# Patient Record
Sex: FEMALE | Race: WHITE
[De-identification: names, ages, dates, MRNs, and addresses within clinical notes are randomized per-mention and may not be internally consistent; named-entity substitution may affect disease eponyms.]

---

## 2017-12-01 ENCOUNTER — HOSPITAL ENCOUNTER (OUTPATIENT)
Dept: HOSPITAL 53 - M SDC | Age: 27
Discharge: HOME | End: 2017-12-01
Attending: OTOLARYNGOLOGY
Payer: COMMERCIAL

## 2017-12-01 VITALS — BODY MASS INDEX: 31.34 KG/M2 | WEIGHT: 195 LBS | HEIGHT: 66 IN

## 2017-12-01 VITALS — SYSTOLIC BLOOD PRESSURE: 134 MMHG | DIASTOLIC BLOOD PRESSURE: 84 MMHG

## 2017-12-01 DIAGNOSIS — R06.83: ICD-10-CM

## 2017-12-01 DIAGNOSIS — R19.6: ICD-10-CM

## 2017-12-01 DIAGNOSIS — F41.9: ICD-10-CM

## 2017-12-01 DIAGNOSIS — Z85.828: ICD-10-CM

## 2017-12-01 DIAGNOSIS — J35.8: ICD-10-CM

## 2017-12-01 DIAGNOSIS — Z91.048: ICD-10-CM

## 2017-12-01 DIAGNOSIS — Z91.013: ICD-10-CM

## 2017-12-01 DIAGNOSIS — M12.9: ICD-10-CM

## 2017-12-01 DIAGNOSIS — Z91.041: ICD-10-CM

## 2017-12-01 DIAGNOSIS — Z88.5: ICD-10-CM

## 2017-12-01 DIAGNOSIS — Z79.899: ICD-10-CM

## 2017-12-01 DIAGNOSIS — J35.01: Primary | ICD-10-CM

## 2017-12-01 DIAGNOSIS — Z79.3: ICD-10-CM

## 2017-12-01 LAB — CONTROL LINE UCG: (no result)

## 2017-12-01 PROCEDURE — 84703 CHORIONIC GONADOTROPIN ASSAY: CPT

## 2017-12-01 PROCEDURE — 88302 TISSUE EXAM BY PATHOLOGIST: CPT

## 2017-12-01 PROCEDURE — 42826 REMOVAL OF TONSILS: CPT

## 2017-12-01 NOTE — ROOPDOC
[facility] Report Of Operation


Report of Operation


DATE OF PROCEDURE: 12/1/17





PREPROCEDURE DIAGNOSES: [Chronic tonsillitis with tonsil stones and halitosis.].





POSTPROCEDURE DIAGNOSES: [Same].





PROCEDURE: [Tonsillectomy]. 





SURGEON: [Christophe Moss MD





ASSISTANT: [None], MD





ANESTHESIA: [Gen. via endotracheal tube].





ESTIMATED BLOOD LOSS: Approximately [less than 1] mL. 





COMPLICATIONS: [None]. 





REMARKS: [Small tonsil stones present.].





PROCEDURE NOTE: [With the patient in the supine position after being induced 

and intubated, prepped and draped in usual fashion. The Gabino mouth gag with a 

grooved tongue blade was placed in the oral cavity and a red rubber Shen 

Salsa was placed in the right nasal cavity, brought out through the oral cavity 

for soft palate retraction. Patient then had the Coblation E VAC 70 wand used 

with Coblation settings of 7 and coag setting of 3. A curved tonsillar Allis 

clamp was used to medialize left tonsil and was dissected out of the tonsil 

fossa without any significant issues. A similar fashion, the right side was 

also dissected out without any issues. There was little bit more scar tissue in 

the capsule on the right side compared to the left side. There is no bleeding. 

Tonsil sponges were used to irritate the tonsillar fossae. No bleeding. An 

injection of 2 mL of 1% lidocaine, 0.5% bupivacaine was injected into the 

tonsillar fossa. There is no problems present. Patient tolerated the procedure 

well. There is no bleeding with Valsalva's. Control of the patient was turned 

over to the anesthetist]. 





DESCRIPTION OF PROCEDURE: .











CHRISTOPHE GLOVER MD Dec 1, 2017 13:14

## 2019-09-13 ENCOUNTER — HOSPITAL ENCOUNTER (OUTPATIENT)
Dept: HOSPITAL 53 - M LAB LCGH | Age: 29
End: 2019-09-13
Attending: PHYSICIAN ASSISTANT

## 2019-09-13 DIAGNOSIS — D22.4: Primary | ICD-10-CM

## 2021-11-19 ENCOUNTER — HOSPITAL ENCOUNTER (OUTPATIENT)
Dept: HOSPITAL 53 - M OPP | Age: 31
Discharge: HOME | End: 2021-11-19
Attending: INTERNAL MEDICINE
Payer: COMMERCIAL

## 2021-11-19 VITALS — WEIGHT: 153 LBS | BODY MASS INDEX: 24.59 KG/M2 | HEIGHT: 66 IN

## 2021-11-19 VITALS — SYSTOLIC BLOOD PRESSURE: 116 MMHG | DIASTOLIC BLOOD PRESSURE: 80 MMHG

## 2021-11-19 DIAGNOSIS — K64.8: ICD-10-CM

## 2021-11-19 DIAGNOSIS — K29.70: ICD-10-CM

## 2021-11-19 DIAGNOSIS — K92.1: Primary | ICD-10-CM

## 2021-11-19 DIAGNOSIS — Q43.8: ICD-10-CM

## 2021-11-19 DIAGNOSIS — R12: ICD-10-CM

## 2021-11-19 PROCEDURE — 88305 TISSUE EXAM BY PATHOLOGIST: CPT

## 2021-11-19 PROCEDURE — 45378 DIAGNOSTIC COLONOSCOPY: CPT

## 2021-11-19 PROCEDURE — 43239 EGD BIOPSY SINGLE/MULTIPLE: CPT

## 2021-11-19 PROCEDURE — 91035 G-ESOPH REFLX TST W/ELECTROD: CPT

## 2021-11-19 NOTE — CCD
Summarization of Episode Note

                             Created on: 10/21/2021



JASON GANDARAAN

External Reference #: [id number]

: 1990

Sex: Female



Demographics





                          Address                   [address] [place]  [address]

 

                          Home Phone                [phone]

 

                          Preferred Language        Unknown

 

                          Marital Status            Unknown

 

                          [employer] Affiliation     Unknown

 

                          Race                      White

 

                          Ethnic Group              Not  or 





Author





                          Author                    [facility]

ems

 

                          Organization              [facility]

ems

 

                          Address                   Unknown

 

                          Phone                     Unavailable







Support





                Name            Relationship    Address         Phone

 

                    JASON GANDARA     GUAR                [address] [place]  [address] [phone]

 

                    NICHOL DIEGO     ECON                [address], [place]  [id number] [phone]







Care Team Providers





                    Care Team Member Name Role                Phone

 

                    Latia Olvera       Unavailable         [phone]







PROBLEMS





          Type      Condition ICD9-CM Code OEG16-WM Code Onset Dates Condition S

tatus W/U 

Status              Risk                SNOMED Code         Notes

 

       Problem Skin cancer screening        Z12.83        Active confirmed      

  [id number]  

 

       Problem Tinea versicolor        B36.0         Active confirmed        [id number]

[id number]  

 

        Problem History of dysplastic nevus         Z86.018         Active  conf

irmed         [id number]

                                         

 

       Problem Melanocytic nevus of skin        D22.9         Active confirmed  

      [id number]  







ALLERGIES





                    Allergen (clinical drug ingredient) Drug/Non Drug Allergy do

cumented on EMR 

Reaction            Allergy Type        Onset Date          Status

 

           Glucosamine Shellfish-derived Products Unknown    Drug Allergy       

     Active







ENCOUNTERS from 1990 to 2021-10-20





             Encounter    Location     Date         Provider     Diagnosis

 

                    [facility]    [address] [place] [address] [phone] [place] [address] 14 Oct, 

2021                      Latia Wade               Skin cancer screening Z12.83

 ; History of dysplastic nevus 

Z86.018 ; Tinea versicolor B36.0 and Melanocytic nevus of skin D22.9







IMMUNIZATIONS

No Information



SOCIAL HISTORY

Tobacco Use:



                    Social History Observation Description         Date

 

                    Details (start date - stop date) Never Smoker         



Sex Assigned At Birth:



                          Social History Observation Description

 

                          Sex Assigned At Birth     Unknown



Tobacco Use:



                    Question            Answer              Notes

 

                    Are you a:          never smoker         







REASON FOR REFERRAL

No Information



VITAL SIGNS





                    Weight              160.4 lbs           14 Oct, 2021

 

                    Weight-kg           72.76 kg            14 Oct, 2021

 

                    Height              66 in               14 Oct, 2021

 

                    BMI                 25.89 kg/m2         14 Oct, 2021

 

                    Blood pressure systolic 122 mm Hg           14 Oct, 2021

 

                    Blood pressure diastolic 74 mm Hg            14 Oct, 2021







MEDICATIONS





           Medication SIG (Take, Route, Frequency, Duration) Notes      Start Da

te End Date   

Status

 

                          Topiramate 25 MG          TAKE ONE TABLET BY MOUTH CHEVY

 MORNING AND TAKE TWO TABLETS BY 

MOUTH AT BEDTIME Oral for 90                                                 Act

casper

 

                Fluconazole 200 MG 1 tablet Orally Take one as needed for flare 

for 30 days                                                            Active

 

           Multi Vitamin Daily - 1 tablet Orally Once a day for 30 day(s)       

                           Not-Taking

 

                Flonase Allergy Relief 50 MCG/ACT 2 spray in each nostril Nasall

y Once a day                  

                                                    Active

 

                          Pregabalin 75 MG          (Schedule V Drug) TAKE ONE C

APSULE BY MOUTH TWICE A DAY MAXIMUM

DAILY DOSE   2 Oral for 30                                                 Activ

e

 

           Vitamin D-3 25 MCG (1000 UT) 1 capsule Orally Once a day for 30 day(s

)                                  

Active

 

                          Meloxicam 15 MG           TAKE ONE TABLET BY MOUTH CHEVY DAY AS NEEDED WITH FOOD Oral for 

30                                                              Active

 

                          Phentermine HCl 30 MG     (Schedule IV Drug) TAKE ONE 

CAPSULE BY MOUTH EVERY DAY 

BEFORE BREAKFAST MAXIMUM DAILY DOSE   1 CAPSULE Oral for 30                     

                            Active

 

           Omeprazole 20 MG as directed Oral bid                                

  Active

 

           Allegra Allergy 60 MG 1 tablet Orally Twice a day for 30 day(s)      

                            Active

 

           Magnesium 400 MG as directed Orally                                  

Active

 

             tiZANidine HCl 4 MG TAKE  two tabs LET BY MOUTH AT BEDTIME Oral onc

e a day                            

                                        Active







PROCEDURES

No Information



RESULTS

No Results



REASON FOR VISIT

6 month fbse 



MEDICAL (GENERAL) HISTORY





                    Type                Description         Date

 

                    Medical History     Fibromyalgia w/ Raynauds  

 

                    Surgical History    Labial excision-atypical nevi  

 

                    Surgical History    nerve stimulator     

 

                    Surgical History    forehead lesion excision-atypical nevi  







Goals Section

No Information



Health Concerns

No Information



MEDICAL EQUIPMENT

No Information



MENTAL STATUS

No Information



FUNCTIONAL STATUS

No Information



ASSESSMENTS





             Encounter Date Diagnosis    Assessment Notes Treatment Notes Treatm

ent Clinical 

Notes

 

                14 Oct, 2021    Skin cancer screening (ICD-10 - Z12.83)         

        



                                        



Patient counseled on signs and symptoms of skin cancer including ABCDE's of 
Melanoma. Patient counseled to wear sunscreen or use sun protective clothing 
when outdoors. Avoid peak hours of sun between 10-2. Patient instructed to call 
with any new or changing lesions.

 

                14 Oct, 2021    History of dysplastic nevus (ICD-10 - Z86.018)  

               



                                        



No evidence of recurrence.

 

                14 Oct, 2021    Tinea versicolor (ICD-10 - B36.0)               

  



                                        





 

                14 Oct, 2021    Melanocytic nevus of skin (ICD-10 - D22.9)      

           



                                        



Reminded to avoid the sun and tanning, use sun screens regularly when spending 
time out-of-doors, and perform self-examinations monthly to watch for any change
in the appearance of your moles. If you notice any changes in color, appearance,
symptoms of moles, you should contact the office for an appointment to have 
change evaluated as soon as possible.







PLAN OF TREATMENT

Medication



                Medication Name Sig             Start Date      Stop Date

 

                    Fluconazole 200 MG  1 tablet Orally Take one as needed for phuong ledesma for 30 days                                 



Treatment Notes



                    Assessment          Notes               Clinical Notes

 

                    Skin cancer screening                     Patient counseled 

on signs and symptoms of skin cancer 

including ABCDE's of Melanoma. Patient counseled to wear sunscreen or use sun 
protective clothing when outdoors. Avoid peak hours of sun between 10-2. Patient
instructed to call with any new or changing lesions.

 

                    History of dysplastic nevus                     No evidence 

of recurrence.

 

                    Melanocytic nevus of skin                     Reminded to av

oid the sun and tanning, use sun 

screens regularly when spending time out-of-doors, and perform self-examinations
monthly to watch for any change in the appearance of your moles. If you notice 
any changes in color, appearance, symptoms of moles, you should contact the 
office for an appointment to have change evaluated as soon as possible.



Next Appt



                                        Details

 

                                        As scheduled for April Reason:FBSE

 

                                        Provider Name:Latia Olvera, 2022 

02:45:00 PM, [address] [place] [address], 

[phone], [place], [address], [phone]



Follow Up:As scheduled for AprilFBSE



Insurance Providers





             Payer Name   Payer Address Payer Phone  Insured Name Patient Relati

onship to 

Insured                   Coverage Start Date       Coverage End Date

 

                    [employer] UTIVOLODYMYR LAGOS  [address] [facility] WILFREDO WHITT [facility] [address] 

[phone]    JASON GANDARA self

## 2021-11-19 NOTE — CCD
Continuity of Care Document (CCD)

                             Created on: 2021



Arjun Ansari

External Reference #: [id number]

: 1990

Sex: Female



Demographics





                          Address                   [address] [place] [address]  [address]

 

                          Home Phone                [phone]

 

                          Preferred Language        Unknown

 

                          Marital Status            Unknown

 

                          Druze Affiliation     Unknown

 

                          Race                      White

 

                          Ethnic Group              Not  or 





Author





                          Author                    Arjun MCDANIEL [facility]

 

                          Organization              Unknown

 

                          Address                   [address], [address]

[place]  [address]



 

                          Phone                     [phone]







Care Team Providers





                    Care Team Member Name Role                Phone

 

                    Ngozi Garcia COLIN AUTM                [phone]

 

                    Aman Hwang M.D.  AUTM                [phone]

 

                    Lucho Naylor MD AUTM                [phone]

51







Problems





                    Active Problems     Provider            Date

 

                    Allergic rhinitis   Christophe Alston MD  Onset: 2017

 

                    Tonsillar debris    Christophe Alston MD  Onset: 2017

 

                    Chronic tonsillitis Travis Holt II, PA-C Onset: 2018

 

                    Symptom of head and neck region Christophe Alston MD  Onset: 0

2018

 

                    Head and neck swelling Lucho Naylor MD     Onset: 2018







Social History





                Type            Date            Description     Comments

 

                Birth Sex                       Unknown          

 

                ETOH Use                        1 A Month        

 

                Tobacco Use     Start: Unknown  Patient has never smoked  

 

                Recreational Drug Use                 Denies Drug Use  







Allergies, Adverse Reactions, Alerts





             Active Allergies Criticality  Reaction | Severity Comments     Date

 

             NKDA         Unable to assess criticality                          

 2017

 

             Seafood      Unable to assess criticality                          

 2012







Medications





           Active Medications SIG        Qnty       Indications Ordering Provide

r Date

 

                                        Suprep Bowel Prep Kit                   

  17.5-3.13-1.6GM/177ML Solution        

                take per doctor's bowel prep instructions. 354ml           K21.9

           Aman Hwang MD                                      2021

 

                          Dulcolax                     5mg Tablets DR           

        take 4 tabs by 

mouth prior to procedure per instructions. 4tabs           K62.5           Aman Hwang MD 

2021

 

                                        Fluticasone Propionate                  

   50mcg/Act Suspension                 

             2 sprays to each nostril daily 48gm         H69.93       Lucho armijo MD 2021

 

                          Omeprazole                     20mg Capsules DR       

            20 mg by mouth

twice daily     180caps         K21.9           Lucho Naylor MD 2021

 

                          Pregabalin                     75mg Capsules          

         Take One Capsule 

By Mouth Twice A Day Maximum Daily Dose   2                                 Unkn

own         

 

                          Tizanidine HCL                     4mg Tablets        

           Take Two 

Tablets By Mouth AT Bedtime                                 Unknown         

 

                          Topiramate                     25mg Tablets           

        Take One Tablet By

Mouth Every Morning And Take Two Tablets By Mouth AT Bedtime                    

             Unknown         



 

                          Meloxicam                     15mg Tablets            

       Take One Tablet By 

Mouth Every Day  With Food                                 Unknown         

 

             Phentermine HCL                     30mg Capsules                  

 Daily                                  

Ngozi Garcia NP                     

 

                Magnesium Oxide                     400mg Tablets               

    every day                       

                          Unknown                   

 

                          Vitamin D3                     50mcg (2000 Ut) Tablets

                   1 tab 

by mouth every day with breakfast                                 Unknown       

  

 

                          Allegra Allergy                     180mg Tablets     

              1 by mouth 

every day                                       Unknown         







Immunizations





                                        Description

 

                                        No Information Available







Vital Signs





                Date            Vital           Result          Comment

 

                2021 12:51pm BP Systolic     112 mmHg         

 

                    BP Diastolic        82 mmHg              

 

                    Height              66 inches           5'6"

 

                    Weight              163.00 lb            

 

                    BMI (Body Mass Index) 26.3 kg/m2           

 

                    Ideal Body Weight   130 lb               

 

                    Weight              73.937 kg            

 

                    BSA (Body Surface Area) 1.83 m2              

 

                2021 10:04am Height          66 inches       5'6"

 

                    Weight              161.00 lb            

 

                    BMI (Body Mass Index) 26.0 kg/m2           

 

                    Ideal Body Weight   130 lb               

 

                    Weight              73.030 kg            

 

                    BSA (Body Surface Area) 1.82 m2              







Results





                                        Description

 

                                        No Information Available







Procedures





                Date            Code            Description     Status

 

                2021      84596           Office/Outpatient Established Lo

w MDM 20-29 Min Completed

 

                2021      28286           Endoscopy Nasal Diagnostic Compl

eted







Medical Devices





                                        Description

 

                                        No Information Available







Encounters





           Type       Date       Location   Provider   Dx         Diagnosis

 

           Office Visit 2021 10:30a [facility] Lucho Naylor MD

 H69.93     

Unspecified Eustachian tube disorder, bilateral

 

                          K21.9                     Gastro-esophageal reflux dis

ease without esophagitis







Assessments





                Date            Code            Description     Provider

 

                2021      K21.9           Gastro-esophageal reflux disease

 without esophagitis JINA Blancas

 

                2021      R13.10          Dysphagia, unspecified Jeanna Mcdaniel, RPA-C

 

                2021      K62.5           Hemorrhage of anus and rectum Me

mu LEBLANC Benji, [facility]

 

                2021      H69.93          Unspecified Eustachian tube diso

rder, bilateral Jeanna LEBLANC 

Benji, RPA-C

 

                2021      H69.93          Unspecified Eustachian tube diso

rder, bilateral Lucho Naylor MD

 

                2021      K21.9           Gastro-esophageal reflux disease

 without esophagitis Lucho Naylor MD







Plan of Treatment

Future Appointment(s):* 2021  2:15 pm - Lucho Naylor MD at [facility]

2021 - Jeanna LEBLANC Benji, [facility]* K21.9 Gastro-esophageal reflux disease
  without esophagitis

* R13.10 Dysphagia, unspecified

* K62.5 Hemorrhage of anus and rectum

* H69.93 Unspecified Eustachian tube disorder, bilateral

* * New Medication:* Suprep Bowel Prep Kit 17.5-3.13-1.6 GM/177ML



* New Orders:* EGD with Bravo pH probe (off meds for 3 days), Ordered: 21



* Comments:* Will arrange for upper endoscopy with Bravo pH probe and 
  colonoscopy. Reviewed risks and benefits of the procedures, as well as other 
  options, with the patient. Prep for this procedure was discussed with patient,
  including risks and side effects associated with the prep. Patient verbalized 
  understanding of all of the above and is in agreement to proceed. Patient will
  seek medical attention for any acute changes. Will monitor.



* Follow up:* As scheduled, sooner if needed.









Functional Status





                                        Description

 

                                        No Information Available







Mental Status





                                        Description

 

                                        No Information Available







Referrals





                Refer to      Reason for Referral Status          Appt Date

 

                Lopez Knight M.D. GERD            Scheduled       

 

                                        [facility]

 

                                        [address], [address]

 

                                        [place] [address] [phone]

 

                                          

 

                Lucho Naylor MD Ear pressure, right  Closed          

2021

 

                                        [address]

 

                                        [place] [id number] [phone]

## 2021-11-19 NOTE — CCD
Continuity of Care Document (CCD)

                             Created on: 10/13/2021



Arjun Ansari

External Reference #: [id number]

: 1990

Sex: Female



Demographics





                          Address                   [address] [place] [address]  [address]

 

                          Home Phone                [phone]

 

                          Preferred Language        Unknown

 

                          Marital Status            Unknown

 

                          Mu-ism Affiliation     Unknown

 

                          Race                      White

 

                          Ethnic Group              Not  or 





Author





                          Author                    Arjun MCDANIEL [facility]

 

                          Organization              Unknown

 

                          Address                   [address]

[place]  [address]



 

                          Phone                     [phone]







Care Team Providers





                    Care Team Member Name Role                Phone

 

                    Ngozi Garcia COLIN AUTM                [phone]

 

                    Aman Hwang M.D.  AUTM                [phone]

 

                    Lucho Naylor MD AUTM                [phone]

51







Problems





                    Active Problems     Provider            Date

 

                    Allergic rhinitis   Christophe Alston MD  Onset: 2017

 

                    Tonsillar debris    Christophe Alston MD  Onset: 2017

 

                    Chronic tonsillitis Travis Holt II, PA-C Onset: 2018

 

                    Symptom of head and neck region Christophe Alston MD  Onset: 0

2018

 

                    Head and neck swelling Lucho Naylor MD     Onset: 2018







Social History





                Type            Date            Description     Comments

 

                Birth Sex                       Unknown          

 

                ETOH Use                        1 A Month        

 

                Tobacco Use     Start: Unknown  Patient has never smoked  

 

                Recreational Drug Use                 Denies Drug Use  







Allergies and adverse reactions





             Active Allergies Criticality  Reaction | Severity Comments     Date

 

             NKDA         Unable to assess criticality                          

 2017

 

             Seafood      Unable to assess criticality                          

 2012







Medications





           Active Medications SIG        Qnty       Indications Ordering Provide

r Date

 

                                        Suprep Bowel Prep Kit                   

  17.5-3.13-1.6GM/177ML Solution        

                take per doctor's bowel prep instructions. 354ml           K21.9

           Aman Hwang MD                                      2021

 

                          Dulcolax                     5mg Tablets DR           

        take 4 tabs by 

mouth prior to procedure per instructions. 4tabs           K62.5           Aman Hwang MD 

2021

 

                                        Fluticasone Propionate                  

   50mcg/Act Suspension                 

             2 sprays to each nostril daily 48gm         H69.93       Lucho armijo MD 2021

 

                          Omeprazole                     20mg Capsules DR       

            20 mg by mouth

twice daily     180caps         K21.9           Lucho Naylor MD 2021

 

                          Pregabalin                     75mg Capsules          

         Take One Capsule 

By Mouth Twice A Day Maximum Daily Dose   2                                 Unkn

own         

 

                          Tizanidine HCL                     4mg Tablets        

           Take Two 

Tablets By Mouth AT Bedtime                                 Unknown         



 

                          Topiramate                     25mg Tablets           

        Take One Tablet By

Mouth Every Morning And Take Two Tablets By Mouth AT Bedtime                    

             Unknown         



 

                          Meloxicam                     15mg Tablets            

       Take One Tablet By 

Mouth Every Day  With Food                                 Unknown         

 

             Phentermine HCL                     30mg Capsules                  

 Daily                                  

Ngozi Garcia NP                     

 

                Magnesium Oxide                     400mg Tablets               

    every day                       

                          Unknown                   

 

                          Vitamin D3                     50mcg (2000 Ut) Tablets

                   1 tab 

by mouth every day with breakfast                                 Unknown       

  

 

                          Allegra Allergy                     180mg Tablets     

              1 by mouth 

every day                                       Unknown         







Immunizations





                                        Description

 

                                        No Information Available







Vital Signs





                Date            Vital           Result          Comment

 

                2021 12:51pm BP Systolic     112 mmHg         

 

                    BP Diastolic        82 mmHg              

 

                    Height              66 inches           5'6"

 

                    Weight              163.00 lb            

 

                    BMI (Body Mass Index) 26.3 kg/m2           

 

                    Ideal Body Weight   130 lb               

 

                    Weight              73.937 kg            

 

                    BSA (Body Surface Area) 1.83 m2              

 

                2021 10:04am Height          66 inches       5'6"

 

                    Weight              161.00 lb            

 

                    BMI (Body Mass Index) 26.0 kg/m2           

 

                    Ideal Body Weight   130 lb               

 

                    Weight              73.030 kg            

 

                    BSA (Body Surface Area) 1.82 m2              







Results





                                        Description

 

                                        No Information Available







Procedures





                Date            Code            Description     Status

 

                2021      78659           Office/Outpatient New Moderate M

DM 45-59 Minutes Completed

 

                2021      99754           Office/Outpatient Established Lo

w MDM 20-29 Min Completed

 

                2021      37084           Endoscopy Nasal Diagnostic Compl

eted







Medical Devices





                                        Description

 

                                        No Information Available







Encounters





           Type       Date       Location   Provider   Dx         Diagnosis

 

                Office Visit    2021  1:00p [facility]

lenore Mcdaniel, RPA-C         K21.9                     Gastro-esophageal reflux dis

ease without esophagitis

 

                          R13.10                    Dysphagia, unspecified

 

                          K62.5                     Hemorrhage of anus and rectu

m

 

                          H69.93                    Unspecified Eustachian tube 

disorder, bilateral

 

           Office Visit 2021 10:30a [facility] Lucho Naylor MD

 H69.93     

Unspecified Eustachian tube disorder, bilateral

 

                          K21.9                     Gastro-esophageal reflux dis

ease without esophagitis







Assessments





                Date            Code            Description     Provider

 

                2021      K21.9           Gastro-esophageal reflux disease

 without esophagitis Jeanna BENJI 

[facility] M[facility]iel[facility]

 

                2021      R13.10          Dysphagia, unspecified [facility]BLANC

 [facility]

 

                2021      K62.5           Hemorrhage of anus and rectum Me

mu BENJI Benji, [facility]

 

                2021      H69.93          Unspecified Eustachian tube diso

rder, bilateral Jaenna LEBLANC 

Benji, [facility]

 

                2021      H69.93          Unspecified Eustachian tube diso

rder, bilateral Lucho Naylor MD

 

                2021      K21.9           Gastro-esophageal reflux disease

 without esophagitis Lucho Naylor MD







Plan of Treatment

Future Appointment(s):* 2021  2:15 pm - Lucho Naylor MD at [facility]

2021 - Jeanna BENJI Benji [facility]* K21.9 Gastro-esophageal reflux disease
  without esophagitis

* R13.10 Dysphagia, unspecified

* K62.5 Hemorrhage of anus and rectum

* H69.93 Unspecified Eustachian tube disorder, bilateral

* * New Medication:* Suprep Bowel Prep Kit 17.5-3.13-1.6 GM/177ML



* New Orders:* EGD with Bravo pH probe (off meds for 3 days), Ordered: 21



* Comments:* Will arrange for upper endoscopy with Bravo pH probe and 
  colonoscopy. Reviewed risks and benefits of the procedures, as well as other 
  options, with the patient. Prep for this procedure was discussed with patient,
  including risks and side effects associated with the prep. Patient verbalized 
  understanding of all of the above and is in agreement to proceed. Patient will
  seek medical attention for any acute changes. Will monitor.



* Follow up:* As scheduled, sooner if needed.









Functional Status





                                        Description

 

                                        No Information Available







Mental Status





                                        Description

 

                                        No Information Available







Referrals





                Refer to      Reason for Referral Status          Appt Date

 

                Lopez Knight M.D. GERD            Scheduled       

 

                                        [facility]

 

                                        [address]

 

                                        [place] [address] [phone]

 

                                          

 

                Lucho Naylor MD Ear pressure, right  Closed          

2021

 

                                        [address]

 

                                        [place] [address] [phone]

## 2021-11-19 NOTE — CCD
Continuity of Care Document (CCD)

                             Created on: 2021



Arjun Ansari

External Reference #: [id number]

: 1990

Sex: Female



Demographics





                          Address                   [address] [place] [address]  [address]

 

                          Home Phone                [phone]

 

                          Preferred Language        Unknown

 

                          Marital Status            Unknown

 

                          Oriental orthodox Affiliation     Unknown

 

                          Race                      White

 

                          Ethnic Group              Not  or 





Author





                          Author                    Arjun MCDANIEL [facility]

 

                          Organization              Unknown

 

                          Address                   [address]

[place]  [address]



 

                          Phone                     [phone]







Care Team Providers





                    Care Team Member Name Role                Phone

 

                    Ngozi Garcia COLIN AUTM                [phone]

 

                    Aman Hwang M.D.  AUTM                [phone]

 

                    Lucho Naylor MD AUTM                [phone]

51







Problems





                    Active Problems     Provider            Date

 

                    Allergic rhinitis   Christophe Alston MD  Onset: 2017

 

                    Tonsillar debris    Christophe Alston MD  Onset: 2017

 

                    Chronic tonsillitis Travis Holt II, PA-C Onset: 2018

 

                    Symptom of head and neck region Christophe Alston MD  Onset: 0

2018

 

                    Head and neck swelling Lucho Naylor MD     Onset: 2018







Social History





                Type            Date            Description     Comments

 

                Birth Sex                       Unknown          

 

                ETOH Use                        1 A Month        

 

                Tobacco Use     Start: Unknown  Patient has never smoked  

 

                Recreational Drug Use                 Denies Drug Use  







Allergies, Adverse Reactions, Alerts





             Active Allergies Criticality  Reaction | Severity Comments     Date

 

             NKDA         Unable to assess criticality                          

 2017

 

             Seafood      Unable to assess criticality                          

 2012







Medications





           Active Medications SIG        Qnty       Indications Ordering Provide

r Date

 

                                        Suprep Bowel Prep Kit                   

  17.5-3.13-1.6GM/177ML Solution        

                take per doctor's bowel prep instructions. 354ml           K21.9

           Aman Hwang MD                                      2021

 

                          Dulcolax                     5mg Tablets DR           

        take 4 tabs by 

mouth prior to procedure per instructions. 4tabs           K62.5           Aman Hwang MD 

2021

 

                                        Fluticasone Propionate                  

   50mcg/Act Suspension                 

             2 sprays to each nostril daily 48gm         H69.93       Lucho armijo MD 2021

 

                          Omeprazole                     20mg Capsules DR       

            20 mg by mouth

twice daily     180caps         K21.9           Lucho Naylor MD 2021

 

                          Pregabalin                     75mg Capsules          

         Take One Capsule 

By Mouth Twice A Day Maximum Daily Dose   2                                 Unkn

own         

 

                          Tizanidine HCL                     4mg Tablets        

           Take Two 

Tablets By Mouth AT Bedtime                                 Unknown         

 

                          Topiramate                     25mg Tablets           

        Take One Tablet By

Mouth Every Morning And Take Two Tablets By Mouth AT Bedtime                    

             Unknown         



 

                          Meloxicam                     15mg Tablets            

       Take One Tablet By 

Mouth Every Day  With Food                                 Unknown         

 

             Phentermine HCL                     30mg Capsules                  

 Daily                                  

Ngozi Garcia NP                     

 

                Magnesium Oxide                     400mg Tablets               

    every day                       

                          Unknown                   

 

                          Vitamin D3                     50mcg (2000 Ut) Tablets

                   1 tab 

by mouth every day with breakfast                                 Unknown       

  

 

                          Allegra Allergy                     180mg Tablets     

              1 by mouth 

every day                                       Unknown         







Immunizations





                                        Description

 

                                        No Information Available







Vital Signs





                Date            Vital           Result          Comment

 

                2021 12:51pm BP Systolic     112 mmHg         

 

                    BP Diastolic        82 mmHg              

 

                    Height              66 inches           5'6"

 

                    Weight              163.00 lb            

 

                    BMI (Body Mass Index) 26.3 kg/m2           

 

                    Ideal Body Weight   130 lb               

 

                    Weight              73.937 kg            

 

                    BSA (Body Surface Area) 1.83 m2              

 

                2021 10:04am Height          66 inches       5'6"

 

                    Weight              161.00 lb            

 

                    BMI (Body Mass Index) 26.0 kg/m2           

 

                    Ideal Body Weight   130 lb               

 

                    Weight              73.030 kg            

 

                    BSA (Body Surface Area) 1.82 m2              







Results





                                        Description

 

                                        No Information Available







Procedures





                Date            Code            Description     Status

 

                2021      23877           Office/Outpatient Established Lo

w MDM 20-29 Min Completed

 

                2021      57576           Endoscopy Nasal Diagnostic Compl

eted







Medical Devices





                                        Description

 

                                        No Information Available







Encounters





           Type       Date       Location   Provider   Dx         Diagnosis

 

           Office Visit 2021 10:30a [facility] Lucho Naylor MD

 H69.93     

Unspecified Eustachian tube disorder, bilateral

 

                          K21.9                     Gastro-esophageal reflux dis

ease without esophagitis







Assessments





                Date            Code            Description     Provider

 

                2021      K21.9           Gastro-esophageal reflux disease

 without esophagitis JINA Blancas

 

                2021      R13.10          Dysphagia, unspecified Jeanna Mcdaniel, RPA-C

 

                2021      K62.5           Hemorrhage of anus and rectum Me

mu LEBLANC Benji, [facility]

 

                2021      H69.93          Unspecified Eustachian tube diso

rder, bilateral Jeanna LEBLANC 

Benji, RPA-C

 

                2021      H69.93          Unspecified Eustachian tube diso

rder, bilateral Lucho Naylor MD

 

                2021      K21.9           Gastro-esophageal reflux disease

 without esophagitis Lucho Naylor MD







Plan of Treatment

Future Appointment(s):* 2021  2:15 pm - Lucho Naylor MD at [facility]

2021 - Jeanna LEBLANC Benji, [facility]* K21.9 Gastro-esophageal reflux disease
  without esophagitis

* R13.10 Dysphagia, unspecified

* K62.5 Hemorrhage of anus and rectum

* H69.93 Unspecified Eustachian tube disorder, bilateral

* * New Medication:* Suprep Bowel Prep Kit 17.5-3.13-1.6 GM/177ML



* New Orders:* EGD with Bravo pH probe (off meds for 3 days), Ordered: 21



* Comments:* Will arrange for upper endoscopy with Bravo pH probe and 
  colonoscopy. Reviewed risks and benefits of the procedures, as well as other 
  options, with the patient. Prep for this procedure was discussed with patient,
  including risks and side effects associated with the prep. Patient verbalized 
  understanding of all of the above and is in agreement to proceed. Patient will
  seek medical attention for any acute changes. Will monitor.



* Follow up:* As scheduled, sooner if needed.









Functional Status





                                        Description

 

                                        No Information Available







Mental Status





                                        Description

 

                                        No Information Available







Referrals





                Refer to      Reason for Referral Status          Appt Date

 

                Lopez Knight M.D. GERD            Scheduled       

 

                                        [facility]

 

                                        [address]

 

                                        [place] [address] [phone]

 

                                          

 

                Lucho Naylor MD Ear pressure, right  Closed          

2021

 

                                        [address]

 

                                        [place] [id number] [phone]

## 2021-11-19 NOTE — CCD
Continuity of Care Document (CCD)

                             Created on: 2021



Arjun Ansari

External Reference #: [id number]

: 1990

Sex: Female



Demographics





                          Address                   [address] [place] [id number]

 

                          Home Phone                [phone]

 

                          Preferred Language        Unknown

 

                          Marital Status            Unknown

 

                          Muslim Affiliation     Unknown

 

                          Race                      White

 

                          Ethnic Group              Not  or 





Author





                          Author                    Arjun MCDANIEL [facility]

 

                          Organization              Unknown

 

                          Address                   [address], [address]

[place]  [address]



 

                          Phone                     [phone]







Care Team Providers





                    Care Team Member Name Role                Phone

 

                    Ngozi Garcia COLIN AUTM                [phone]

 

                    Aman Hwang M.D.  AUTM                [phone]

 

                    Lucho Naylor MD AUTM                [phone]

51







Problems





                    Active Problems     Provider            Date

 

                    Allergic rhinitis   Christophe Alston MD  Onset: 2017

 

                    Tonsillar debris    Christophe Alston MD  Onset: 2017

 

                    Chronic tonsillitis Travis Holt II, PA-C Onset: 2018

 

                    Symptom of head and neck region Christophe Alston MD  Onset: 0

2018

 

                    Head and neck swelling Lucho Naylor MD     Onset: 2018







Social History





                Type            Date            Description     Comments

 

                Birth Sex                       Unknown          

 

                ETOH Use                        1 A Month        

 

                Tobacco Use     Start: Unknown  Patient has never smoked  

 

                Recreational Drug Use                 Denies Drug Use  







Allergies, Adverse Reactions, Alerts





             Active Allergies Criticality  Reaction | Severity Comments     Date

 

             NKDA         Unable to assess criticality                          

 2017

 

             Seafood      Unable to assess criticality                          

 2012







Medications





           Active Medications SIG        Qnty       Indications Ordering Provide

r Date

 

                                        Suprep Bowel Prep Kit                   

  17.5-3.13-1.6GM/177ML Solution        

                take per doctor's bowel prep instructions. 354ml           K21.9

           Aman Hwang MD                                      2021

 

                          Dulcolax                     5mg Tablets DR           

        take 4 tabs by 

mouth prior to procedure per instructions. 4tabs           K62.5           Aman Hwang MD 

2021

 

                                        Fluticasone Propionate                  

   50mcg/Act Suspension                 

             2 sprays to each nostril daily 48gm         H69.93       Lucho armijo MD 2021

 

                          Omeprazole                     20mg Capsules DR       

            20 mg by mouth

twice daily     180caps         K21.9           Lucho Naylor MD 2021

 

                          Pregabalin                     75mg Capsules          

         Take One Capsule 

By Mouth Twice A Day Maximum Daily Dose   2                                 Unkn

own         

 

                          Tizanidine HCL                     4mg Tablets        

           Take Two 

Tablets By Mouth AT Bedtime                                 Unknown         

 

                          Topiramate                     25mg Tablets           

        Take One Tablet By

Mouth Every Morning And Take Two Tablets By Mouth AT Bedtime                    

             Unknown         



 

                          Meloxicam                     15mg Tablets            

       Take One Tablet By 

Mouth Every Day  With Food                                 Unknown         

 

             Phentermine HCL                     30mg Capsules                  

 Daily                                  

Ngozi Garcia NP                     

 

                Magnesium Oxide                     400mg Tablets               

    every day                       

                          Unknown                   

 

                          Vitamin D3                     50mcg (2000 Ut) Tablets

                   1 tab 

by mouth every day with breakfast                                 Unknown       

  

 

                          Allegra Allergy                     180mg Tablets     

              1 by mouth 

every day                                       Unknown         







Immunizations





                                        Description

 

                                        No Information Available







Vital Signs





                Date            Vital           Result          Comment

 

                2021 12:51pm BP Systolic     112 mmHg         

 

                    BP Diastolic        82 mmHg              

 

                    Height              66 inches           5'6"

 

                    Weight              163.00 lb            

 

                    BMI (Body Mass Index) 26.3 kg/m2           

 

                    Ideal Body Weight   130 lb               

 

                    Weight              73.937 kg            

 

                    BSA (Body Surface Area) 1.83 m2              

 

                2021 10:04am Height          66 inches       5'6"

 

                    Weight              161.00 lb            

 

                    BMI (Body Mass Index) 26.0 kg/m2           

 

                    Ideal Body Weight   130 lb               

 

                    Weight              73.030 kg            

 

                    BSA (Body Surface Area) 1.82 m2              







Results





                                        Description

 

                                        No Information Available







Procedures





                Date            Code            Description     Status

 

                2021      33427           Office/Outpatient Established Lo

w MDM 20-29 Min Completed

 

                2021      59242           Endoscopy Nasal Diagnostic Compl

eted







Medical Devices





                                        Description

 

                                        No Information Available







Encounters





           Type       Date       Location   Provider   Dx         Diagnosis

 

           Office Visit 2021 10:30a [facility] Lucho Naylor MD

 H69.93     

Unspecified Eustachian tube disorder, bilateral

 

                          K21.9                     Gastro-esophageal reflux dis

ease without esophagitis







Assessments





                Date            Code            Description     Provider

 

                2021      K21.9           Gastro-esophageal reflux disease

 without esophagitis JINA Blancas

 

                2021      R13.10          Dysphagia, unspecified Jeanna Mcdaniel, RPA-C

 

                2021      K62.5           Hemorrhage of anus and rectum Me

mu LEBLANC Benji, [facility]

 

                2021      H69.93          Unspecified Eustachian tube diso

rder, bilateral Jeanna LEBLANC 

Benji, RPA-C

 

                2021      H69.93          Unspecified Eustachian tube diso

rder, bilateral Lucho Naylor MD

 

                2021      K21.9           Gastro-esophageal reflux disease

 without esophagitis Lucho Naylor MD







Plan of Treatment

Future Appointment(s):* 2021  2:15 pm - Lucho Naylor MD at [facility]

2021 - Jeanna LEBLANC Benji, [facility]* K21.9 Gastro-esophageal reflux disease
  without esophagitis

* R13.10 Dysphagia, unspecified

* K62.5 Hemorrhage of anus and rectum

* H69.93 Unspecified Eustachian tube disorder, bilateral

* * New Medication:* Suprep Bowel Prep Kit 17.5-3.13-1.6 GM/177ML



* New Orders:* EGD with Bravo pH probe (off meds for 3 days), Ordered: 21



* Comments:* Will arrange for upper endoscopy with Bravo pH probe and 
  colonoscopy. Reviewed risks and benefits of the procedures, as well as other 
  options, with the patient. Prep for this procedure was discussed with patient,
  including risks and side effects associated with the prep. Patient verbalized 
  understanding of all of the above and is in agreement to proceed. Patient will
  seek medical attention for any acute changes. Will monitor.



* Follow up:* As scheduled, sooner if needed.









Functional Status





                                        Description

 

                                        No Information Available







Mental Status





                                        Description

 

                                        No Information Available







Referrals





                Refer to      Reason for Referral Status          Appt Date

 

                Lopez Knight M.D. GERD            Scheduled       

 

                                        [facility]

 

                                        [address], [address]

 

                                        [place] [address] [phone]

 

                                          

 

                Lucho Naylor MD Ear pressure, right  Closed          

2021

 

                                        [address]

 

                                        [place] [id number] [phone]

## 2021-11-19 NOTE — ROOR
________________________________________________________________________________

Patient Name: Arjun Ansari           Procedure Date: 11/19/2021 2:02 PM

MRN: [id number]                          Account Number: [id number]

YOB: 1990              Age: 31

Room: [facility]                            Gender: Female

Note Status: Finalized                 

________________________________________________________________________________

 

Procedure:            Colonoscopy

Indications:          Hematochezia

Providers:            Lopez Knight MD

Referring MD:         Ngozi Garcia, Lopez Knight MD

Requesting Provider:  

Medicines:            Monitored Anesthesia Care

Complications:        No immediate complications.

________________________________________________________________________________

Procedure:            Pre-Anesthesia Assessment:

                      - Prior to the procedure, a History and Physical was 

                      performed, and patient medications and allergies were 

                      reviewed. The patient is competent. The risks and 

                      benefits of the procedure and the sedation options and 

                      risks were discussed with the patient. All questions 

                      were answered and informed consent was obtained. Patient 

                      identification and proposed procedure were verified by 

                      the physician, the nurse and the anesthesiologist in the 

                      procedure room. Mental Status Examination: alert and 

                      oriented. Airway Examination: normal oropharyngeal 

                      airway and neck mobility. Respiratory Examination: clear 

                      to auscultation. CV Examination: normal. Prophylactic 

                      Antibiotics: The patient does not require prophylactic 

                      antibiotics. Prior Anticoagulants: The patient has taken 

                      no previous anticoagulant or antiplatelet agents. ASA 

                      Grade Assessment: III - A patient with severe systemic 

                      disease. After reviewing the risks and benefits, the 

                      patient was deemed in satisfactory condition to undergo 

                      the procedure. The anesthesia plan was to use monitored 

                      anesthesia care (MAC). Immediately prior to 

                      administration of medications, the patient was 

                      re-assessed for adequacy to receive sedatives. The heart 

                      rate, respiratory rate, oxygen saturations, blood 

                      pressure, adequacy of pulmonary ventilation, and 

                      response to care were monitored throughout the 

                      procedure. The physical status of the patient was 

                      re-assessed after the procedure.

                      The Colonoscope was introduced through the anus and 

                      advanced to the terminal ileum, with identification of 

                      the appendiceal orifice and IC valve. The colonoscopy 

                      was performed without difficulty. The patient tolerated 

                      the procedure well. The quality of the bowel preparation 

                      was good. The terminal ileum, ileocecal valve, 

                      appendiceal orifice, and rectum were photographed. Scope 

                      insertion time was 2 minutes. Scope withdrawal time was 

                      9 minutes. The total duration of the procedure was 12 

                      minutes.

                                                                                

Findings:

     The perianal and digital rectal examinations were normal.

     The terminal ileum appeared normal.

     The cecum was significantly tortuous.

     Non-bleeding external and internal hemorrhoids were found during 

     retroflexion. The hemorrhoids were medium-sized.

                                                                                

Impression:           - The examined portion of the ileum was normal.

                      - Tortuous colon.

                      - Non-bleeding external and internal hemorrhoids.

                      - No specimens collected.

Recommendation:       - Patient has a contact number available for 

                      emergencies. The signs and symptoms of potential delayed 

                      complications were discussed with the patient. Return to 

                      normal activities tomorrow. Written discharge 

                      instructions were provided to the patient.

                      - High fiber diet.

                      - Continue present medications.

                      - Repeat colonoscopy at age 51 for screening purposes.

                      - Use fiber, for example Citrucel, Fibercon, Konsyl or 

                      Metamucil.

                      - Return to GI clinic if persistent symptoms or new 

                      symptoms.

                      - Return to primary care physician.

                                                                                

Procedure Code(s):    --- Professional ---

                      87359, Colonoscopy, flexible; diagnostic, including 

                      collection of specimen(s) by brushing or washing, when 

                      performed (separate procedure)

Diagnosis Code(s):    --- Professional ---

                      K64.8, Other hemorrhoids

                      K92.1, Melena (includes Hematochezia)

                      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 

                      intestine

 

CPT copyright 2019 [employer]. All rights reserved.

 

The codes documented in this report are preliminary and upon  review may 

be revised to meet current compliance requirements.

 

Lopez Knight MD

_______________________

Lopez Knight MD

11/19/2021 2:54:20 PM

Electronically signed by Lopez Knight MD

Number of Addenda: 0

 

Note Initiated On: 11/19/2021 2:02 PM

Estimated Blood Loss: Estimated blood loss was minimal.

## 2021-11-19 NOTE — CCD
Summarization Of Episode

                             Created on: 2021



ARJUN GANDARA

External Reference #: [id number]

: 1990

Sex: Undifferentiated



Demographics





                          Address                   [address] [place] [address]  [address]

 

                          Home Phone                [phone]

 

                          Preferred Language        English

 

                          Marital Status            Unknown

 

                          Church Affiliation     Unknown

 

                          Race                      Unknown

 

                          Ethnic Group              Not  or 





Author





                          Author                    [facility]

 

                          Organization              [facility]

 

                          Address                   Unknown

 

                          Phone                     Unavailable







Support





                Name            Relationship    Address         Phone

 

                    RCIL                Next Of Kin         [address] [place]  [address]                        [phone]

 

                    THE SHANTAL HAWLEY Next Of Kin         [address] [place]  [address]                        [phone]

 

                    ABDI GANDARA  Next Of Kin         [address] [place] [address]  [address]                   [phone]

 

                    DIEGO GANDARA      Next Of Kin         [address] [place] [address]  [address]              [phone]

 

                    SHANTAL GARCIA Next Of Kin         [address] [place]  [address]                        -

 

                    DIEGO GANDARA    Next Of Kin         [address] [place] [address]  [address]                   [phone]

 

                    MICHAELA ADAIR Next Of Kin         [address] [place]  [address]                   [phone]

 

                    DIEGO GANDARA  [phone] Next Of Kin         [address] [place] [address]  [address]              [phone]

 

                ST              Next Of Kin     Unknown         Unavailable

 

                    SHEA GANDARA     Next Of Kin         [address] [place] [address]  [address]              [phone]

 

                    CONTACT, OTHER NO   Next Of Kin         -

                                        -

                          -, [place]  -                  -

 

                    DIEGO GANDARA     Next Of Kin         [address] [place] [address]  [address]              [phone]

 

                    [employer]              Next Of Kin         [address] [place]  [address]                    [phone]

 

                    DIEGO GANDARA    [address]  [address]                   Unavailable







Care Team Providers





                    Care Team Member Name Role                Phone

 

                    BENJI Leblanc RPA C Unavailable         Unavailable

 

                    Charlebois, A Jeanna RPA C Unavailable         Unavailable

 

                    Charlebois, A Jeanna RPA C Unavailable         Unavailable

 

                    Charlebois, A Jeanna RPA C Unavailable         Unavailable

 

                    Charlebois, A Jeanna RPA C Unavailable         Unavailable

 

                    Charlebois, A Jeanna RPA C Unavailable         Unavailable

 

                    Charlebois, A Jeanna RPA C Unavailable         Unavailable

 

                    Charlebois, A Jeanna RPA C Unavailable         Unavailable

 

                    Charlebois, A Jeanna RPA C Unavailable         Unavailable

 

                    Charlebois, A Jeanna RPA C Unavailable         Unavailable

 

                    Charlebois, A Jeanna RPA C Unavailable         Unavailable

 

                    Charlebois, A Jeanna RPA C Unavailable         Unavailable

 

                    Charlebois, A Jeanna RPA C Unavailable         Unavailable

 

                    Charlebois, A Jeanna RPA C Unavailable         Unavailable

 

                    Charlebois, A Jeanna RPA C Unavailable         Unavailable

 

                    Charlebois, A Jeanna RPA C Unavailable         Unavailable

 

                    Charlebois, A Jeanna RPA C Unavailable         Unavailable

 

                    Charlebois, A Jeanna RPA C Unavailable         Unavailable

 

                    Charlebois, A Jeanna RPA C Unavailable         Unavailable

 

                    Charlebois, A Jeanna RPA C Unavailable         Unavailable

 

                    Charlebois, A Jeanna RPA C Unavailable         Unavailable

 

                    Charlebois, A Jeanna RPA C Unavailable         Unavailable

 

                    Charlebois, A Jeanna RPA C Unavailable         Unavailable

 

                    Charlebois, A Jeanna RPA C Unavailable         Unavailable

 

                    Charlebois, A Jeanna RPA C Unavailable         Unavailable

 

                    Charlebois, A Jeanna RPA C Unavailable         Unavailable

 

                    Charlebois, A Jeanna RPA C Unavailable         Unavailable

 

                    Charlebois, A Jeanna RPA C Unavailable         Unavailable

 

                    Charlebois, A Jeanna RPA C Unavailable         Unavailable

 

                    Charlebois, A Jeanna RPA C Unavailable         Unavailable

 

                    Charlebois, A Jeanna RPA C Unavailable         Unavailable

 

                    Charlebois, A Jeanna RPA C Unavailable         Unavailable

 

                    Charlebois, A Jeanna RPA C Unavailable         Unavailable

 

                    Goldiner,  Lev      Unavailable         Unavailable

 

                    Goldiner,  Lev      Unavailable         Unavailable

 

                    Goldiner,  Lev      Unavailable         Unavailable

 

                    Goldiner,  Lev      Unavailable         Unavailable

 

                    Goldiner,  Lev      Unavailable         Unavailable

 

                    Goldiner,  Lev      Unavailable         Unavailable

 

                    Goldiner,  Lev      Unavailable         Unavailable

 

                    Goldiner,  Lev      Unavailable         Unavailable

 

                    Goldiner,  Lev      Unavailable         Unavailable

 

                    Goldiner,  Lev      Unavailable         Unavailable

 

                    Goldiner,  Lev      Unavailable         Unavailable

 

                    Goldiner,  Lev      Unavailable         Unavailable

 

                    Goldiner,  Lev      Unavailable         Unavailable

 

                    Goldiner,  Lev      Unavailable         Unavailable

 

                    Goldiner,  Lev      Unavailable         Unavailable

 

                    Goldiner,  Lev      Unavailable         Unavailable

 

                    Goldiner,  Lev      Unavailable         Unavailable

 

                    Goldiner,  Lev      Unavailable         Unavailable

 

                    Goldiner,  Lev      Unavailable         Unavailable

 

                    Goldiner,  Lev      Unavailable         Unavailable

 

                    Goldiner,  Lev      Unavailable         Unavailable

 

                    Goldiner,  Lev      Unavailable         Unavailable

 

                    Goldiner,  Lev      Unavailable         Unavailable

 

                    Goldiner,  Lev      Unavailable         Unavailable

 

                    Goldiner,  Lev      Unavailable         Unavailable

 

                    Goldiner,  Lev      Unavailable         Unavailable

 

                    Goldiner,  Lev      Unavailable         Unavailable

 

                    Goldiner,  Lev      Unavailable         Unavailable

 

                    Goldiner,  Lev      Unavailable         Unavailable

 

                    Goldiner,  Lev      Unavailable         Unavailable

 

                    Goldiner,  Lev      Unavailable         Unavailable

 

                    Goldiner,  Lev      Unavailable         Unavailable

 

                    Goldiner,  Lev      Unavailable         Unavailable

 

                    Goldiner,  Lev      Unavailable         Unavailable

 

                    Goldiner,  Lev      Unavailable         Unavailable

 

                    Goldiner,  Lev      Unavailable         Unavailable

 

                    Goldiner,  Lev      Unavailable         Unavailable

 

                    Goldiner,  Lev      Unavailable         Unavailable

 

                    Goldiner,  Lev      Unavailable         Unavailable

 

                    Goldiner,  Lev      Unavailable         Unavailable

 

                    Goldiner,  Lev      Unavailable         Unavailable

 

                    Goldiner,  Lev      Unavailable         Unavailable

 

                    Goldiner,  Lev      Unavailable         Unavailable

 

                    Goldiner,  Lev      Unavailable         Unavailable

 

                    Goldiner,  Lev      Unavailable         Unavailable

 

                    Goldiner,  Lev      Unavailable         Unavailable

 

                    Birchenough, R Alecia DO Unavailable         Unavailable

 

                    Birchenough, R Alecia DO Unavailable         Unavailable

 

                    Birchenough, R Alecia DO Unavailable         Unavailable

 

                    Birchenough, R Alecia DO Unavailable         Unavailable

 

                    Birchenough, R Alecia DO Unavailable         Unavailable

 

                    Birchenough, R Alecia DO Unavailable         Unavailable

 

                    Birchenough, R Alecia DO Unavailable         Unavailable

 

                    Birchenough, R Alecia DO Unavailable         Unavailable

 

                    Birchenough, R Alecia DO Unavailable         Unavailable

 

                    Birchenough, R Alecia DO Unavailable         Unavailable

 

                    Birchenough, R Alecia DO Unavailable         Unavailable

 

                    Birchenough, R Alecia DO Unavailable         Unavailable

 

                    Birchenough, R Alecia DO Unavailable         Unavailable

 

                    Birchenough, R Alecia DO Unavailable         Unavailable

 

                    Birchenough, R Alecia DO Unavailable         Unavailable

 

                    Birchenough, R Alecia DO Unavailable         Unavailable

 

                    Birchenough, R Alecia DO Unavailable         Unavailable

 

                    Birchenough, R Alecia DO Unavailable         Unavailable

 

                    Birchenough, R Alecia DO Unavailable         Unavailable

 

                    NADIYA JONES MD  Unavailable         Unavailable

 

                    NADIYA JONES MD  Unavailable         Unavailable

 

                    NADIYA JONES MD  Unavailable         Unavailable

 

                    NADIYA JONES MD  Unavailable         Unavailable

 

                    NADIYA JONES MD  Unavailable         Unavailable

 

                    NADIYA JONES MD  Unavailable         Unavailable

 

                    NADIYA JONES MD  Unavailable         Unavailable

 

                    NADIYA JONES MD  Unavailable         Unavailable

 

                    NADIYA JONES MD  Unavailable         Unavailable

 

                    NADIYA JONES MD  Unavailable         Unavailable

 

                    NADIYA JONES MD  Unavailable         Unavailable

 

                    NADIYA JONES MD  Unavailable         Unavailable

 

                    NADIYA JONES MD  Unavailable         Unavailable

 

                    NADIYA JONES MD  Unavailable         Unavailable

 

                    NADIYA JONES MD  Unavailable         Unavailable

 

                    NADIYA JONES MD  Unavailable         Unavailable

 

                    NADIYA JONES MD  Unavailable         Unavailable

 

                    NADIYA JONES MD  Unavailable         Unavailable

 

                    NADIYA JONES MD  Unavailable         Unavailable

 

                    NADIYA JONES MD  Unavailable         Unavailable

 

                    NADIYA JONES MD  Unavailable         Unavailable

 

                    NADIYA JONES MD  Unavailable         Unavailable

 

                    NADIYA JONES MD  Unavailable         Unavailable

 

                    NADIYA JONES MD  Unavailable         Unavailable

 

                    NADIYA JONES MD  Unavailable         Unavailable

 

                    KAREN,  NADIYA MD  Unavailable         Unavailable

 

                    KAREN,  NADIYA MD  Unavailable         Unavailable

 

                    KAREN,  NADIYA MD  Unavailable         Unavailable

 

                    KAREN,  NADIYA MD  Unavailable         Unavailable

 

                    KAREN,  NADIYA MD  Unavailable         Unavailable

 

                    KAREN,  NADIYA MD  Unavailable         Unavailable

 

                    KAREN,  NADIYA MD  Unavailable         Unavailable

 

                    KAREN,  NADIYA MD  Unavailable         Unavailable

 

                    KAREN,  NADIYA MD  Unavailable         Unavailable

 

                    KAREN,  NADIYA MD  Unavailable         Unavailable

 

                    KAREN,  NADIYA MD  Unavailable         Unavailable

 

                    KAREN,  NADIYA MD  Unavailable         Unavailable

 

                    KAREN,  NADIYA MD  Unavailable         Unavailable

 

                    KAREN,  NADIYA MD  Unavailable         Unavailable

 

                    KAREN,  NADIYA MD  Unavailable         Unavailable

 

                    KAREN,  NADIYA MD  Unavailable         Unavailable

 

                    KAREN,  NADIYA MD  Unavailable         Unavailable

 

                    KAREN,  NADIYA MD  Unavailable         Unavailable

 

                    KAREN,  NADIYA MD  Unavailable         Unavailable

 

                    KAREN,  NADIYA MD  Unavailable         Unavailable

 

                    KAREN,  NADIYA MD  Unavailable         Unavailable

 

                    KAREN,  NADIYA MD  Unavailable         Unavailable

 

                    KAREN,  NADIYA MD  Unavailable         Unavailable

 

                    KAREN,  NADIYA MD  Unavailable         Unavailable

 

                    KAREN,  NADIYA MD  Unavailable         Unavailable

 

                    KAREN,  NADIYA MD  Unavailable         Unavailable

 

                    KAREN,  NADIYA MD  Unavailable         Unavailable

 

                    KAREN,  NADIYA MD  Unavailable         Unavailable

 

                    KAREN,  NADIYA MD  Unavailable         Unavailable

 

                    KAREN,  NADIYA MD  Unavailable         Unavailable

 

                    KAREN,  NADIYA MD  Unavailable         Unavailable

 

                    KAREN,  NADIYA MD  Unavailable         Unavailable

 

                    KAREN,  NADIYA MD  Unavailable         Unavailable

 

                    KAREN,  NADIYA MD  Unavailable         Unavailable

 

                    KAREN,  NADIYA MD  Unavailable         Unavailable

 

                    KAREN,  NADIYA MD  Unavailable         Unavailable

 

                    KAREN,  NADIYA MD  Unavailable         Unavailable

 

                    KAREN,  NADIYA MD  Unavailable         Unavailable

 

                    KAREN,  NADIYA MD  Unavailable         Unavailable

 

                    KAREN,  NADIYA MD  Unavailable         Unavailable

 

                    KAREN,  NADIYA MD  Unavailable         Unavailable

 

                    KAREN,  NADIYA MD  Unavailable         Unavailable

 

                    KAREN,  NADIYA MD  Unavailable         Unavailable

 

                    KAREN,  NADIYA MD  Unavailable         Unavailable

 

                    KAREN,  NADIYA MD  Unavailable         Unavailable

 

                    KAREN,  NADIYA MD  Unavailable         Unavailable

 

                    KAREN,  NADIYA MD  Unavailable         Unavailable

 

                    KAREN,  NADIYA MD  Unavailable         Unavailable

 

                    KAREN,  NADIYA MD  Unavailable         Unavailable

 

                    KAREN,  NADIYA MD  Unavailable         Unavailable

 

                    KARNE,  NADIYA MD  Unavailable         Unavailable

 

                    So,  Ambar FNP  Unavailable         Unavailable

 

                    So,  Ambar FNP  Unavailable         Unavailable

 

                    So,  Ambar FNP  Unavailable         Unavailable

 

                    So,  Ambar FNP  Unavailable         Unavailable

 

                    So,  Ambar FNP  Unavailable         Unavailable

 

                    So,  Ambar FNP  Unavailable         Unavailable

 

                    So,  Ambar FNP  Unavailable         Unavailable

 

                    So,  Ambar FNP  Unavailable         Unavailable

 

                    So,  Ambar FNP  Unavailable         Unavailable

 

                    So,  Ambar FNP  Unavailable         Unavailable

 

                    So,  Ambar FNP  Unavailable         Unavailable

 

                    So,  Ambar FNP  Unavailable         Unavailable

 

                    So,  Ambar FNP  Unavailable         Unavailable

 

                    So,  Ambar FNP  Unavailable         Unavailable

 

                    So,  Ambar FNP  Unavailable         Unavailable

 

                    So,  Ambar FNP  Unavailable         Unavailable

 

                    So,  Ambar FNP  Unavailable         Unavailable

 

                    HILTON,  MARCO A FNP Unavailable         Unavailable

 

                    HILTON,  MARCO A FNP Unavailable         Unavailable

 

                    HILTON,  MARCO A FNP Unavailable         Unavailable

 

                    HILTON,  MARCO A FNP Unavailable         Unavailable

 

                    HILTON,  MARCO A FNP Unavailable         Unavailable

 

                    HILTON,  MARCO A FNP Unavailable         Unavailable

 

                    HILTON,  MARCO A FNP Unavailable         Unavailable

 

                    HILTON,  MARCO A FNP Unavailable         Unavailable

 

                    HILTON,  MARCO A FNP Unavailable         Unavailable

 

                    HILTON,  MARCO A FNP Unavailable         Unavailable

 

                    HILTON,  MARCO A FNP Unavailable         Unavailable

 

                    HILTON,  MARCO A FNP Unavailable         Unavailable

 

                    HILTON,  MARCO A FNP Unavailable         Unavailable

 

                    HILTON,  MARCO A FNP Unavailable         Unavailable

 

                    HILTON,  MARCO A FNP Unavailable         Unavailable

 

                    HILTON,  MARCO A FNP Unavailable         Unavailable

 

                    HILTON,  MARCO A FNP Unavailable         Unavailable

 

                    HILTON,  MARCO A FNP Unavailable         Unavailable

 

                    HILTON,  MARCO A FNP Unavailable         Unavailable

 

                    HILTON,  MARCO A FNP Unavailable         Unavailable

 

                    HILTON,  MARCO A FNP Unavailable         Unavailable

 

                    HILTON,  MARCO A FNP Unavailable         Unavailable

 

                    HILTON,  MARCO A FNP Unavailable         Unavailable

 

                    HILTON,  MARCO A FNP Unavailable         Unavailable

 

                    HILTON,  MARCO A FNP Unavailable         Unavailable

 

                    HILTON,  MARCO A FNP Unavailable         Unavailable

 

                    HILTON,  MARCO A FNP Unavailable         Unavailable

 

                    HILTON,  MARCO A FNP Unavailable         Unavailable

 

                    HILTON,  MARCO A FNP Unavailable         Unavailable

 

                    HILTON,  MARCO A FNP Unavailable         Unavailable

 

                    HILTON,  MARCO A FNP Unavailable         Unavailable

 

                    HILTON,  MARCO A FNP Unavailable         Unavailable

 

                    HILTON,  MARCO A FNP Unavailable         Unavailable

 

                    HILTON,  MARCO A FNP Unavailable         Unavailable

 

                    HILTON,  MARCO A FNP Unavailable         Unavailable

 

                    HILTON,  MARCO A FNP Unavailable         Unavailable

 

                    HILTON,  MARCO A FNP Unavailable         Unavailable

 

                    HILTON,  MARCO A FNP Unavailable         Unavailable

 

                    HILTON,  MARCO A FNP Unavailable         Unavailable

 

                    HILTON,  MARCO A FNP Unavailable         Unavailable

 

                    HILTON,  MARCO A FNP Unavailable         Unavailable

 

                    HILTON,  MARCO A FNP Unavailable         Unavailable

 

                    HILTON,  MARCO A FNP Unavailable         Unavailable

 

                    HILTON,  MARCO A FNP Unavailable         Unavailable

 

                    HILTON,  MARCO A FNP Unavailable         Unavailable

 

                    HILTON,  MARCO A FNP Unavailable         Unavailable

 

                    HILTON,  MARCO A FNP Unavailable         Unavailable

 

                    HILTON,  MARCO A FNP Unavailable         Unavailable

 

                    HILTON,  MARCO A FNP Unavailable         Unavailable

 

                    HILTON,  MARCO A FNP Unavailable         Unavailable

 

                    HILTON,  MARCO A FNP Unavailable         Unavailable

 

                    HILTON,  MARCO A FNP Unavailable         Unavailable

 

                    HILTON,  MARCO A FNP Unavailable         Unavailable

 

                    HILTON,  MARCO A FNP Unavailable         Unavailable

 

                    HILTON,  MARCO A FNP Unavailable         Unavailable

 

                    HILTON,  MARCO A FNP Unavailable         Unavailable

 

                    HILTON,  MARCO A FNP Unavailable         Unavailable

 

                    HILTON,  MARCO A FNP Unavailable         Unavailable

 

                    Visingardi, C Zonia PA Unavailable         Unavailable

 

                    Visingardi, C Zonia PA Unavailable         Unavailable

 

                    Visingardi, C Zonia PA Unavailable         Unavailable

 

                    Visingardi, C Zonia PA Unavailable         Unavailable

 

                    Visingardi, C Zonia PA Unavailable         Unavailable

 

                    Visingardi, C Zonia PA Unavailable         Unavailable

 

                    Visingardi, C Zonia PA Unavailable         Unavailable

 

                    Visingardi, C Zonia PA Unavailable         Unavailable

 

                    Visingardi, C Zonia PA Unavailable         Unavailable

 

                    Visingardi, C Zonia PA Unavailable         Unavailable

 

                    Visingardi, C Zonia PA Unavailable         Unavailable

 

                    Visingardi, C Zonia PA Unavailable         Unavailable

 

                    Visingardi, C Zonia PA Unavailable         Unavailable

 

                    Visingardi, C Zonia PA Unavailable         Unavailable

 

                    IHLTON,  MARCO A FNP Unavailable         Unavailable

 

                    HILTON,  MARCO A FNP Unavailable         Unavailable

 

                    HILTON,  MARCO A FNP Unavailable         Unavailable

 

                    HILTON,  MARCO A FNP Unavailable         Unavailable

 

                    HILTON,  MARCO A FNP Unavailable         Unavailable

 

                    HILTON,  MARCO A FNP Unavailable         Unavailable

 

                    HILTON,  MARCO A FNP Unavailable         Unavailable

 

                    HILTON,  MARCO A FNP Unavailable         Unavailable

 

                    HILTON,  MARCO A FNP Unavailable         Unavailable

 

                    HILTON,  MARCO A FNP Unavailable         Unavailable

 

                    HILTON,  MARCO A FNP Unavailable         Unavailable

 

                    HILTON,  MARCO A FNP Unavailable         Unavailable

 

                    HILTON,  MARCO A FNP Unavailable         Unavailable

 

                    HILTON,  MARCO A FNP Unavailable         Unavailable

 

                    HILTON,  MARCO A FNP Unavailable         Unavailable

 

                    HILTON,  MARCO A FNP Unavailable         Unavailable

 

                    HILTON,  MARCO A FNP Unavailable         Unavailable

 

                    HILTON,  MARCO A FNP Unavailable         Unavailable

 

                    HILTON,  MARCO A FNP Unavailable         Unavailable

 

                    HILTON,  MARCO A FNP Unavailable         Unavailable

 

                    HILTON,  MARCO A FNP Unavailable         Unavailable

 

                    IHLTON,  MARCO A FNP Unavailable         Unavailable

 

                    HILTON,  MARCO A FNP Unavailable         Unavailable

 

                    HILTON,  MARCO A FNP Unavailable         Unavailable

 

                    IHLTON,  MARCO A FNP Unavailable         Unavailable

 

                    HILTON,  MARCO A FNP Unavailable         Unavailable

 

                    HILTON,  MARCO A FNP Unavailable         Unavailable

 

                    HILTON,  MARCO A FNP Unavailable         Unavailable

 

                    HILTON,  MARCO A FNP Unavailable         Unavailable

 

                    HILTON,  MARCO A FNP Unavailable         Unavailable

 

                    HILTON,  MARCO A FNP Unavailable         Unavailable

 

                    HILTON,  MARCO A FNP Unavailable         Unavailable

 

                    HILTON,  MARCO A FNP Unavailable         Unavailable

 

                    HILTON,  MARCO A FNP Unavailable         Unavailable

 

                    HILTON,  MARCO A FNP Unavailable         Unavailable

 

                    HILTON,  MARCO A FNP Unavailable         Unavailable

 

                    HILTON,  MARCO A FNP Unavailable         Unavailable

 

                    HILTON,  MARCO A FNP Unavailable         Unavailable

 

                    HILTON,  MARCO A FNP Unavailable         Unavailable

 

                    HILTON,  MARCO A FNP Unavailable         Unavailable

 

                    HILTON,  MARCO A FNP Unavailable         Unavailable

 

                    HILTON,  MARCO A FNP Unavailable         Unavailable

 

                    HILTON,  MARCO A FNP Unavailable         Unavailable

 

                    HILTON,  MARCO A FNP Unavailable         Unavailable

 

                    HILTON,  MARCO A FNP Unavailable         Unavailable

 

                    HILTON,  MARCO A FNP Unavailable         Unavailable

 

                    HILTON,  MARCO A FNP Unavailable         Unavailable

 

                    HILTON,  MARCO A FNP Unavailable         Unavailable

 

                    HILTON,  MARCO A FNP Unavailable         Unavailable

 

                    HILTON,  MARCO A FNP Unavailable         Unavailable

 

                    HILTON,  MARCO A FNP Unavailable         Unavailable

 

                    HILTON,  MARCO A FNP Unavailable         Unavailable

 

                    HILTON,  MARCO A FNP Unavailable         Unavailable

 

                    HILTON,  MARCO A FNP Unavailable         Unavailable

 

                    HILTON,  MARCO A FNP Unavailable         Unavailable

 

                    HILTON,  MARCO A FNP Unavailable         Unavailable

 

                    HILTON,  MARCO A FNP Unavailable         Unavailable

 

                    HILTON,  MARCO A FNP Unavailable         Unavailable

 

                    DEEPA NAYLOR MD   Unavailable         Unavailable

 

                    DEEPA NAYLOR MD   Unavailable         Unavailable

 

                    DEEPA NAYLOR MD   Unavailable         Unavailable

 

                    DEEPA NAYLOR MD   Unavailable         Unavailable

 

                    DEEPA NAYLOR MD   Unavailable         Unavailable

 

                    DEEPA NAYLOR MD   Unavailable         Unavailable

 

                    DEEPA NAYLOR MD   Unavailable         Unavailable

 

                    DEEPA NAYLOR MD   Unavailable         Unavailable

 

                    DEEPA NAYLOR MD   Unavailable         Unavailable

 

                    DEEPA NAYLOR MD   Unavailable         Unavailable

 

                    DEEPA NAYLOR MD   Unavailable         Unavailable

 

                    DEEPA NAYLOR MD   Unavailable         Unavailable

 

                    DEEPA NAYLOR MD   Unavailable         Unavailable

 

                    DEEPA NAYLOR MD   Unavailable         Unavailable

 

                    DEEPA NAYLOR MD   Unavailable         Unavailable

 

                    DEEPA NAYLOR MD   Unavailable         Unavailable

 

                    DEEPA NAYLOR MD   Unavailable         Unavailable

 

                    DEEPA NAYLOR MD   Unavailable         Unavailable

 

                    DEEPA NAYLOR MD   Unavailable         Unavailable

 

                    DEEPA NAYLOR MD   Unavailable         Unavailable

 

                    DEEPA NAYLOR MD   Unavailable         Unavailable

 

                    DEEPA NAYLOR MD   Unavailable         Unavailable

 

                    DEEPA NAYLOR MD   Unavailable         Unavailable

 

                    DEEPA NAYLOR MD   Unavailable         Unavailable

 

                    DEEPA NAYLOR MD   Unavailable         Unavailable

 

                    DEEPA NAYLOR MD   Unavailable         Unavailable

 

                    DEEPA NAYLOR MD   Unavailable         Unavailable

 

                    DEEPA NAYLOR MD   Unavailable         Unavailable

 

                    DEEPA NAYLOR MD   Unavailable         Unavailable

 

                    DEEPA NAYLOR MD   Unavailable         Unavailable

 

                    DEEPA NAYLOR MD   Unavailable         Unavailable

 

                    DEEPA NAYLOR MD   Unavailable         Unavailable

 

                    DEEPA NAYLOR MD   Unavailable         Unavailable

 

                    DEEPA NAYLOR MD   Unavailable         Unavailable

 

                    Goldiner,  Lev      Unavailable         Unavailable

 

                    Goldiner,  Lev      Unavailable         Unavailable

 

                    Goldiner,  Lev      Unavailable         Unavailable

 

                    Goldiner,  Lev      Unavailable         Unavailable

 

                    Goldiner,  Lev      Unavailable         Unavailable

 

                    Goldiner,  Lev      Unavailable         Unavailable

 

                    Goldiner,  Lev      Unavailable         Unavailable

 

                    Goldiner,  Lev      Unavailable         Unavailable

 

                    Goldiner,  Lev      Unavailable         Unavailable

 

                    Goldiner,  Lev      Unavailable         Unavailable

 

                    Goldiner,  Lev      Unavailable         Unavailable

 

                    Goldiner,  Lev      Unavailable         Unavailable

 

                    Goldiner,  Lev      Unavailable         Unavailable

 

                    Goldiner,  Lev      Unavailable         Unavailable

 

                    Goldiner,  Lev      Unavailable         Unavailable

 

                    Goldiner,  Lev      Unavailable         Unavailable

 

                    Goldiner,  Lev      Unavailable         Unavailable

 

                    Goldiner,  Lev      Unavailable         Unavailable

 

                    Goldiner,  Lev      Unavailable         Unavailable

 

                    Goldiner,  Lev      Unavailable         Unavailable

 

                    Goldiner,  Lev      Unavailable         Unavailable

 

                    Goldiner,  Lev      Unavailable         Unavailable

 

                    Goldiner,  Lev      Unavailable         Unavailable

 

                    Goldiner,  Lev      Unavailable         Unavailable

 

                    Goldiner,  Lev      Unavailable         Unavailable

 

                    Goldiner,  Lev      Unavailable         Unavailable

 

                    Goldiner,  Lev      Unavailable         Unavailable

 

                    Goldiner,  Lev      Unavailable         Unavailable

 

                    Goldiner,  Lev      Unavailable         Unavailable

 

                    Goldiner,  Lev      Unavailable         Unavailable

 

                    Goldiner,  Lev      Unavailable         Unavailable

 

                    Goldiner,  Lev      Unavailable         Unavailable

 

                    Goldiner,  Lev      Unavailable         Unavailable

 

                    Goldiner,  Lev      Unavailable         Unavailable

 

                    Goldiner,  Lev      Unavailable         Unavailable

 

                    Goldiner,  Lev      Unavailable         Unavailable

 

                    Goldiner,  Lev      Unavailable         Unavailable

 

                    Goldiner,  Lev      Unavailable         Unavailable

 

                    Goldiner,  Lev      Unavailable         Unavailable

 

                    Goldiner,  Lev      Unavailable         Unavailable

 

                    Goldiner,  Lev      Unavailable         Unavailable

 

                    Goldiner,  Lev      Unavailable         Unavailable

 

                    Goldiner,  Lev      Unavailable         Unavailable

 

                    Goldiner,  Lev      Unavailable         Unavailable

 

                    Goldiner,  Lev      Unavailable         Unavailable

 

                    Goldiner,  Lev      Unavailable         Unavailable



                                  



Re-disclosure Warning

          The records that you are about to access may contain information from 
federally-assisted alcohol or drug abuse programs. If such information is 
present, then the following federally mandated warning applies: This information
has been disclosed to you from records protected by federal confidentiality 
rules (42 CFR part 2). The federal rules prohibit you from making any further 
disclosure of this information unless further disclosure is expressly permitted 
by the written consent of the person to whom it pertains or as otherwise 
permitted by 42 CFR part 2. A general authorization for the release of medical 
or other information is NOT sufficient for this purpose. The Federal rules 
restrict any use of the information to criminally investigate or prosecute any 
alcohol or drug abuse patient.The records that you are about to access may 
contain highly sensitive health information, the redisclosure of which is 
protected by Article 27-F of the [facility] Public Health law. If you 
continue you may have access to information: Regarding HIV / AIDS; Provided by 
facilities licensed or operated by the [facility] Office of Mental Health; 
or Provided by the [facility] Office for People With Developmental 
Disabilities. If such information is present, then the following [place] 
mandated warning applies: This information has been disclosed to you from 
confidential records which are protected by state law. State law prohibits you 
from making any further disclosure of this information without the specific 
written consent of the person to whom it pertains, or as otherwise permitted by 
law. Any unauthorized further disclosure in violation of state law may result in
a fine or prison sentence or both. A general authorization for the release of 
medical or other information is NOT sufficient authorization for further disc
losure.                                                                         
    



Family History

          



             Family Member Name Family Member Gender Family Member Status Date o

f Status 

Description                             Data Source(s)

 

           Unknown    Unknown    Problem                          MED[facility] ([facility], )



                                                                                
       



Encounters

          



           Encounter  Providers  Location   Date       Indications Data Source(s

)

 

           Outpatient                       11/15/2021 05:11:44 PM EST - 11/15/2

021 05:25:45 PM EST            [facility]

([facility])

 

           CLINIC     Attender: Gokul SILVESTRE-YOUSIFNS 10/25/2021 12:00:00 A

M EDT            [facility]

 

                Outpatient                      [address] [place] [address] 10/14/2021 12:00:00 AM

EDT                                                 eCW1 ([facility])

 

                Outpatient      Attender: Jeanna Naylor/Bobo/BENJI dockery/Jaiden 

2021 01:00:00 PM EDT                           MEDENT ([facility] ZACKERY joyner, )

 

                Outpatient      Attender: LEATHA Naylor/Bobo/Eber/Gregoria milian 2021 10:30:00

AM EDT                                              MEDENT ([facility] Sara julian, )

 

           Outpatient Attender: NADIYA JONES MD Main Office 07/15/2021 02:00:00

 PM EDT            

MEDENT ([facility])

 

                OUTPATIENT      Attender: MARCO A UMNAZORP 5F-BA            03:26:17 PM EDT - 

2021 03:53:08 PM EDT                           [facility]

 

           CLINIC     Attender: Gokul SILVESTRE-YOUSIFNS 2021 12:00:00 A

M EDT            [facility]

 

           OUTPATIENT            5F-BA      2021 11:51:33 AM EDT          

  [facility]

 

                Outpatient      Attender: Ambar Judah Fregoso/ A.M[facility]POz salinas 2021 03:30:00

PM EDT                                              [facility] ([facility] [place]

 

                Outpatient      Attender: MARCO A CANELA FNPReferrer: MARCO A SHAW                 2021

01:29:00 PM EDT           Consult                   [facility]

l

 

                                        Consult 

 

                Unknown                         [address]

[place] [address] 2021 12:00:00 AM 

EDT                                                 eCW1 ([facility])

 

                Outpatient                      [address] [place]

[place] [address] 2021 12:00:00 AM

EDT                                                 eCW1 ([facility])

 

           CLINIC     Attender: Gokul ARNOLD 2021 12:00:00 A

M EDT            [facility]

 

                Outpatient      Attender: MARCO A CANELA [facility] -BA           2021 07:57:13 AM EST - 

2021 09:49:13 AM EST                           [facility]

 

           OUTPATIENT            5F-BA      2021 12:16:55 PM EST          

  [facility]

 

           Outpatient Attender: NADIYA JONES MD Main Office 01/15/2021 09:30:00

 AM EST            

MEDRUSTY ([facility])

 

           CLINIC     Attender: Gokul ARNOLD 2020 12:00:00 A

M EST            [facility]

 

                Outpatient      Attender: lAecia MEEKS

2020 02:08:00 PM EST - 

2020 03:45:00 PM EST                           [facility]

 

                      Attender: MARCO A CANELA [facility] 5F-BA      2020 03:46:4

3 PM EST            [facility]

 

                Outpatient      Attender: Zonia Fregoso/ A.M.POz kirk 2020 

01:30:00 PM EST                                     [facility] ([facility] [place]

 

                OUTPATIENT      Attender: MARCO A CANELA FNP 5F-BA           2020 09:04:00 AM EST - 

2020 10:01:04 AM EST                           [facility]

 

                Outpatient                      [address] [place]

[place] [address] 10/01/2020 12:00:00 AM

EDT                                                 eCW1 ([facility])

 

           CLINIC     Attender: Gokul SCHULTZ.ES-SDB.NS 2020 12:00:00 A

M EDT            [facility]

 

                Outpatient      Attender: Gokul HarveyReferrer: Gokul Harvey    

             2020 01:00:00 PM

EDT - 10/05/2020 10:45:00 AM EDT Dysphagia                 [facility]

 

                                        Dysphagia 

 

                                        Patient discharged. 



                                                                                
                                                                                
                                                                                
                                                                                
    



Immunizations

          



             Vaccine      Date         Status       Description  Data Source(s)

 

             COVID-19 VACCINE Pfizer 10/02/2021 12:00:00 AM EDT completed       

          [facility]

 

                                        Vaccine Series Complete: YESThis Data wa

s Submitted to [facility] Via [facility]. 

 

             COVID-19 VACCINE Pfizer 2021 12:00:00 AM EDT completed       

          [facility]

 

                                        Vaccine Series Complete: YESThis Data wa

s Submitted to [facility] [employer]. 

 

             COVID-19 VACCINE Pfizer 2021 12:00:00 AM EDT completed       

          [facility]

 

                                        Vaccine Series Complete: NOThis Data was

 Submitted to [facility] Via [employer]. 



                                                                                
                           



Medications

          



          Medication Brand Name Start Date Product Form Dose      Route     Admi

nistrative 

Instructions Pharmacy Instructions Status     Indications Reaction   Description

 Data 

Source(s)

 

          30 mg               2021 12:00:00 AM EST capsule   30           

       TAKE ONE CAPSULE BY MOUTH EVERY 

MORNING BEFORE BREAKFAST MAXIMUM DAILY DOSE = 1 TAKE ONE CAPSULE BY MOUTH EVERY 

MORNING BEFORE BREAKFAST MAXIMUM DAILY DOSE = 1 SOLD: 2021                

                        [facility]

 

          200 mg              10/15/2021 12:00:00 AM EDT tablet    4            

       TAKE ONE TABLET BY MOUTH AS 

NEEDED FOR [employer]      TAKE ONE TABLET BY MOUTH AS NEEDED FOR [employer] SOLD:

 

10/17/2021                                                      [facility]

 

          30 mg               10/05/2021 12:00:00 AM EDT capsule   30           

       TAKE 1 CAPSULE BY MOUTH EVERY 

MORNING BEFORE BREAKFAST MAXIMUM DAILY DOSE = 1 TAKE 1 CAPSULE BY MOUTH EVERY 

MORNING BEFORE BREAKFAST MAXIMUM DAILY DOSE = 1 SOLD: 10/08/2021                

                        [facility]

 

                    SUPREP BOWEL PREP KIT 17.5-3.13-1.6 gram SODIUM, POTASSIUM,M

AG SULFATES 

2021 12:00:00 AM EDT recon soln      354                             TAKE 

PER DOCTOR'S BOWEL PREP 

INSTRUCTIONS TAKE PER DOCTOR'S BOWEL PREP INSTRUCTIONS SOLD: 10/02/2021         

                         

[facility]

 

                    Bisacodyl 5 MG Delayed Release Oral Tablet [Dulcolax] Dulcol

ax            2021 

12:00:00 AM EDT               ORAL                 active                      M

EDENT ([facility], 

)

 

        Suprep Bowel Prep Kit Suprep Bowel Prep Kit 2021 12:00:00 AM EDT  

                                

             active                                              MEDENT ([facility], )

 

          15 mg               2021 12:00:00 AM EDT tablet    30           

       TAKE ONE TABLET BY MOUTH EVERY 

DAY AS NEEDED WITH FOOD   TAKE ONE TABLET BY MOUTH EVERY DAY AS NEEDED WITH FOOD

 

SOLD: 10/17/2021                                                 [facility]

 

          15 mg               2021 12:00:00 AM EDT tablet    30           

       TAKE ONE TABLET BY MOUTH EVERY 

DAY AS NEEDED WITH FOOD   TAKE ONE TABLET BY MOUTH EVERY DAY AS NEEDED WITH FOOD

 

SOLD: 2021                                                 [facility]

 

          30 mg               2021 12:00:00 AM EDT capsule   30           

       TAKE ONE CAPSULE BY MOUTH EVERY 

DAY BEFORE BREAKFAST MAXIMUM DAILY DOSE = 1 TAKE ONE CAPSULE BY MOUTH EVERY DAY 

BEFORE BREAKFAST MAXIMUM DAILY DOSE = 1 SOLD: 2021                        

                [facility]

 

                          Fluticasone propionate 0.05 MG/ACTUAT Metered Dose Adrian

al Spray 50 mcg/actuation 

FLUTICASONE PROPIONATE 2021 12:00:00 AM EDT spray,suspension 48           

             SPRAY 

TWO SPRAYS IN EACH NOSTRIL EVERY DAY SPRAY TWO SPRAYS IN EACH NOSTRIL EVERY DAY 

SOLD: 2021                                                 [facility]

 

          50 mcg/actuation           2021 12:00:00 AM EDT spray,suspension

 48                  SPRAY TWO 

SPRAYS IN EACH NOSTRIL EVERY DAY SPRAY TWO SPRAYS IN EACH NOSTRIL EVERY DAY 

SOLD: 2021                                                 [facility]

 

          20 mg               2021 12:00:00 AM EDT capsule,delayed release

(DR/EC) 180                 TAKE ONE

 CAPSULE BY MOUTH TWICE A DAY TAKE ONE CAPSULE BY MOUTH TWICE A DAY SOLD: 

2021                                                      [facility]

 

          20 mg               2021 12:00:00 AM EDT capsule,delayed release

(DR/EC) 180                 TAKE ONE

 CAPSULE BY MOUTH TWICE A DAY TAKE ONE CAPSULE BY MOUTH TWICE A DAY SOLD: 

2021                                                      [facility]

 

                    Omeprazole 20 MG Delayed Release Oral Capsule Omeprazole    

      2021 12:00:00 AM 

EDT                  ORAL                 active                      MEDENT ([facility], )

 

          Fluticasone Propionate Fluticasone Propionate 2021 12:00:00 AM E

DT                                

                      active                                      MEDENT ([facility], )

 

          30 mg               2021 12:00:00 AM EDT capsule   25           

       TAKE ONE CAPSULE BY MOUTH EVERY 

DAY BEFORE BREAKFAST MAXIMUM DAILY DOSE = 1 TAKE ONE CAPSULE BY MOUTH EVERY DAY 

BEFORE BREAKFAST MAXIMUM DAILY DOSE = 1 SOLD: 2021                        

                [facility]

 

          30 mg               2021 12:00:00 AM EDT capsule   5            

       TAKE ONE CAPSULE BY MOUTH EVERY 

DAY BEFORE BREAKFAST MAXIMUM DAILY DOSE = 1 TAKE ONE CAPSULE BY MOUTH EVERY DAY 

BEFORE BREAKFAST MAXIMUM DAILY DOSE = 1 SOLD: 2021                        

                [facility]

 

           tizanidine 4 MG Oral Tablet TIZANIDINE HCL 07/15/2021 12:00:00 AM EDT

 tablet     60         

                    TAKE TWO TABLETS BY MOUTH AT BEDTIME TAKE TWO TABLETS BY LEONARD

TH AT BEDTIME 

SOLD: 2021                                                 [facility]

 

           tizanidine 4 MG Oral Tablet TIZANIDINE HCL 07/15/2021 12:00:00 AM EDT

 tablet     60         

                    TAKE TWO TABLETS BY MOUTH AT BEDTIME TAKE TWO TABLETS BY LEONARD

TH AT BEDTIME 

SOLD: 2021                                                 [facility]

 

           tizanidine 4 MG Oral Tablet TIZANIDINE HCL 07/15/2021 12:00:00 AM EDT

 tablet     60         

                    TAKE TWO TABLETS BY MOUTH AT BEDTIME TAKE TWO TABLETS BY LEONARD

TH AT BEDTIME 

SOLD: 10/17/2021                                                 [facility]

 

          75 mg               07/15/2021 12:00:00 AM EDT capsule   60           

       TAKE ONE CAPSULE BY MOUTH TWICE 

A DAY MAXIMUM DAILY DOSE = 2            TAKE ONE CAPSULE BY MOUTH TWICE A DAY MA

JUSTIN DAILY

 DOSE = 2    SOLD: 2021                                        [facility]

s

 

          75 mg               07/15/2021 12:00:00 AM EDT capsule   60           

       TAKE ONE CAPSULE BY MOUTH TWICE 

A DAY MAXIMUM DAILY DOSE = 2            TAKE ONE CAPSULE BY MOUTH TWICE A DAY MA

XIMUM DAILY

 DOSE = 2    SOLD: 2021                                        [facility]

 

           tizanidine 4 MG Oral Tablet TIZANIDINE HCL 07/15/2021 12:00:00 AM EDT

 tablet     60         

                    TAKE TWO TABLETS BY MOUTH AT BEDTIME TAKE TWO TABLETS BY LEONARD

TH AT BEDTIME 

SOLD: 2021                                                 [facility]

 

          75 mg               07/15/2021 12:00:00 AM EDT capsule   60           

       TAKE ONE CAPSULE BY MOUTH TWICE 

A DAY MAXIMUM DAILY DOSE = 2            TAKE ONE CAPSULE BY MOUTH TWICE A DAY MA

XIMUM DAILY

 DOSE = 2    SOLD: 10/19/2021                                        [facility]

 

          75 mg               07/15/2021 12:00:00 AM EDT capsule   60           

       TAKE ONE CAPSULE BY MOUTH TWICE 

A DAY MAXIMUM DAILY DOSE = 2            TAKE ONE CAPSULE BY MOUTH TWICE A DAY MA

XIMUM DAILY

 DOSE = 2    SOLD: 2021                                        [facility]

 

          10 mg               2021 12:00:00 AM EDT tablet    30           

       TAKE 4 TABLETS BY MOUTH ONCE 

DAILY FOR 3 DAYS, 3 TABLETS DAILY FOR 3 DAYS, 2 TABLETS DAILY FOR 3 DAYS, 1 
TABLET DAILY FOR 3 DAYS                 TAKE 4 TABLETS BY MOUTH ONCE DAILY FOR 3

 DAYS, 3 TABLETS

 DAILY FOR 3 DAYS, 2 TABLETS DAILY FOR 3 DAYS, 1 TABLET DAILY FOR 3 DAYS SOLD: 

2021                                                      [facility]

 

          25 mg               2021 12:00:00 AM EDT tablet    270          

       TAKE ONE TABLET BY MOUTH EVERY 

MORNING AND TAKE TWO TABLETS BY MOUTH AT BEDTIME TAKE ONE TABLET BY MOUTH EVERY 

MORNING AND TAKE TWO TABLETS BY MOUTH AT BEDTIME SOLD: 2021               

                         [facility]

 

          25 mg               2021 12:00:00 AM EDT tablet    270          

       TAKE ONE TABLET BY MOUTH EVERY 

MORNING AND TAKE TWO TABLETS BY MOUTH AT BEDTIME TAKE ONE TABLET BY MOUTH EVERY 

MORNING AND TAKE TWO TABLETS BY MOUTH AT BEDTIME SOLD: 10/17/2021               

                         [facility]

 

                          Prednisone 10 MG Oral Tablet predniSONE (DELTASONE) 10

 mg tablet predniSONE 

(DELTASONE) 10 mg tablet 2021 12:00:00 AM EDT        10 mg  oral          

       active               

Take 1 tablet (10 mg total) by mouth 1 (one) time each day. 40 mg x 3 days, 30 
mg x 3 days, 20 mg x 3 days, 10 mg x 3 days [facility]

 

          30 mg               2021 12:00:00 AM EDT capsule   30           

       TAKE ONE CAPSULE BY MOUTH EVERY 

DAY BEFORE BREAKFAST MAXIMUM DAILY DOSE = 1 CAPSULE TAKE ONE CAPSULE BY MOUTH 

EVERY DAY BEFORE BREAKFAST MAXIMUM DAILY DOSE = 1 CAPSULE SOLD: 2021      

                            

                                        [facility]

 

                          Phentermine Hydrochloride 30 MG Oral Capsule phentermi

ne 30 mg capsule 

phentermine 30 mg capsule 2021 12:00:00 AM EDT         30 mg   oral       

             active           

                          Take 1 capsule (30 mg total) by mouth 1 (one) time eac

h day before breakfast. 

[facility]

 

          75 mg               2021 12:00:00 AM EDT capsule   60           

       TAKE ONE CAPSULE BY MOUTH TWICE 

A DAY MAXIMUM DAILY DOSE = 2            TAKE ONE CAPSULE BY MOUTH TWICE A DAY MA

JUSTIN DAILY

 DOSE = 2    SOLD: 2021                                        [facility]

 

          30 mg               2021 12:00:00 AM EDT capsule   30           

       TAKE ONE CAPSULE BY MOUTH EVERY 

DAY BEFORE BREAKFAST MAXIMUM DAILY DOSE = 1 TAKE ONE CAPSULE BY MOUTH EVERY DAY 

BEFORE BREAKFAST MAXIMUM DAILY DOSE = 1 SOLD: 2021                        

                [facility]

 

          75 mg               05/10/2021 12:00:00 AM EDT capsule   60           

       TAKE ONE CAPSULE BY MOUTH TWICE 

A DAY MAXIMUM DAILY DOSE = 2            TAKE ONE CAPSULE BY MOUTH TWICE A DAY MA

JUSTIN DAILY

 DOSE = 2    SOLD: 2021                                        [facility]

 

          15 mg               2021 12:00:00 AM EDT tablet    30           

       TAKE ONE TABLET BY MOUTH EVERY 

DAY AS NEEDED WITH FOOD   TAKE ONE TABLET BY MOUTH EVERY DAY AS NEEDED WITH FOOD

 

SOLD: 2021                                                 [facility]

 

          15 mg               2021 12:00:00 AM EDT tablet    30           

       TAKE ONE TABLET BY MOUTH EVERY 

DAY AS NEEDED WITH FOOD   TAKE ONE TABLET BY MOUTH EVERY DAY AS NEEDED WITH FOOD

 

SOLD: 2021                                                 [facility]

 

          15 mg               2021 12:00:00 AM EDT tablet    30           

       TAKE ONE TABLET BY MOUTH EVERY 

DAY AS NEEDED WITH FOOD   TAKE ONE TABLET BY MOUTH EVERY DAY AS NEEDED WITH FOOD

 

SOLD: 2021                                                 [facility]

 

          15 mg               2021 12:00:00 AM EDT tablet    30           

       TAKE ONE TABLET BY MOUTH EVERY 

DAY AS NEEDED WITH FOOD   TAKE ONE TABLET BY MOUTH EVERY DAY AS NEEDED WITH FOOD

 

SOLD: 2021                                                 [facility]

 

           tizanidine 4 MG Oral Tablet TIZANIDINE HCL 2021 12:00:00 AM EDT

 tablet     30         

                    TAKE ONE TABLET BY MOUTH AT BEDTIME TAKE ONE TABLET BY MOUTH

 AT BEDTIME SOLD: 

2021                                                      [facility]

 

           tizanidine 4 MG Oral Tablet TIZANIDINE HCL 2021 12:00:00 AM EDT

 tablet     30         

                    TAKE ONE TABLET BY MOUTH AT BEDTIME TAKE ONE TABLET BY MOUTH

 AT BEDTIME SOLD: 

2021                                                      [facility]

 

           tizanidine 4 MG Oral Tablet TIZANIDINE HCL 2021 12:00:00 AM EDT

 tablet     30         

                    TAKE ONE TABLET BY MOUTH AT BEDTIME TAKE ONE TABLET BY MOUTH

 AT BEDTIME SOLD: 

2021                                                      [facility]

 

             Fluconazole 200 MG Oral Tablet Fluconazole 200 MG 2021 12:00:

00 AM EDT              

1.0 {tablet}                         active                  Fluconazole 200 MG 

eCW1 ([facility])

 

             Fluconazole 200 MG Oral Tablet Fluconazole 200 MG 2021 12:00:

00 AM EDT              

1.0 {tablet}                         active                  Fluconazole 200 MG 

eCW1 ([facility])

 

             Fluconazole 200 MG Oral Tablet Fluconazole 200 MG 2021 12:00:

00 AM EDT              

1.0 {tablet}                         active                  Fluconazole 200 MG 

eCW1 ([facility])

 

          200 mg              2021 12:00:00 AM EDT tablet    2            

       TAKE 1 TABLET BY MOUTH 

IMMEDIATELY AND 1 TABLET 1 WEEK LATER   TAKE 1 TABLET BY MOUTH IMMEDIATELY AND 1

 

TABLET 1 WEEK LATER SOLD: 2021                                        [facility]

 

          30 mg               2021 12:00:00 AM EDT capsule   30           

       TAKE ONE CAPSULE BY MOUTH EVERY 

DAY BEFORE BREAKFAST MAXIMUM DAILY DOSE = 1 TAKE ONE CAPSULE BY MOUTH EVERY DAY 

BEFORE BREAKFAST MAXIMUM DAILY DOSE = 1 SOLD: 04/15/2021                        

                [facility]

 

          75 mg               2021 12:00:00 AM EDT capsule   60           

       TAKE ONE CAPSULE BY MOUTH TWICE 

A DAY MAXIMUM DAILY DOSE = 2            TAKE ONE CAPSULE BY MOUTH TWICE A DAY MA

XIMUM DAILY

 DOSE = 2    SOLD: 2021                                        [facility]

 

          30 mg               2021 12:00:00 AM EST capsule   30           

       TAKE ONE CAPSULE BY MOUTH EVERY 

DAY BEFORE BREAKFAST MAXIMUM DAILY DOSE = 1 CAPSULE TAKE ONE CAPSULE BY MOUTH 

EVERY DAY BEFORE BREAKFAST MAXIMUM DAILY DOSE = 1 CAPSULE SOLD: 2021      

                            

                                        [facility]

 

                                        Omeprazole 20 MG Delayed Release Oral Ca

psule omeprazole (PriLOSEC) 20 mg DR 

capsule    omeprazole (PriLOSEC) 20 mg DR capsule 2021 12:00:00 AM EST    

                              

                      active                           TAKE ONE CAPSULE BY MOUTH

 EVERY DAY [facility]

 

                          topiramate 25 MG Oral Tablet topiramate (TOPAMAX) 25 m

g tablet topiramate 

(TOPAMAX) 25 mg tablet 2021 12:00:00 AM EST        25 mg  oral            

     active               

Take 1 tablet (25 mg total) by mouth 1 (one) time each day in the morning. 

[facility]

 

          30 mg               2021 12:00:00 AM EST capsule   30           

       TAKE ONE CAPSULE BY MOUTH EVERY 

DAY BEFORE BREAKFAST MAXIMUM DAILY DOSE = 1 CAPSULE TAKE ONE CAPSULE BY MOUTH 

EVERY DAY BEFORE BREAKFAST MAXIMUM DAILY DOSE = 1 CAPSULE SOLD: 2021      

                            

                                        [facility]

 

                          Phentermine Hydrochloride 30 MG Oral Capsule phentermi

ne 30 mg capsule 

phentermine 30 mg capsule 2021 12:00:00 AM EST         30 mg   oral       

             active           

                          Take 1 capsule (30 mg total) by mouth 1 (one) time eac

h day before breakfast. 

[employer] [facility]

 

          30 mg               2021 12:00:00 AM EST capsule   30           

       TAKE ONE CAPSULE BY MOUTH EVERY 

DAY BEFORE BREAKFAST MAXIMUM DAILY DOSE = 1 CAPSULE TAKE ONE CAPSULE BY MOUTH 

EVERY DAY BEFORE BREAKFAST MAXIMUM DAILY DOSE = 1 CAPSULE SOLD: 01/10/2021      

                            

                                        [facility]

 

           tizanidine 4 MG Oral Tablet TIZANIDINE HCL 2021 12:00:00 AM EST

 tablet     30         

                    TAKE ONE TABLET BY MOUTH AT BEDTIME TAKE ONE TABLET BY MOUTH

 AT BEDTIME SOLD: 

2021                                                      [facility]

 

           tizanidine 4 MG Oral Tablet TIZANIDINE HCL 2021 12:00:00 AM EST

 tablet     30         

                    TAKE ONE TABLET BY MOUTH AT BEDTIME TAKE ONE TABLET BY MOUTH

 AT BEDTIME SOLD: 

2021                                                      [facility]

 

           tizanidine 4 MG Oral Tablet TIZANIDINE HCL 2021 12:00:00 AM EST

 tablet     30         

                    TAKE ONE TABLET BY MOUTH AT BEDTIME TAKE ONE TABLET BY MOUTH

 AT BEDTIME SOLD: 

2021                                                      [facility]

 

           tizanidine 4 MG Oral Tablet TIZANIDINE HCL 2021 12:00:00 AM EST

 tablet     30         

                    TAKE ONE TABLET BY MOUTH AT BEDTIME TAKE ONE TABLET BY MOUTH

 AT BEDTIME SOLD: 

2021                                                      [facility]

 

          15 mg               2020 12:00:00 AM EST tablet    30           

       TAKE ONE TABLET BY MOUTH EVERY 

DAY AS NEEDED WITH FOOD   TAKE ONE TABLET BY MOUTH EVERY DAY AS NEEDED WITH FOOD

 

SOLD: 2021                                                 [facility]

 

          15 mg               2020 12:00:00 AM EST tablet    30           

       TAKE ONE TABLET BY MOUTH EVERY 

DAY AS NEEDED WITH FOOD   TAKE ONE TABLET BY MOUTH EVERY DAY AS NEEDED WITH FOOD

 

SOLD: 2021                                                 [facility]

 

          15 mg               2020 12:00:00 AM EST tablet    30           

       TAKE ONE TABLET BY MOUTH EVERY 

DAY AS NEEDED WITH FOOD   TAKE ONE TABLET BY MOUTH EVERY DAY AS NEEDED WITH FOOD

 

SOLD: 2021                                                 [facility]

 

          15 mg               2020 12:00:00 AM EST tablet    30           

       TAKE ONE TABLET BY MOUTH EVERY 

DAY AS NEEDED WITH FOOD   TAKE ONE TABLET BY MOUTH EVERY DAY AS NEEDED WITH FOOD

 

SOLD: 2020                                                 [facility]

 

             meloxicam 15 MG Oral Tablet Meloxicam Meloxicam    2020 03:07

:49 PM EST              15 

MG                              completed                         [facility]

 

             Fexofenadine ([facility]) 180 mg tablet              

0 03:07:10 PM EST              180 

MG                              completed                         [facility]

 

          30 mg               12/10/2020 12:00:00 AM EST capsule   30           

       TAKE ONE CAPSULE BY MOUTH EVERY 

DAY BEFORE BREAKFAST MAXIMUM DAILY DOSE = 1 CAPSULE TAKE ONE CAPSULE BY MOUTH 

EVERY DAY BEFORE BREAKFAST MAXIMUM DAILY DOSE = 1 CAPSULE SOLD: 12/10/2020      

                            

                                        [facility]

 

          75 mg               2020 12:00:00 AM EST capsule   60           

       TAKE ONE CAPSULE BY MOUTH TWICE 

A DAY MAXIMUM DAILY DOSE = 2            TAKE ONE CAPSULE BY MOUTH TWICE A DAY MA

XIMUM DAILY

 DOSE = 2    SOLD: 2020                                        [facility]

 

          30 mg               2020 12:00:00 AM EST capsule   30           

       TAKE ONE CAPSULE BY MOUTH EVERY 

DAY BEFORE BREAKFAST MAXIMUM DAILY DOSE = 1 TAKE ONE CAPSULE BY MOUTH EVERY DAY 

BEFORE BREAKFAST MAXIMUM DAILY DOSE = 1 SOLD: 2020                        

                [facility]

 

                          Phentermine Hydrochloride 30 MG Oral Capsule phentermi

ne 30 mg capsule 

phentermine 30 mg capsule 2020 12:00:00 AM EST         30 mg   oral       

             active           

                          Take 1 capsule (30 mg total) by mouth 1 (one) time eac

h day before breakfast. 

[facility]

 

          75 mg               10/22/2020 12:00:00 AM EDT capsule   60           

       TAKE ONE CAPSULE BY MOUTH TWICE 

A DAY MAXIMUM DAILY DOSE = 2            TAKE ONE CAPSULE BY MOUTH TWICE A DAY MA

XIMUM DAILY

 DOSE = 2    SOLD: 10/23/2020                                        [facility]

 

          75 mg               10/22/2020 12:00:00 AM EDT capsule   60           

       TAKE ONE CAPSULE BY MOUTH TWICE 

A DAY MAXIMUM DAILY DOSE = 2            TAKE ONE CAPSULE BY MOUTH TWICE A DAY MA

XIMUM DAILY

 DOSE = 2    SOLD: 2021                                        [facility]

 

          75 mg               10/22/2020 12:00:00 AM EDT capsule   60           

       TAKE ONE CAPSULE BY MOUTH TWICE 

A DAY MAXIMUM DAILY DOSE = 2            TAKE ONE CAPSULE BY MOUTH TWICE A DAY MA

XIMUM DAILY

 DOSE = 2    SOLD: 2021                                        [facility]

 

          75 mg               10/22/2020 12:00:00 AM EDT capsule   60           

       TAKE ONE CAPSULE BY MOUTH TWICE 

A DAY MAXIMUM DAILY DOSE = 2            TAKE ONE CAPSULE BY MOUTH TWICE A DAY MA

XIMUM DAILY

 DOSE = 2    SOLD: 2021                                        [facility]

 

                          meloxicam 15 MG Oral Tablet meloxicam (MOBIC) 15 mg ta

blet meloxicam (MOBIC) 15 

mg tablet 10/21/2020 12:00:00 AM EDT                                    active  

             TAKE ONE TABLET BY 

MOUTH EVERY DAY AS NEEDED WITH FOOD     [facility]

 

           tizanidine 4 MG Oral Tablet TIZANIDINE HCL 2020 12:00:00 AM EDT

 tablet     30         

                    TAKE ONE TABLET BY MOUTH AT BEDTIME TAKE ONE TABLET BY MOUTH

 AT BEDTIME SOLD: 

12/10/2020                                                      [facility]

 

          4 mg                2020 12:00:00 AM EDT tablet    30           

       TAKE ONE TABLET BY MOUTH AT 

BEDTIME    TAKE ONE TABLET BY MOUTH AT BEDTIME SOLD: 10/13/2020                 

                 [facility]

 

          4 mg                2020 12:00:00 AM EDT tablet    30           

       TAKE ONE TABLET BY MOUTH AT 

BEDTIME    TAKE ONE TABLET BY MOUTH AT BEDTIME SOLD: 2020                 

                 [facility]

 

          15 mg               2020 12:00:00 AM EDT tablet    30           

       TAKE ONE TABLET BY MOUTH EVERY 

DAY AS NEEDED WITH FOOD   TAKE ONE TABLET BY MOUTH EVERY DAY AS NEEDED WITH FOOD

 

SOLD: 2020                                                 [facility]

 

          15 mg               2020 12:00:00 AM EDT tablet    30           

       TAKE ONE TABLET BY MOUTH EVERY 

DAY AS NEEDED WITH FOOD   TAKE ONE TABLET BY MOUTH EVERY DAY AS NEEDED WITH FOOD

 

SOLD: 10/23/2020                                                 [facility]

 

          15 mg               2020 12:00:00 AM EDT tablet    30           

       TAKE ONE TABLET BY MOUTH EVERY 

DAY AS NEEDED WITH FOOD   TAKE ONE TABLET BY MOUTH EVERY DAY AS NEEDED WITH FOOD

 

SOLD: 2020                                                 [facility]

 

          20 mg               2020 12:00:00 AM EDT capsule,delayed release

(DR/EC) 90                  TAKE ONE 

CAPSULE BY MOUTH EVERY DAY TAKE ONE CAPSULE BY MOUTH EVERY DAY SOLD: 2020 

   

                                                            [facility]

 

          25 mg               2020 12:00:00 AM EDT tablet    270          

       TAKE ONE TABLET BY MOUTH EVERY 

MORNING AND TAKE TWO TABLETS BY MOUTH AT BEDTIME TAKE ONE TABLET BY MOUTH EVERY 

MORNING AND TAKE TWO TABLETS BY MOUTH AT BEDTIME SOLD: 10/13/2020               

                         [facility]

 

          25 mg               2020 12:00:00 AM EDT tablet    270          

       TAKE ONE TABLET BY MOUTH EVERY 

MORNING AND TAKE TWO TABLETS BY MOUTH AT BEDTIME TAKE ONE TABLET BY MOUTH EVERY 

MORNING AND TAKE TWO TABLETS BY MOUTH AT BEDTIME SOLD: 04/15/2021               

                         [facility]

 

          25 mg               2020 12:00:00 AM EDT tablet    270          

       TAKE ONE TABLET BY MOUTH EVERY 

MORNING AND TAKE TWO TABLETS BY MOUTH AT BEDTIME TAKE ONE TABLET BY MOUTH EVERY 

MORNING AND TAKE TWO TABLETS BY MOUTH AT BEDTIME SOLD: 01/10/2021               

                         [facility]

 

                          topiramate 25 MG Oral Tablet topiramate (TOPAMAX) 25 m

g tablet topiramate 

(TOPAMAX) 25 mg tablet 2020 12:00:00 AM EST        25 mg  oral            

     aborted               

Take 1 tablet (25 mg total) by mouth 2 (two) times a day. 1 by mouth in the 
morning and 2 at night                  [facility]

 

                          Phentermine Hydrochloride 30 MG Oral Capsule phentermi

ne 30 mg capsule 

phentermine 30 mg capsule 2020 12:00:00 AM EST         30 mg   oral       

             aborted         

                          Take 1 capsule (30 mg total) by mouth 1 (one) time eac

h day before breakfast. 

[facility]

 

                          Loratadine 10 MG Oral Tablet [Claritin] Loratadine (Cl

aritin) 10 MG tablet 

Loratadine (Claritin) 10 MG tablet 2017 03:25:00 PM EDT           1 TAB   

                                

completed                                                       [facility]

 

                          topiramate 25 MG Oral Tablet topiramate (TOPAMAX) 25 m

g tablet topiramate 

(TOPAMAX) 25 mg tablet               50 mg  oral                 aborted        

       Take 50 mg by mouth at bed

 time.                                  [facility]

 

                          Loratadine 10 MG Oral Tablet loratadine (CLARITIN) 10 

mg tablet loratadine 

(CLARITIN) 10 mg tablet               10 mg  oral                 aborted       

        Take 10 mg by mouth 1 

(one) time each day.                    [facility]



                                                                                
                                                                                
                                                                                
                                                                                
                                                                                
                                                                                
                                                                                
                                                                                
                                                                                
                                                                                
                                                                                
                                                



Insurance Providers

          



             Payer name   Policy type / Coverage type Policy ID    Covered party

 ID Covered 

party's relationship to viramontes Policy [employer]             Plan Information

 

          NON CLIENT AGREEMENTS           UNAVAILABLE           Patient         

    UNAVAILABLE

 

          HMO BLUE            [id number]           Patient             [id number]

[id number]

 

          ARC                 UNAVAILABLE           Unknown             UNAVAILA

BLE

 

          [employer] (68           [id number]           [id number]                   [id number]

 

                [facility]   Health Maintenance Organization (HMO) [id number]

84    

[id number] Family Dependent                        

[id number]

 

                [facility]   Health Maintenance Organization (HMO) [id number]

84    

[id number] Family Dependent                        

[id number]

 

                [facility]   Health Maintenance Organization (O) [id number]

84    

[id number] Family Dependent                        

[id number]

 

          [employer] [place] [address]           [id number]           SP       

           [id number]

 

          [employer] [place] [address]           [id number]           MO2      

           [id number]

 

          [employer] [place] [address]           [id number]           SP       

           [id number]

 

          [employer] Blue [employer] P         [id number]           SELF          

      [id number]

 

                [employer]   Medigap Part B  [id number]    

[id number] Self                                    

[id number]

 

          B/S BLUE PPO/HSA (33)           [id number]           1              

     [id number]

 

          B/S BLUE PPO/HSA (33)           [id number]           1              

     [id number]

 

          [employer]           [id number] [id number]                     [id number]

[id number]

 

          [employer]           [id number]           Self                [id number]

[id number]

 

          [employer]           [id number]           Self                [id number]

[id number]

 

          [employer] [employer]           [id number]           Self          

      [id number]

 

          [place]             xxxxxxxxxxxx               

      

 

          [employer]  H         [id number]           Self                [id number]

[id number]

 

          [place] [employer]           [id number]           Self          

      [id number]

 

          [place] [employer]           [id number]           Self          

      [id number]

 

          [place]             xxxxxxxxxxxx               

      

 

          RPR- Needs Payer Match           [id number]           Self          

      [id number]

 

                [employer] [place] [employer]

ld [id number]    

                    Self                                    [id number]

 

                              [id number]                               [id number]

[id number]

 

          [employer] [place] [address]           [id number]           SP       

           [id number]

 

          OTHER1                                                       

 

          [employer] [place]  [employer] [id number] [id number]

7.0 Self                

[id number]

 

                [employer] [place] [employer]      [id number]    

[id number] Self                                    

[id number]

 

                [employer] [place] [employer]      [id number]    

[id number] Self                                    

[id number]

 

          B/S BLUE PPO/HSA (33)           [id number]           4              

     [id number]

 

                [employer]   Medigap Part B  [id number]    

[id number] Self                                    

[id number]

 

                [employer]   Medigap Part B  [id number]    

[id number] Self                                    

[id number]

 

                [employer]   Medigap Part B  [id number]    

[id number] Self                                    

[id number]

 

          [employer]  [employer] [id number]

7.0 Self                

[id number]

 

          [employer] [place]  [employer]mercial           [id number]    Self                 

 

          [employer] [place] [employer]           [id number]    Self                 

 

          BC PPO    O         [id number]           M                   [id number]

[id number]

 

          [employer] HMO/PPO           [id number]           Patient             

[id number]

 

          [employer] PPO           [id number]           PT                  [id number]

[id number]

 

          [employer] [place] [employer]           [id number]           SP       

           [id number]



                                                                                
                                                                                
                                                                                
                                                                                
                                                                                
                                                                                
                  



Problems, Conditions, and Diagnoses

          



           Code       Display Name Description Problem Type Effective Dates Data

 Source(s)

 

             R20.2        Paresthesia of skin Paresthesia of skin Diagnosis    1

 03:27:16 PM 

EDT                                     [facility]

 

             R20.0        Anesthesia of skin Anesthesia of skin Diagnosis    10/

 03:27:16 PM EDT

                                        [facility]

 

             R42          Dizziness and giddiness Dizziness and giddiness Diagno

sis    10/25/2021 

03:27:16 PM EDT                         [facility]

 

                    G43.109             Migraine with aura, not intractable, wit

hout status migrainosus Migraine

 with aura, not intractable, wit Diagnosis           10/25/2021 03:27:16 PM EDT 

[facility]

 

                    H93.8X2             Other specified disorders of left ear Ot

her specified disorders of left 

ear                 Diagnosis           2021 03:26:17 PM EDT [facility]

 

                    H93.8X1             Other specified disorders of right ear O

ther specified disorders of 

right ear           Diagnosis           2021 03:26:17 PM EDT [facility]

 

                    Z82.0               Family history of epilepsy and other dis

eases of the nervous system Family

 history of epilepsy and other dis Diagnosis           2021 11:17:35 AM [facility]AINAB Bhattim 

[facility]

 

                      Follow-up  Follow-up  Diagnosis  2021 07:57:13 AM ES

T [facility]

 

                      Annual Exam Annual Exam Diagnosis  2020 09:04:00 AM 

EST [facility]

 

           D22.9      [id number]  Melanocytic nevus of skin Problem    10/14/2021

 12:00:00 AM EDT 

eCW1 ([facility])

 

           B36.0      [id number]   Tinea versicolor Problem    10/14/2021 12:00:00

 AM EDT eCW1 

([facility])

 

           Z12.83     [id number]  Skin cancer screening Problem    10/14/2021 12:

00:00 AM EDT eCW1 

([facility])

 

                    G43.109             Migraine with aura and without status mi

grainosus, not intractable 

Migraine with aura and without status migrainosus, not intractable [id number]     

             

2020 12:00:00 AM AMI [facility]

 

           N32.81     Overactive bladder Overactive bladder Problem    

0 12:00:00 AM EST 

[facility] ([facility])

 

             N39.41       Urge incontinence of urine Urge incontinence of urine 

Problem      2020 

12:00:00 AM EST                         [facility] [place]

 

                N39.3           Stress incontinence (female) (male) Stress incon

tinence (female) (male) 

Problem                   2020 12:00:00 AM EST [facility] ([facility]

 

             Z86.018      [id number] History of dysplastic nevus Problem     

 10/01/2020 12:00:00 AM

 EDT                                    eCW1 ([facility])



                                                                                
                                                                                
                                                                                
                  



Surgeries/Procedures

          



             Procedure    Description  Date         Indications  Data Source(s)

 

             OFFICE OUTPATIENT NEW 45 MINUTES              2021 12:00:00 A

M EDT              [facility] 

([facility])

 

             Endoscopy Nasal Diagnostic              2021 12:00:00 AM EDT 

             [facility] ([facility])

 

             OFFICE OUTPATIENT VISIT 15 MINUTES              2021 12:00:00

 AM EDT              [facility] 

([facility])

 

             OFFICE OUTPATIENT VISIT 25 MINUTES              07/15/2021 12:00:00

 AM EDT              [facility] 

([facility])

 

             NELSON POST-VOIDING RESIDUAL URINE&/BLDR CAP              2020 

12:00:00 AM EST              [facility]

 ([facility])



                                                                                
                                                



Results

          



                    ID                  Date                Data Source

 

                    [id number]           2021 04:20:18 PM EDT [facility]









          Name      Value     Range     Interpretation Code Description Data Vanna

rce(s) Supporting 

Document(s)

 

          Progress Notes                                         [facility] 

SWRAVg4aBtJTPqZo36/SWZbbBIZqe4EfZVcmBUg4KNgfRRVqP3WoAGF3zC1kVJJ5TIuWMcQrMoIsCrFz

m
MbClzTCwTqOQToWrzVVnIoYRakOnptrWDlRQ4GdRE6LZFfK03eVTCwKVByO2DdBNAnHZW+Fy1KMPIpzS

FoRQ7FNahW5F2oc0fDVy3k7G8Q/BRz8Ckd3ohcIkdkem/kUifxXT+0/JRmbE2SzieVFaJI//ij+JA04/

TpQSFnP73iQBCA9N6ztEcxoWFm+akQQgHd95ru7pe+
OwhTrMowk3vwTko8Fm0j0zh9jHnqgZEJrdpMd58+/zCh5FUGml/4aQyvXM7Ehcu3FOAtXnpYbXG5ec72

nD/MuuuhSY37NL+X7IOlkmvZeF6DU6n2nqYshwowHeOqmilVNr7QC2+U7n3ixqW1J9gfWx1x1TNwvmQZ

HdJ7s+wo9xVvDDcyPLGTHLqXDIiBO2VlNWcoNsQvjM
l9fqjr2XH1f9/BweeJFb47BcLFnbcQoa+rYTPTFcSJQqVU+r8SkOt9q2Kb9dG9aL9drIuUuylBr92GIj

j06Yz3AniUz7D7KHYx2mWXzefH4cJczKVEgX+Fd3VxrnubK+P06FnPTiizHSN9fIr6zuux+yisgtZRYX

a08XnnPV4yT53s5u6CVFfF3YebS/O/lNzptoF4EO2r
ZfPld+d2z7LhfovUGzMQ/6dlrUrAuNb2d42KSx9itjeqgws3zVfUzbnx2tCkgRWGASJvK/N93111wA70

vxk3WLj8OVACQTsS4n0H8hGR6hLAWfOkj+oAAE5MlvfFlUiYrum3BnmOYlU56/jIHbHwZCKVjNiRh6h/

edPRRmaZR2PUO7r90qBzF7X/mY5PsrrTyH3gxQtBQF
ovPTChepmG8hSIT01wIG+bMl1MyMCihUD/phmHEgjMQGYQVKf8tNmRkEXvJrs2E9YlUG2+uiKUUvv0mA

5w5U25DJWtRTlw2AWGR7Sn29stSbM0cd3gJ5DzQgBPOpGhXbH+lAzUUaFrjKNTAeX/+LaXTPrKmEoPTF

pwD8xXx0ndeBmYzes0/OCxbJU7rGmLkOMaKuVt4PeN
0E/D/L7NJ4wGOtQJx3n0cn9Ws7/2HEEYeeX/EAVgxRBHpLnsRQRuZPFB6RAwiuwR6x3swj5I6uREf9a6

Oyh+BCrh0XpACNn2Zd+Lv2XgNzHcpz9yXS9F0yO7R5A8yDgNy5gibRYUF48mQhB+8KwvifBbyfH5q4Rp

Lvt8EAQyJ8+zU7U/t7g5KMkJNLbCq0Yg/rGPNazIDw
nwjPmOsbibYUYy4mlSWQ3l1WdgTxtrP+Pg3a1BkSsUWvdqOkrQvSXWStJOKh185BUmcyXtJfoKrKTBqq

NK7yQtxX4RFzEwC2VTvKiMz0P7L9sDjqqNLJU/wWB4KHcHOhwjV/oty8/Ol2r/HZl9811JE0zQ8kPg4z

0bML80YFSBUVwRLwR91LId3ZiwaJNumIS/GRDK/jpv
0VhAB5CaEFnlgzVUyt/gSP4gJBIFjV0NtBYKjXNKixAHqR9CaYfiOw7LtIzFYEAtQdTRlRxWqawi2hEJ

uHy0cQHoVm4q89iwXI8EnCAs9CJv6u7kGJxkzaEDwbQyLFJSfj3NW+ZL7c9OPYzIKW6Ei3YY4SqHOJ3c

JIqjZP21Vu3aun6WqR6N6bUUIbvQYPja6UBPnHPuET
qogVdbWAFSPn2Ozb/uZ+jG9HwauCFXNG8P/SZZSjSLEleoTiyTMBP2SAQO5GxjRqbl3wRRNfctLKG2Em

iHZBGOWXE9n3viZtFtPUWBL7BTnVEi630FEISGK9qc4A0lCUPPgteMhXOvRH5jIpFpeQHo2HWwQDaSRZ

CgDmIY8cc3MK5HuELYctfuv3nzUuTWTMNVDXaJtyq5
kNvF2oP22DY4EbWiyYIiVmMaHLQbVUR5CJWXZgjWZOPKS+vW3nVNQmkjoCcbrX6DnsqzeoaBF7jXx8M6

YWMTMYwowZjtGGbZWGrwLokxrW+x61RyjI7WYwfaLCkGOFDjm4bt1MW83X5pKYqZwSEZTrGCNmM3YWKl

pCXxO3vnuY+v/wCJ4FCFnQhv6noOMLiOHGyaJuudHu
XsBKSy5clXrSeM6Duq5Shg21u1eblOBFJiO3I3mxOFeoWI5LHamMGJdIMHCbxTBUk0IsselXbPZlhqLH

rzAmdmWFtgIjy803B9WEu7GuztzK7xZDDTZjYDpz1Rw6NkHosHjuj2STkJRbstnnLB9AlBHFBBMhcOhg

jM6LwpM9xQnK/SMf1dI6Uw3PfXUq3hxsHfujsffcvG
vrFlDlTm/0k6bASh7lZnNHgSLDSTWD+6TEQA7Q3ED6w0PgodYZzeM3gQ74kNqvWH7eUxHrH8fgLCQotv

CTgL7P6OaICVLGodqqMefp/BXrw8S6wHlnqUbvNU5ZopGs1cH5gPYqdd/dahL001U/HcsX5En/a4Vq34

Ot3tm3N0ADfgaL5KEqVjuqCB6S1xFCo4H5kE9yOWBM
sZUKmScWAKJp8QF1r7BW8jjScSreBHW0I7CFRlXjsPrn1jdOUZmGP7BVrToeWxq6/bt5qnZ5IQzhH3KV

SZvHydvc+vF2c8cs2bQLcS5hP2oBWTyOAFpb1gqArCgEsFCqvRfmzB0BTyLAPrx2CNpLMcmkgLx9jNJS

yboyiggMlqxCSI+7IIJoHRhwKRhKWBbMI09oztRqE2
kX02N9G6VFUGlSvL+J1KcG71q2kxFwJBOo+xTosU2VRZJUzFZrowW9eaf2Q+iIvtnRWGgsyDszIPexLm

UPKLFZSCWRJkI2k7eJldicYBVc2ujr5gemVEHJXDVPYpl9WhAF46cmSgHC43lTYyMQGiiraGmh2QmH1W

bX49WRLmeOGtnwgK7C5g4MvK6ZegYpyrnbVHB8s4F7
njkosm9c2vv2ZHI9VYjTTZ6hFcFgQOXmesoSfFWvQmMHdDV1JfaEppkgnVzWJ6nGYFtOArSvfGzqXwLF

AS8/4aHqUgVf4ifhcYYa6ZyhyFBC3CiPvG/0qSTCNMncEZqi6fth+XRoXR0l15l4TaEzlg3JMFeWhYG9

qT7TbBIHsacIVCheP93xz5EvaO7hIudk06mE1YlxKS
ymaapIfAarj8gyAfUfz0Aqa142LJmTggvjL/CLvtQeE2t2IbpMetP0USVKKP2sSe3/2KAjp44HKmjUyA

KUKMRaDTBvjUr0KcbEJzY1yb44wInubKldqWgFRfaU0jRns3h/xqkY/QuMtkv0wpkKrugsrwuVC54rid

YhERSJttJlbkI9LnV1pdIcCSJ3Wn+B2WESSkgc3lh7
5R+hkA5G93DsE80AyVsEGXdxBwLU7c/3NP1qjvLeaIafIhqBdpDVBl0KADTJhey9dZu0n/ywB7FQcoQK

6m+kZm5aYX3A1PpxBu4rUgEfPDMaf04ZqVb4TCBiugX2zW0WVQj4mvASLye//NtTNriLOzO+XA6RFjXl

Xhxu/yKYtQ5+GVtpjs4Ua90KKxOXMy69U8UwLW/SR9
qS23BfnLTBLm2Tj3SVNdceIjPFwRwCyXdYxfZfdWNzSnaU/oHQtSHsg5blfFFoW5S4cGchqDzQ9clBOI

HoIU1zT9SipSEpBvVH8tQjPBgVnroiW+GhrifK9CU8B0kjfMtGeionht5nzp1F0Xm0b14gRQ1Ux8uPHq

dpmxRtUe+/Xkktbj4DAgAI6DSiKFWsYm+7JmH23Qpp
VDmMA8N2yKw5oPtOXIZGGOK5fFfvE77D27BP51LyZhMMSCDopLcHTcLAsKzZ19zVypmOKNBHp8Vmfyua

KQvVfHSN/knUbt6Z+dEINifpFT4yUp6EtfjKWvL1lcL+/y6P1oOPSN9YBqLI/jjJLdnJVBYbY/o6G7S8

IuMPSSlk9MlzS3i2l/8KTrjGUe9SSdBve93ZN959su
fY56tO9+YBWAkiWlLS59qakao5H6EkP7Jz3zTJJJ4BH5+Di+7ArEK/9e/0ppAQ1ufu4wLzRz1eBDVxFr

5WI75bs3ISwFrjr5o1YUtT4cWWpozneRmArEgv9wJivXYBaR1EzxnTXNYHmXqis+eXkF9xvx6D36bj+c

G90zaFu3szJhJ7UrkYpjXl+4A7CUEjugJC0JDTeMG3
SFevm6ZdQcriwXABkMXAiC1JqLdToup7cBw9+O+lneFZ2IvJE4piWhoke1pD/fn95IfsbOMP5e4qVJn9

PvahWtp6AXPxz8Z6HxIZtBM/Jgv79R2Dp/ZVs7uhBFYxBnAFE5yeXivA0JTM1ti8SuVOd9MAMww3WgEN

joOYk4XLyyCCDkB3I8oHRaEKUaLY9VKXWxVK3IDYZw
avAhOqOmKJKZXpNuNNMdQmIky9TbR1HkIFHuXNQPZQjlVYPzG75hIWxpSn90VEuvUDWqDeGhXPt9Dq9G

TfPtATPcW57anKJbyNYoXZZxOOQRXxRxXVBaR0YjdSCzHFbcU2SlH6UnUM9dqJGmZK5ngPCjI5VzH3Qs

dmljZVJHQiAvSSBmYWxzZSAvSyBmYWxzZSA+Pg0KIC
A+Ap3MSP9lb5EjZWx4LUExl1XvBNjzPEu2W0RdlOSfsuInKsmnuQHZTYOkBZChB9jhprj3eYAgLvU4Rg

1EWvGlu9OpPPSnKReVoz2u1DBhLjX/70z/Wsh5P5iCwp+mXcaeKH7tkIAAmgqBcr8ksYJOHHEYSrj0/v

nGCVg9ssctPPp6souIz8mChDpGFxlMMCbc//tLTKLA
cRzR/Ma2XspE42165aqeFs3Td4tGt7iW5cC3fManS+m8Fj/+IE4i91E+iliwU2CJoudctTS1zT5F06A+

Nye24Y4o0l1b7h04l7TFCz81t9t00jUBYFpuK7qn5xWfxoU/RQPWHxuzICAhU2uUX8ba7dmifrn5oZ+J

X3Bn8r1763G8Bkwfu8MyhUxPGNFEvQvp5JGEekWoot
ooH1RQSUTNNaTELrcB1++vVqgmRV8PRKV5LCEwilZQ+uY+k0XBqX8rYTjHFpoqpW1Pc7jBg5PlAiYO4Y

EpMau8cNOypvd9GO/V4PC93mNQDv3JXKcTnpxenPZQJcsnr8LZDLs8wHoYn8McLimrCd36V+QQ4lrTCe

QRvOlJJAD2UiFefTlaDIh6+sm6Kgk7Fh9Y1Hmh7vc9
QigcVIxCoeZ54W17Iewn+EDMJ6ZFe7jaIw7gxP9DbnaSr67lD7t2ca3bN7TESm4h2VPt0X6EXHVnF8nK

k7mf9f9SM2vYG7LfTO6IqN08Aq/gdgnZrQ2PBfBzeP/cb6ULNc+0UBwTq/LdPrcxEgA1090sAroPPFLb

UiZcxxj1YznDKLD37bFLg9Kkjvek4xagEBQICo3xtN
6V+XlZtmoytwO7zE3R6r2it/xZvLiSDXK4S5476lfsRC8j7iBJ4yLoCIs8SlsxmhagS21q6IWk4ek1Rl

PmIoiqdOuTIK35HQma2Ge3pGNHPOzTyfNmYpS57uUG7GNOTSdNKVOYYm0Rv/cXelhju42zof8RzimHUd

rLKnVZXsA8BpTTztmHXthr+W/GpqdwTasImdT42ott
uF7Q0WQxSMN/xLvI7iIwI5ExYsl/O5Xa4Mst/hsWsUHwA6n4Og3cDeAVJcyLoRCsaPFLIImOQKq7XLDA

cEv+6hm+wcgdVYSA61VTUIyK3gYOf9Jyzls49xnE4ZqJUaf9A4aJfHPNQdwL5trPdiwYRiuCvboVl5x7

caPbVDxA37DugeMCChHXfwnfQlpKpb6ad3P+fZvN62
ajIhCgO8BCfAeg1rT8mtzOtVpl6OOr84uxSHbyuBwNtaD2DInFIxGEEqZRkZFjJtRCIxWtZZaRf5ADHD

6KUEOBqmlcF+bFid+dBrxMPPO2W8epT/AjmgKNKyJbyZGDf8Rr73m5nV4poEL3+KtWbeDexY/O3Niu2c

Gjjv8anzV8GnQ3kApZJiAmVVTL4Tv7ShcFo8VZ8mAA
aGVLZhnS8Ecjw2JgHbLighBhh6Q6i5o0WYUAGLxxnyvtfYLu6DK/Z6tW/Jh1SWYmQNBcYuxl9KpvrZBF

Y+6CW7ncumF2AkWV5OkWeDpzLQTd4rvYFCJ3R7ngJit+YEieV3hC/fHgyZJfMwfiA6lRK9L7utBlfk+o

S9DxAz4vvtLiJYmWpONVtQcJxsRWA1lEv7xtXBxOJy
e8uQMNbs60GLQo+0u2HETT+7Zm67RmATEMF8vuHPGbz1CoO9YT0SnA4cj1FckANCWbVstFAZ4Rf2htvB

0zAkgKoTODnbUqLluEkYUjm8TSdpt/A7PLItgun+Vj8brLvrL1gN3Ry/MrlYFa7deg5PghSwWfYj0Dd3

J3mrmZFnTN9xlvOY1uMUjIkaaCxUHw8NLNDXque+18
w64Y36W4oksRxtna25UEBe82YFp43YQ0sngJCp8e1K9xh4f8cAgDhKp6bg0Hpmv2a85gfXAN+WMOEGbO

fcVa0QCqoBWeYuoWBL98BvJjKqqENKQCmUCRHEE4Dq2u739auouvws99yM221CB2WOzKLdzXy8i2WAkg

XCffZgtSH9o+uKgneFd8zYmPRvdaX+Tls23RCjB1wD
x+zB7mMZIYNrrcBLHj/1s1Y4SNempAUFEesgij6AdUz5YAzGgE82B9Jlzlt5F9fvXdFGJg6+4LX0GdYs

sxiV8eHoJeknVczhpkQ/XSj7CEUguQtU2nabQ/cOtOhVUvr8a6Whi7SaQ3kLcCN1rdYVpM0Plw9nPTFc

s9tbPQtH31mDIccFkAc197u7x9wU3FN+WPbdcTsad2
mqZWkpU3Mxd2UC0rW5K2wvVEwLl2WV+y8n0+L6irFHjEl8J1Iij8KTHNynnLz/0r65qUDuxP7zPH4TR3

TAeyf6yiQU+zKBOo9xWOEUMC4F2Sp89EAFWn3jAEG/mHNjwVxHa73nZWNUaSefNrfQ8Rw+4916z78B90

UTzwUAZAyZALf5WpWO6lizota965uAu6XoJugc72yC
qGtftrisniAUgSFOqwSDU3eL8L5tdAIqWX8ig4yCwypS4u6bGs2FO3V+jBVcMXRqCPJNB9puLWUArBBK

wP9unz5bQmyccYQse26Gbe7uXaILjHbBSy1TuCGQyNhZb+CF3fP6bPQGb1NelaCxyks3JE0EaDnZTh7T

Cc0rAMOQA3CmBzTU2YKyrvf8kbFW/qNXDmUm6myJBA
mas4TPQDjyL5YLiLjiWSiLdm/4bUCIyNGKq6vr7EGBlc7WGLmMyZCj9EuSn5W2+QviR6MHw8nqc7uqj0

VLJtcaCfEflnRs9PaPA7/Mmc89uGv5AVQKt4alxajFZ9F+VpXebzoSQ+Uh+ubzY2IrvrHI7Xvkj+HcZu

CFwWYHtaqGRKi2vIffAV3DcbTmPT+x6WgsjKpw7ycf
KW8rpr0kpNpvEZRiPu23RbeL5BUF3xL3ysMmCp5SIrKHvtYOtyDYsST2QB+XVD4EbKiP1bzl0gSJiryr

z0gBP4rhe7ng/RcH2UdfeBl+xA29eCXYqf6PB+cVTe2TA7+tQk7eH9AYBdtEAsQR/xv2OsgcFeEk+nVZ

NN0Biq4sTlrUc8nQyxSY/6aoWjnJsUyeKmZs9mJUn5
IBdk84C9vsh3Rt2P7aQverCmfKiSz4Q0paKeXjIzuXYOARqelal2AqJ6fwW1uUyftP9qX3/OLY5GrZs+

0Xt3VeLE4bzxOcrPdmbxu7bWDX3L1jsXOBS+5bEF5IBM/lkW9mB8RjHcv00dqHWdXl6mXVLu68q0PuwB

PBtKu/eSi0fOmzBlCHkwv6beLwdP9dRgdW2THmah/D
aP9lAm4/ZH2ybe5m0tjSirGUrjpdPzvSYmIH0BGaGtCV1lfw2XNaFaIK5czi0HTFP4XA1EUZFkVA6FdP

YjW2ApA0XKOoBvUVQgMILlOO62TOVuJMHUZOoyVLWgU7Yfv867noBeenZsNJHmHb2RWQAoTO2PIEAeUD

BsyYPrWOUrBMGqKoI8UGXcYTkpOWQiL1UioeFgwnXu
HVPfKSDRSNelKRYbP7ftt2BaMWa8ZD0WNR1RckRrz3FfzcRrT1kpC4HSTV7DQUYiS0ZTG3RiT7qiUvKh

o9TdF0rxIaUak4LpBn0RCoMsRp1MEePeOG4zud4DOMKoIF2doj3MDAE8RA2OhXh7AVAdB2LtAKFcLTXv

j1OkOR4UNA6bbYmyPSqyRx9HRbOdw2ItDMSsRLzFbx
3I474wTFL1l6I/aCkYGjG2GL+dOWXeQYhduYDIhGz1mWPsBHQj8uUUx7+1FwOztZyUgB+8hzk+Z2c8Hh

vA/a0PDotqwSZ5tBYfcPE5i23cZRxnqQSnXo9Gdj77mv6JVozv/Vtesp1BOgyKO31GO139sNhffHuHId

Iyi4odLR8EfenzqjIvbA3p7ZSqUzOpEQZsHCiCHz57
w6tNgiYheoqA1QyRwRMxHkoWFRNuPAABbkDG/ZUmy2BtjlJPDTUJE8nvQ5ENedTExEH9JKwLBQiJQhSU

c98u4MobFRAYy/YvBmn1aZBGHxP0kfhmib5wBS7h/7KWNhgQtOSGq0HI234jH19TQZBDI5xEkp7caM4B

zozeeT2XUsBjg7QCG5egy+McseWM2xkxO+OFeyN68F
XXQKKU0PeM9az7MdSzOnE02oaeOqhjGDv90URp66yr2Nq7wi7Z+bgXS4hRZmLi0yV59puWhIUz4ka52e

jmtl0bZdZAc7mqZb7DAtf8ntwTqBRECWniTpOU99MJ76cCh/8BNQ7p7AanabJ9JSpYpjYrSewony1xXX

SxLJnARDWSoOL2hLj95tbG1sDM7I40QvziZNO2kfy/
JQy/CrZVrIoSmOqPP9u8Wtl8GNPBpKX+lMPZePJgOQJNXPJ7BlNKulomyTeRfYTn9BU4CO6VxpE1UCnO

mO7DL0PslXJmp1RyTQXoU+62BIbe9hkmWn88eCWjG3lTWxRN0Y/hflt/trPCNcEYRrIOzdPxVV8JuD+W

emRkOdCxAXoxlpGymUCmSZ6XPdGeXW4ckd4EVJXmLM
1cxh6CQIO3NO4NIQJeJZ4AdIHnR1MiF2JLRbVkPUKvRWNgLM13LNPbWXCETPlpURXmM5Imi380xkQmif

XgUIBjHd4IWPBuZX9UOCHfLTQjiKWxPPNqWKRkZrF9YAMaVSjrXGPxQ6KwhiWygcSnGUuxVQNZXPjqGA

PcJ2pnn9OyJLe4HI1QFT6YaoIvf5AbmlCbP9lmO5EK
OP3MHZHdH8MPH6KbQ4ifHoAde8ViH6txCrAby0NdUo1YVnNbUh4QLpKrDB0ttf0OKYXkQJJhQvpCQiQy

OqX9REGoBkHuYJP3BFOfNmLrIIu9PJB9JoE0AFKpLRf8ZZseCqDxVneyHvTzARDvFuLxVWhnCSk4HVA9

VJMzNcYbGol1PKF1TOE4UQNgKGB5CAW1NqN7SAEyHC
T4JKW0UgT6CSRvMDG7RAM1EvW6DQOrEzMcVIYkFgO5MRWtOIquXRU9GHJ7USEoRnYiLFz1EGK5MxUbFa

OiYZhdGvX8ByIwFlH1JRKvHTY4TtpwXgRhHHA4PQB8LTKwCdKrPRLuRJC9RvRjBdRbDKb4JBP9GzbbXr

c2BOcxCsG2KswzLkCuMCbySnR0GhkmYCF6DDF5YhU5
NbuiYlXfUA9WEWYoJjStWed4QWYoGdE5ARIuPUE8NXJlSuO4EYOtWnGkBCV2CiQ8CKApPRC1UCKrXgX1

MNCdMaEqTON0CGLpSmniHUC9XWB8HHN8RPbgVvFqSWFtSTY4DXVtKxAoCVS0CHO1ELSsQsKuJNYpZOC3

IAGtTpz9PKS0RlNBRdDzRKS0HCElTOXwEJqbByyoRV
I9VXV2XQHqUwJmBUu2ZGF9JOTvVbLmHUm8TYV8VWShKwKiUMi2XDL6JGHiZdWcYUu6JCM6UWHjBgLvRO

j4YIY9TELqDaKoXNo1VBF4ZSRvQtDuIUh0JCP0WPAqXdUwXMd8HGK9HTCvNFcjDWr5SLK6FJZePoBwAT

a9UXW5TBNjPlIdIHp9HRP1QFVeNsZeVSD3RWNoCqKw
QRK9KJR8IwV5OEOaDMS2LVX2JKG4WDBgFbVaITqcFdIjRpZxOVL0PHI0CWVwLkAeOtC2EOW6XwB2XEPz

CGD7BRXwSqJpDdFeWuOiXH9IUEO3YoYiAAN3RMYsYjErGnGoCbDbACR4OOL7MDDtAAG2HMryVBG2UrHl

HpWoWVpyJkL2FlVfCeOnBZykKoU8UzSgYtXpBZLhRB
QaCxOqFAE0JfO9VoaqDsP3JDP7VsYdEkgfXvr4XKV9WTTzCcdcApOmZBbwCiZ5IzsaDYarXXc2SFM1Xw

wxGwv2DNm1PDY7QCNjQlj1EWbaMsB5MqIuOnBpSQeiAyI6QblnHgK0BUMbJBX8EDXnJLB0XJU0ScG3PM

DrRRS2KYJ2MeK7KSzuRFZ4NKK8FwK0HBFdMCE2TRB8
ZzKwLiuoAkj0LIL1MBInWjemPiHbXQ3BMHO6QNArFiToQYLsFKX6DAAcIgDzMSTcSPG4CBpxNnAySWNk

EBV1LHSkUfQxZSXsWMU6HJYvYiLiSVW6DdReRH2GDX0mx9DeNOlzWLZhVN2hfc3CYFN7LI1XXXLzUH6F

jQDbI3VsphWOQCTpbvjqsO4tXMbmITXyA6SoidBFFA
3lC4GruCXqNHEdeOLDHeMjCXYfLRFrEP97MLbgKQ0QRISVCOylwSRoIZW4O4Jdd3ApjwSnIMBoFu1GUK

LgKY4XrBVmknOaVe6CKFWaOX4Uu940PySgvAFcNPYiHaSzFNNgTHViBUT5PD0YWEUwAX0CgPKntMCQcw

emXYSlP6X3KC0LAYDPViVhFc3PPbDrXF5joz8MQNUm
TFVhLweZTuLsNCoZBrOmLTIdRNmeLY0Da549V7P4VmL1dFZbZQL4TXY7pEHeJjZzTHWxpsRqAILgWZhk

Cc2jXL1VvfPoHKjhSz7PvC4LjzPaPJ7pw8SfsikBAkWsEMGwHyprc4QNgIBbBFEoE3ffn9DPnPYaCOO6

CM5SXTWqAO8EhUP4qUJbAGLpPDZLWQzlDUGtD5Gnjb
TPUHIlqyxezB7zZYBgUDAeVy9LXTH+Kb9BBE9vp0NfCDaxIkPrNX7nga7DVAEqTMu6JGP6FCCpFzu3NX

D6NJBgAVWbBUP5OEV8WrW2PMrxQnG3CWO6DFJxXxTcWtHeESK4IMB0CBArYbw2PWFvLvVbZaczQnl8XY

R1HsS0UQOrAVG3DLV1LsU0REJsLJY0SZY9MuJ2UAKt
EPS3UCV9JfFzFwdmRii3LYA1JLEZNaSzUBt0RJM0XYK6OQCjQTDhFPG9UenhIsM7FKinYmW8HtViCiX2

JRTyVGF6BmhxInWrQZI0QFB6IHInMcG1EJI6DuA5XtUzFlWsOUa1WWO9CippIyu9UEqmXkG1RypkHeUf

JAulKsV4XcslRIG8ETV0SjK9PoczFkMtRM8CSEToFv
fxRxw1LBO7WRO9VrdvXJC1VVIsDpU5UTAeYMN8NZNtYPM6PDJmWEE4ENT7PBF7FPLdONO2XNNvRcCsZd

MiGMCpONCdXoE9SrHjATB7RQY9ZgO1ZXRtSNQ9DJShVuL7BICaBep4OKU0RkY8FOEtTnFjKYGqGBFEEg

AhXGRmVVTmRAPgEtCpKqPzGBEcWYW0ESNjRfUiFAb0
WRR1QXGxSkGvNDn0HPC9ZMXsTwTkNOo2KRF9JDHwUcWjEEo0PHN7QQPoWxPgQLt1MBN9DZZgIbFrDEq5

UXY4AWUkGsQrCSz1SBV1OULdQgYjYGf0JCU0YNTxKBvbSAh1DVJ2MEDeKePhLIx7RDB8YVChSmMeNGd5

HCC9XZCwGnInKOG8KROoTvHuDBW0XHS2EuD1AGIgVQ
Y1MPS4QCL1JHFmJbYyCUjwYyRiVfLpZSQ1TXT6FVHlNnXkCqH3JKV5YrA5VWCwPTD4NSRgZoBpQdPiCx

RvQM7CUPA0BqYjOVS6UONgEoVgYdHaWyXcMWY2SKF6SPUfKMP8YEizABZ8CbGqJpLiFGL1ViJ1ZpvsEk

W7FNC4CqJ8PbglHqJ5LWHdCBZiKlMhOJA5DdS5Xbof
HnI9IGS7EtLrFpbfJdz9DUK3TRAwHjtbIuCaTHfuUsP7ApfjJCihQNy5BJW0FvfxLuc5PEy5NJZ4DCUj

Xnq6RWfqQhP5AqXfUiAyECdvDbM6HbxiRjS9TEBqVOR1FSUyJVT8HZP2RyI9LENjZQG6SKY4QrZ3LCpk

BUYjNEF5VkI7SXKkOAH7HSP4LuCqOjihFak3AQM0IC
RxApjeDHN2DG0SHUS1YUVxYQW6SRQ4EtL3QMBbZYK2NCQ5RaI2ADzwXnKeJYB0CmC7JKViPYV9PWM9Ax

T9HDSvEXH2MNVuOBMwIM0QPP8jl1XhFKfuFEThUN9tlz7TAMF1NS9BOXEwRI8BzYJeV3FuotCJMPEijh

emlF0xHXrbJSFyG5HkdzSYDE3fM1DasYPpOXszZPUx
T5CxS7QydEZ3KGYwV1EkCQBgD4q1TQejFM2VTCNyVN24XQ5bCOAMEzVzWBFiAmjfL9MgYmDXNrGvJMRy

Qd2fsOOZp5eiXpGzIERjFqZoBAB5YWwoLS6FPeKhFQAeGPEsyWyvPA2zlMPxDF7JkASgPhKgHCrbMQ1+

RTfshxLeGaeBDcE9FBFvs2MdZVmhRSl7UJikETBtQ9
J8wOZqPz4nyZ3EuYK6uVSuV5BghSEAbFPsD3Dhv3GWy761T5YdySQiE5JtZ88hrB9wW5wefxGqo3tNpq

RbFNqzSt3COPLcDD6PfORniMQiHSIzLoFrZNZqlEHjAYPkTdY8PRyqBOBvT5hdSZWavkYkDmOdCWYFYf

NyNOIxMq6fwQSpr7DcqAW1d7PzNUNgWMNAXTnxJU0+
TIhponSjZbwVUpL5FMLdg5AdKWlaLLw5R7DemHUmovRfAtnxhOBTWFRgGLIiE4epgmv5vSRxWjU6HOQe

DGXrY7TeHJUjVyN1VY3+VStdNND7myHktV5JPFUswPt9MNVF0qd4C3mCcgCEUFOsMt4ZUgACQLVPO1Ds

eO6WVcCCWJPKhUSDPWpfHmVNd8YWLHMGpOXKSboePZ
0p4IxhSsz2CzXCN2P1s4qakxItnCl84p//50tF1038mukMMJ2zEz3jBd2IYWJzTUM5YwMigOQjdMagEr

YiFCW7LWKMat2zWgc5EKh1BViSeSR+LVBFGTu6iAvb4J/39saa1I2HPP5x9YIpXZ70uwBDFk6sdhkjH+

StUb2Z83BZbcEeol/RmfMWLlqd/JewTS8BR7EkZEB/
VzJGkv0tDE2j0bmLpC6RpQe4XQFKdhz/3DhecDUnm8sI/5RJIFt9z1DpZdDBoC+I1CyIywgwBK++bsOt

U2MG/dKA8HrSxI378SY8r9b/wBwMkhkojEI8FWkB0ZxLOH/AyJsTDJUDhm216+Eb6IMQr5MHcpSw8oGg

CXuJU2QuMGuPbJnJdnvntNH2V4qteqGwqdZFZp6myG
58wbRulvUFxzOVXyGfWgdEm+xYYMmbpD35jNnFYFduO+9eO3dSCyEubBJuGvaXjHwVtZBG48cc/A7R2t

vP8DJwnkLd2J8SM5TnzpzQxG/pGlqi/R2qczWHjd+PsVlAs6ArwUMpQsHL5PLFy0zHVzMLGHfU7s4Ksl

t02SXc7p9rpcHsamYj+L5i/mO8p9zHqiHUakYWPqU0
EQ8D0z3eFnNgEYwWYo01AVvzxU0kMPYeNam4UtAwmP5rXjrR6+ouIArwPUMb62ps6eumEiL4XMSJ7g7W

SidKtnALt4P3ZmUHPKqP2ycbbR047sVs6Z2S1Zxo9C3bkzcAsDnrYufwtDwu8N2/ts3KJBxchZoJJ6YP

p/+m5+iLHTMButpUTsFADMiAZ86IR+jFKEK4OiD8ZU
jqw7n5Pn79KqkAedRtt0q7unjC+mZ4MlywdHsuM2z1qv72OJuPEk09w6tE6su4ro6hhUfqyvF1bZ6vl4

DMwF4nAwuM0ZSpjU8aqidBL7GJWpbHdtYM1aRbHeoYuyXm2D22Iw32AdwatC6At9O7vX9TznXwCZxKCW

PFlWKZuFWsEa+VI6FPsmMyZTsVU0jE2NAtF4oIgkl5
rf4JOBswaL6AU0r3HI6y642uTzqePD3u2CdRiebtE7157EV3Y90L3NCiyMsbAb0+oOzTnFO6Re6Eye5t

n8cE8NHak8aCwAogA/OGWBTwkTJmO9UlaW3rRQ6dO6X5sLwdSAsfax/BC3BMChdmGetcYyQUiRwTrcy7

C76PaR/uvpLXbqO1xdWAEfwNy55ybtPLUoIf8orxaM
/+6i9tRP8/X0d1y+uP8lHL9CWikMenxmLr+WKfvfDBedZV58cp5qlM53XgpJ6u6AOWAbTTYzUAtUCsQJ

BLiF7pDD7y4P9LH8JX9yIOZVDZiuFo7a40vou0Rp/rJvq6ZW5dh7Y72TsyD04iCjEqsDuoyhCOT12i9j

Gnn2ipjDS8igP04PHnW+39rB19Aixe7hYRXD8jgw/V
L9bX65/epqnSeSkPW02sb0985Fi0/+a34vNUT0YlltZj27vOEc/h8bJDte58QO5mjaXMIb3mVCs8yL0Q

ldZZ2dG3tCW8iyq0URUdMX4Lz+ZMMtjkCKslQ4tMIeQt4MPP4m/Je+WKxdQ6OdOKbIpph0u1aB32EHNN

7M04qa7g2cMFBM0dH1dy0BTdEfBLiz1H7yDfJq7Sk8
zq4L0NEw9efemBGm9Qy/JBbSxGwV/0WDTbYRn58bHexsOyfeajALDj510BBH7qdeBbEKWK3tsXQm1Oah

fox37Jg9vCdRj+eOmrZz2ujlUNuCL4UzkH72FRs8RymVOZbSaMD4dPueAkOnlJ3V6G5hdTIgSeGkA05g

pipXcDye9Z15O7zr8YMfs3U+Nb9ua2mMCvsKHMgBpl
5lOtLfsTUYEcCwIJOmRyNX1W2FitctQnt64VhRAJGo5zNcgh6DRI7nnPJICgcGY7CJBREMlaycqMOVYq

Olt9DXlaW4UXhCWfnElBISFfVIoe0Z3rmgWnVmLKgSiu60qx7QEk79twsXr+wUxjn0vQoiOqbTnKLm+L

hLkwSIagAt4buNwl4leVM/klZhvEP7oS+O5QaoZiOk
hJ04B0ktUBGOnifzaEa77cjSDGBtBo/8SYgvp3kGrauPtx2BsUFQ3TjJa4dOHG9NXDzxgdmD2+4DGoxJ

BSGe5Y71JcR4nytcnJqvG7h9QAJ1LBgS06PYjdSi4UowE+B73YqO1CjmJC80QLOf8UikdfmZIoK14ib3

V7aks+TNkumGY+wKuW4pCJf4Ianbhza/VJrTMdBqTW
uSatrW+V5jGmEzUVY2kSXahs5hXuRuzG/Qji8vVWy+c6NKirE6JqctMB2yI6SyPODncCIpXpeQxCP9v0

xatVZwOjvlSvemw2nk/a3C6kv3HeZb9Jl6JjQ9OZrVPjZVdzNVXQevSOcL5Jkzi0AH0zKoLVLUlsetvx

wsa4TfESDGqwgd700RgrSyD8G71S4o7Qcok9oquBw8
ygnZ1jnm1a6wHls3dbhowE93DqWgbZU3yEH1wn1dGIcHkRI+Km4ZGDKnZY0hPclqDF5YysDEwPhw+Qmn

FKOv9sfVpKCU3dGUe9AoYlGkdjpTwWMxUVOouR+a0dI7/h1dJSqy2MP4jDpOvuS5UeJ/h+KD+ka+Rhbn

G6P1K8mA3HAhPDBOrF+H6A7/zkeHfAj0g4CXZFHgkG
3BHTOB24nanc8FzkekfcmeCLZp/DM1a+l5s5l4oZpJuzi+S8ML9jPR55LunAvtH9i+GStXqvCn5SKTxD

WvlazS+dChCG8gNFDL7i6BxU8pOf93L9xCJBeYj1l7nmB97uC/PpXEiYnt4iHVIU47Uo4+QHxgLFwALA

lGaMbO0cKlXDQIFNJlGE1MkS/HI/8DLwBvUAgsBYwC
xrqMKsqxQoOEwZf0WXiFXbMKQ81QmxWMDbgg4a+xV3YgWDAuuL3woB9yJq4fGAwLvgkAHYDR6PhFGx8b

2jogbQhii2cB68eHBIosRQDQntS0OTAnX2LTsyPbk6YiaawPnlQU7o4Oaf5ONgkC3tmGrDsY8czv7SvA

uPe/j8lQkh8ma24AHv4cti4BRO4bXw3QEL5iXEovPZ
5MuXnPMT9DTNtIFwxaNTjCrDZlAOIEsWyzYAv+lu5rDwsmFFblVOtdZnPXeywjy8kIMi0YdJTxtMtb8n

Vjp25OjuHyzuvcupamVXXZooTY1DtvPj0maLku0iAUcjsxtNiqNoWOA5S1WDEtoP8BlHmHJ32gQji7ZA

mLsmBe0CdXRkEuGIdbzFPxULE/dBw6DcawXybmPpla
0wTzrQcwZB+3SSTBKcXsZ1QzPAKrmODqpsBrWbz87pc62XyQ59Pua4x0p/jPmYPvuQcB+8EG20TWecyF

4ADoqKzI1dITWpSgQD5DohKGbVCZs4MB7qcfgazZ1eXuGIn4HofBgAKTJLuq+IhZ5PjhBsW8WBXKE6U1

+7diDdrlQxkGHqAzqd8tMvYeIUpSoooGP0eTILtI6j
nnvNppCQcS27+exdTyR3FQzpYM0p7K5njkO54/uU5V4P35J+4RscL/0N8vlQhlAxtSyU3B5Cayo3NkhX

2OfIsetPT9rSBcLAVR2vE60q8WRR5zG1daUF51lfSflaF6nY+N701+qixncIIVt3+k7BamV1RjdmPiBM

ptYKkN1+xborqa/jiz0A9JdKdB7nQg0gv++qpKG+OU
piedzWFR2DegJAo1hcHY7u2EajUN4JzGVyN77RCjr0JmwexEnAFy8FgSSoE4zJ5T4MblOZB0Vs6FKkLQ

nEF6KGe4fIjAf6Z1U6XBw7cuF6Af1qS8GqoIiCL+fg8p2bnbB0hkTK73gKrx2xxzDOVV4fkNZAam4J+a

nln4wkf5Q4irU1SOhdBZKsO+rlB8g87B0OfwjeG/2Y
F4jS5bLmy6wZaziV9LSpuHpL98Z8Qm18QbStBV/jSVohIeBHKpMwSQIU6hwcLXmrYX8nvlvUO7I1q5Iz

rpMkJkXIH5TCGEADj5biIuBgLuHhk8f9slig42v76Va0ou06W6QtX7+pzrpESL8czhFCfu0RcWlLos/3

TyncBTP+hR06KpIRKjSGt7IH3nbv5EweIdryOotiUL
MVH/fYJ4ZBbeVGjdnUTq225Zr2x/xS3vaaRQ++KueUXlFOPYxgOLzL6ahB+zeoJqQbA6fnhJsa+alC8V

Z84utf2V8GK3pD0flCWnuBv/6TittOSxKWxwDF4w6ZpOafZNTYhnszPUyuXQv8LS3qU5xFsqBIZjoMZm

+Bzj3Rm2NsQehlp/AqtISapFCT1VOq5iERsRAoAOcC
4WtYlPKG7PP/k9z2eRH0AS90uAgwLADa7bEcEXo7hEOG1k0oAKR41XQ7lvKnb7lq5EirrU4HFF+uGRAc

tMSnpX0xSyRfAN6HCukU+mTVYckbcANsIescEUV9sLn1O9rW7xT9zDq9kHpfgvFzZIOCaVpubOavumUH

axf81Cota9E2Wnkzz4UvhpOaBI1HHqg5lUdVr10CFE
9h7KgHCDzHtLV6iz3TrrUcSws4BAXRO0Vm2NZwrA0xqnUYp+PmlJrRD1jjhbPVfSXx6/6rwAdBCy/mBr

efaRVn+uGiPfLpiy8ETKKG6oBeT5rpC9db8bYTjDZRFwAwICxVZTrQ0gbWvmppbe6HyFkVCltDCQPAKl

Op/X+rZ9ivYNbKS9zLwJBwRUONotX1oTGyZOOAjnmh
eFrPy5MC0MuMDPEANwEfJlFi5Msreid2b5lWAqMy0QpkqfzbWYzjshD4LGP/4cZKq2+swMkh7QckMsdD

V5ZD2aAlHNrxIqCiWIyYAcMmjHdIaVLsVfm4kui0hO4fo43xG2ps7uzItrjLQE2StUoSS0c9rFvU1LTX

UxZnBDrsiWGh0S2U0ZkbJi8L1EqgHtICCjTqCvKcvt
o4mRKg5S0Vwlv+bJLlhf7Rf564fbrgX3wMP6fHfjBsf08INcRV/NbkuSCUPGZ8XaviUVeLLegcszPCHw

hI8PzfhgcDmjr4lWF/6hlwyAelOuw3heFtrmR9Po4CrNloyyoa8jwP26mUFaFFBw+voHhguKctXWpMpE

PidMcPVXSaA1aFXv8clrq2U+TnXRxd04A/APgAGAhM
HHf3MaTYEpJe/lYqtNaGsrUXZ8EIcgp46VxcpgYb3i7eaaocgNs8K+qp44apX44Q/54g58L40Za3cOgR

uYVWI/ussPsvxN4kKyz7od7D2Ew36tpSGhhMSDXM7bb09XfBv2Q9YV6oQ2twcp6DdAFMtIVnzNRCPmKQ

8wt6I3ehlidz4lljI+YTlIFDB5brXNUSeiNoBVyXVB
oO8C+Bes5r2OBbGnpCImrOwX9oET+CpqP+k+x6A8/TVY7bqB2nuaOOgpTktTrPeezXDNG4uF1Qk6Ryf5

AfRRfJnkUj5RlhVCogW0G0+hoGrL82aurhCWYxcas1L7xhd2Or+PIglQq08Tf5N2UYJxnc+h7VmO3KvM

H0wnfP5FjPu/zSmkHm1t13R8BlQxdosJP/KmDewH7r
kdOFYOA86GV29T+xCcEBDXPagewvrS4A/7Xo1/Eo6zc+dNu4pCCg+bCHbOzksUv3qxHuujElru19KjIB

BTdPAmil3qciHD1xT60umipgyUMUJyFjGJasGcBAnAkDiBhhTlK2KYKvNEaFB6J61PzfQPdA0TvkcP2d

l6RokwBgxfuOZIX+LCwN93BVqV4GDfmD21G/2nPmXi
cvLifZfJjmOEjmPHm+0EiRTUHf8jqlbEh2GY9qOTkq/4SspHGzbaNm8T/TASAArJRLFBjhcCIWYD9GfK

TotrcJbFrIQIrcWu0QXsquCKPvRHasvK669ZjI6SnKsnrm9GA8HjbxGFufmHdb76VPDgBwjYCK600nmv

pGzfL4Q1nkS2KVH/QY4St13erItgHpe8T/JoKuZngO
eyfRXkFgY8qlRZ0STr0BZkXf84zTHZi4ciLDlThryQdz8ds9AoPoNhbECA90P8X/Mrw7yO3JcEzmt1nW

pbN05JFufrQ20PP2JAsgTdEL6KWRF8a9m5Ket8Gdubv/GwanO2m/EaQ+JB1e2ZhnlwvMfWbAI0XfjJD9

+bW9tohVXs00CXAKAkmOf6Bs+QYtZnD+vWLELg9SAn
ANN5pz3kcAbJlzxFOvXnZh/w44+QLLv6YDTDu3SZCgcJ2t/azWB+peBSfnAvmDe26I9JXTF91pORQIV9

kIq4sY4dMBiuDS+a1dbzgrNkg+bK7Zo0VYGN6Lrwiev1Al2xqsCqcq9qNw1DDwAig1RZLiheZNqvio6N

gsmqQndPsAKfWzHl4yqMuW/RZgWA+cBXiGw1aHMh5g
jtX600D6wPa744uGv+Y5+ux+pW++ozvUOjpRyRdlbqNrjGPod+nXXe9GIl8wCMV1YX4Trk9CDsEltbWp

uC4BJ5Vsi3a+TOxgrHDVQI0KcOPoXHggdpBn2QJH88sscbQpbhJA2ijWRBOBsdeiPGKV0elHbVyS6+6l

Xu6lwjgNp89voTffgQUmJe9LW/9l2CRFBWb2vORP8j
YfGGCfQcJH0EfvJkFtjF7ND+v7a5nKdVebCa2SbfMVgyjvQrnoqAmEfOyd0Pjt0V3bkOFtJw3oazY1vZ

DZmYfdyFuzAZmIBOcPtDXi4C42EszVfxxd5mB8iWq/ZXiJfhM0GCP1YdhTrDUyuIYQbb9qsZeo94dXXr

fcN0gNrCwCkVqii1bupYSysCaS1lfgkoMEdLf5smfr
UZA83g0s8d4WEF7c6Gdy6HaFclxd7AGqS1AeiinAno7WLEePgxFxWRRtoJu5gzDmCvncYD+daIclMIj/

0Tk80Fnri3JiWaIz0BwSJzGXmWCRLxLCj+gu/VqapU+oAP8s1n1r5hy/JQH9G2kmvaOGfK+m49nqBat6

6uWYY6P+S7FptutKYvvm0oD3H2IrwNJBV1bY0UhToT
Yf3W7I0rk94DCPSJftlQUJ+u+CdnBjKF0CQkWhASD6G8yD7HNl7PWCO/Ee0T4pG9ZU0RQNqRHXrCfDZc

M10K1TWygwJ4sx27qfG/CkiPBPgzH5NMDp7M9FRxX9fMIygOA5jHzzvBhAIvXvda4s4XbKYMf0945DzB

K7sIs3HiuXk8jK/aa75Q7Srk6eXIXZ9b0Bhn58VAoS
HDfjTkt6PZLeypzPGq0wmFYtNWUe8Do0S5AoX1ggbh7QUO7794cz24m2LWg5S9TzHQB3drz1AlUKDzvQ

t0O6CL+IxKk9SGVGoOqsRXo+D/u2XAoXVAwu7t4YgimD6X9N0DM40qV/W7Z/F+lbExTS2k6/eYu7MIQf

4Us6LwtM4TJ29d7EEZKr7x7/k6l23W4K+O0qPKL/ZJ
TYu45pYY5yg+iHcdrbU06AksmO8fuZmDDPjZsPC7z7r8+bTYwzIkL5c8xwa5LPSSJn1fiLJMTb35t+cw

f8UVuZyUWEp3OpS+c8H4GaGSNG6FlQDDzWVRmben1eMG/hh2JJL3qQwtH00mdp1J7/uP0S2T+Qr6d+gA

sXhxMxY3hWYhKy7GN7sq5Zwnp11TH1UPTq11or/N8E
hO77oRFBt/+7zeOQkXeBXkC4cWPmBLjxtt1zZe9bSp5r8fq2fNx6gQGdczdW9m9EBGjYvnNUcOU9t+jY

Z7CgsWBXJabbU0KJr71masIvPzbnz1++tw7TO9P/xb8jd9Nm9zOHovlfSLpyQ04ElPx6Voa/QrrrQZ+2

821Gtr1Q0Ef8t3U/NpVR9w6i8nLbqqQAHHUiAsA4at
o+9D9OT7//+TOWWjow7AGeoUxvvIE6pM6V2c9zmAtsHuz+s2aYkXoAPZfpmTl3UIVjTKdn36q3CSlnL+

bCgYlMu0QPDhB6si9Gmb+V/noJtQ3XxeyDAYgrByqg3G3d71suN4D11NqMcgRz1J9+jvhQU4A2a/ie1g

EhSLBN82ViV1oQWfPxB9Yx/SB5Xiuk2HyOa0f5bBHX
o0Cc2e8N6u5G9UO255C+8A4xbl60Bfz8hZc3q/1/td17W7wHXLwAV8WqmLn3ZWghVR8DnvQAhv1/9lp+

ptA5jX1+n/qnsx0G5LZOOAlS5x8LfM95rx7SiX4bG/fP+iPtjj/ot6IBPL3hCvgMVRgtOhZZ8cJhZj44

WfDeQn/brL8bbtM8h+vnm+NHG+HFW1qjh1VLpfP+BZ
3XMcDhSl2L/1Uz9cmC+upfsH2qbHnsWDJnAIp3nbvJ05iGpimhrz6Io6YdXdvrz6b5H86exk+KuTl8U6

tvOtlo3jQNCQlPPksl+0t8pJp0gRDqo/scujI0o34VmYKFY7J48IUaBmdg9R+DZh2uQgKlIfBVrZ4CMz

0aDf0+5lCBu6AqeJoqrAHzjvwxFXL8NijiNuolmmMu
xeRR83kp6cRxbfxkf5/p4I/lmlHLv85MiVhbaW3W/W5o4QDeu4G5inVvpplh7Feif4K/kij0FtRpvNH2

cM+S+LnA7lfiZ2Uvn0gdd+IYNNeRsFozS3rhTfJx+Ch0cQqGy8xR4eTV9IAcnuhXjIUVWh8R417guSa3

Ge4yIvReDHfInkdeRgpOCXXwFuidcIYFdLNw/fFZZp
o6S37uknO0rUYkFw1k27uwyj069zagco+xbhs4JEGY2nHjaJXVNl+vsH48/Nln4xMj0tbWGsOcmB/zI8

/eu3B14Xdx+5qq4Y2dm5V1+rKCviFUFedp1C1Ml0LRxbJJ2XIZYE/1ajuEpCbHuApVOb13ztke2bniWh

WW6Tvde9oKia+uu5RZxF8Ua/1EXG72wjguuUBAIOWg
l0041bfAbRxCo/dv3o9s9Z5Dx48F9urm427CmBKRae0Yta747iuYALiD7xE8gCbF1VvupWP6eHwLBdDx

Zf8KR5k6kj20NVFFnKfpwHnYrJrE10jf8HlU+h+0YG1Gbx7YGS8EQVnRDk1phUok4QqaJWg5Gl9GbgDe

aTuxKXJY87pCq5mbSan89D60szwW6Lmn1F9wmGa3wO
lnpuaGs7FoVNPgRTSyp3DOK0jfSR996R3b66jjF+s4002hhG5HayA2v4ZZhKj8jZPQ7UBEbgeISJ70bb

1GWD/rrgXseQA4cFY48vgWUKpWqe7jRxfLBoHQFsWfrEE+RGGymnms5ZDkCS+EOXYyhkQCaZmmRwLhTF

Sp5OC9mPPEvFQDSaREb4XdCViatiTU2coLYHlyy9GP
M+KhwFYRC79fC31iQJ49I+Z2Khs6zJDRR/eEXPIxstHLrRJEX2YiT/ye2wYMk29piPscfd1Ncusmm0eM

eRPbib7rn++/AOx/dADN0sjLikOaUEm3xExWodMyp2nAdALrl6QE4np1R476X0QFtTjSpZyowoCDKqKG

rNtupz/+VJvRDf6TzVo9jfip4t74QCGZLcu+y51yQ7
xbejeg5V1Mjtmh6iVPsKQL0MetQ64/dldKbAo3MO7N+Qs7bFvmlxxn1xJt/YjadDIh7N/3OmuNNzYa3M

30T9odV0Qjxq21aPXhyXZhy19xt1U7PzRwVYynj2KJuYjw9E5tFP4VL/6mnYIpMBU3SAuRsjzRu2F/S6

8OX9xleyQyfigmGr+ZvwFH6x2P7xp0mKtEvoTLNgU4
SvVOiN+kDLCr9IkJ/RVCqvq283L9Dj7Qip52RLiL7D7Dw/hvwbp+Ap4tosVYfcQlg9bY12wPoKgRIoVz

9XLxn2Ym5yshV6SMY2rOHgHscxDOK8AqBc/+JBSNANOm0kpXZVk7omebf8cguXOS27Pbh5dzU/yWBoxU

IaXk7KZ46nEz3GfSuwnmrsVwb5o3tnwi9vMOK+MI1u
3w7VjEHxxcxXEKHyMP24pDa9Hrxptxn3881r6Km6/riLPtskCEhfam3amu3eoJ1ZA+H+etR5B5y9jnrg

zFOPVeyzkPqKduHvy+gUHW6T4HWIkRfbNpiXdsdYB+zMGtDyivn/G0w3oLlSYaKgfUfGoGkaoeY25IEN

JHJvUeby/GbiYQfspEIC4Unwfqz1hu5ilMjhuO+Z59
EXnUMulqh8u7jf8bn2c76Zqn4BhEVx8TKwVikGsa66YgBopF12usb2QXNPprrOY+l9WrJ97ISpJpNmn2

hp2TLWx5/dkZdO9tYCymdMfdd4e4Ojh4l/nAhtqH+CEdVInb0dVM8Gj28xy+chukf/IUzsrj82c5wAq+

PSl79NvNZwtuI7pJ39VNY8jnZB8Mwwz2/2I2HiYu2o
cceh+ZHsc7Toku/XZx8Wu3RFkQxnyaFulwf1wZs7ur/p1gGit4FJzB5YChv8J/yksorFa7oxtm3z/YZ3

InqHnK/hxvah8vpaOa4N5qlj1v406o24P0Ar5tRMNIjcOL9yo9WGeeCYnE6JGh9BgqzDkw8yNfMpAwyr

Vp09M8jNMUhezfetTE9VNT/rUNXI9JLH38mhncjjIR
jUiT3JgtP4r/liZ4BXiezjvlWXP7PemgxCataQ51iFKMWOSocttj2C6G1C5c5Crmmwyu+U6+/9N+/J/2

y3+s7i0aofmwS2ah6ee30ewrOz9g/SfZhgfwmVi2DSRyReBber6IysodylHzh2clBmHNbciiGS/mlHGw

SiyPchY41u+HHjb3W7A5I/dirk4hOx59yXft8ikatV
aMepqd059lrq9FoQb99f1y6aDxBV9i7XJ8cB1SqcbsqBt+FE0/8gzu5z76sVJJ12VF/pbLUthLL7r4VL

bZUqy9XkSeoI2HlVfnFlxnEEIa+8lphEyGfbgKdDKhGac0us9XdANao8ORgl7PvinVSpa+y1CSlZSmDr

0n3QYEaXk1U9Cejf9Ts8E4/YecvP2JxYxV/S4BM7NF
tIZlS3F55mp4c7n2hhl64P3klQEwd1Kz5dO9sgqb+E4BnR0qdR20liES+C8Y7m29aWr3xhESlkdy4on5

M0cAj8pYG680OpkNq46ZPYVxKM2106k/ltsRwib8SxeI9l8sNgG82hRFn530Vlw48Ha/EJgPdwJoDFAE

0MXfSw2N+DGMk1/BD2g6/C+RNjdnAwf5iiMYFvye4h
w4mFo0pKgwj/tZqFY0FGiJB6pjccPx5Me+sP5HThiYs4hbSzHTNE7vs//O5wrMr+KggddFBE5mNLy4Og

Zxd7KUAApFbhbDA8avPfyayM+mNB6T5mvy/RHwxj0IsV2dYwu3NiZ6JQb6sGDPZ2qMhZ15B14VRkfU8+

AwDTR+dSPRTaIul6Vzt9xnQa+0jGgX1U3eS5MN6+zb
gZgL4YOM6uS8ZgE6PVuFaXT/QErJpQg9y6hl58Lcgm3e/gO620In82hfvXrEurPm1244yCh5HRwQiS0s

vqfaSX+B0gBimf+l9fMzFc+HfOiG+mNIbosfleaYPljkoqofpL14wSQrwkEetfS2Henv6L2jdcrYboM7

FvmW9PkzLEs/1++2IWJ3oYL1D3cfxlLPrjQG3h/Jdy
qRdzcausvRoD/saTqI32ohVgzBypZ3bqpoXg7S/3HgVujX+ar9NUN9G4MjuMf4InmoW9VPZN1fmGGFXa

dtB/3VTl+rhxXJ0gdEx+tqIIoy8nuY+Z2DOk/hbes2F3JH9TlPAa3yLeb/Nj68ywhkQJ900GeSuXhDnp

Silvestre/huN/Eg0KaqfKikb3WN/mbrIHFIgVgXqXPJLMiY
krubzo7NCfvwQUvBtvmktmPoiaw6XoAkP3eR/HoFh6D6uwHHA86Yqh/q04EZhi+Gn1M7jPk6utn396pE

dUtfk0Yj3FtwB2Pq0IAYwTNlEqr2PepKQu2hLDO8ioUzen6dB1p5PoaPNdMCOSTpe/FmPydGg6KuA2+d

+W5v/i8DjSu4J/5C5e0BIcxwMGdrvEb2GzDE+sh+ty
0w2lE6Vmr40c8J9+dmda+U5gq19IaZ2mf27QLpaZcfQUmSzxs2R9A9iCAm308aH0dkKL/3c/sj8n5PyL

C+cn3VRlPQ1tC8lpY8/2TY/vZ+pieB8vX5hO6wplTnFsZTw1oPaJE4V1qK/BX4B/CGfU51/G3+7RC3S/

1+6B6+V1D2w1vD6pHfLEqANpyhn39sbJR4ZU+LM/h3
DPw22rcXO/hzychDKdT2S9g9+foMFdiSlTXiC6P8qXhrWQuFrPqzuJEZ/D3hPke/CrZ6R/WeaqJ+mfpN

XYABIn1o4btYQLdmRr8+dHW/N1+/fxdAY7q21EscIO9jTAMpgCUbDTtyFdQm+/cT+kWUy/b60JJKCOU0

dnU8NtvZG6p+S1gWJQ/3IT7K6klwhi1Ku/T9dLrkvO
7hnMsPPb1OPk8+ahIhEvcNYAiU1m60ik5h8G7Mo+gGeoR7X/ohFnx/v9o3kaT5j9Up3kQOsqx7EF+JPR

J1eZIp4ZxptsfbOgwggR1bMZIlIseSrYjsQ+Kd55aBwDJfTjHxK4GIVItFhDQN6z1Wum96FqknkBmMs9

tF/466o/xVwF+G6lORICRWPwCg0phWq2eNS48WY7Ap
T58DPLDfqiklcMYepM0lSeOimLs8tX6zP+7G3PiQHOTmAvgq4qbfn6YxOa4XrmvTTxj4T4gyMVI9F8xe

8QlBaWoutRsjL0kFyJ+4YI0m5Ryk2+ucfX42JAv0sMQzcq7VmPSQv3qhTYDY0GvcO/N2jdQ8yiovgOGQ

IVOAKCUv6ceb+VEiRgIcfXhliKgx1rqAFjNcfjyv+Z
161cvO/c+yI0+txT39tKkYmPTioM8Des20r4MdrHHhUHwRnx5rbp/gcMUnas8mZ/83ZpBHT3g/DRsOdA

V1RP6h0kZ7/qPg+n3G4kVQLnF8xgLwLeiFRuSK7cJ2MLvH+qHCYRxZRmHsbsdfCS3Z+hpoUA328INmrA

OnGsHNQyylk2qPnIJucuQ/namrata+C61/K8qfjDicyX3H
WdUzGkJRiupJeN0dvHNfFpBOLwXEwMlateQM3rh1ZIlPkyx6uUQfI4lfFS5FcR3zgFoC+bndOYD8bHQu

59qAkXywE/nRC1wgbI+CLjnTXZcz+BZm1hJeqTqYiwRt8dp1WRfczyFEc/ML0od9Y9ZCweqtXy+hDE9T

5JxSw3H8h9K4MwAeu0UgJ5huPdwrC587/0frzfcElU
2qem0z2a22bortdSE/OEWa5Cl6t566/F74z3RcZC9J9qxsk/p2JxN3MRYqA/C5FEV5H7BZK3ASyZrJah

iC842B+1azCzii44FVaHE9F8E5f6wTEe4wQ2L22GdA5NEWFWoVJzExE/J+3F2AICclo8CbElyFJrfX57

Uv3ug1bvt/ruhDXLnpFr26npoCiOPvKgCLfQKaz/as
s6QZw8Ym/zm0qKlBvogJtuuYFFMINWcEnX84VXowwe/S+tV9i7Ox6yOe1OF3uM396aBaoZAoP/SO1k8M

ynJme82VQVqsz+jDMHeD+8WIO/wP1hVk1K77o2Wtwim5pzsT6vEmK6NneP8eNhYwSNEPorF0mJ0hyy2J

iNP98l9yBwlcNEtizQuY2o47Z0GMYW/Exze8kpbkb/
n7C89bh0h5+nH1FpygUYYfgQvbe/uAX+kRGqlKOqwVgBzgzJJUOZuyDK9tNyXJKyD2wtM/HVxeBwY2TP

7K2U7Jq3ON4Q0fIK58JGIeiN7hgg677ARiE9JwvCfqjWhEIZ/SvvME4yhrCgHQ12IyepeHhNqV1la2VP

ExUbFn14lDky7L6k8HT2YIXbbZLaZ7TpZ20E6ID0Ss
uELue4Xv222pC102i4tqM6NB+NVyX1vsnS3q6NhPQJwHS4fBxiQX8nKnYp6y1FlMbD/APwn+yHF75JSl

6I0IK6Z/nXjauk+nLj3hbtA+YXX7M7uBFQBmmn4Ttqt042xREwrWXgfavlbhu9W+9nlnOi4YLDzshj3B

gsc9EefiGtmppNj4KIluxYsNLsHNQ2ZG9nB/oRsVOQ
/E+LC2ksKRpLomrZCWRMF4jdp23rtuMzHnGIxVW0t0OaR81ynRhP+NQmaD5urX8xtiUYTaB0Pb6ZCi27

Sh52FP+L5yigqRAxF24rBYSbRyxTA0XS+coRXes9xEe2zW5kyJPS/8w6Si9cyWkjV/G4n7Ehpx0pvf+a

3fjL/BnHE6WNEPQKPRJsGn/FSMESI/mKWw3R9gpX8Y
+Ckdj3SmrFRMZyl/zde4a0qKwngrgbh50gxnYJyIIIn50YRqnJELPeyxy4+i7bn4oyfTF467f54qVHm7

7uhd9xLXSntpu6s4KxQQ35CQLdCrWM6aGgfOxHpxOfBXYb6g9WxE1ACeyLyiS3AKa/XBrMBp6pxmVMQG

SVhAcciPoNyTeUL+YiJhB9foAX0rHPpOiuVDsnuf/Q
NpYLeu/pyAP/PEkgIlKvoJS2E3JHtpuG/8CmtLHsbxsf2G7hZcDCVcLVbP7gjKuM4gaUoEgYal1xzqB6

1xnqyzHYe0r91AQg0PguRdjFoEQvNXRrAQqkeTFg0kTLxcJMpBu3Y7Eof7CKweCErsNZNieqYrWaVbI9

SZs9i0JAulmIXmKaO/V0MaEZuVDfTeRnd6TQC+WVwb
icXe6NKl2IpiEJoSeUg9DWIOQiGanVBGbWsH7wPhrcnI4P7xrEUr77wxkdjQIpQ6xybODB04RoRl91q2

jCkPh60xV7LE9nQltNGiPITEmgnzNeJ9XmpI9JUuqnKEzgN4IKRvhT8/+x/4cPBB6vkgJsuPoAJjr1B9

yveQTN9m4Bs1pOA5iAQJCkhuEpGvgPfVIGnWhOStLo
jcpbIGyZ5f2Ul+OaZb1+sGBoyACwmWzBcOf9N6bnwyqBkbxmlfxY6YYjSaDNiRDdJj5ZOIsbRi6Mgu9H

3qznVh8TGiVmzBGeMDiXhQUJeqpc8S8Tq+LOQXUtdQBrQIYXHfe71ZMGNPrRMQH1yfWW7wspkjCCaxKE

8IvVqLLd5J8LiiZQP7qwk3ukFSShOITz7jjA1fu7Hs
PlopmyXHCkELpxW5ELPyDGZIF7K4sdeENLDK6ZNfyUpxsG0aWHtGn8U5OsKXhoOLhNeIo8sqH83J+r+Y

0GbQt9a9H1bFzgEhhdki0Oyuwn0v5ZqDKKyxcqdjHW1p10g+ywKHum0v4AKn25IVywh6/x+RywJPYeOQ

Ann Marie/Is7ihcwAK8YRaTz/iyv5iSx2BAfGSg9VrNgmn6
xKkRPJHDFD/tNZ5k01cH+MyH0cPVjnk9l4UURshoMDxfHOjbsi6iRQlNlSqJjeGsULuCeJ/NVae/syih

JDQTTZEGbA+ZVVRJ3DvXwPbpppNe2zixTD4QID3Jg3jSULAUM4AwE6xA69BNCTOf7ywfvZJ+pUxXna01

2JodwVRWirmaJ/d70XvsxrRhdqKFHSGALpsRD6OUEd
LRiSRvMoHY0dkOhjLZxedsfG9+HWcRIyxqSOQUDCy5XKtxG1wLO0IeNqNC8IJLITOBHsPSZvLDLWf/xY

BqIr6PGWRD+dWZSc1HS16cm3A5uEjQkChX7LrL+2JrZW91iybgfgbC8LeweZpcfJhqVUvowPCF6xHF12

dVFu2lLpseYWlcT10o+PMqX4rsblBMSU4V9A/sEBwU
CiP7VTLNdURuuFA7XV8uUbWiRZA0/rHjbGDEOURFoiVrlRrrzbdoseeeGpsWgQlsHZwYmWG42RjYMi4e

ZBhPzVExxZ/YEYAlFZ93EQTKjTA1KNTyQBunePeLzL6YlgLzxpokgJJnVbPttBeRHXTYbtkwkFHJlOfY

7pctsdADWB4al6gFGitVoeNxdLcY0MHuI6v7sj9lFm
kNkE3lPQqgnQ5rPSCuCtYlAXvbQQK0uXH5cu7KZvAxiRXCbg0u+GW026RygssModhVLSdQrX1LioitxV

F0CU1wVdYWHOUJCzOWUjrWjcM8sngnGlLHy8RWeERTuzBYrJxUsTnGnCFnVCue28JrA/qTg8JdpmpPEw

sVwMXrBPubSjkl3b+qInh3knWYtOwr7P5w8cPbzPwG
26mvSz3ITBAu9JRW4yPeduOxB3B7Dt7+oQ4o5bJgpmumRlDZUOWsSXad2SCd3eLUBI/RXT1ZbdiveJUt

yGBaGmPkLYKv2SgaZC0WxKGwsGcMEXxEZ3zZD+KpqWEA8TcpgFCwB+0MQpKeJcI0vm8QyS0O4ISeQTlR

RoaztfNQ6GTtuWaLAT1Eu6I26y4NQe6AwMHLLbEkBo
IAXMraSD3HAOSrmaEssdaOSzFMvRemiikoK2rQGaYniymtc7awWavTcH4ZFzMSi7mtflhXW78tarUHIM

s6q4Kn+/Olo094PL9R4aJSv90a/15/kQC7korcbJOR3+ZpNfB5qdRVVQt0/H3Ve88yA3OXbY0N/8NJvZ

f9PVZmWuyGcyTR8hOUxEsw10GzTfN3CDZjgOXogie2
iJztMGRyZnuexAjjdHtbOoT28QG63Bn8BfuJjjZUKfh7UC4hGrpvGKQT0y/LmJaFj3/4XM/f/4//hfgE

f/X4YoFTmWPsOpRAR8rfBssK8RXS3td5ZiYKwhUdBcXF2fbr4PBOW5OC6XzBu7CYPbV8ZdGIErTVZtz9

CbJK0DRY0bsUdpOdR2To7PRyZwu6GsYRZhAJoQnQXC
l99BYAt7P+o/+Vbpo6IMM2QgqTDENn3h/QDOQVwznTOTSkb2Y6reBKuEelEOIh7SyyPL3wJkO0S6toV9

VwtiGurYykgDGhkj3b7gwkTRRHqVp1ldoq/9Nl442PUS0Cgq1gvQQOuulS9GuiNjFRB1cKCsLs4vEbtl

W+puAvXo/Xu9wKWJjYBWwGhh3eU/Ny3O2Gl5khJ1sZ
BTHlEF7L6a2GJBEoGiBt4etbDdFsDvqBDnY1W9Wtwm/mA9C2UWhM8aqBYFQ7t4h8R1HKWm83t7OJ5fAZ

9k4QqxxXipO/VJHir55jmqy4aXSPjrdcMioJFxLH4OIpHnYY7bps3HWFgpRYTnEnoDMko8MKhvHA8EqK

IbE2BghtQZIAAujmptuR3hJOniMZ9Nt653DdIzZC0E
REQDMKEsBCSrAMsZLuDxO6KbY3GmvSQ2KSJlZ2BiJOFpA5w1WPdjNP0GNVPvQM18CL0jVEJPAvSbP6Gc

JJdjBZWkYLdwUS1Mh978HvBufLGwVIAgFkAyCBClRRLpBTL2MT9GDKMpJQOkxSfsXC3ylIWxOI8LsDAq

LiZsRKuzHK1Wq495IyrjITCnDAZwODDWBPz+Pg0KZW
7gr8RgZCsqRPKsCE0uqi6XVItPXmRlB6G7nATgQl0yuX5IuTK1uYUjO9NTSVZeoePMuLCnOz0QTAVpJx

2caQ0GNRXEMZPoETQuYOyiI9pLZC1EJKBCAJGkYERqvpHymIuRTfRwM9YSAPI6y7YtnWpnUl9iCKqoVl

XhaXJ7ivnkTEZyd5QgQJ7NhpRkiualFiRaGSQfnrIz
nHfmHB4NxABkxRLoTW59OUR+IgGOIvPaD4LtscKDQBTzhmeztE9pEXM2QSPmDu8ZVHGpRCxmWJDpFZ2F

FkDkRwj6LW5hXMt0AZlnWDJcGCEeRBr+Hy0KRY2hk8CsOYpuJHAuKL5xfp0ARBxVUzKxK6A4nWTwIw4n

zF9AgSK8lZHdU7T4xKLgL0Amt4LLx148P0ZHFKLJRr
bAlmhrlC1NhnIjWVnuCp0BWHSlhPr9mZ2CLPjbUD6UWIWqNE4yCZ23My9nfEMoDeL0YNUvMz1ZFaDpU5

VoAM6bA53lKAAhKjVvYNVJOt9+QVmhjnFxNniAUbIqWIQei5VuCBwmATA6PlJ6YJNkKts3USDwJoQ3CK

RcPUsuBGL1UGB8OgmcNLMwKMI0WgIjJtihPfW7WDL4
MRE5DAlvSmnsOXLvLQAaJbZeSNRlNYF3FDA4SiJiGMw5OVM0AHE5RkIeJFm2QVE0XYC9AtGyQYr1VEM1

VZApTnIrEzpbPAxcFoRNYlszOzY4QAxdAOL4ORs3NIK3JTJbHgSaPIOaWCU7SqnaIZBmMHFeHFS6EQRz

HtW9FPOaTuY6GVLkTuY7WTZvFQE3KVyvJcjvEOx2DZ
I6TBNcLhF0VCL3BfV1RyOsJZa5RNxhMuA7AieaBHAkBCA0HAOfXwT2BJWmZmBJHuG8DIS2CrZxLwJ7ZH

znPeB0AICeOcX3BOBdXFK2PnJaNGndYIUwXXK4CIDuCowwEGUeZKR1RWVfUdr6QLY5PXG7YOfvSud2PO

qiWfB2PFVsNoAfIMZcCLB8AHilFoe9YGO4LDEeLHqk
EpA5INM2WqN3ZOatPHKcAO4JUPV1VSK7XVIeIoasBqU3TKB1HQQ5CEtpEuNgPQF6MzD8JPTwXuE0QJL1

RnP6XGEiOnS3IAS3AcX3TJWkRjV9RKY2CjD5QILqMyX6VPE8TgS4XTKsHgG4PBA1CuS1NSMqJvE9KHH3

IkN7JJMuQiF4NVE8SdL4JNVpXSi6RBHxYwJ7JZM6Jt
X9ZTLfIbU1DRC3KbH4KMBaMzU6EHL8SrQySjMqTcH8WVN9EKM1SkCqQJT5GCO3BXJnZzGzXJg7NGI9Rr

S1JsIjIJYmQFR5COH3SQmgZAX8GAg7IMU6HVPyHvlaNCymNqB0TQFjLEG8EFWwYoLOZyDgHaT6DEawSi

umMDPfSBLfEACbUHF5XOH3CIL1YMUjBYC1YYu4HPI5
CdcsNsHcEOsnNaN9OKRiUfNpLAqxXvR8BRKhLYR7WJVxDCC1RhNcLAUbGULdXoW7ReVnNoP0HXFjWvIt

BZozGuD3QSbkNqF5ZJNgZgQ6UM1LAyC4TRt0BML1KQirVbN3GJdoXyV3NDntUsIfGNsxLsQ1YgVhSkZc

GUQkFyV8GKUoKfR3EYVhNFD9MmelTVI9YOLpKVA8Ys
egDTH2YRnqHIL5FQLkBCBcHAR1RII4CQQdAMByZCT3XEHdRPMkKiz9ODY9CYE2NGRsJWc7HVJyEwIoYI

QvAOT0IVQbUmRbGXYaWDJ6RIvyPtJhBMNuZEZ2EBDhZiA7PJRrERU4GhmpJoWvXOFxXDQvQB5CJY3od5

VqZQryKdCuAS6tlg4MGLwNZaDbI0D3rYOzNe8jmHVm
i8QxsGU0l1BFXeFcW1QuukCXGK6tE8UcZ65jRFmgYh8lIS0AYRMpBZPpWF70TGMvNlkhL2MvAGWbV8h4

WQBqRuvcEIPyF6RjqOMuUfYkQVnyXI1CgJLsbuIlKg5DVCRwZt8vhRNVb1ksMbAsNWB1BPNpUFX3MPPg

QxnxPSukQG9QmMRaqIGVdrfzFAOyG4V2DI2KYXVRPt
4+FVuzznFjBszVDqBrVQEtg1UmPWm9KJ3NVTNcZTdxEI9Rh697I0G7PvD0zYKlQGW6NZU6xKDoXvHsHN

QwvpXaB9Nfs1KSPJeMn1neS6BxR01fjU8iN1iddvSdi8fMxgHeZLzoPb3AMGFnSszrn5QVlKWoXPCvU4

ulr8UWyHRzDLH1AH7ACGFlP6oefDonMACeBHEbLq0D
JDZsOi3jwAMkk8NryRG7g2LgUgZpKWJWBUv+Xr0UYY1lw4ApLIyfNIRxJG7krt5OZxK5SYQqQBSgLUA6

VTGbJPJkVkhfWSZ2GIO7SpyySQL0SHzbFGOdBcVjMsPpSYEiCfQ6YJzyTxw6WZFaKiO9XIEhPrO2RSV0

NBA6PhbmWPV2EHArLXE2RqwdRVT6QSWmZFP6DvdlSN
O9EXHcJCS9ZquaMwM2CCQkPrT9FPVtZEi4WOHiThk5NBK5WNV8CIFfKqR5ZOE8IdQ4MHazBlQbAIOzTz

Y2UQqaIrp2JCmtHXLwGrMpCmY4XNE6HYX2NvEqNSs1IXl6LPY8DLNqQUXsYWj6LRI0BKxoKSItEONdSL

L8CVfpDjEiCUotIJM6RLGlDBFjAAB7FEPGQtVeUxEm
ZafoYhJwXPW8QRV3MQTfCiF8HMAeXIP5LOefWNYlYBB8XGM7YuYjKsFfZVB3PNC8MaYjOmEyJVN5WwZ1

LVqdHvHbPOw1VUQ6LaDySFi5DPF8MNO5DXvsJyP5XZPnTDPpKkmfBDS5TOD6MBI3XfKpTSU0PH6GTTy2

LAZ5MnNuGQYyNnA7ZAZpPgR5MNUgMiMpQUW0BaP9XZ
VeRcP2PNS7TgF0EZJsAhM1DEC4EuK4XKKgAsS6GJW1PnY1XIJrXuI7UQH9UlR0JDMoMcN7HBQ1NaH2KX

FuYxU3WPP2OfH3VRRiZvQ1DMI3IrB8ZJHjZNa7RCMrBgJ5XTO1XcQ7HTVrWdO0BYJ0JlO0QRTcPfQ8ZL

C5FiCjQcFnZoi8QJPcPYS6RkwbQMT1FKYwMUHcVvju
ZWY5WSUeTOR2AHMgAmziHXZeAdI7AAVhXNAlHEt5VGT4BUJnBqq8OTT7LJRfVrLfDnB4ANL9ZzTIMbY1

WnD3CTsaSuF5FLHgNUGqKTOgYFFhWTJfAyN8BVThACQqVDx8VzD9WHcfMnB8BZW4XQN4SMDeHpF3TVA8

HRC0ONUuZBTiYFKxFID5GONpLBZ8PTDqEqH0GQScKm
K0KCZ8KjBeMsLcRlH1JWjbOEP7HCpkQgE1BZ3PWhB0CTc4NZN9EMGsSvO9DGY7DVC1RRZyIyv9PJJ8Rg

F4PJlnEcy6MTY7DyF9VfCjGZV6TGMvLDG8NYopMCI5WSSmDHX2UPaaUFV0YTatTfF5IbOcFQEvIFSyCz

W1HsKeVBBdDGX2JyLlLOGeWwWjHEK4FmE2VVehFRu4
IoFjQTz3CEQ9JvA3LTFpCEv4UKV6PpC1AVKnZMf5QFX1JbY4HhBwDUY8PTW5KfR2PTTgJRd9MBU8KYXd

NW6XWK8ec8XaYKkdCPQbJA0ipk9FFShPClVuJ8U6vAOyRw3woFXpi9NlyTT7o9CBXeClE8YtwzJXJY5a

O4XfX31zBAbAPtFxR0AyJ9RwiCPaCRn2T0NysApsmT
nkpZRpLMs4H6Src9GprkQiZQE0MI5DRKScWmptN0GaIxSFDhEsO0XtpgRJXf46GNdcFE6nLQYgSBO5PJ

GfEfbvMBpeUE1StEWwzAMOezrgHQOrO8V8RR0GOETPHf1+ELjrjxZsAqsIKxH9YZVjo6YgCSc2QF4NGQ

XjRFdaTX4Oo164G0L5RfT0hBEoGWP0QMT5gOEyAmSo
BMHxnfCdR3Dak1CPWE1PsmAfXQtjSm9NiA5MxaNmMP7si8QjcjxAXiJiL3CpsfB1A8ihedYyXC0VLZN0

G8qcufSdBFPBHnYdZ8qmUTStqoFjIRVxDNOPIsVvQ3JjczPANHObcbnduN9vDMA3DJMeMw3OAd1NSkXo

SC8djt2IUniiNHXdHunVSvTwXOdbKxuibKNkZG1WbJ
L8OMUlS58yGNRuRAThX4IxPXGkFmNsAM1PNZ9poAwqDPB3QYR3As6BBjEqx9FzFVCvXZiIqf63aArDCl

cueChuAUCUECAiI8tpRrUOPPDO8IwSPCFloUBWKBnxSFPezrkpZhWJgUlxGOHDSOMlZFgwaZCKnZURCR

BBq51oBdJwLvXeCdJg6M9ddKqy87FSoRrs0/P+sPF7
T+80fgkNuDF465nSiSxQknwapdebNU6oz//8qxFSC/QpRsAhd4/tyU1CiB0o/UqCZ03PHVCoDV5GaKFt

tC4Mwhu462TWd4WhA5ck5sPeALumNkEjl3tvlOm9cRm6qMozF/adbaqQsM4KxrvDOKg5okgx0c6K5bZK

/xuKiAL/RLy/4OrmFkzfBWjXoODky0FAXrw1w1vwXk
yFKH/j7NbhHW+K+IwxSdI9RNZx9mPOSc/5r7+ipjjA1X1Ehcn+ztzG9B/aW6+jQrd9r9m03SF6E6ShNt

dttJf2gy1w1T+xnk0dg1f1VB16nNHCTcsg29xOR0SOGFoxBQhv4HTGTnwxWkjWtfthQBT1w9MdJmySxk

lrxqRUd2jicidEoGt3657N3pRiX+1egijUOc7Gydxs
9v0xOqK1yDnEpwRAknEZFfpg8jbrbJxzdgOswEVqd+nNwP/QAppA5/+9j3+//2fQ03S/9M1eoNSO+1Eq

HwxxfeLbe9ORbiyoVAiP/q9Znm46IEzodN5l6hNdtJceAbVUKNpLa0RHnR7QFrghmVB1+igtGmCpLt0h

GTIYnLyZ5rODTV+XvGhY9UuU8lM2pBR8VH2EB1jRNr
Wp7P1Hvu12LLpVsezQdT52kg167CnEUbVPHnKAhZ9Oen4l0//+dx/tMMHBI45tukV5P9se9Yd/Avkgaq

yWmz/SELqBUo2dhc6L8pg5ZBhI+1rlgHj0VkVCnAPl2ccQaLwA0De/scXY9j62nxdnszSnq2O1ppe/1N

1zFZGcrrHvTK6u+dy2IIs7g6zgbglnd/z++P3x++P3
x++P3/+Ap4DWQ6aBhdCyAXiV8CBXg8hazvWjKrL/VuSl+EbEhAwV9EgMGM/FcqkSTfEZEPoXKrwhJn0O

apZSQw9zRLfUlm+LqXCmgTwGaoKlcwlMNf4ooVW5jIzhRJx511Huf+C9FC5sqsGJR541kyFBU7QjJ/E8

Lp7fi+b98chSOD3Wf8/E85/i+F43PoCRK3Ndq+K5Xz
m3yhtn5abSILpUJ4983sBSy5Jl9iuovgSbjZN2C1YjsYhjb+qD3pePBDjH42kvtIt640cmBLTCN81Q9l

bMxuk79g0ihi+lAgAFe94qUq/ZOOPgZ/Jb12x6Sv3q53t8y54c2sp/dSerADvlAqAp9r663Yco1f5zoO

nTW8+2Yk4Cg5bt9MEoJi7Tc+PcE4uX/k/cPmsC9yXl
CrXx9EtS3lXYWk5q2SzkWb6COeKTDF2G3lpm+9lu9d+o+pLm0LvQuzguwrVpXR2UT7dNgV7vY0/f7m6X

0f/gyaxSaH3B/JPGaxhka6Yc0s5IAnR32fFVgtkLv6RK2A+1b9v/CMoYUQ6ze0fRIAhj7wubfboHD75x

Sj1gnQqPCPrf71Mt2UForB7xf+/rmdhlt9Lk+7pbjw
de700Al8kgYdSUaLLTv12wsNJ8uRiAbATMwzfdN6hxGQd2aduAkgYPssy2+zHR19pacbcUPh4+Rfb6Pm

rZKuNjeJB5+na5Y8Fuuy46MIYV350Pkzfh3xe2h2edYbxS/EeMlLYIEESJsZ8N5ZiNT9Vd1XJmsmoi7F

Um6GxQ4K+w2xf9U/Y4yNJWxq425JGTwCAbXunMGJhT
5Q/JVoKHOnTOSL+LrFhnEmbM1dsQb01nDv3pBcx7buQuoIDzSVLi6epMXdIxNRvUP/gCvkrP6w9eCi4/

tMpsESr9ZGlX3b8xtW8ef+1m+vyfZRsVyHiIr6se56Oy/fOBiV8zb0QzU+3OqM3pYYbwV0g3zs9tr0o3

2Y8DTDXZzc6uI2okt6cSmofzsEm1mOGE5eFlBSOoVs
biFD2B7T3NWzbTz7ft0iX2kd3rufs8LRLzbaDTykxBTwaJBh7Oye2a+o1UbvHYdbGBjG6Dl9K+LVr0bh

P6uIehmI7ht1EUOB4q33cppVPtNx7k6pmCnFXtLjWQSsJjMwZUVKov9JaXFcwFSR6jAkIB9PQwXBi+JZ

EJiP3nPLjAyB68WiYrRzbInAl7Rem1OmfZdlzma9BF
yel97TD+l2JJsS3MyevSNYKN0SixC4IPPQNDkmEMcMBjosAizHIyHx32Eu3rd7vts6VucD75yKc4M20Z

QzaaGiuvqzNI4TJxThMtXxcdllHQmxdVXYUiD+psG2TB704LcoG0bbTj67df2NJYarL2Z6E5izjIDShH

SxW3qCAoqT6Rjy1nGRLzx7PmZ32RcxBHN1EaPMt4tI
bUHL2YGROWnxVAtGrAA5Q1huhigMvo07jSyX3x87h1x/HrCia3CbUtB9PQJxpYtTaUJ8GIkapFSmSDrr

nHTrFLrH7pnM9cWpN7t2bDN9SFy8xrZDbPRdyHtThEPOuUo1egVvFil1saaHaEh3dP3gvyNSrn+zn0JB

m5/bX9JKt1ibcC4AIZaqUZbhog1We6BWVtMq0PXa2y
LG/47zbxwM/ef9w5QW591gwzm4Sw4CL10XSxVw9i66rrojTlUm/LTdb9Wswh/O25ctEvDalijscexrER

ptucQib3WH+nGWgeBB4f51jy9qO4ubqrv2VxU/ecxnhlMO9iigu7Viv+3QawdD0F6H8VP8vEJAOXxVjr

LqculLcQ9jxm5GAuRiYTuIpW+nVdAOYD2qYabP7AuE
OPn3PaBOyIM+oSGNJtxHJZ32cAXrpNlEl6OCbvdPhhjwB8tNMUG0ALVo0IoYJ4YDrq7TkY/sC+wKuBvY

K6ooaZbbBVGJWjU9GlfMBIIvnQIPRVXViowAreJrAmvH6MMFqJnHTaFQHa+a1X9SkaSVHMv4XjrUhmjf

a+Ro4MRHbGoEkREs3YFaCwRCa/PLBlNyAsGg5VrxFZ
QY09qAYbfClYYzGE7yFgJmqnTwqEkQ1DWB7yrC+rnmo4CQVm1nFEWcg7T3xTlPH2w8UfZmvBkS/vk9Yr

lzIjDu1pKw2rBn5pLXrGN4P3KiwBcGgqcl2zUZEdVDW4IJz7tDl0Ooe8aVEef0d0XhlgyOYSuHIuNFmF

GpJSxR4zbqetj8XHr5SDrEAhCYBWWoqaZgaCmsNemQ
wDQeJfOUzq49lpSOdSs8XgxFYm1wshatBoyDZbocD0psDYpRYWmADRhzJ36TkZiKsQjYqb9ogC31WbGl

uvQwPcYaYh4Lu09T35MfazmkEMiO5cPu1LkPwZiIJjjEePd79oVQReneEjddLpEaa3tCPt2W2fRR6Gxx

vjVpdwe0aBEPM/vJB6C/ymgmLDi52Ysh7Jf5NxuD+i
Sj2XSuQeD1bW9rE/HRfFh2FHsthK64gBnx3QiK3XVnvYTRI9gl1t9bNuFbrD+t/szcn5m90aTwuAad/m

2SaRQBvgNmSGNC5TIy5X+1K1xXD5qZIVrtiiltgHbvoNdmDd+WvDqyrPtCAYOazgc74P8hfOjHovkTKS

WOKugZ11tw3vY6FB53irtANmzlRbwYrjwB1oV3L6FK
uVaYIBDJq0GF3UugUI18YcdI+NthiUivruc+JNxGRp5Wwenuiv02ad4CMpuOmjCmek+sqvuM6QiaL5HN

3Wxm8IlAdHkgJEhIPrcLzjMbf3s526eI6ahy1bV2PbRDNJCgva+8LxqzqIzY0LVVk5Qoj9DQrDcTxHwd

VII3PCjClPo56vPXnf8BGJab5tIjgoVv23ZeUQ1yMX
dI4XWDncUV5IMxcBH2AwuCFD4i5KTxdwf/QYMtQbiPjMVy2W6sD95IyFTEsIZZ+t1ZbqRv8YFSsDM9MD

+Q1pyY8SXo677Uxe7FOPxowIPibUfhYQyQGqe5iXDuCPZGq6w04nc+WN194kCoUtu8xtmWbb6gBjSJvA

V6cct9oCsEbaclAi1Oey21uJ/NPRuqmQncdhe9Y6zX
KSpe4jgSEaKB5Mr1sRzlGXzaqSNG8bvoTOT5QUM3K1H/vE5CFzQF+AD/Ci/IKyPjQ/wPguwvjeo0+jcr

fany/fkePSC/X2tVpMoYHmttKA+E8YEUxV8iQAdwsufF/v8jBAoHK8xWt0N1gjA+I5897Nfb85Hhp3P1rv

kF8TdaNwYznlohi15s5S5Qt7s2Jxhuijl3vBBo6Rdd
vVir6xZam79OHnhMhp4qUtHgcfC/qh9AV2MAah/w8RsdMkZrFiJkOHTvTsviz9aF/M8dQY3c9uyksmHt

cj0zXgXNthv4BaynewcRtN/O2fhk2AwU/U37tKNG7kdqHbgFa2GbY72ADZiYD7+TJOrB/SB0PnS/3puW

r85dcIyIZQ3SjytHJlfq+o0e5vNO1AYleMZeEM7yyC
UEogu7FAyMLvYc+uwptTdFK/bQBuWcsAwNRuxGoMaZaVVr6PaMNXT7uIcphTcY8hMoC8YVtwCN6rQ7Zd

xHzGI1XENnupCsA6cLi9LLplH73RopQtgRse7slRAUExA/3jyiFkC+6hFWb/fDBJTB/VBprxtOH/B6wn

9DEb4LE/nVF297O4kGpBua3mabK8jq3Mph9cEMuzHq
I+zqdREIEpfO10Pqonn7kK0+Y3k/SapyP+BjBQpbvDYGwNVvKBd59+73jrh0azfBVYToI/VCv2sj4RBF

XYp+nfG9QuZ205ZaRMRdFxXA9qlNcLY/BxlUh9jObhjAJSM9keqKlR1MfYeZs5KQ2KOyS+pLOe9sMP/P

fsY8DMhP/WwJ0OnTyVu3ehKhcU5Su/eKJl5lYfe6Fd
ZbX21Gii6ds0PLuNKxFTXfKarcLEH1M+ukWY6u1v9lneLI78CuyfTEhogDjch3pj7ZvM7Tk57Zphk0Kw

1bGYSp6WnrtJXFz3765b8Fe9ETJctq24bM6NiPWx36k0DNNJuym95D1RhmqdQzPIJFEwv6lpxqDi5P9i

sNUrnVoaDF46DOctSsTU+tlROYL/4kccX4L2VrEbj0
GMYpyZ1f6n1SzVvRIGHRDBOsMlKi9aZX8G5F8c+Jm4a8gfra2kwx5JIpH3oUQT/NNs54uf4bs3yt5MHG

aJnjh+56E/68OVlK9NddlYpw1zxHKsO9m0ZbEGtqIX1VzqV9SM/zLrYGNlgTbVspSM+8UWtBLv9cxJsF

STbpN2jxb6BU2oO4Y4EomqEXawhyI5x24bkwEnViwp
dJo1pJBkkDosbdP+udY3Lop0IkXt4wB9zQ2xc7AsLA8wVg2kmThvgwJ6BYE4r26sOCa4t6pI7dZFHgGR

fsAl60/vHBo1nY48ODwblbn0wMifBqLpe6jWmHsOgMFSsLxY2NQyKz9XeB/QONri1bxRhGuhje4f6dA0

LCTzoeXnQ4jE94yCdXBgPgbH2/LNpklwRihcAvG8L3
FWhfBdpn+Wudfg/9e96zoBgbitMelPxuu7c5BjI3D3hVfg/ejHhr76FRqy0Ws7H2Xz9pBSv0MZ91AB35

bX6W03NCpCm86vU+JI66Rz5GDyJHIR/43wrsFfrJkIYAWZwHMnMU+JVtSRsHgffY/jHwRFhzXxyQ4bFy

qUHi5MSnH2AwGR8BdEUjR+ZWvxOKvlkIgaK4dkwlB2
u0+qssnugenCNIHQFKxDvgScSiVC3KvX21M7D7m9Ue62Lg6EUxd3TOxHGknYwjLgmGVYYz0XkZucAK72

8hpGVHO62eCFYLlQ6mtj0++oqru6N7sf/YiRes64xW3Q2GORPDk/nNbNAnQXvabbrOiqcoOx/TqXy7MO

iY3Kcr65ubPNsSqYTZs8sFlfgJcDt5yxP3TCD/oK3j
11OaCPsF+49fsF4+b+8yRn3e5mKZ3+/OtOfJaiZzHgVSsntdj31RguQcwMjKg+ZRjdzXssOVU+KMJ47t

aoqlvmWD8quAP7NzOFi3MYrb9F6Zw/VkS8sPV1V+jMK+j7y1excl10ZDJAONEZG6YqresPZWf91TDm61

NP1owxHFf34yVGpCco6ZtydPErEPAzuPutlCtCyziv
KBcbdUjSXvRWQEsqHuLB3YXsLsm9Y687T+Tivehxz9h6jmwuSYt3KVeQn/jZ097fGNFm4r2lueCXGaUJ

vFU4xQxqx2Lzzx8jllsB/CPsxk6wgu1v7YOfo/asrAtu9BewT+yIkDxXivNNN4KptPl/jI0dya4ps3bs

5w6GLGiqUVgE0rqUfXbOJAu0crIv56FIz1CalLUV2X
X3FDQquddO2KHGOt/byQO3wYUOGjcSMOljikyfACSGzpV8joe/AiaseLjZmhmzrbnULHbfCUaOjmI6H7

Gerardo/4XJHvjbhkpyFFMymmOEBbqL3c1smgx6tNbsAoLZowSa8VXcQBu6qVOpRC9rwAKVLAOU/S8b15Gmj

w7jBwymZ6rS2sepi30sPeugDVTwVG/S7BxJ2N3elVo
50w20uS2pQmvDshN3TG6NycDIhIYoFyAXKJvbZaj5PSlAwSgEyw4CgCdOxn4cZ1Dwewc1EWKtXJcNGvT

bERvWX5Kk0kjzELb/Z2TkBlGifI8qFDoK63poZdClV6skwEOCxgayVTyobNI39Vnfv10wjb9PFigvgE6

/hidjx4NrNaF5Eyy2ZmeX/7cOsraD5hR453jfBQS1w
BQCW2wCbdRT5ILonT1lK6nabKkA9fJ98ockBKzG6qjGnvniWXatVv4DhXl+t1UD+CiheqadoHbxoLI/v

YL3e3wlkLg8umW9+qvIIJWF/KfbPM1n3dLzlsipfMWfzMT/BCKivCp9i3y1SE7RsavlhoJpvBW21Sv+T

tdzARCGNH1IbQWbkct9XMdyk3dSjOFPLkmt9Lq09MD
WXgaqCBz/rySL1a3ZNv2Ji5UN9ItFzIi0oyEIqzH5wT7ul0Z4IzMcKp1jbec1HCvY95NP+OBNYoyO/Fk

OiPTp1RU9K4MTP8KOkMQv8Cakkq2mtxE0fydZK2FkMd4Zz1lywu7btrbhAwLr6m8OQNzpNg3iKywI2eH

XI9XXq3nF9zgFhl2fX06G+yKv+ZvWOb14bFEkvQY/P
Exfszya1blH0GZB2agTGz0BgBgTUT/xOQh/nYjfVRT1aHJ5U2MXwXtd7ArQHXd4haIlYSxtY69bfqUyP

awXEbgR2QJ0DGrKk9KUVuMtsgzyBnqsAHtlbNXc99Pp6sb+zvSmk7GZaJiSlLQ4wZVvbKYlhhwN8Yugf

hGIzhjb37s/qYwFff9LXcOZWiIEQFqPXDHUk57aeiN
YkFEhSTbzjqbIpm5cQf6x2GYL+ag9G8L7cZ5oTcYB6i10R5UCAZC3WkHTzNNji8rGgdfwcwUUgeKP6XY

41Hj8Gf+74nJimkBC7AB0b/9CF34RRBA6ZH2+Cz7jqSKt9g/Hhyo0nLy2Mo39BE8ucNUdcNU/PHH6slA

XZM3GXs8TFge/idDf0P2HoyC2EcTTmWwyOtHC6UJHP
1Zh1ZkXCjkfkMuN2tOIvgow/ew09LaQb0jhG1w+8dck7bnYsCMz3HMCrCcvM4KyP0S/iq8kROgJH/VGI

Csd51zqVD6N50VaBTgLS00YLv70GAjEdgyHXXhJz7t6OzVbxbynfLA0DLhMgp9PKGUoI6FJylBe2SRp+

o+L8GAFjO5IKU/ZFXTEWb+H4vUlbp817ZzOB67Lrrk
/Ir9Jc+EOCw2IT7LWF5O2sOHsqTMj7T4fxP1k3rCKKlM9DQMcHWXXJ+f6FlIeDMgTPcglzWpy2RUEVbK

jzVAxG6KbrRk50jaFagX2bK5fmBTnQUrIq7P6MZ5/KdYquLcLj0U79di50JUKHzU5G+xXj7oP/XujArX

GVrr2F6bX+89PavY3qsayfHVbA31OER43x0BwnGN/C
MZCaPb9y3rR1zHmRORmDLRjY/whrxE+gB10OUf+mGHl5DqSRXLJsL+yPnlhjO/N3CZatrZ4FOB++A45H

Rbymw3X0a3oi85vDwTnqyVf/Q7T1/j3Vz2QjG3R/We/8gEF2PaOck8H6UopUZ54K+gJsVq5KQDwfJ6Wt

Typ/kB2kZoXFQvCZJvI6sI+Ws9dJQf+Xs404UCik6n
Yb0LRj4GRZfxM33RH5sth3+Njge/QisdLcV4mjvW0o1Ex+24280LxqBcyKK09/uLQpy0BFf0Wgnjc2gc

pvrtWssHg81PjEfifcGA57FBxcP73S90gtuWQyVx9RMJ+hYhK03mTbU2qi2qhJZPgjU4mvQW5nWIspE9

9U6wikmvgp53jrdYx0DqRrVlDw89kzPd0AmnHk6z9d
R3M19c7je/nNAYivQTbnespYCOTsHrQRfKSqILb8ZYh34hFAUcK/JRbGtm28IL9/QV/OqOjKI1ZmrqyP

UHmBdHAN1QqdEyTrPaR19EqAQQaA0DXz3bvyzr1WGaKuvdcrxRN8ir3Qz51ZiecIc44ARR3p9+nnh5Rx

rAy5fDptjQH/OqMPUJjEDtPj5eRLhakcV2Y+g8L3A9
XR5F8rB+TG9KtpdjNz+R6M+EeDjgZ8e5UBzgNHFdweHInQ50oM1bICbgvo1gVk35sU1Yngkxl98WFeJo

UQ4t1Y6IM8g6Bj72ev/wlhg+Btb6cCvlHAA63ve9PV32Ud6RcGF9LUnHXBi9X7P8x5RJafcH72ewJ+Wy

1+dT5NKQ5LCd+FqUJzTlSfyGcvKD6M3WklcKa4dLS3
exowexBlpedLFzvdZixy2iFNlwyWg+iWJtRZNxNJyrG1WgiRMZ0f8iLcOS9Y0oHankxEFSj1NZPZKxGF

w4U6DCDemdFa98NtaUxQK8J3d0nxPYbu3X71/oHY0Tsu0443nBpag8l1cUlzto0bF7CBVK38M5s5Kz5V

cRbKYohQC95Y0j+sy+4bp31n+Wn72FThU2Ccczg2i9
c1lqozw/spVCYYCGN+SGAnLXuzusnI6t2O2k6O03T077csI+C7oQ63zEI/AI6j29WC0928f/vtYvSMXP

o9udeRTPo9KkhRHmRHxM1ZCD1M7GXtesobB1JyMHUDrq9G+/lC3KTBbVjp5WzAQzzN9kukyWsuc9M9lO

0dVz6DZviY3hh7HMHAM6sv7v6vuzg5jtdYvyu26lyB
0RXAZWyXRJyiXhHPUC/7d7BKGRPko1t9rTXjQGz5g10I7N6oHPF0liX2+NiEjwfrJNYNcO+hKFbu7Gxv

2ISbaKMf4X28Chk8lgQgcrt009EB466v1HfQb+nVCGtHqcVZ1d5zM44fNmrk5QwFZwQIUN7etJs6cdne

mWTC56pNxK+A+5LDG2tZLo6R7/SopcuCYHvvXdTfm/
sC7q+GewgzBoOm4q/cFmXUpc/G488A0KgPUx/Cx+Ji/oYZR9pwW+OXK0r5ai2MzETF40k6omzs8/ccqo

O1KVCiOfb0vKvx2qpJq8QASj3vuYgagM2Xy+zi5j6NVlQjQ+e8l/yv1p7EdOpYNz6vqD3pPxon6R9tv2

+Ajct3c/h+TLCGgmY4R5JTLzGfoU+005q2jm41WL4v
L+qiRj9zIEgaI6fWw80qyPLo/5NbWxwIid0PaD+kfsZ+2YlO0VusY68I7f5KsVwDzp/XTnOaEZAbU3rj

ZVigM1+XG1E1Lxy8mfOP8G1e+4DBD+p3EfEOo9jdSJ28acKZabhsY9oA2oK4kCqew/0777l/DmP3wKPJ

pePo6fPQ5S51olrXEtSc8Z7Q/ZLK+X6oNpu+0v4MPE
Yz8MoUAOjH6XtQOR/mVvIFFqQ1Yer2C44UmVFqLVcuIO5w5eRH+p+zY29SlzpkclUshe5gy16aKHiiW8

a202yUM0QEpWl4Df+3N+3v5E1+C9WETyGzfih/Vl1KFvpmxJrRf1rB2rCdKrMikgx44PFCpfaUFr6j9g

ykrEtsPybpOKrlDdhR6Vpjc+QPR4ppXDAw5tE6Bm0w
yEBKeE3nvmroD6M4rJHp9VYklnprr1xuLfpX09XF/ovotj2FFchD0lpJ5/jYTuZyGcXmMIHhiEdZzJfD

R9s8TI8hbvoxaDCR4h5SrHb2sHDZ4U25MM9n6gV9Wl9RQ4JeqIfdEl5J3c9pfPmX86axMKM97frrXZqD

lKGhkFWy4vQXD+H0eN9jUNTRCtQ9mBuJvVXpBtCHvd
RIXq28wPx0xzbORlQBEAWeYUegAitjfMInh831oS/9Fz23jRmbEZbG785xuBY34kyCgwuRBZgZ26PMps

7J9fIk3AhfCt6BYFOe354wbzsX6e/dgl5EkJ+AJQ2bXH9wdRFCjrFrxwFjaRpsY9C2o5m3fV/lG9CMTO

/DrkQZqUjbhPaLe/bYt2dTvCBw/5Nn7k+6FVHUKm1i
VyfF7B9y9foWKw8cJZ0CCHbavFrEHrCbCbOYKA/U39DGc4EYTriVg8MyYn/WC/iwQdWMtScTQwbM7I7Z

ne3Tb5Y+lpm8aX1/CphlXwKsYwbkC8hbt9QcdoxytWVkatKQd78ys7b4jCQ+EnFFrpaYQ8NEfCqHayeo

S6FVvijxKhqnwtsdtU3i/rufnugXRnMJElF8B4xg9V
uwuTUiQ50T3xYfRo9V0+D0j0j6r99//J993GqrIczUE5aWkGRpaf17Ow06m31c7jJwZcq9XLOacv9s8/

uFJmivImwL+39iSVgziLmeb2KmyB0CDqEuiXMup/Re1d7OSrJ3Toak/XhZaSxhqCa9ZCRFpLV1Lh7JWF

aEyn+H/XLIfu/A83js89b2T551hrF4QfAL8R4VI22i
WMT0slr3/xsnswD6mG9GHHR1medXDt4P4LY/EGktWJlPYZlwVbOF4ce2K87NC+g+5bzlil2ue/07sJZY

76/41SQkdiS3y2rUGmACLZkojWmNHGxrptPEZ/V0AaxNZMUbqX0ifhoBJ3EK+s0wUQkD+7sUTgccDqbL

S9yobLP00m9WxyTJ49pwdur+HuZpO+/ewR5cubmgLG
2ww3pC9/GVwt4M37xmxvhcCKwSw2unWtdQ0ZgXOpMOjS39/N+QOjdNQanZseUueh9WRqqqA/2OCmGXWP

Vp2d1yHfTl2dj2lJ4F8wmPQx3bc0AuOxOuF8TA4STfYTOOYujH3eX3ViSpmj4fYoVYCEnB3MaqZwSQCI

+Yw1Oz0mYaIdz7XRVWKaZuaVeS7Ex1zHbafzvbznPl
Lh4RsEb2T2AQXeAB7G1RGqm/Z/BZSNoDJCHI6aw0qYBhi6YtZ8FM9wLS1CxqYC2wHH8beFsH23Y654w2

7w5bZUVvtp9v6umDJz93XOh7hvrZIm4HWnfoq+HG+JQq5Kn8Xi7kNU7N6vQ8VO+ZWSldk2K9Y3lZUdKZ

EtkPFEcdQt86/h5qp/ENzJU0fUwPE+K5L/SN8Onobp
jPkYZ5/Cp8pItt35V/tvgTebfBhkZZGnYxynLKd/pa8F+JLiHkRdU1T0BKsLMSJD/QLF4nlgjkkTUSyE

+gdtrjdjmJNELnNn+DulLhfxJ6+CngV+lhTzSx4mS8zabkZ6Rc58xFPeD+LN+UI9ON1m0AJkTIJspgrn

gt7TAjaEZhvdiX5/lLtn9pshlnG63/RfKdIga/y0Y5
10yO/abP1yOUbhzz5od3z44hYuoetvX+JxLq/7at3e9db3DbvL70qr09Hx/jbLOTraa/8n4oSIxxSPKe0

iclbR4Ea3HO4KgMVJdsmt4on20eQG03E4Gp0eTq2vi9G/l5UqDHZMUFFDfmcutB299Ehd2gQYd3SW+4E

vRN7/pmwd/XjeKfrjsW9OpzxspGfEyra8qW51QMOf6
FZ5brRbv5qhFbFti7mO07Vx9uY/PzzU2u0y/gLwwVUb7orQfvx5anguyUQrTl/e6//z7P3/yYw47I26c

rVtki73FwjM9m+6FnutX4Y91PyJ+f8/ljSQ1CMnlDMSPkWCvaSwMo6Wguh7tvC2H/6oNhjsLz0dwG16U

EeqoyKKV38oT3Uym3Oi8PrMTVSvh+UqmF5Y90qVk47
qI747lR5yv1cwhUEdjOOAZxYGJcywoZBB4/zTsAZ8+SRwIgfClmlhqiglvE03uHUvsizarN+cq0coeF0

LEPB+C0DgO4WHtHVg2UVZ5B97EX6zvsuPpCzjxQIPGK9xO3YdyizzzxculZ60sezTas7zX577An22lLQ

+819/y/qAiy2dZXcaa2C5Crp3dEobvsOog0zyBWPV7
zkov1lz1tb3SQ2TGDymGQ3AbD4cp/TKniGJpX2ReelTpmJ3SkEdM+lX1JT90MnpSgn7KAlN44R/w0aAf

v5Hy5UlVl0o5rBHvGksXbCpyOL8L5bnf/Iu4XlEvXtqpd+OIkqfx6DgsJ70ZCVSvXp8lLg09QX1/+Kvf

rhWXfiOYks3FmJRk2CLaAzkQe3ufUoGOSLZTkGMtH7
Bz7SqhX2VCF21F+waug1eqVmMfwp1RhqX5duU2VtSrU1wjfBRaPQ49F0p8K6b2YXXFa1EWtEJlT/SbM0

GaqsYkjOhrzj1kCDszD99FKp7oQp/0LfnqIZ+r117Yvb8FcAgfkggY1xns0D19DN/51maIfpNtSxCHvR

YkyvO9RJaV+pimib2RjA5mThds4UhNcYdJF8s3/kKq
GJ00iD0SH0JpKnHgqkVzpQ/haitVhIDHc83qM+RlsKlVMTy0NIQiIKm/Ys+jS4hDR7bg9RhgeUSyupZP

tEwB0LUB2OIzeTA81Ubyd2VDzCv/rkimR0X/u5JirAa9xZuULr3TT7lonL0jfBWLHpf5qA4TwLCvZcjF

hgWeKJeRHHp1mHgjpS5K+2pDys1xWo5fDkSpd7LAaP
kLFq7Rz9CRvRWqi/kUDtNzb4Jq/MFR5QLoaVjP+fpSWwk/1LEpON2l35YQJI4ea5BCDRcSl+BqLW0Wnn

6H3YS5+gRbDhj+hX7Z50Ttu9gIFLB84t8ga/pU27Ij0/D/y+h3os2h+4MMbM2h9DNuOHAKcfCxolGjY/

4fvall+EeZFUrSuVc3fxpftxUtGsjdUxLKU1lfbzDO
mVsIQhyohNH55V9VLm968c6xN9IoJGXs/kO+Dha69hcY37pVqGSOe43IFBFxnQEfzfqFJnpLkRO25Y3a

9LhmFjZiGzj86E+X0A+CaqA5qoK1SDXjxnyNkBilQx+bwpg7u4rJp0VAa8xQt9O/HGheloUhv9Tii34U

ZHGhEU9BgJ3KTrla/8oruYU40XK8SHvQ2FBfE74fmG
/SqBjW5Al6pAXCqBpTmVr9DnwyiaezqZB4e3uR4cGbZ4HNbJfmW/8+Yj5s8hx3mUUx6D3OK+3Q7pA+hC

NhF9PKvnPj4POEVAEkbwTJCeqBoX6IGyX0PrPmrEULRsDifaN1z7XWsujofXhaUNTPPSdus3Y5A50Dmc

u83vf0sk34POKZi/iR2QDyvf2vel3eQUFqiM1YGH0i
NaRONtm5hb2Eicii/OxmOf+oUSHhQQV8Frqh+edps/zthFo52OherEesC/RC/owwtNrHwZ2GjLCodF2t

S+D2Nehx0ko2kMkWgq8lmhOThe3gdkdadab3B37XwrmFiSHa5moNhDCZeFL2p/u+V76owaaD8C/pF0F7

rF3RPb4dqWgz4uMdZIh9+UUvD6DnvjqYqxkMWbUonj
bzpXRucC14vNap7+mF43chHggze1hsWs0KaMj4HrINB9FRFfd0Q8ao+/g7xnAlQnv1Zvydm75Sbxn2rf

DuNGTjJnIvcohBK2lOPz7S27lc0BomeoGjZVSdi+PvIUa7BEvz6RjT6W65QyPfSmPV25PEOqXMDyaFnN

DlQVSEWWEWaTpEe5OkdsZqCslQtPEFYLpUWcqs9BBj
UvPavulbqCzgNWAjuAOcCfgYNALkOLo8+KJ7GWcXanOvWf3ceVJwB4dNV6i1PXRvqyUBNUEQ8ZJX0QYh

I+cWVLuwByZdM/XVS8BNvDFrI4JrozOUnmaaiBx8BnhEvxBcll7OW2Gy8P6O3zpcgdOI5WpqO+BfzAAO

Va0S5cYi3UvoGhudTB8VwfU8eDwIU0bDwTiRGCrOWh
ZdeXeWcYX8UKmFqlUWMesXLpcZmWg6P/CPjPkizClPINl5LavMzyvCfZwDl97oWbT7SkomcJXKFL8XXh

f2Ft/LLrJtr2rtJu3UIuc1VdLHIA8aNPh9B5LXwrwF3BcJUGkZOtYTfnGNOKZJaY8inTK4B6RXfGPhXq

ogms4YV7ZIUBI5SoxGssGfE65MrjL26r5FLlQOLBW5
nSMCsyYhsJ4SszErKwvOMF3Vqy2l1EMpK+g/lePP7XdUYoI/b+j+0QhdH54M4EoE+CFiiYP8n79MyXyW

S3Re1v1zgOuioZHYYP4re6KOhsDEOy2hIRrPJBew1nymzz6CD4KuQK+8l9Ax09YD/SiA0vZbFsJkkity

bDNrC/0lu0jphtx8XKLFI+347FvE+SQeEtk9NY3ygH
fd4zR0FZVfsnjRbFrhtSPhMCuCaeKId/EtR5z/lfmoPihG2pNAk2gX/h2LQHNIKc7iH2MGcw+AiNVLtD

h85Vugkx6siBdz7dTZuHpcILx0ymyS7Y12+ZJ1R9oV59sEDjBKvDB3uaS4Cpk1Z/DFM5g9H+BRzBemmY

R8EL1+tXIuxvvTeh/CqokmjwpqK82m46qVRyixGyK3
bqA4X9apkfrgzspR/1q+y/E/VNvZa3fTzQRWIH0G3arC3Xv7uIH/thg+tyFJqdUXuek84ySsRO8F2YZo

0IVq5j6JOULcez5dQ+SC+1dnWATk+l+TVmbWcbLbbV2M7jtydKNe8D3v+ozFWTKF+cMdp/R4taP29Y7f

Awyxt9x1+H7+JUNMhxzGg7g/CxnQDBKPWz7Xh3KX3t
3XM7/95Chvvh9Jh6cn9qoo7XtuhvwW9PnF9NP90uDNiLZav9Ki+dLRpSRKJsEx8YZdlttXfNYxzh8Uu0

zi+JljdSA4hI2E6Y55r5DQVHnY/evDd067D9A8tn9xxDQYZtruv0A4GRLaQ38pfEyTAZ/TbYANchHrYC

8QC292J4yvE92d27CxewGjOo5/CnoZOvXak6Ny10cP
7S+WtgUefd9loj0D9+EhosVZIu/8beqEYtQvbDSwBmcwQUjTCKbHa0824o5YVUz6jsh/x/ApOYu2JXlS

/GSM3Mj/lcZLHT8qTRjxjvfGC1HHrk61cxDJHGxbNPxIqibO3uhSIlDEwbkgNFGi29zPtk5z9mvjs5vc

1kFc1761v/LdCJdkvYaKxci831X/gHIC3/DDsx2v4Q
opfmOpgaulkFZ7cD//X0JkuCOFNhOgfhdjo6xsD4cw+jZOm4+DbVe7S20m665S5ii0U+GbSA/o4FlG1Q

FCcvQtphIIDN0iuHF5vJj/+zElpS0SGUPeNm/5y/bwx+R/ktNeG/6jOkE8k58/g2Cxb1y4qR3Fb/j+8E

ib//Mz1OZr4yBfgnT6VvYHrHn4ZwfOde1RtOnrrPl0
ZflYnOqdqYjCasLdKvSjo3mjTl3zJ84c9W++lP8fNfnG01ha0cGpkOd6zi42qsbRqYWMOyclizzx4ne5

qUDmOE8MWCjKYSd27cWswO2Rf0hGp5j1FaetqpIll5VL5uwv2w3B5x8qmpcrlOLkJqHdsC5wh/08gPup

2p4EbR63kgio3ALynxcw5ocnG+bPfRd9htPknZ5dGM
tlZLQ3+6VXi6jNGpI8INnAAYoQ9VYMV8fHf1LY7iYtDvcEOOc4tJ9NsdiER/TGocsVGWPSSZHKeK/3cH

eagl3+8S3vAT9Lzf2SRSLHnII1qaMFD2fnbAzH6kwZYiIi2JgncEr34EgOo1jVr/ok/ac7U1tbSN2fG0

t6ped72wJtHtc4gcwyM68wN63wwyVeuUy8eiCpvfab
vVhwJVtwXgGPth7xopNpjQIDjUoXNTuEHTe0QmEakVtDtqgQRMsVxN197OqfGYCgpgbbqkJU1ZeYDVxE

hLWyMcNgg8AmwVfgE8yXkMdIfRzJ78eduQ/eWATW5mGciQDrHQcX8Ijkwvy3B3CSNFjlnYU+C/wYjCf0

JRhmDHNP3Lm7hMpOLX3hrMZvGW7Dqg5QRqMYfUlETI
E7H4TkjnE7nVikUPL5VXMAI1OzNjVXpBOI6sCJtVodvaHDh6GscXSZltwyXIQkT7Z9yK2IQpRZZNj74B

t51Amh5+mYxMSq9SKXtSSiwv+bE32tUApjVWlSChdhW6MnL8ef0hwSHDxtJiP5yBwHBUFGLO8TawqXuR

VCLYDkY8BNLZc8E6TgpQ1WesT2ma0yUlYK4XlonCn8
FviGl+WarFvVqHqOCjp0cRLYcSjWAGHRmOT06Cme+4KIj4Iz2rKpggCytY5nvkVO7RS9bsYQJLzZkAFj

zRE4q9wwrvMXWiGP+us7n1Bsq42Fy9nZ3U+DmtH8/xpK86f8HBwRR2VOeOOckuaISZGfkc9lyZWQWcy0

jHuBuerd855GEPxlRep8svKjbQZ2sSyvClAewZQKSF
6dAtTGQVmxDpLLLWqOZVIEvv6BBDVX/Z4H3pkAZbcviMM8XsC4e4mmi+JQmjg/TCFxYJ7hqAbxjdQDZ1

MqYckxr13HET6KTUGIyhS3wJYkMyVoypFFXMAzckOg19TsVwPyjERObsHzLEcdrUE0fgiJAiSSWmuAcT

M/cjyLP2YwHqdPFOIKXUuEVNlb3j7GP3wU3uYWtxKO
08YYMAFw5Rutb8SRW6ILgJiNVLSuBFE1C1M0J50flvXwIdDNzS4s24TIYW2nE4v1P9Xo1CJXZgJJcDf6

0wl7XDH+mOi7Cu4Kb9T9bGVE1JHKWy6CKcFiGqW+IamYlMD3k3WVwujXPmFz0TQMYd3JCZBHcFQI3rbr

W2nBqxTrz8eE7kXDuNUjZtchbsbLouZsacWt1OhyPb
DhukVc1hy/1FIckrlILIAkj212Pz8ejUF4thzWZjwjG5XIKdxE3PCi9KQUt2pLe1npxwTZNw6oAAXywO

A/TqWPb319sJlgxbOO7zgaGi9QDW2fuD0NUNryGMs3lxcE3lsKIjYLRb+Y66gS3FxaUspa9QFYHYWZgE

HRTHqHdl6nxoyLU8d8XqNWQO9ch2MKThAbMW7S5Fzj
AknyDztQVupEa8i4i8pNYDXggh2n1dW2xK6GElbOZFvOim0YGaMubed3BOGH+lSFapn7eSlFFxZp3ACI

FTf7FEXhNv7V5Y297JpzMdFSAT40AGLmZfGRaVnkop9SzrNd97uUD8q16MYzWgohjIpVHhcX8jpoJHLH

4xO9Rd5Wp6v7CaC+7iI2CWUrQF48ZbuYXuRY/uAYUe
aB/D0mKP1kqxHOXfZj6/tgHIRv4Y4HpmH652dg7iaYocmhcsvVOt6lP69KC+ABK+sKyoGNJt+GzzKP6v

qFbMlhGFdi+maria eugenia+1LbUMX1myrZD/yHLnGs68AYSDQW9TrZnEZPG4lyYddMn0DJqlrn/8nrzvUOygserAT

QDD7i2bPOH2ImG/s2lNRZK2+Ruc2FS71Oy5RDHjqNw
nvJuhPMd4vLgpRF1oRvtWebOjVRUsYNQdAN6fuwj9lL2/ikSmRjRYVgcG2YIdmNYxwQfoCDj62AH/RAh

PfhwZon8BCPmWJ5VHLyKNMcSOFXOA9EJE7aYE8kEx6ShqYegIuCSdQHoQsvyMWhrt1xhGtcP/KJi15kw

1VEgdVI+H6ReasOJyojfvFowe2RgCopfKfCwPxyiOy
UCpteaPnmGxzNtn6oDg6ACh2gcGl/uN3K3QQ3kPB+K+CYmX0tSQrYI/r/s3NBEhGNnCJCuoWrPheF0ih

VbvO24BcIUoVza9L4mmmul85sBa0H/2PeoN1sgKEv3I2JSsmnxFdiJBqGN4IJxPcSZ2acf2MSexcFEYu

WqyVDmQxBHubBowxgRQcLT4IpXQ3QEBlT76iIGGjHE
PmL5YzXPMvGS3+DCftSES9xwKsxH7HBJU6VlMfgnXXbR/8Iml47FPUy+6ARhShlMI78kxd5LrDPqkakw

iH/F9uePE31w8W5YM71MwUeZTpwunMcDJpJ0jrIMHjGmll24ngDJSpsIh4UPPIlhN5JCbf2X4qZgOaNw

YWbZ9lqyNz7sGCcxuLTQD2zdO4aiXR2Bh7LWEJEfWj
2j8t0o7/tTBfMHGNBAlm57V74Z7ZijYZhqmoQ7RAlCyye/UpKQQmlBHJBj45WydlGL6DBSEUVN6xcRdR

Y61/ZZ4Rlt9LmyfOlPzwRJWLTyQAIFHG66rsxhlO8lJ8JJ1cetkaweHBohR5JPLVoEOZJvFpIOI7mtPg

uP0DAG6pl8OmLXztRPMlTS0qxs8WKAxJCuVoT9D9tB
BnXs3tbXBmv9SluCA6n1WSZkGqC9XvldFEHL6jE1OPMZDQCkcKEFnyMUVYDLirFF6Rt3QjcjOtXNQ7FB

6LRPUJCRtnlFLtWOK8YT6NFUFeLJ23SQ1jHVXYMsEpF8VmRUniLBZvGHjnWT1Pl471XhLowGVcPULaXh

TqCKEmOXEnEPY2UD0IUmCjM9k7SIcrT3DaD4zcOTIx
Z4McoRBhNN7JXKMxLd2cmRVawNSuEBI2NMSyNg5SQt5MAzMpSW1eme3DDtDcNVUmSqqMOfc1LDuiAV9O

eGYkX1PvrcChO1YlrAqnVP8UHHVOo894VWuyYDXqKgVoNBPxmuErZQUMUIUUB7QnG35zYZkoM0rAXP8S

OOOQYQHcMFBbizBmcXkDZaGxR2LLIRV4l1GuoQxpQg
2wOFqjUgNblXA4mftkRHDyr8HdWP0WnhGfxsetEgHrUAFqamNvjGxuAF1IyRVhfSStBB39SSA+PiANCi

WyT6OygqOGFOMlnezxbB2wBEG8WQKiOg4BRZGyOEitNKKzKR6SOzKwQrC0Mg7tQVR9WIs8VQRdVB2YHq

4+WGokziSnJfjQBaCaJORfv9OuOCp2QT7TBHTfDCwg
MU4Iw788F3S8JiB4iKJeYRdbDONqNjXmBWIxfhMfKFGVUJGJQ4SoC57cWEuvPG1eg9OnrfnaKNJtzyWi

hSnbIM2JESXoPVCnV1KpFSFqqINmjdRwPGgkOETxKONoPBotPU2No1DqgPLyBJPnZcsqJYZFBNo+Pg0K

MV6se8PxEPqcLlGvCF8oqn2CY19EKvSgJO2gsy8JDi
YxGE2vjw2YUAzRVsKgZ1Isf2ADRLLgYz4PMOBwPDQ0nD7CtXWtIEEgZG9iJ8UDKE9FYBUaAm4uiZB5UJ

GvKqUoIDTcLHAFKHxcZNXjV2HbUHD7PCBuMy2SVGLrZT7MOdWqICUxLWWBSsZgTWNiAtAzTyGrIHPJNS

wqOHUcI8O9CDV8BHDiZj6RDDYeIN4PAOKtENTaUBD+
Pc1DKJBuYQ1buhJupYN2FTF+Yh9BNUOyTTx9T8D2OPJbMKs5K5FLU6ZRXAVpMUutSVesAHXxXZh4U8G7

AYJvL1YNC0Otqfzbjp4+TN7CS93DJUMpSAl5G5E6hLPqC6K2mKfPcXP1XI8VVV6NcLd2dNWntS7+IC9T

U0ELZjPyBZl7S1I8sOVnP5X9hEzSqBN6JC2GOF7YsW
NsFVAhzqTlQb2hT9ANZSeOJzWMUEM2PC5EtSGaBA7QwOZEV0CmgWXsOa9xOHmspBZndH6rOz9kGVviBR

4TKaKUXTyTEMY0DT3AfYIeZI3OqYPOP2CtqLWyLv1mPBslzRGsxq4+JZ6PFWKhXs2IWh3+DQplbmRvYm

lRDhFhSWRyl7MyWKq1YV2GYD9urQnfDTG0Ex0ZcNR7
eXMoZ8uQAI8OyHNnI74gwPQmIOOnOs6VBcG9naOjcZ8FJV53eAXzz7S6CBKgK8tqMUtil83eCJzcEAaN

TC1oTBPPTYjuCNxeZAR8OhNwmvonGHJxTh4KKiHrPZb3mA6fxEH1JOA1ZvnfxOUkPMljPrPfGdFhUgE5

cYyzmjl2TEojYC5xPBcaqaesNDSpQvd+DQogICAgPH
ZcJvsQEJGjsM6agcI7plYyRFcfkCWxZm7uy9f7UtmlDe6zKp4qDJi2SiQoDyDfEKYmPv0ziZ02KBnlzm

OaPl1FRwXdZUI8M9XrNykWVXC+EWmtXZbxoHk3cIIrHVEkDv4OZOLyPAPbYEFhCUEoYWFiDJImYQNaYK

AgICAgICAgICAgICAgICAgICAgICAgICAgICAgICAg
JAUxSGEaCOHnWZNmSKXtSFOpUKBbGHLhVSGwTMBlBBKbWMCmUECiEEOvQRYpRZ8MPTZiXXKsMXWsNXGa

ICAgICAgICAgICAgICAgICAgICAgICAgICAgICAgICAgICAgICAgICAgICAgICAgICAgICAgICAgICAg

BODoYUWhBHMpCILnKCVcYSWuDKPiIYWjPNLaBX3GNK
AgICAgICAgICAgICAgICAgICAgICAgICAgICAgICAgICAgICAgICAgICAgICAgICAgICAgICAgICAgIC

FrWXYbJYDfDOCsZNVqPRWwUPIrCGEbOHZiDTAaNKDjOEFxTREqVB4JCSDmESYzQCExHXReRIDuNDKnNL

AgICAgICAgICAgICAgICAgICAgICAgICAgICAgICAg
OWOtXBQiFJVxHPEiZANlUFRaSFLoZXFhFECaXHGeHWQxJKJhJQFkCLQbRBDeEBVxWO1XBNTpQIQkTUVs

ICAgICAgICAgICAgICAgICAgICAgICAgICAgICAgICAgICAgICAgICAgICAgICAgICAgICAgICAgICAg

ICAgICAgICAgICAgICAgICAgICAgICAgICAgICAgIA
0KICAgICAgICAgICAgICAgICAgICAgICAgICAgICAgICAgICAgICAgICAgICAgICAgICAgICAgICAgIC

GoNUUeXNRyONEeKHEhRLLgWOVzSXDsEBBjXSHzFXDzJIBwPVFzSEWjYH9IWEPfQXIfGUNeLDAuCUVdKZ

AgICAgICAgICAgICAgICAgICAgICAgICAgICAgICAg
YKJnTLAtTDSgILBpJZYzTNBhOFEpWFXeDJFyDZNcELZoGTExLZQaBSArFCJsFVQzAFHuMQ3FFOGmNZBz

ICAgICAgICAgICAgICAgICAgICAgICAgICAgICAgICAgICAgICAgICAgICAgICAgICAgICAgICAgICAg

ICAgICAgICAgICAgICAgICAgICAgICAgICAgICAgIC
DxNN0IMYTuZCPjGQAwHTNbYQMnEUFrZCAhTBAbRIGfLAIkEKXdKKOgDOEiRLGwUZSyYZCvEGVhBIAmUW

OfQNWaISLfFJKkBRGrGLWjVWRbJZXjIDTwAPHuHVAdVIYsQNHkPCQlSHRbIM0WFTRtCUYcYOQsURZrZQ

AgICAgICAgICAgICAgICAgICAgICAgICAgICAgICAg
SCXqEQFxXZVlVZAwKFDsXNGbKTDrBIHhLTSwIUJjFTRkQWPlQYLrYJRmQVZkZCVtVIGhHUQuDM1UTW59

lVVqt3Z8ZZVoFZ1yfqd/Bq8MOSufwhFttISqJD1RCbGwTU7orz1AWjFoKL1ssn2ADErPRiDpO0L1bAQi

YUBcGXWTPpHqC99rWOgdUk46QSylQKTrZjFqFPf2Fg
3SQqQgY9tpAGJmGzM9WGSiVhD2QLGgWeZ7MEXtNlMhVEpyHH3Gj3SsbMPzRNs+Uj9WGI6fq1ImQTdwOY

XuAD1xrc7FHKyOWpFcN8MbngI6EMK9XJJjHb6FNNPyGDTwcQEvGANtUUZUYlTgT1LuoA01UUOOCb1+DQ

javxGoQvbGXaT3AFVxb4DxXVa8QJ1QUEZnEPd6tKZz
VGRbR6Dhp9QaTy41RPSoSfzkAPVkp8ZgIWxjmMWndvHpVYLSIBWhsHB9QsNdXmMiYuZhDOH1YoHtHR0j

XXyiUZ2ZTGH5UQwuKXZpZRUbR4sTJdBiAGlcCDSzdUnqAU9LNbIbN2ZpioIhlUFbQLDiMLCTSm5+DQpl

jlOpDtdOZxR4XNMfd5XgLIs9SD2GPREePAfrXJ3MXF
YybQ1pUWczCC6FInYxJaPaKBSTPmXyT46rdTOiBNt6T0JyStGpLECkFheyXQEiZOdjQvVqEWJwOqNvSE

ogID4+ID4+SVfsYH0AFXhowmBuPXYyHj4ZRVAwLQCcOV1zMJZqZHRwQ9O5dFuaNFMKVtMiP4uvmusvZD

6wYQLtZ343wXpgacEzCFT3ZJKrHl6OBYZaKPZ8WGLb
iSGrOqIvHBXZZSfrWF7XiSMqIJA8zG4pKUrvLMOjJCPxJ4qELgBhwIfnOO03uMaitxEjuKRcGHx+Pg0K

JK0hq1MwUKx9luVqQJfrYBK0ZFwwFUWiKXNqSBWvHGD8OUA8ODANOwNzGBUrTSHnKMufPTCiZYPchq7U

MSAzNBHhVEa1VgUiQJRoGTHbSVfsJORjLDXcCwQaKY
OdTOBvQG8OPeEgWZBnBLDuRNeiQJGsBJAnbm9KSHEdGCKiGuP7LkJtGJPzMEShNGdqAIFoGEXdANo5NN

AxMMNjOP3NPwCpTDBtBCJ7UBBsMTCdZESvyb3OLDAaPWXmSgH2VNMwMUXqZMXcKHywTAXvZVE6MFGpHO

DnNYCrIL7HHsJmVJImLXsxUGbhKUWvEGGtig0EZASz
HCFtRqc2FuChNSGwZQRuJRurQNGrNWZ3SCb9WFRzKVDrZH9XYqDbMIQwSPqhLvYqHZNwGJUpus4GNYTj

MMIyLRKfXgZmXIXrZPQdIPicRYHbPMV9NSTwGBVyFWHyKJ5FRwTsGBCzEYi1XCQvSPWgUQTjfk0SEUHt

UQCxOwH7PKZqVEIqBUReLQdiOFGsZWMyZhfgBFDnGK
RjLR9ZJpScWHCpYpW4WVCbPTKgYDYayl5UINIxZDHtJsouGHZsYZKdQDKyIVtiNRFtUDSjMYU0SGBhQH

YgRH1FNeOrUHJhEsO7WDaqRFLpGHBknn9RUZVoBVYxGSIfKJHbUZMoJXMeMXcyHDYbTVT5Ztq2SLVuWU

NbQM0ESjXzJZHoTuAaIyXsAXZoVQOlqn8XHPGcYGOq
UUPnGOGrZESiDPKpULhfEKIsXXJ0GRv6LHNvGGRpKR2OYiBgGYYiDys6MWhhUKMnZIQuqf5MJFLaRBOf

SOQdWNLwKQOtTDLoPVelJWTvGBN3EUEsPEUyQFJbEX5HHiPzPGEvDkb6YgQnAJIoOXOobw3NQABfOQPh

OTgyMSAwMDAwMCBuDQowMDAwMDQwMjcwIDAwMDAwIG
1XDqIsCUTyXHW4ElBnDVVcTRQxnx9NSLRiQGK3UHx1FKBkMEImVSAbGBddIJDeHBKfBCQkFTVrFORnSQ

4GHfVaOQlmTARTUpc8ERulU0o9GBYwDw7RV0Zaz5SlIhBjTTXJSUcnRU6fcgAsTJWxJf1UQ4vDOopeRF

a5PJPvAtR9OzwrTyA0XrsySBEeNYVlSWF5PTH4Da0n
CZG7NuJlGVKpVEJaDSCiMgG7CBQjWSL0NBE6AMedOKLkMtMwCU9IXa9BBkD6RGA4cQMdQt1CKEJiYUYL

WyRlTC2HAQj=









                    ID                  Date                Data Source

 

                    [id number]           2021 03:52:15 PM EDT [facility]









          Name      Value     Range     Interpretation Code Description Data Vanna

rce(s) Supporting 

Document(s)

 

          Patient Instructions                                         [facility] 

LQJCUo2eBqZNRrLz77/AJWylPEKxa4OtWWiwASb5CUmwHNRyH3DfJVW5kE5tRZV8FFgOXvEnRwBlHuQj

lbm
AkPzkNSnVjPQAzPfaXKtQdGOxfSqwohKDxWG5KtSA1ERWbJ81iDBUxOFLkW2XrRFGhBVa+Cw7XPBZuuC

VaBF3HBrlZ5AyWy4wHEI7J9Z+EKdIr8i8f2pkHTQ9aNHQCG2TavIuLHXnaC5PmFUEEzw2souOacKBXJE

pAgdgHabCz8+ho9BQDKw00P64ItwZZtc/q7vGsZK1U
jz9Qp+xNcEo9jajJFwMXMxNsluaN/u+Jg2cIwYnx6beBrkyeA1OEymCDpU40sWX3jKu+8SqrcYMWtU5T

eHkj8ucCGMuv+TQbULGzNz9hfEQ0G1ZXs0pZSFiuyZHAhbqxHBmk91ZxPg8pqtJI0Ec8JGVlRdoQ3OND

5KuEVRObWSoRz4FuhNMb1lqc52mZH5VH6Pf54P8Ytt
danubkM8dX+RHeeyPLULeCsAtLeqB95ALdQkexWOf7rZLKO7RawkROxwk7jp0XvtLdecWogxHT1jR2lM

Gcb7nXSFqN3ZSzaFHFIZAx7qdCjCdscmp1JBHZAcukwpyTl/fwyNFDgsikHhzV3v/1DQqJF5C5ZBtTZK

1RX8OZqY760sy/etRl8B1u3Gix11TbmzqiqLI65s/D
Nr9Hg2uieMMAplP9bBzTvF6HFH2DetwR/wy4LvSuNI/1/W00f/6k1L35+E3qvh9z/sFlle5wJElV3a63

XXN74PLMzohyjwp4YN6TCsrIQQ+DPGo5XrpM4wTBgi4YJAfh+eBcO+nZQL4qRZsZbelvjj/0f4N+mY6z

AYhNIr3iIh4R3MksIFgrkJM7bhbD6cSiUizfC1jpxA
zO/jhNOE8pk9hbbQOsKNnHhlFWd0wqKS9o/szEm011rbCLplc9vr/frp2464nyok0mwcIwT0e+fHoBmG

vaJzTkXC0N0fMXMzWXfbZJ14DMclKPCfmagYbEU82vTamt3iKH1SVSJRTkTMKw06idqgr9sP/qiNtkYc

CWtdblWvuLJzsaniDXy1W4XqvA7iMljomwBZVOnIH8
nQW+m4DaEIF/AcgRBOCLXIEGXwP4TvBy0o/rd9g57Q7fsfeKHHvD0e4B6YyWZzH0ns4PHX/243RDGrB8

av98TqidARiZ3Q8OD34LC2S5zbnF4zIy1f8nF8qJ5ynOdqLXCG0lVaW0SxMeUw+/X2lie6V8hzCpL4+n

1R1UZa1nqyV8ma3HcpZtssQFyZQXhqwC5pp9AhB7u8
2jNCodzyTI45DDo1x9RLYN5B289vbUYJQxl3dtYDLPBxZ0qVGjDbl8iprFcPYkjUurRnBM9PpkHAlA5b

Gomeoh/WJjszUcK0AuC4rd15uwrIXoanmNqYtMHDbkW+vO/j0dl4T46uGdXEV7PiYY83xnzwanDHYJUj

zT4PKRO9tglfj6loggJFkhTfMrkHPmeac9RaoPdBGu
MeNilbHVURLZsydqb7+C7aba1FVlNXJEQU51Xo/hyW3uXcVYqQFGJXnKqZZpkKUcjkHH4WRDr9zfvuB5

AO6rZ9kQ6b1kHUt4hv0g0BFVNIcg439B+YTM3pkSKcfWSHTr6jpINulSMuLI9Kt5bYYeIr0zAVoCjCYL

JhVB7ZZqkf3f1nDCr9EM8sjOzuRef4b/c84M53T6G4
DaXLNlFlbjWbq5hDPZCM2+cgEe13jYgQlMdVRzybn0VssthUJfLMofdH/CRe1H4aMm5Zk4FtG9+5MPv1

AEkbe33el/RKnKOas+un3VG7NXG6sJSO7N56NdxIIz6td8gsh/RfriH67DCwEhBBB5qnHqeW2QBI5pc1

XbQFc9WYOjb8ZzEDpmKSl6DDvhRNMeW8E6qIGzGJMn
UN7XJMYnLZ2IXTJdoyTzMoNzIZSIIiRqEVZiZmAfl0UxZ8GxNAFyPALQURowLICcP15rWIqcPv69PIub

PEHqIxXeUFf3Zv1SNmFfTZKhS92ozNUrjCPnQMJyWITEWqSoMEGlD5EzoZQgWPghS7UzZ8XpIL6pfYKm

FP7qaZOkT9BiC6PbvgedGMSTRhSpSZDgDTthLNPxGz
BmYWxzZSA+Xe0LIRM+As1QFB4at2EpKUf3COKlp8OnQUfrACggEzKpNHa5AWLtGntgZyEkYZT4OZU0SN

IkEAO0BUb1NBA2IzGbVnI9IIWrDmZuKmGcFie0EXF3AOIqCupqJcKwWOK5XHHcBbulUVH8TBP9KxW9YL

VzBTR3MEU7QwU7JDGnJQK6FUU1LgH0HLMyLYI0ETPc
SpKfTnTiKAv6BN9XRQB3IGRxEDj2ZUSjBFS7VnPmZpTvDJyeDnO3JoWpMcMiNNB3JhT3SDIbTaj9ZEog

RpCyIccqFTH6LKdaCjM6CIGaZKBtWXkhHtH0LltkNeE8NFo7IUR6PnXcLxB6JDNvGCA1OjIrLnU6SZs0

LUD1FghrYsO8BVJcAEYJHsEdHiRqYSM4VZQoLqNvVK
v9YVZ8KwUnKoEdYXE0JLMeRWC4KBUwQxMgPSL5XcXkRzPzZoFkGPSdUXOkPjynScr8ZEW6ThAlUdjaTW

j0LVJyDJL1GHJyRyYmUZIaATXzRSqjNAI2IIHoDwP2VMXmLXL4OKf2ONZ4JKDtLOvrYAK5RmY2THIzYu

m6QKE7WRCvRNjyMAq7KCh4TZN0XQBrMmIfBAe2EDV8
SDKdVbFwZLz2FQO8YUSsSyJnHVv4EDP6OVUxJbWzEWh4CUV3RUAbByRnQWr6OUY1ORDaEzZoGWr1NMV2

RAEvBgYvUNd3JBT5AFScDyHcUA8QNEA0IZTpIdGaXRf5JBN6CJPrYwSwETm0JIA4VZCfHtEgKRm9FQYg

UztuDkImYZP7AzU2HWBpQNR1BBP2BdEiPXSkQEI7UG
LdLgC5AwcgAkkzKQI5WdP3PCGhWyHgDSgpWiB5LVPoLRQlFLM8DUDjRbXkSgZcDXOzHwLJIeJqWYl9DY

H4JsPeXsOfXfRsKQMmPoKoIcBrCED6EIgbZKE4WtYnRXR3FJTpKOG9WbBdDcDyBCmcBdX3YmCnOcEgLQ

nzNoOvVCCsWHjpGnC1EmjnXzU3YPM7SsA5RgbpRat1
WPP8QRNtSvwvLbq8TMgeSsG6FdQsTbv3GZ3WKPZ0WtyiIcw2MBc7BIM9OzrpDXf9TVu4NVA7HnSbWoTq

RCutTnQ2HdEqFuW6CFR2NlD8ALFoDOK8ISX2QjY6YAAcUVF9ZRL0GmO6IFBtKOk5ASF9HnF6BHKnXHT2

AAI6VuG9OLQtWpk3POQ8VGXlHpzxWvt9WDRwAAQOLn
TsUaEvQCIkAAR4XTKzImPiSWFaBMC9IXBlIVE1UODwUGT2DDWkRhXfUHAlRPI1MABrVUB4UDPxLWB8MS

OcHFUITnYyLY9rbe7PZqFtOC4tcc8HWCM8PC3YCHDnUG8EjBLeE4ZynoEEGBFriezpjT4wJWavJWDzG5

TtglOPBX2jN0InlPHoWDGxzHLEBcCcIMScKQFsMJ63
VMbsXN9VIJWXBRdqzRLbBBK9N8Umt8CypwNeWIWkBb0JENUsWL5BzPUhedDmJo2FRFJkEY7Pz398HmVt

lGHrJYVaFgPpEWObZLEuBXC6EJ4VTCMaIR4WiDOosUSUjkmcVTYsW5L6JQ5CFYBOVmOfUh0GVuNnKA9c

ro1WQBHhVX8kcz4HRKX5LM9AGRYyNF6TvAHxT4Luli
ZoZ3GgcAwbSR6XprJzKAebUM1KVVGbZp5wjP2UojquhWmZe0zyM5KnU12tpW3wK5leuuUtg6fNjdAwKL

jzSl7QCQMpKN4CcNPxhRAfBJPvMgFhYXInjKOqTRYoNrN1HClyXLWgD5rlVPNmrgB8LNFeLm2ZFFGpJC

2Xy728JTXtE5RdvRBgylE4CMMgFc6MRIT+Xj1GRW6g
f3IcRSt7VPXgd1LyRUlsSDldLhKpJCp9XIIsLmzgNdl8LVG1QSV0OYJaLVC4ROe8SCR8OhqlGJeuVSNt

DjHbMzHeKeb8HYL1VPDgNqhxMsRvHEU7OTYjLvugFER8FRY6RpU6HCQzFKN6JJO6BlT9YHBbLCV4BWZ6

HdH5DKBlIEQ1DWH1EYVoFrcvPQg8TQ2FNRS0YVNnCB
d6JBB6EzXmDDN7TMY5UyI4SkedMwGcXBryJjW6CswtGkYcDQf6ENI7MpVzTbc6LOGhSUM8GoqeTZC9NA

aeNbR8JhTbHyl6SWC8VoS4OtmrNlDqKBW7SrV7WQPzJmOnHRM0MuX1BHFaUsK5DGD6ThJ7UBGhBYomWM

H6ZFAdBwjaEia8IXB0UXC8ZMWyMpUhJTI9NrM4TOXq
PQIdXLK6IyJ0DQHhWas6AFH0HhK6BUSdSsQyYPFzUpE6GEUoVkFwKDcyLoW4QHUzOSE0TJE2SgU3NUAf

ZhGwUPYsBHQzVwvkHBJ8FKQiNXE4XjLbLMYmXE8DPHK0HIOsRDQyDOVzVEDxYxIrJpE0AJP0SBZ1ATMt

CeGxLVc4FGX9CQCrLjFnDPd1EGQ3AXWyTbLgOKv9SD
A6YTDeTwZyXOd7AOF2KDThPuStQQx5PBM7TOGrPjRzOMn7FMT8ODIuUpRxCVj2HMH8DQUySsAmRVe2NS

PAUwIgNsLuHSa9IFJ4ECZhXgQgEDn5NXN8UQDvBtDrTYx6RHN4KYDuCzs5CSUkGrN1EGGnFAI7ZWJ3Wm

M7FEXrXkPaUEI4OaXgVwUeGiP3XHJ0SOF8GVZkLEk6
AKKkBaQ4WykiASJgQWEhNEZ6IBorEsZqVARjTeChQvWiDYqoZKB2RoZ2MkncWeFfSSLpDfGxLzAoJwJ1

GAX4UqP7AxWbDEY5XIwsNDO0VHNwXzN9XOO8ZpF0KqsbZrT5PSE2FeJ8TsmbRXMeBHI6AvZvXqD6ADN5

ScY0ZuzeJuZ8JWG4TQJbRoslVnw0NSZ5EVC4MtNsRb
NuXLp6RNLSAcAfCft5ETc2RRN9TidcYce9KAW9RVM0ArfsDjVpKUjoFrX1JkTxOiAeWJA3AgY2RbayAd

CyVMR9GlB4CXKdICD9JGF7ZvB7TSUgFBL2GUc2ZCA2HFZiXLD3IQV4KkZ2KHYeUOW8BCQ4MHYeVymdPg

v3XFQ9ACL5CYCuFDzkWYA1ZvX8KLLpEZR9PCJ6VeU7
XFUpDIN8OWX9IZF9CNGkUAV2KDB3HfQ5BIKbWAE7ZQQkLJE7FJLuYHQvGZ3dUFnpbzTpXwoXWxQjKGBk

d0PzFJrtLIv8UIauMALbJ8P8tDNaQi4giNGwn9SenWA4c3EXGwRjXBOpBt2bkA0fkONePYIaZLwVEaJs

RNZzIAYbYA90QMcxCS0LBDLUDDexeSJeXOV7D8Vmg3
WoalQxSIEeBn9LLESsKK8GxLQfxlCbZr1ERLWdTJ7Yx252QvTivPZxKBFgWwCnEWXdOYDdOVL0OI8OAV

FaZG7TrYZneFOGolipVTQeY6V4AI4WVNIWSvQzDf4JYtIcYI5eme1KDPJkQOMbOxxKMwLiDLpBJiVnUT

ZmEXyvEY8Wk777M7L3PfZ9pFDwBYS8NXP6nQWlEkMe
WPGoyoEwAOEbLHwkWU6oj9FhdxbzH1jtYH3klRSzE18vhM9aQCdgRIWmG4YoxfN6K5azyoOhLH7GGOE4

V0ehooOfYOHKVdIcAZSsC8yvlXqsSXiiRAEWDHriZEHwF7JxvdUBFGKeufmopG3dUJExNUZeOt3ADNB+

Jn1ECR6ko9ZvWPkvUoXgMK8edp1LMEX8MF5CkCz0JP
WzA8OsLPJnIDGjc1UyPR5XVE1fjOizJKCdYPKmZ9rdjmz7mGMyLdNuJCL+Ss8NGGMptRKpWG5ZHsnC6G

kPqISA8acjjbd8urFFCUWI4R9LelAfWOKELOVCbHY3HNDOOKbEobLHcB3XZLZtr56RCBPLHoMVBNF5Vv

awvIS90p7O+q8nndrM+7+c0o2laoCobUP///N8z9+d
589nBmIv7t8ljdbcJwBFCSvEbPs4w8ny4Q01qY4A4iQe9dbOBPrCfPw456SRqFXYPkbkYhQQbQA3/aWX

LPRv++qpL+DrR9KwcWNh9dQteagqMkTGElPG3BAhRlT3qMrOKAFqs2B/8HhvIt3dxyxAn9CsbeAcbEZx

Dy1hudtqiyKA3HIRM6nzPOnXrmwBi8Z6/+cIBebG7t
+MUq6LLRcaJ0+60Kjxg1TpjTYjmIYrr24/jj6IoCRvIr5dBuZh9N4ih+aT2G27k3n3152VC3PQakz2Ay

CGfhYYf4P93CHvnd1d7ebxldCH652rW7cU4uUqAaw1bEAZIfvHB6uVPsQ2YKB1zxMsfF5vJHYOOgssNG

7VpTB1ild6GxyyvU6al7pXMSpoGLXJVdA17ST3cnRe
dCblwEvzSNMSGVNp3QUGbNWDIPlAibKbPYrJ3zraTrn3M0gVG+BuRYmNylweSfdo2Kbmit9ExFWWY0aP

y1yjxolHZcA6CK6uB/BJ1GiK7dP4AJWizjRL6l2B/aNK6hMcwL3QQ1kMexg+Nc4MiHx5v5cjkH05tu2D

x3T2yqyBftjj2o8K5OicIM8JU6hiOZTzQWbUqFaDa8
a3Ya17m6KkHoEybS2ZDobFYljCfBHFbEUV1mIw4UrZhfnKMPrU6Qo7FAxPdZlojkOj+NbSX+ww1nk5tf

6bQjAG7TVWha330JIZh0Nmz1TbHm5scTR6Js/EzKf/bvyzJFJVQ8y7OyChtxypf9juRhtpRJRV3zAy/E

z8JK/S6qwbMA2QlmVcyYpqwwJcFpnVdOQtYkfRMTmQ
+UO6biA5WQZQtZafUtc1FQT/B5IPggCvUKjxu7/Gm4zp1v0bWrOyipTJ1bjwXRSR5B5rhYRtLbQpXGXc

ynXyI+0O/BC5KB8Bou1+zaOVtJV+OcYnhwbwvsSfnIYwuxvHtNIjbLbBZ4JgnWAioHRkCR8K2U73Ap25

eoW+6Zww3Dp+RQoS13ugF+t+sjKspTQO4+EyVS1pfP
fNdtFBehvfD+gjYYhGoim+fpEsCsQV+W5oLppNEuB2XMeMOv+Ex3RJ2okwhpugZCOkEVFcnkrHu3MgUA

+Mt1s49PP5J4OnaZ+1Pzr8HJ8ihH8ZgwK1dNuJcob+g4jt4r95j5eNlj3wGqk7DSvYimnXvmOlmnh1U4

lf/u2+166Lw23WtmllXaEC1I3R1kdXK0LMD/gwa8ZR
Cb6z0d+kPOM13l8ON0HqR5IWCXOy4ONOhIathHrVdhtwmDTRHAvpKA+xku4AVsGlA5qtXK1ov9luLWiy

73S1pRPlVv+TO+Sl1amTkdTDIcL4nYl1GUbGK9xXmhbmr5DQ1nK7u+7f1QplZ7fvVPf20q89RY7ZS6xY

1S/W1+lw538cJ9iVhH/NaHOeudSsNf/pOdszyDPWM8
7yqmdyz35W796nwF273Ce8Kp9BdSWmTt2Sk5fjio158kXSAEMnLLNJwPCLY5QjRbxsLVFK7LSiBzvHIS

F4LmYI6uoXwIoim4QGBagwpNumYFokqIum7M9oG/V99K3+GMe4ZNjdMcyIw+NLetOFQSF4JIzcCv76PQ

uJ3tE+GT48VtqDcofdsBzoKJ1hKfLo9QBMTCIbl3Gx
7mPoBmjX9lVBE/XeqLhnCdBXO7pAXxWxaDILhqVs0oQ4KMxairqMixQ7Ts5/Axa1HJCT6CYG5fn0AaAt

u1ZR2fLOWc/WCOzPwS47aAUoFo04oH8CH7hK6wQ9ce62LT5RG1ZGANkz4n9A9Yzsl3sl/agxXszEDLgK

glMC79l3bZaQZuOpURUYIQV/OP04vFriG7WxT01sBQ
fkL8n6qwxBhewuvDJl9KbDjEzJowkO92hmVH5ptTgf+JCixXL4g9zej1kN4OJE8vAgX0SLc4pLDgqpgx

ALrNuoG/DOEpzT6TvAQiDebwBOMlfRFoW3iPgbk5IDgiww0AonJEQ8X+jRPAq0G2a7RBdyN/g+Cs8g4w

mq0t+tyQx9VgaSZOx5SvHtaHh4qi3PJn+BIqEqr7gJ
9LqWxaBfB6NpR6wk2cSxL6D122uAF8FBMtKhcUSRrh0YfdbB6lghLJ87KfdxdhHVBitrLSgknVu65te1

Qn2x/ohmfBgrOA2uZtPfI5JSt75wuaKwlRLvT05dt5gEAzfLUnwhUn2FkkaFU1j87h2m6Xt3Pc5hNYqy

7ddikq7op1FFx8nyvXmf4JXr0+IzYgZ30/pD2jXP88
/mtRXhsIhXfliSXoPazB0MERZjbS9Lc5YYtAoR+wI8lV9ZQA0K5ajXMX9h8QWUWefntK4yU8D4jfSRVT

qGxeDWoHHyyTwFljT9yVa7QHQ+sK9aUgvzRkWvt5ivsbJ1XnG1FnL88bwZ0H8heUYEC89nn0wkTxzcNi

GX82c5updnI6mX6bXf8eHwSoOHYrlQKohC7Yg9ldIY
hVxErAlmGaUiLOCXzzMIEM5ZnGYUYjBzlR5rgYkdoy0JD2JQRHX07Rj1MALIaQcC1AusiHNYYs44Z/mt

HSO/1p76ZjD1k7jSd4kdpEkN7Ia1sit/sLcwWL2eZHCHl6N8wCUyNAVLo/h+gO/93h6CvW+ndCATmArc

A+wBjgAe+Xc8Y+N9oQbjW4cpBSnswcWUekulzNkwnF
wHrnfkOxDtbzV9+hOgJo56cLznZ5HvtFVnE9qEsWc1Qu7jzQBnYuIJXJZPeY2dNXZN8gpy5OBwX5tRCO

tXKz/e7k/hqUmgV70FPCXUbmaJ2cokB2SXssENySZiSetuqZwuHfKLEMKaldNYIxtmV/Np1Ec1BSGHFT

SnJ49YJtMiJE3dfa4mN8dt60OO+yUz5iRPH3bLWlmA
wXlUH5Mdao7Nh4Y425DgUreewVrXxGITR8iqy2viQw9lAC9u65PocPihM8gYHEUiwAElld3ltP3VSo6X

ZCC4OJW9KR27finn1bkh0IjgOIHZYvCzY1l3i9ZOc5e412DVLFjzRiMmP/Wm2+DiR+r1K+DiR+rl18LF

a8D4u3ZLu8Jl6DTpZ6AcHXa6vQbcUkb86Js7v3zm7I
jyWx4pN54Mm0YODeMyYSaKnkD9ZYM34t05c6nirhvEoHuhUYmxVe8E7hScBCM1IxQtGNLsjdIyZDrhaQ

moGa0AzClkPakYggNIIDC2VFb6TYTimTDbj53zcfwdWewmHFFEcsHYCCcYpx0s4n/2JOfokq8aTxzzFV

D4GX620UJDLbxCaMB1zGBOtSL3gn46CoZU5lBI8W9j
A4vda2H55v2Pos10/1b4vH43tKDkztQnnFWt3NX0TSz2cQi+MYSLOjKaUjjJPBPffHP0zoy+e1wD9MeL

JIPDxRnCI5OijDxCLUwfrCzLKpPUgsqLziX5mJ6xxmiFCVYzsAsQmLrtAA3lZTUUImgjC1PAsnwcD0ch

kf69cFlWw09/Zma1duXJYQgW5dSlKfCDQ4axZ7q4Bx
UtVtekPuEbHC/+g0xwJHmxG5RPZEF+FTW0hKbWk/QsSpJbQTNqkiYiWUxNalffbtGUU+30/ZL0ie/LpF

zV5guDT/es5VuwfwR6NKWpb/geY4nV01M4iAdiU6dGNqIducO7M5hz96Y4lfOjAYyl6B8EJXymngWihw

lJKqLcjji/Dk6skR310cFrGDLGvUKSU3gWtbh6lYbp
+ldhLF68sN7rE8yAFaf5TKnXAbqKAToR8ZN5XsldYnqkCae8YqqUSFQzpEV9rlwJOX2SB0+bi9B8ypup

muoy5G15as3gK/wCeObxfa8ZzISsdWJf3L8K1IwhI1ex3Lx6o7RWcBYP9RGmGV/x3KhJsBGxVimy7w/9

mB+cMgJiQwpUMGPUasSU0eYqXQ+9eIyDKP2BVw5o4N
h3ncE1BIsZOGJnk0cLtbEQVeuPZjVHmnfHaSmWdIZgCIDYAabYRoDXUfdVBdMu4lQ862jaaJqPrjFBB/

BQKYhnXpr4Ap+/bSi8sc3st7yBYkvfKHHimA2O719i3XSq/rsK1h1gKbZJvcl1FoX9yKLqxf8YUalQck

UTFR/5xT/Kv6FaF5PmnRtTjAP4hpY56fh0KHjGyv9F
fMrJSEiCQfMjPtUybafrXgutCWs08GB9AbmtRbUFjISC2+dEPS6RelOrDRt7Ee/+lpXMExqkWJKSwNIu

DmET1H8TT2Xwr0dveqPSm7hvhTb5GKLfzOZ8BRBNz5WeN2EW2Dy6c7/pmIF6rW7VHoqHjOUXWgRgbvzs

Am78kdjWABE7j0+6tIgj/JGumFjf1DpzBtGvwhA4rw
q9sK5wUcCxZNSBPAwSHHbinyBZMIypBscIccfSDOMZXDis7h827Ux0Bhscifiggov0ijxVmhPxqLoB+5

ecrp4PN+Wy+qAZbsjvcxSncigRW13fSkDZagbCXDAqhhqxYqN3EVH2HNsy9YXRvnmKEW41TfC5MDdO2E

inIKZ1pBSlIxYXg9GpLkNJqiYKIYYquOAWkNWW9BZf
CcTYQUkeDdHjEdi5ruYTbRIyS0y+b4SXPKwk1rg7f5dGX/ogLicULMmuWEiUF+vPutwISKp1vQwyYTkt

AopP7z2t3Bch2avqA5RFEF1P3FE8Pp6qgNIPOQ0xuo1/jPPU6KyvhX9bA6V8LjDQKUdnqmcCECIDxYdt

69IGwlajZOWmQDQWUzPfG4Sf6iBMkJQPpo+2ld7XTw
m8xMCD2YS7TZCIlV4D/+WPZpdzaZHyD7UyyFOPCtoOuexDyBEg7j4j8GG4uPuNLtblbcmEela1+KK8fX

eXmyY4SpJc6+NaiJElLV2hZZTQkNXQV1L3zHECrwQP5vJvb1jX6wC/TpMorhikTN5CzTjdxHbYwJEKWn

0euMmt2HxcT116KdZc2O02KX+iEMYjZUfBzXUIr57l
a3t/Ocv6hWd3LhX3QQtkzU9X5fqxlqVPfSZIPwv5wjZ8A713T82QI7KPe+4EE3UjVYcccHi5YJiQQfzm

l9N26bs6RaaVqkn2SnJzVNdbl1Y5AOwmNM3kFABPxPsyju59COMlD1J9IjsSnRqyDoa3p6YJotI0DKxN

TATaOGGjgBIgn/3f3sQdfleYsfnTPtnb5sI2W6H5VE
QtLh1b1eM2oCuwC2Fvhl/Ng/9+bRaVhcNNuSLhsetURqP4HvDVLgq5cSH115J8Huw2JN0ou5znsJCuGo

4Z+wbq8Kdc4sPulHqGA5UcKnlqRAOJRGlJuDFzkWYaWXhSBn1o6yN3kaUoLOnirl/D7VH4J2itmmECp5

V6LbMlgsjF4HRGQdMQZpfOy7hllZXAjvlj/pPsegPP
16xsg00eSV2o15mhScsVF8t3RnDM/gzSYuGOUTeJ2gqU52bHLhzhESQGQ1+hefpIEmivNcanpDFgcHM7

vQ+ry7hGyWdX2Ql7quOn1cZiAIiq9/go9RC1D03En7q4JuZhJ6i9DF3KDjvXtSJW6XgJr4bgzk9jAgm+

moUIIV7A+d1Be+dhdwP7YMjMdk697nXqE/+16NfxKO
s3PoFG+fmoPPyhAanG0mB5cA6f6dsacs9zgJ3XxbAHwxohfZ53IjvUf0yuJl108VfA5bywvQO8MzbMqY

fTtYEmWi9W4nisvN5BCNazL/ApsBGYA/DC3yTLYUrGrraEF2XZz286WhK5qTgYofYJcWi5UskYp1gk2y

2gMk7j5MeX2epP0os6lsFLxAiFjj70r5aYpGKV9tee
3P+F10seby5d2mck65WCTh7AjF9N3ncVsIhnHecFFc8sw6VYdaaZ76QqXl2fjv2sMct6XSwoMzIuGyOR

As5YX+pAgu7w0Yu7U8nYr1PzAkpHUW4vFkUodUn3LxkRBuP7xfcSML/pwrFMGP65b7akD9jKwbmLeuL6

ZchOfz4Afbqw6iWzaEp6JL3a1+nQjGWwkz21TZppqH
VXK/tcMLWsez5LnFYl78aU/s8XE4qh9Kgigq+MQ5aF+rqWa5TeoakT+A4M8UqcLowjCDAblMDDlveJEm

H7/f5XtxkdwuVdFmmdOwW65ScACJKAPQ/SHm0l+thC5zpggXFCUPgqKNXXZlduHRoJBMHx/nzAEDaOTh

tdtZFDao95bxAm713i3LT5Fg8YQNjMdD/ADxhHeRiT
vPouGM9i0tctfJBdU10c26/PF+tL5Sch6CdoBjhWvE/WtzNyygk7Qkrcxckh8vh4/uzmFruqnuZ4b8kL

5Y+pGvnw8GvHN2jLi7WlH876idTZAj9wzV9lSsP6QRahu14TaU5RAx0J6xpklV7YNPqVba8I1u+phVad

f073onsPW0mavgwz6PjsDvtxcaLwgzK83Cc3SkbVCD
5oy8mp9KpuMKMoFmeWjl0k6hz5PO3+wbaaHf09KHdNBZlrlDtfvvRQ2XYX81FvRuAyc4CkevM8JwwTYb

mYpux0YiHhZtORYWhUYXMxOafprwqEuVMHDXaipMFsI56Zi7R6417SlclnoSaGCW/9AhOSIvA5Jfi5BW

BTvobb9qsk5xVHgiPwJNdDDm/di10NH9D2am5yS79f
1rGH6FDQqMR8wBjby+yJr+iok9TbEU8b73two85BmUz09ep0MgR4RGdK6eBX2/GcqW1c56YdmjTLOhRN

/3PhtVVD1EcNkY0udQylzUAqCVFR52n6B+c8xx9P39yeEqe9Bis6jOcJqmqDrqOLi+BlXkboumP7V8Rg

OoL2ZkCeUtggZMpyi/I3wuzcRV6mY3vAYKuR08nfBL
7RCcOAwKBR7KGVq95N30svtExY0NPozYeDXBNN/6MQcan033zCqU5XUkul5W1qA92ZVviYvPapZw2wiu

VPpAytJ+ZuM+qm/wTv2Gngr7JQYX6/oyUomIK47sxBVWoc/ebhW3G1SRmB0vQ4+IuWWK9v0n+nC/SpVK

FVY+b7VsL0wKD56rw1KNb8NFpbfF2M4JMS6eHsue3Q
+xesg+LAQxczwgW5EnEHgLqHenIJeQizI6NrfA5UiOu+SV7IWi+nK+Dl8HZ07ZnXptq1iHOS8tBleNGT

amBjGM7ANys3tLi6tf6Kp79asMAaXHgQ4+n9mIGAk828WE0kC1tX8I0Lmpg+1E532L7iRtQrA6ky3gLP

nZGqD9BTdwF94YTxOb8p57Ie2NXJwy6a+R/DWgOI76
3djEh+vODQWH4l/ZyaBaHruAIJvLTdkN6bYqjQgGuOozt/qYn26Or05Hi1oRQOoa/96Jiij4N9Ax6+AG

35B/GlTAXujhfKu8z2vogj76YRF9X+/5Z6bxgTy/ID5VxdFPFVOmOecpmF7U1Bb6L4Cj1+QvjtKjyi/2

XHNbumXnhl6Z/u46h2yclKGWt6EbjHh481wGHmty+I
5AhgyOPF67hQJWsbM1+jGKalZufeugscxWWnduMCmWMXWiV7PirPrFAw/MpvQjMwXamd7ERFu47U9AS3

FiAb94qoBGwwjv8xkmEP3kUu/7j9Etk/kK+tdaHEp9GT4N+wAvoIejIpHrEsYju2rjO6COPqs+eZ8vzf

BMyd/cDzwHP/v7ePmBQGpEMtm1ZDFiIOVdI8ac8uMf
gKXtNoNbIKtviV5VvBt+tq2nqgHwmzxG5GaHfw9T4rV3WiLkNiiIwQYuxewP8vfJ7tn5Mbma9+2AtEv3

8FxYJHJ4uDxHo5ieJI85Q/L55XtCB7e2V252TqsyEDQ8D/EuBJ+L+Rmc6VDOvnpAIqYGUxIIpJk9RyUZ

LKPvQ/Tk+///hcAGAVVmF84RfG5OZUe9f/TN3+bIBG
7jXc/m+Bva1LVRU5SaUw+wr2Fpl04/K8qycVox/exlACDOijVdUjI0aiYgdewi8aADW/D8VjfkBr9ET8

ndl+YlgG6CoSj3LVerZdYvH7yjSVQHoAvaq0tmHJwFVhE+fE4uxF6WwM3WEY6f1P4qt2U+DHGKOJNqyD

UMnjoQswfo64GxVS+NuC/wYzhn7hWCGFGpGk/af9f3
aN/ZnD3KeWOuHcocLFkL9AZ7TzaI2CRy5z//KzxwJ1Acqh/p/27GUuGyUnykA7weBuGfRfrhmz8OT/IT

v3z/oj7Y4/7Y+wS1x/CO8Ysgx9qp7TydeZG4Av/Vnw3kJ/5PJl00kCGY/v11aaKbrpuHZ4lDYdjb5V/g

Gx9QmOHuNnPz/V3t8ow/sIZcC/SXE0qKXTDBvGguZU
zfbc0fB3e9Y/Pd1K4EA4FWdwXMdMG0aVyD1JAvM78ZpIf4NZIBWmMA8Vg/ua95Ao+0COzhk3AQ5f2FZ+

SSkeDZvykssX0RA6P+ZVxZ6zTLxT4u5Gh2wjGqtYKd53Se3FMJ275Npe7NeZH4eObJw9/VK0YT33ig7G

T5e9HJNzpB1iNRWn0sJD2iOC76Kox2/KW3tu34nr9R
wO6+EekAeIpIbi0CvgbgTkji8zz1N2rCwA9Uext9CMqqb0kiiigeaOlqHZ6MNuc4XkM9BlSFJ729AD7Z

bJ6bk6V9OYr7589aNp737aAX7Q8ou5O+x6PMQvlVt0p9ef5IMAHIhV9un6h2xZ45kF2ZCA67QNrS1gKV

ab+nWJd81DOhWQcyrE9YBR1FArmM8toXPtzGUv+xyz
FldUh0lTVYl/r7B+PP15p/Wyc+5931hkY3hz0mEU6vzVLW0o6ZfeJ8sIcL/Q+thZtTvpUQgbahDti17g

G9w5BcnI2Nfs/n1Wj8Ohn88Qt5Um/KXqr+QsB3IOkFSZ8m4vs69/g0pWE7RdAsx2HAAyMam2a5e3ZKcP

b4N/DeyePRbqZMD39cdu2eNKfVrmKgyb8j/NzHwgE3
JNR7ot/E4FWK42DheOkKJeRNI5rfXBajW7uH6dD3DrXFXid0Ox2WZ5FC0byapkcTM1RceOwgZCRYcvxX

z+YmqjJbBeTOT4BEqd5ntxy11Reye4cMTBVkhB77tshy2ZzAuVoTmpef0klTrnn8QvqjU3I1SxGeEB+4

sj9Q1zSNeALDeOff4FS7IAUcpOogI+Xkin1EH16hyo
hRf3ld1+WfiP3fKMHAk9kFxmrdocuj83uOXPj55vUck/3zhr6aLGJzIThSWOlRC9O4yIqnTExhB7OIfV

o8Vi/kUFopE06JORU3DknLq4AjEXXfzThqhG8LhXHrXZg6oEshtfAZVaBisN59n3P5QdQWUmqAQ83c0D

ajWoK7wLaqT/U84dWPZq7pccQAA6v2gJmsf6ewvfA0
Pv7NIiUB5bL7a3KMfuQ9T+5je0J1GXFSRom9kVe9XP6S1/BPvhekXqh731BPqNCxPG0FxW69MIOL3OlD

+8an9W8YhqRzMDoOyLtIjBBGxfc2Qz294ef3Fwixhbfon//gjoCxH+oeIzkktqoYu0u4PDoSzirlcnkn

T34ON9w+UdR3QykCLBFwRKuOHG8hMGM5C8m8tH+0Af
Cvlb8KuWHdh7d9oA0/p697CZO8Rc+kumcEWjjOVvHUlwsu3EY1WBJYfKPbGP2eBAkgWKcsO007JUXZsV

WtbsbmevWU9lmAy+M10gbW8O+aJ2tsCFQfCvL9wozP0pqh3WJUu4PRmBtk5IWoi3JDYryo20t4HNhFLI

d8t8MqyooKxaJcL2hD2XF07L+iqRuc2cvA2xsENGJh
xVY4w3o7P9ps7iRd2qw06phR95tL+D6xQhBv8bZrBoxbFd6m9LfWhWErBVRK69avSFC1sPqiG//hklRS

0KFxqfwZBt3CKUvx4173TXPU9zh5+u6Em4gkkUSBHEOzmgQiuGPuw2S1Y1uHZgHb0b4n6nXvWpVgrjNO

yc4kOKFwBcY87ICmSKPxND0DOOmUia7lqXp6n+B79/
4dtiptFbTcS64bYkiDzKUQ7Bwk/bamiFrvuLt3dahXw6Gwv1D3ypry81mv9WHrWjzPa4ltBr1b0R69vp

/cpVzT5gc3/43YmxrH1mumjglT6wK8s2Ya+qkUZNVhn2Mq6vhaHfc1a28CwQCffEztfEAw4MxQRgTgul

fSUrBVjRuJ46VPkxr1mqkKRDeLB9TiCqEOZpe3Dj5+
/IbZXzllLE51yVGPpV736eNsrQy3wHKB7ztST3vqT4z9B4/W5nvwORfRHx85Fe5370+Y9IC9f9hEvol/

tW2DWjQtDOW+iJd/m9crZniL/eFL3fSU+pOs07/kh6O+hN9q334bUR8fuX1p+Cbb3cKHK6it/NWjD5Ei

PVYCchetR99DBq8J9XE53k+O002j5hCy648irZw0fc
jz06rMVj+A9RF8KzLR/HG5r020s1lSDvgg2e5BvyJ4x0tt5ppWw+Ue5xJ9k+H6He8/PdnN9B/V3n3oDF

ZtMEGu3oSN5PtOFJw0nV7wk2S2M5H9MCWe6yvM5hNTF2Mks9Ge45eZLiLwvzUa1SVUd/Ujj9/lgw671Q

Z3y12rW6iVRP/93WzCL7qXQ83uY8iK88YkMCPQcJIB
kGhOHg0EH043FiQ3FiR1T5Kz2AmyuSvr/Wnrtc4xt/qt6/P6z9aDc0KmX1e+Zp5Ub/GXwWajvBe6HaLi

UPsf8GL2K5Ec2bUE581cqZKyuLU4t8e8wu3rQnrZE2B7hbe+6ufHVxQ6+CPQ/18n6BAqcx+7pV4iNd7H

q5x6m6C1vzr8XtXZb2swYk2tqsiinq38aN8dTnemtS
87yH/wXTH9pdc/Lt/dU8JshlIq8sazPGuK5V4riMEwsniB9lbaAyBRIs6z765IJAdtiK64r2sDn8H7sF

HooQ3wngWAada1OWI627zJirQBqfShcIJ4id1s3vJ8EoBPLZ82vEza31Ut+EQu8xan73jJfGH/y+SSuh

gpduxScBgbS0K7ws+7J89vA+iX4hpRkn1xjy+c33a/
eiKuVb4WZ/TUn5dDrwG+nd/M0syx48U3a3KrBimg+VQ4t4eLcxtBW4BRWKznpXuQGOiQNkx9zyQSh2+B

n1MMHPbCmkwkdvJpm++FXYO/VCxfLAP7xLqNhKHQrCSGC2MvqovDBrnG1kYpbycjcC1jtK+8XBChscr/

zzD4oKRsHI7wV9Yul7BxthM6+cjwk6QUJGUelrovvZ
Bsv97GtbgeZiZqr/zucKzK/zUL9sZieuubg8riyzdfvE42CGLXX3oe2hxB5UuuDHmtO75buYwM6G53vD

Hnfwyt09+6WywZN6DsgBvCfsvkVHgWPDry09D+RiLR10rcM1LsM6UkfcAViRTgc/zcG4ttUm7sLLAdhj

48SPKPLPj6ZG9OSj/T2Ogk2k/yPgRH2J6q/IsduirX
cw7o18BysVdniIvcGLv6yVUNdv51V1/VztQIf79m2i1xz/uMekP0tdcxdWHhkRk1ivglswF3N0w83Exe

TXtu17cRfU19WApA99M65vbz5gy+52lyKg7NkJpGL5X9c7d0EJ/1wqm1CJw6iuwG0b1A9VJ0k9WcL/yX

obqIz2OgqI6sJ/4p1LQ+/UYArYCx4GewLH4NHaqy6w
4Fbo1/sZOlmWOh+99sWeyDbT2ygcCp6QVBpgFN9qpOj64L7sua73a9s0WMTstjs6fT77xvdxnjqG9gP9

zsg5ACF9aZiZ9yg6/xHO+z995GQ6Up4JCQO4viZqQjM7eyu5joI1Xxau6PpYP/7x2vPzRSURH0wrcJpI

eCEzkqzedq5PzggKJ4V31EQEO87xkGLMX1repMvlAF
9h/cO1NWiodl0/7Fl7ZlSib4mF49TG/S4L77DfI979r9vJ6oEipSiFIcBtTHoI02Hx/VMMz0H+HUxD9p

ZeX74GhK+1oRQ24Gmx0IxvQo/i5eXaUML3Us2Juxcbe/2+d9jSpif5lqfhUVS65pEt6Fx4fG58B/rIfr

cvIvcF/D6H+0mi63ljUxXfn4TmIgO9eg44VezmbPq3
E6GB2FGhiC+F9b0jJ+8MFiEw8nn/39J5ro8ko1oDsk1g28rAEmgJq2PKjx8j2Z49zba6U/IiPaDuMoMi

5EIdTgTZLoSFKSk5EunB+Xvnlx2Vuxci/j5Ji3g6jrfobe1Im2o8W+98LOC9C4Dny7apu4bXpXYeewD3

ftXZ9umrO9a1Qb91TtAJqCqP+/psb1Xo2uTt0tqZ+4
3tYvzzc7MVY9x2Ydu73F4Odla2oap9arfcics5VMaY1hbaaIU1WGgpym6UblH9tridi0VWeSkfiyS5Ne

V+fn2XK4iG1U9lkLPCLGt0I/yRpPz6BBdIq5F60hpRTGesA9YXRdfBHrLN+ThKslbTwtwnOTk3vNv2PU

Q+4FG0mm8/VNzbmxYLFkJ7IXrso+ik2Qm2E1ZmmY/n
0HcRNg/8PBI6o4lh4AfpICetb+nRC08VOLMkqE9U0ViG5ZIVaWmxuz1EepkSxsPbzI0k23jZPnukJ+t7

sHEV3G2eMWzSCRg5ytJuRSKfqh6lrLXHU8YijUNpUi+ItgX4WpZmdCko9aFdkICy3TFYB6yhrf7Bxcw3

lUbau0ta30id6wJbsuBiBu3G13ym+qEgpFOoM0TSnf
rOBpTF4MO0I55RQ0WoN0wWSYfq0u/K1Ppg22bX7hV2Ts9JtnEkXrl1FR5RueBf5sD35gCmSeIY+RCn+p

Gs39irAg8FEB+rsTWCy+V4bk/ch8rxu0OjUHRVIvyhDKWbn40AgreI1rtAMoyJgZR7L1zlMzNKzLcZKr

elelRBgw3PrhXNa2pbD8jZ0JqbP27CvRvo+K+EXclj
cXG4vrFIfamg8xVGyTB+aB/+tk9esMTMhs5dNjKKewa+bq4CAR1c9Kb+R/Pb0syugRvvlW0QZYWjvX6U

ejjcz/tRKR5gjV7t4Z3V3vJmjgtfSTOda3BEF7LnmZ8sqPDbBm/VljBZMbY0g6k4sfxze4f4K8mRsNhU

1WLaP8gbU3LKB4PuVpPQ5vAnJI0lCHmdgKZHe+r4vU
Gtwurik4bvViJg1W3qF1Mpy2xyH/aNa71DWkDzkatfgBT1oFS7zYmpkkOdzl37642LW/pu82MD5zlrER

7PtAHAg433lCSRPIu1WbQQ+apLQ79vwShofAgSC6Qn1A+R+s8EvC2SKy/XEGeuHSqq0j+99/5H2007BD

ZM7Kp02+E9yPen4IPkd8HMCGxLkc0/IwbaBG8MrWaE
CeZoWm/8Pt3qKIJN0hw3v6kBspqKN24kAvndneK1kDBKW/m32m1TP/ZXFFup1Kmtwu/r2j8iEDUdN/TK

DgjQCDcV4rV53k/xxqU46UA83S6UKF0vnFnyoe/Rs93q/NmLn8Z6xraRXjgGnK27ckVvTbIvWLck4ZP9

rG+UAVmBAp8cN7rchWCPk44tmpXxbkpo4aDt+AWKnn
Nm2suVazhJ4f1jZruJ9mg0CZvtJoIimvC6hwFKRW7g65ApYA7T3grgbcT8d/wNqLk8Lx7Rp+TMfXFs0P

CDLHP3A+DWEhSlxT5eYlXK28NgRI2Rovs+N8cPiHQO/x2HsCrS7tDHbvXoWWMa55ZGGKJB630bbO78w7

/6l1DErx+L+/clKWb45jhHJO4Tu5Y6gT/mCW0Ez7cN
n2v81hsMfngLepC+JbR3Hn2Bgg1wGxa63YYB/RjfC+l+FZdDD+d80VHXPS5FKe3QV1FvsBmZQySAcDMc

uY8TnPOA5P1ZGbiqXy3pLep4tplLy2F1ZoBgDrVVUurXGtkHx1iUntT1oBVJawwLZU4Hbg7cXmXvRNaV

UUPimafIOslp2XNjqLlsrNgNxviTSDl1ccg+H+zoh0
LpLCXdtMMSMFRYBZiAhzkNs7nEdH9M/ch9SYhMXZPkROwMx5953ytLh9f1d93O/gjx/F+rVBkBmqovVm

l6tWbAHO72B6CI2lRW9m+wftYXoD7/Sb26fMu/L76z0d6CiriAk4PgDCMZQnbn74ryHXsJDy5Ic2YeAP

tNrNs2h8ykCvk97HLrqObcv338c+kD4zwr11cur5o3
7JlJdcK0CJnwut7ktZ2GCnlr91sFmoOz7+BYe5G2ypjsM/itEDcu3qTNdTDmCaOegw53fcL/nmhT7a/Y

W7u7BneY0MI/0OCU1myQg6PZ/inVkSiIIgClKWtw14j/1Z8MrVlGNA9PuHNh4/lFwWDeP0NA7dW+o31+

EojBgzdnShKPMv2Lv1N0yQAvw5q3K1n8kEGHI78cfY
Zb8GVs9iOoVs3u902bi71w5cqg1AL6v3npaW12o+dCqqIv2R6B85bWTsupAtyBZb6hSdMZzwPg03RC34

dTVB2TbggwFLJ1O0Pk9M+yDvw62SQoEu3RYscPZSdADWTBf5Mrt4qG/mLF5j+LOzcX3PCqCZgsPC7aXw

5MsRG3bv3voV/5VrnPUbibzpqRh3L3xScp+lj1Kak5
JejwAS9tHNAq2qWCuTkYJRJ/A7E8xaytoKPdSFvpYEpSJZu2vYdxUD1dMF6QyEOmWRCEt9jKrDOHTze9

auR7m6PeFDpCbr5aoXSuTMizWHyzPpMc3djdinHIf6a8zN2OJpSRmPVa/1sArqgldAmkVX3RqBKd/llc

Z3jSU845v9LZ5srtThdOmMzWKiWkBOQMVKRIkrIZeq
nc8mPzKPkbGbI4CXYUvhCRqxgogOk+0vLFFvMEPgukEW9Y8opKJjFWWxoUI8fUo3QCfKQk+V3IfqYGli

KQWIIi/VwTrpVdP/sf+6QUz/jOrqNPbCvFv0lYRP7dRhM+aeReCe7Qo5qD4XfGYfHrUZTqeizCCKJkU8

V9xHqsf97xJqY8VlsvS0tk4RI3a9A6zPJtnVnTA7em
70DMzaGvg1t1apaLVKJgEnJzL/SHloDO5hIJJ+J5klh9R6esMFZOOThMf0rSBG9evdsdo+GcPmSkxAb8

VV4UBdYw047KIkjaGtmR6S+KzZhH6hoi7q69FF5q9RGMJkROOXIcLHjs/E54tq1nKhXWQ4O7QYp+Gou2

LOssf44dD04nWh2lGfjV2AqQaae7/A7uylmDsdX6br
Pl2ts5dlp0KerQaxZ1yGoWIKe19U/I8yCx8Ry/vVuNhFYf1l+Pkx4CYI6E4+PK5qBQOeYpGOU2qXi11J

SfHd+8Dl0shSfXIoAIyjIPqEhSks5crtnBN4EpfT1NE8VQ4yMjktksQbLCe13vlatQ/SyKTk7+g4lqra

OcSpETyRwxQ/3TTvYPOMl/xngPfbRGwEwNyDlYw3id
E0xUUTYZCE7OrLaiPYqvFpStWguQMJkIYmb1PlsWR6S12LCmeDtymqyzYBnZzuaOBq2d4jmjNnGCPjLO

/LlVxVUltVbltIA/KfE9Yt3wk32pfUVjZxoeg/aEczUdQGDqd3rle4uhIS5adwxcJg7SrOQWi6Eg3Pqg

B2QgKAyKCMJqBWPbCCtMCoo4S+ZbWtMS8FRIIG4Rz1
dVB8Xy/FyDe2FoybpdRGuotPiPgDXZ2JSuckn7HWlGuDAFBmJVlj6GM3M8EpDy+HH5ZXujEdozKwC/tF

uZEeT4TQQBp2KtNuwAkIzZZiFNvQ4WFU0yBMXwP7yU3i1rkEaSG8yfu5G/h8FX7vvmqgtUboc+wf7BAc

KVZqAfLFmhYoM0dYjF4RNUGgFgCsOJ0Gx3UgMSRahh
2pR6vNn3H2EFBNK8HCTy9iHtKci6sQyU8JAgrxT2FJJ75DXcXuayLFzidijKXsj8aypiPXmRYAd5FDVm

Ev19HIja4w5PIuNSwsiIsCl3Y31RLCzDyOWPH46QegpoknIRYcGPZ7MHHS7GvB51SjWXRm0V/K/bHN7E

JAaKX45HTIyV8bUi9kR4dW2cbzVLEtEQvQvniKY9pN
P7eEqUs+oPdY91QmCi5xKP17VHPlonaYPSHAaMDSxitPq0nFntXLgPN4hFIdnZXLRK57xzOoBBZRsRNI

0QmxugIH7nFrwyMqvTg10jvGjpe3tPxKT9fZisj7JDlFJqPiZjx4ef4XqR19Cqw4KpbkmnitYsAPFlbB

QluhqDYzELFC2TRyjIRPsyYklhAkwvhcohPd3sQP1S
TUttEpNIKThRUnFcNS9UFCBAIteQ0+dN1WK9sq2q4rWd04Q9VogoxHXOhuSDRsjOKSQ4RiQs/cVF/mLo

IMGHxR2qOyij/majjaTumEtP9zDHE+HYBvUSw8BI2VGD1xXmPI5gT6XwMaE7c8nfOdWnHxRAsurkkumD

RDAMG5EWmHoWG1lkyPs1U2JMLXpcn689C3qzzgpQeZ
WptF8YMBEpRiKXPAJMrVvVzRf3LC0wp/T79kC7EBQkwRy/vypM+ZUl0tToiUbUkqkW9ua1NZmYDMPWMp

iFoeMfFWCGsDIj4ue/1WS5sYdyTLfyVpAc/LJy8nkpBZCUyFfD8tF/cFyYFpKt+iKSKy/NP2heRzeRW6

9NEH6fEEPNMPCsgFYIraZsxc/OSRPdDrOTIaTIXQAw
8joOjMYf2KgqRnbso/g6cDtB9/+FzP3/+P/4X4BH/w+nYsFQPUtaolKlmCBlCD9BAiUqWU0umh5TXAPs

YHOjUzxIWlNhBJhqAihznQFdGM4NsTG2MEMbI08oRTTrWEOwO5VaBIZzQL2+LXzqHDG9okGnuB7WERM6

UctuhDAMvFfqP/dLlfaG8DHVAc3WaQQeTx4XmDsofL
UdBf91nUJ1EshbcyXw2F1/AM4Qx+evIbxMuFSRWEMjJZyPaJ7fRPfeVC2dYJEvk9KdBrn9iINo/ErNuv

Y5DOgfce7EXwDGf1w+CL2f8PDe+EMzn81KOC1UWXiytvW67Q3Dl5HNHFRiZQk7dt9Yf0t4wfcT/ZH0PX

kEmQbyy/XJ1MBGUfaN2IphXWVV2oVvHDcr02+FPAs2
ay2MhSx0fIZxKKWnvy+n+zdVrZEy2mkbyyPOv1t+s+o3p0sPg0/0zic6O+cXM2+RcM8URW4uj3JgVJZy

RArvxpGcIkyUCvB2DHZlc5FgJJv2LK3YOUCwOPnvBA4Zt386TTVlG5NagRRbme9ECGMiBl2nzG8vdWVs

GNHWNETQZ4QlaLWwACboIM9Jg0BelzHmTIC7P5VbqK
kxfLmbdIN3AOWqIFLnC3FiePApEySoIInjWL9TxJVwnfElGh0KRCFdCr6pnKXAm2qeCgAbKMBjQdSdSO

JnXSqcZJ7OOwNzS8t2JUrnJ1ZkN3ivSPPtJ1EuaRSgUQ9TQWSjCx8pjFDdjMJgWHUcEIClQx8CDo5OKm

PdNB0xpq0XWVYwARHhCnxZMdc9LTmaCR4RhCYvX9Xx
rmHyK3NmxKurSY7DOXUYy071ZFnoPSAfXxXpDVFkqdSlVZCORMHBH0DrlSBiR5ZLAJHbC1kMVUXdB9hu

CK21tXB4FEteEU5JGIMSwQV6XQ2BtnCbHZj4X6AkJ8eybNN6UYhTLJ5rKNicQ3SrMFGrjszmSPpgCS79

aMZ3YGInC1FfoUbplLRzkNDfNw0xMVrwLC8Bs569YN
HeF9BnvCUhsvTyNCKzENNIBhHqP2FYFCJaPATjG2qjNLy1AFSzTWO4HXTySFVrUP6zYG0HLx8DTsHsIR

3nia6CZRXsEWByXtnHXot7BKrqQF4HiBFvK2TdyuLaY5VzpRtcNM5IxLKwPB9LYAMhYx6jjI3VKVFEZS

DgSNUrIEfuWA9sv5VuhzpvKUIqweKfmOjwKK0ATVZl
DVRsU3LiLPNmhVMtorCbCDrlOWIoILWpDRmhCM3Dn6WykZEuCQJpLVDqVQCFHDh+Gv5JSL7li9EcCDoj

HwFeEK2cbc9DBmH0TRRuRUTqYPY7BUXyVNEiXgpvNYA6USG1TfcrMKD3AZamCUUuZfXvVyZdZRAmPaS1

TSarIdj4DMKvJdZ0VNJtEoM4ERZ4THG7WrecKIL3SF
KpMFR3WmudLSA2WEBvWTJ6PxzpCMW4WGSmLVR6LcvhQhI1YURrTcG1JGOeIEh3QQOnVip5GZD9TQC0PN

KzXiQ1TCF3BkT9KWmiNeBtUODdQdH5KIsdUzg2VBlqWILgLiHiHkC5WJI4FGB7VlAePAn8XRz8KLT2FV

DpAFGnDNt4CRC6TZblYTMfLETtTDZ0VTmiWtEyUHnf
IJJ6QCYkUWJdNYW1GTDKBnZjEoAsTxskHmVfZKS8XBU8GHMcDpW7OVZyPTI1OChcXBSvZLT8GWD9QaZe

XpWeSBM4QBS2FgYaNwBcYRL2JmN0DEycYrJwXOn6NGN5RvNoMOg4SOW9JUS5FKhoZhK3KYGoRVJxMomi

NOE9AZB5ZUE4KjXfBNB5LX6UGEe3UNX8GqImOWDwKv
R2DWFtLtO5DDCnNeCwOYG2WvX1ODNrZdI5ANS1NyI4NXBqHcS2KUE4QoD4TLPlAuM9FIN6RxD6BNWfSt

L2OJE0AuU6VESnNbU6EHZ0QcO5BUEvWgZ0QNB1NjW4AITqLlY2OFI4YyO4SSSvKZc8QWWePsI6FKV2Bn

G2INSvOsN6YXA2EsU0OPTcLeS9PAB7GmChEzKoNkl7
TQMmRJU6MkevVLP0RBOmCTCqWefmRRC5GXSzAEY7DSBrXklhTQEsMwM0VYVaWKPzXPq4DIT3PBHyVzl3

ZKF3YQBmIdXoFgQ2VVQ1PhGCPiT4AdG9SMbcFzN0OVLlJBEiFGFnJBJaGFJpLeT4DOEeJBClZIv3KqU7

POncQqV7WOT9WKA6TYWfIbU1ESU6CKM9PRTrKAEpKT
IlNGH2GYBzNFI6KCRiNrQ0KZXmLbH9BJX1AnXlDnZuJjW4SKryGZX3ZUhlHvR3PQ0VQvH4IJo5FJV3WO

OxSoU1HYK1URI8NXAfBwj8TKU4DcW1RRwbFdc6LGK3OaE9VsMiBNU2BHLwPGU4OTqlKVX8QTHgHPT5WX

gtJZO2RPblXaJ9LsMaMAUaJHWrKgL9JeYzXPXeDJM9
JfKtCXAbFuXkYQL7PcF0TBepHLc6KwAmQLe4JWV6UvF1GTTaLGi5DKX2YrA4HJHqLWj7XCA6ZuA2KvIm

VCO7MCV6DmO1JJKlJJd5SFI4SFLkDW5XDT2mg8XmSXeiZXRrDQ2wez7LBItBOlTpG6Q3ySZbAy6cqEAd

j9MxuHI3m4KVUsIyY8QmryHEAD3bE8AcR91dXGkKTm
ZaR7CiC3PunPPfMEg1A1RlgGtovXtozTKlEOt2X4Jpn1FimsYbBPC7BG2KWWYtIeqeQ0TbOuBPWyZyR8

AmcmCEYa21IPtbLB5pLXVbNPY2PJCoBocwVMwpWT0BoGJhnWEWtgmkJCUlP8V2SU2WLVQVRw0+DQplbm

ZcZxqKHxG7YCWoa2TzGKx8LM9CSFKlMFewZN3Zt777
H7G4GeK9vHRnPWF5VIY7wLVzNmWtWNTsrxIiE1Eri6CCKN2SieYuRAztDn6XeC4PsyZdXS6nq6KovbkW

CkSfY8LzxpJ0L6lenyQhSC9GLRA8K2vwyvIhNXCTFwMiC6bqFJAvslPeMdGpOLAKYxNkM6CphgCFCCWy

ycpadH4bNYB8KOCoHk8XEx5JYaVdFO0efr2MSlEpJE
RdGyqYOcouVKjmcyIiRxnFLmFlHNPoUwhTQnx4ESjaBG6Tfs5iA4U4AHmcAAHOW2FgeNKwSJ2oS4KSE8

eaRUhpPn7QUyFlE2PnakXjDCcmJ4AzYFdfPGUUCApaJSQeR7AbWQQuBOHdOq3YWFYjJR8SZdDjFuBzGD

INVeKiINQoRpKgJVchXCAMNp0GUkHhW7aZBhtaG8Ni
KXxnOg8REoLyQ9A0yOgHhQE7WRF2KB3KJsDZDjOnFWk0C1Z5bZUuX2W9oQxOoOH0KO9KRL0QQLOmJN8+

EnJxG3OTQBuTZQB0DT6PyLQuRU9OkOGJF1UamPMjFu2hZNNimGrlkYh+BoRvD6JXLHPPYOO7NB7ZuUXr

JV9AfIMBO5KddQGfEb5xIGbmRyYtOX0lZR6+IC9QQV
TDFuZIHsWtITbzQAlxUXSwUVf5U2K4WCQfI8SOK1U7X2o9p3ykso2+TS9YPGZpB6EXGEGTOsAbSEhhPM

scPOTzFFc4N2O1TXLfK5CTK9hwU8a1UQ8+PiANCiAgID4+DQo+Rp0XIH1ly4HsTIucUUGkTR6eji4MBZ

vvZLEjD0NlHSZnSLshD8TqhGziXC5QZXnrOPpgDA8N
VBYgUOL9CU7+DIocxXKyKY3CSwq/oYZqP3gujNBkTUzbts0o23s/WaAzSG5wMqKUUJ3uK4WtkMy6fbOJ

cv9KY8geBxbzEb5+INtmEDu5IbniwX6deOUgbTd1vLP2fj1qWn2tXQfbHOmriK9jqrZ5aZ0zMQEhZhF3

ckZ5iFQ3KP3gOx9RFQMeICgrDFM8FlNAOEenzH3hGl
SvMk3lkXO1yNdrL7y1gl12Gx7owpsxHDo6WI6wAv2kNk7eKPPga9dyfTY1XU7bEki+AEztCPEzFB6vCH

F6KvYWAn4ZADP0E2v1dY7rwPR2YR5OStMhKOBfUSGoNTHwJYNvQXIdXJXaNTSyFJHhXLLzDGLrOGEtUB

AgICAgICAgICAgICAgICAgICAgICAgICAgICAgICAg
ICAgICAgICAgICAgICAgICAgICAgICAgICAgICAgICANCiAgICAgICAgICAgICAgICAgICAgICAgICAg

ICAgICAgICAgICAgICAgICAgICAgICAgICAgICAgICAgICAgICAgICAgICAgICAgICAgICAgICAgICAg

ICAgICAgICAgICAgICANCiAgICAgICAgICAgICAgIC
AgICAgICAgICAgICAgICAgICAgICAgICAgICAgICAgICAgICAgICAgICAgICAgICAgICAgICAgICAgIC

AgICAgICAgICAgICAgICAgICAgICAgICANCiAgICAgICAgICAgICAgICAgICAgICAgICAgICAgICAgIC

AgICAgICAgICAgICAgICAgICAgICAgICAgICAgICAg
ICAgICAgICAgICAgICAgICAgICAgICAgICAgICAgICAgICANCiAgICAgICAgICAgICAgICAgICAgICAg

ICAgICAgICAgICAgICAgICAgICAgICAgICAgICAgICAgICAgICAgICAgICAgICAgICAgICAgICAgICAg

ICAgICAgICAgICAgICAgICANCiAgICAgICAgICAgIC
AgICAgICAgICAgICAgICAgICAgICAgICAgICAgICAgICAgICAgICAgICAgICAgICAgICAgICAgICAgIC

AgICAgICAgICAgICAgICAgICAgICAgICAgICANCiAgICAgICAgICAgICAgICAgICAgICAgICAgICAgIC

AgICAgICAgICAgICAgICAgICAgICAgICAgICAgICAg
ICAgICAgICAgICAgICAgICAgICAgICAgICAgICAgICAgICAgICANCiAgICAgICAgICAgICAgICAgICAg

ICAgICAgICAgICAgICAgICAgICAgICAgICAgICAgICAgICAgICAgICAgICAgICAgICAgICAgICAgICAg

ICAgICAgICAgICAgICAgICAgICANCiAgICAgICAgIC
AgICAgICAgICAgICAgICAgICAgICAgICAgICAgICAgICAgICAgICAgICAgICAgICAgICAgICAgICAgIC

AgICAgICAgICAgICAgICAgICAgICAgICAgICAgICANCiAgICAgICAgICAgICAgICAgICAgICAgICAgIC

AgICAgICAgICAgICAgICAgICAgICAgICAgICAgICAg
ICAgICAgICAgICAgICAgICAgICAgICAgICAgICAgICAgICAgICAgICANCjw/kBHgK8lvzQNrjrJ0R1qi

Cc7CXf2VZG0wa6TjNQFnPDogrjLxGsiSTuObWCTbMutKXzi1OBirEG2XsYStN8HmG0TvLEbkVJ2QAMVo

LBGbzOBuQTLzGPDuMdN5OYAdADkkCO6NfVAxOBygWC
RlPXEaZA8TBMTwS783arRvDR4RBj1OCdAjTG6dly6MNrAdAALqCcjKNms5FTxpJO3UuSOyhJClOsFrIP

SUIjVmQ9xjf4JvSbIuGEKKAUrkNN7Qe5PcdOBnTMj+Wx6ZZE3af6HgQIwzQkFzFF8dlf5BFTnPZmSnV7

PfeOweCSFnwXcwosLiKX5gdHZ9K4Swy99nBIB9YIts
ax9pDDMFSE2vQBL5CWXKZaWwJVJbFi4lRv5zPCZkKZXpBoTdOOJIAA3ATXKqMGRhrVGxOMDwVAAIVH1X

MWryDMJ4OwgbnyNrcGQtBTnlYM1DIPTfyiOuJzIoQDTXNWt+Nl2XDN8ae9TjUKtwZQRbDR0ado4YWOeS

DjBgE2E4gNRzF9A1WXirEz5TOPQcATBlTyQyNNSBAT
vvPN8YMH4ixcA8PK4EwRChHELoAHNnhYBqVJr0L71lyZZoCDwpNZ0BHLD+Martine+Jv9HWUXsFUGhWMMrJq

RoSCBUHeFqU6CzM1YFx4KnF9QqCE11bKxxljBcJYekSF0KVQ3mUBCmEHVIFE5GzNMurF5lyqMpXpWlOF

DANkFwC66oxOPqUWLaWHZfQSHjTy8XECAlO7AyjwIg
lQhbojApBOFiIRQPPG0DVOftlmPsfYCcbYoaYB70vXaiWV6ZNr9HTjHgFG5plu7UrWQdAc0HDCXiQP1A

ZCUkPTPpSCJbHLS3FICpKnNeTKaeAFWmHQRiGVM2QRNgJRRkAC6WErCaTVSdSKc8BVIoRQIjPUZggh0B

WBVnTLFyDMG0TSWaQSHkMYGlVPvuGEBhWUOpPSS8AE
LuZHPeJN7YQqWdTAJzJKT3NNvhBEAgAXEfom9OFANeRBYsMYJ1HDZxPGRzQONfINqbMHUrAUIjFsW2HI

RgTJUlLN2XTxLrVWHjFOR7XHGgSJWsSHOgri2LMBTuAAQpRgDbRFYbNFFaXOBiAWkoUWSzQVIkLHb3OL

ApTAFnCV7WJrQkYWHnFQShDUFyBJYhUFNzrx4WHPOl
ZLMrFWQ7AmLsUFGfCIGkSPbsSMCcXMV2JdHmTBLiBUWtTR3LFpCqCZBuZZvbCqgpBNCzROKovd1GFXBx

LOIrCjW2LaYkQPGyDRRtJDhpSEAeHWB8RMz4KIDgNXXaSU2VNlApSVHjPUo4HfJtXUOjINLxcl2IYRYf

SYWeNZI3CUOpOJFtWZPtSJjaODUvKAN4DoCxIRWeZT
KeZS1KCcOgCQUjNAo0CvDgSUFxROGgxx4CEVUjTKKuCOG6GHLsIQPwEORwSWqtHFNyXKYrFzz8KYQqZL

OlDO9AEsIeNSJlHpX9OQmhPNZzCGOzpu1RQHQwDKKdUKMyGFXdOLRoGEExEJv1lvOurTLzLMn3MZ3KY5

VcicTsWzNRXn9Zl156HEZ6ZPInMe5CJ0dmKu5hHKMa
EIXAWj8JRAz9J2D1WzP8EFU0DnV0JBspPoK2DCAnWFvjGBCeHiNoFmW+QTb0EZe4SaSxLCEkEUy5GGVg

NsajAJYhIJMjBYJhAXGrOt1rGUJHQi0+CRhhgSDocQngPHWBQqMlFrD3GExpRYIYWs7H









                    ID                  Date                Data Source

 

                    [id number]           2021 12:36:24 PM EDT [facility]









          Name      Value     Range     Interpretation Code Description Data Vanna

rce(s) Supporting 

Document(s)

 

          Telephone Encounter                                         [facility] 

DCWCXk1dGeSNVaFn03/UOBexTBLal7AeKJruMUk7DPkgCDMhC5EwSHD1wT2jKDO0JPiUDtMhLfMxVqL7

lbm
XeMieNDwDiIERcNflQBjWfGQciVvwndHNwZW5AoJS9XJMyP02qINHmZVShE6TkUUklRM9+EOhzDHZ7vi

CwcW9QQVD4YV7Q75AGw9+0/9SXL3O17fJ6NoYKPlLrHdcEqpefe1ZQgfFCAKYZ//0uTdLEx+nGSgAeGu

vs38/40S4ejGliYjIrYVSM+b9+pMkL4OU+foBWLWBr
tfbRUkIGjCgiKwPzGNDnBgMdSBZaH/vNdWBeCopSbfVM4Zsh8fNt3GwXxKAFp90B9ApzR1InILFILbcz

XS0DCuH4uBwfXwfg+CD9ZnQpqIpGKMPoiFhdVb1OKfsdd7aZ0ZnOscmzLv7WRHGoAJqsyyXvreGhjxFD

TuHU6NF+CdHteVZOgN3dqzfEcO7oO4QffAteQttJMp
pix5WvfTTJHN4Q2QaDECihsYG6L1L2kxqu62sk7JC07bFAUIJZLaEU3jfi+CKm0CawybQdSjDb5N1BV0

LwsyWhJvrnMF2H95TsngQGrBXY3ykHpjJX3lQBxZ0bRUsVEcaAHYuX9YxabP0ixx7geuCHXSjNX5tGYI

4RGQ7T4uTvhvZI60eNgqurHrjraMxJ8fyu3oEkD6Fa
+31LSIEr6eba1RrqUcd1lOj2JQxsdc0t2TQ7+R1J9eciLuGh0RDY0Cvc9lLQDPgiNSnOS7h4NNX3RllM

7H6oL6xM21N2Q+Ydy59H7bZPSP1armuCx9uh0MMeKzC/Uz5xU5CR+TIrkhz2x0Wvtyeo0s+QdTzL3nJq

Oc+LctSk8KhNJmir8OgCEIqdwuHGbKepFdyEUg0bbD
kH//xvnxmk3m21EaYsEYEfPbx9dPjWfqFz7S3J5XTprwsmnHl6OZJmNxmpanhLDbSZU9QgqaCYyrJ4IO

V1b85N0vko8ydtNFX7+up4lPOc57y9wUlAxDiuRqzinfAlADB6Vdloik/7ASpqHQYrBj3bh8CvTZDfnf

fTqzRnTn80QA8UA+uEjZJcrDt5cuZAG7r+uJSLH5q4
lajcqcVAmARH+E97Ce4Vox4JsCi0FpZ+WSRWM/jSh/1oxA8opQ1fgWZWT0R92jKaX1a5s8+y1p3TaWAf

5yfETabhtlAg3+Im3ZTE+GgHa56nSK+F2oQ8f3EL+3iiDdVNKkJbMhtHETVwc9nZxZS279Fxgeq/jbPa

os2ZJK6cn6EtYSDbKGpxzeYoMnfNTlFvNLUhQyfQQf
FnQDlYXzOxJNJwXNayDC0XNWxiEUieVDIaA8FnvcDgwURxJLRrAy0GACVfAX3YPSUmeTRiYDSiElSpRW

PWSzHgVSBnZGLglPTIm2ehBhBpTZB0GIEkXhggRB0XCZJwEZ0Md969WL38tsV9AKPrWw4TSPXdZR9Qvo

83jGP8ZBJjFaQxYMSnccTxGIBvdgM0WY8KLwInMTA4
fMSsLmnQEC8EJBNtfTIhHQ2MZZJtjGCmZX7+DQogID4+YFdsddNcPeaOZzMpQCRqTsjXLaYqHxJ7MAJp

JoEyPOI3PYAaSdXhKYw4KSK5XcV4XPFtLQg1UXfaIpMtYracHnNtVPSaHtBvCJznROv7UKM6KDJvHfZl

Tqh4BYB6MOZ2KKCmXKG6VZI0KrB9AZFmPYM3JAC7Nm
K3VSQzJQZ3MDT6YpV8LHRyEfPbXIVcQkW9SCSzJEvxHMJ6HES3QHLeIqUaKFz0BEX2HcNbFkSjKKzqSe

W4LdJfDdR2JBRdDQT3HihgAlMcUFF5MBQ1YDNnFtQuJKXqGVF2MyUvIdJyPXd1MDN2WeooBaa6TGjyHz

B7XkhuFcXePSitFcK2FyeaYFE5DEM8BaJ8ZthlThIt
KP3WZCSsOxVnJco1KIBgPpB6DALjFMY1NELnOnR8HLTwAvZmIGW0OkL8VBSoGBD9UNTiKbN5GYDfZrLc

IZI6XZFoKmtnVPT6IRH3DMX2EZxlYgVlWWRkQDB2OXXsJwFmVVV0FMP3BHLlZqHzMATwGYC4QIQiDfi5

IWY3WaMJHkLlZWU3GUGmBVHrAQncCcshEIV4YST1XB
SpOrEaWUa3NSV1OXXvCfJuTBl5AAW7EGKoMnGiJBh2EMG4YQYgIbMzIBq1WXT2CUMkRrFzTUs9XGC4XQ

AcHnRxFVr4VKE8NMSeLhHiDUr6AJL4SDXtAwYiCIr7PWW7NYLjWDhzCPk8NIO8ARMtWzLnQHk9YWX2DU

PoAmTePOa6FOD5KVYqWrDlDYV0AHOuCmOdZVH7KYG1
LlK9EZExZLX3LWR1OOI8NEMtYnBgYYmaCyUuXkXgDZN0KUF0RAXlEcAfDhP6MMT9BfI8VRSaFJH9OYRh

UoAaUaIwVzYzOW2PVAS7DoKbEPE0MPCmDhPvSvPmXhYvJTM1PLT1HLWiPFO8GUlbSJG7OvOaZaKyZUza

KvM3SwDrUyMkWQcfOyS0CgKhZwEzYOMaAWKiBmZdYG
A4AmL8RueyZwP2TAA3MvFtPlvaPjo3OOE9DUPlXdmhRtXfYGuyGnD0GhdjRLeoZJl8DFA5NclkZwc8PR

s5RBH3BSMqOke5DCroIcT3NxIdInGiEPmnMyV3ApglHhY6CSBrVFZ1HBObPJA8IMV4LuR7IQKnFJW7ER

E6IqL9WCqlTYE4HFT0TiU8REQnTSO5ILD0KwIpXlkh
Hys0SEQ8DSQmSxxbSmPpMW0CBYY5EPGwVkQaSIXsJEZ5IYQxRoUiDPKvWFP8MNhwRpMyUAXcZRF8DLDt

ZvQwDVTaUTN0FWBsBdOgLDS4GzQaEY8SIC8at3HuTLn9XYMax5JxMDhqABw7REwgNWVaT1I0pGVdYa5m

fIVew0NkjED0w0PXPcDsYOQtEi1bpB0anEDqSTXaNX
qvTx3xNE0AIBSiJT7In9ShwoJiPSR2J1CxeEsajZzgyES0ILRtEHLqE4EqwMEeNnLlQNlgGRVdD7SnHI

jcBZAcQUpmLCYrC4DjklHLMh54HApwEI4cQQDsDHTiNKC1XRXtTKkaOASzB7c0AHxgQ1CyX6xkSPUvG4

QwkMPoEF4MQSP+Ar3HVZ2ot1WzUEs7DAWmy4UuPMqf
SIr4WLnyVLUnC8M0dWGdVo5iqG0FxIM8mUVjM7BvmGETtPHgR7Iwf0UXq982T6IobGNlMBUdjSJvOI8n

z2CzywvvU9njOR7gmDTcJ64xrH9gKDnsDVGrU5JbuoV2Y2sumcDvUI4UPIL2G3nujyZaNADDNxFaMDOf

J5wssOcgWXLtIYGAAOnzTNPsP7FjvhCFZFUhwtsyoK
9dUTywDXICQLmlBX9+UPpkzwUxDfgAUwhlYCDkXcqBRkHnYwB8ASJrXjEyCOM4QLJnPdfzIfR4QXO2Sh

K7KXMcOCy4SIX1JjWdNWGgEmJcEVShUfAgKQpcAIp1XYR8GAXpCyBuYtz5JHB5IJX2SDRjVXQ7DMV5Go

G9HSTnSUK6DXZ7ObK0BUCeHZS2BXN1AeL9WDDyVqs1
DQV6RPJ8MGQwMWeiVUR4ZFG7UKHjDPM9DIWrDMWqTfK4OMS5BxV4DgXxQxVcBTB7ZbL4GKIqGkn9VNka

NnOaYjyjVTSbLRT0GwN5PIRpFCZgDBacTtR6ObdeEoK4MCq2UBH3SqJwOmT3LSMdSHI7FfBaZqI7VBh3

UKE4JghwPuC6FMRoPPDGQoRbOye7UNI0NHHtEuptMV
T1GLY0KgCpPcBcTUT7KBX3VoZ4IEFuHPS5AZZ1IiCgPdriLRD1NIT3JtAuFdQtHqNpEHKbHQXgSnExDO

KwZVF2XmK2QLMeVJX1HVZ3YwQtFlSdDIZvDAB3JSX1FAVwHDIrUElgUwD5THNaROneBKTlTYN3XQGzIn

Y0SPF2JJSuWoRsSCc8DPv1TQX9OXQsFyVjWTv2LKH3
TXJsDsBsJVr1LAE2KKPoQaYjWBz2OPW2YLHaCzOjCPw3ILB8ONFlOvGzKZi3FNN1JUHjHtFaEUb0FVQ9

NAWdCjHuIXs0ISC6USQqFmMpAS7JVCS8NMXnYlLuUZs2YEG0CTAuXjPgNFs5ZZJ2REKbQoJhZXy0PTMm

AnceSwXzWIP7CaK9CQWmJED8WBB6NqCpVaCsIYS5UD
ZfTgL4DhbgYvgoMFV3OrD8PWNtVcCjFOfsCwC1LPIkKACqCTC0PBEkGeEmCrPgZWKnSgCQSyRwNLy8DY

NhYuZeThNnFeNlGFXtWoToZcEzVTY8NAlbWRB0GqQeORF2RXJeOOE1EohdEtB3WRW2EsV3WimzTlE3TI

H0KqGwJTZkFVrfGvH8IjroLxA4NXE9GvA5MompPcs8
GLR2IQZxDsvtUxc6IEgqZkK4JyRvHhf4OD4TOMR1KpxmDuu1AVz8GJJ5NbagBZe9FRp4KJI1EiUwDyXp

CSskOsE3JsPzZdU5QMG5FfB8YOHeLCG6VVQ9ClA4CYRpTBX8OWH1QyF3LLVgXTr3WMRfTLT0EIYsTLD9

SFY7ZrU4HOMnOdw4UXG9QBRtPgihFlf3SVR2YjXNTx
FeZHY9BMK4QbH1TEKcGFX2JXQ1JxD4HLSgIOJ3GOLvHNE2PPGuKHA5CEC9GfH5ZLEzOHYzJVX9XdY8HZ

JrFVMSNwRnKP6jqk0LHHRiDEBwMswAOoDvRVyHRlEyTRLmWVozOM1Da690EZNxQ4VxwRXgyd4XHISoSK

9Th963UoLqJD4BlscdoR7WFQGmRC6Rc1PxxaMwJUC1
K5AyxYwwyQpbnLO8LHDkRVAkT0PmgSVmPwGvPYldTNIzN5XbICkyIKDvEUvlNNXeM6NtafJXIx58YVxz

LN8sVURaUXFsEPI3AQEvEZmjQZWxG7i4NGnoP7GdL0rgFWEnQ1MdvKMsAY3ZLNW+Wo4BRE5ca7JcFQod

DRKoUQ3bum6UGVB5VC0ZHAVbIN4DeTFuY1XudnWtQ1
UnxNmpLB2EulZxUInyOC4DZLXrRn0ugT2LlxqutG6OcuSiWJzhFy9AzH5HryHpWY4bq8OxpfoQWoFnMI

FdClxhd5ZFtXOiBBGhU8hbs6DEgBEoMUV0FH8FNVHtBS2RyPA8jFEbGUVuOBUHOeDeFSWjRa4dsZBum0

FupSB9n2KrKXBtMHEDJJcrNG8+DQplbmRvYmoNCjEy
ZSFrx0NhSQgvYHi2G9KwbYTropMcWvkyaVEPYFVmGGDlV6hzwwu8qXAqKoS6SPFbSZFwD3HrAGWeLqO1

ND4+XKbmEYA7ktAwgC2IPXZyrVu4NGGY5qc7R4tJblROMNNnXb0JMhWUFJCHS1JqvE8AWgHHNRBZvVCF

KKbySuZEo9HAMFQXvCTYNxsrVH8j0YctGbx0XkPXD7
T8x8ebhwDucQj06k//87vU8668enfKGU4jVh7tJk1NQJJaSYC5WwJcmENgpJdrQgYvCNO1UUUJmv0rZn

f6GWv6UUsTkUL+TZJRJRq9sMhm8K/63wui5W2WDL4a5OApAM81eqJEIo5vmadlQ+VlSg7X44OXkdKdfa

/RmfMWLlqd/WuvOS3FO9NfWEN/YhGOkz2dWS3g5saI
rL7ZlCx8FCAKdmk/4BnptADho1iE/3CJLEn3a8ViBtAHsG+I3JyBsfpwOY++wrNbY4FL/kKV6IxQgK30

0EN4w0d/mUmYtwfpdFQ7DLcZ8PhTHJ/BgLdFXMKVxu196+Cm9NIXc2TFxlVi6wDwPTrEB0VrHRqLxPjQ

tbpsbYN8C2yxilBkmlHQKw0uiH26biWvuaYMprCGLr
GfWgdEm+wPREhhyZ85mBvIPZpyQ+1bC5tQAmVcpSAcJtbTrBdLxBVX38wu/L5K3kuY6CPjrnIr0P9CA0

NuaikSrC/pGlqi/B3fjkFRhz+GgKxFp2AudKKpAxKU3MZGo8oMYnFTRRhM3o5Xfwa50LVk4b9btuWibr

Ol+L5i/zQ8w5oMniAVyxPFAmT0DE2A5a8oHkZmJHpP
Xq66NHqroJ9qMYOkNvi4VxOrxK3qJfdV1+ukCUjnQSJh94pe8krlFhL5XFHW5r0WBdzGnyJLm6H5GcHC

YEuP8ujkyZ791cUd5K4K8Pyt5N2owkgNiOouEbsmtIor1T6/tc9ZHQozaScCF8UHw/+m5+lVERBPyflG

EpOISBkOF81BT+gBFKE9PvJ8CVfwc9h3Wo50RvkYxy
Scg6e2lhhA+nO2WvokhKblW5h6ve78HCfTYb30z0jL7fv0ii7uiMkdhcX5hF9re3AUxT4fXiwD1KVnrD

0missOB6GBEifVghXI3lDwTyjIgjQx6Y19Zc31SpovdO6Sh3L0uE5XclPyNEwWOQUNrWQNmADaYp+IV8

CDusXkGLuAJ8sZ2KJfH9nAnfc7nz0NIAditH4KO9k3
NZ2b007mFpssEY2e4KgImasvN1256PR2D10T8PXyjBsiVq7+xRoXtCZ3Hg0Nse4dt6iV4IQly7iUmApw

O/YXPGTcwSQjL5MhxJ4xNH3xP8Z3eHmfREefub/CR1BEWvpmDsbrVvJWiSvDbzw9F72MgV/qbfSDuoV2

gxPJPixPh99mxsKVRrUh9gjbvR/+3g7fMT6/X0d1y+
iC2vSX9PRdkFoxqpDp+ZCbwqCRibME65bl9tiI85WywP1g9PCNYaYMNeBCcHJyTJUXaR1qXI9n7W7KW1

VA5iEVWCLTxrTp3w66bli7Qb/rEmn1WB3cg7C22FihW77lKbMmqUtfkdEKT42z2rWdd3eatOU5ljO92Q

PtR+18nN31Iegw9aZEYR1xwp/EW9oV89/rnxtTjJeM
S15hz6028Ji4/+y96uNAY5UtpoOe73zMBr/h9yUWwk20QP4yhySMBo4tGBm7mY5CvxGY6cL6kQY8mxv4

USWvTZ2Ft+LHBaouWTypP5kNHkPu7NLI6u/Je+PVwsT2NdPKlDszo2m0bZ61BCBW6W10zi2t3rZORA9i

S3on8GDfAzXUvx7I0nVcIq8Xg8gw6L2URi6caguHVe
3Et/JBbSxGwV/5NPVwKCp69xXddeGyfezaLUWl710BQO2mxqToZJMX4lwILw1Pvwvcq02An2uKjRy+xT

adLs2wbtDNjML2BxrT06BLy1OtmUCAgHqWJ2iQroCyNuuD6W9V8mdDCwYvWuS55pkggOvXrq2K14B3it

7VCnz3K+Eb7ka7zQImaYXGaWca4gQoBqgMCKYzZuQC
AaDaNS9Z3IjkhxRqt93VcCRNZr6zEhvg0NRP7qfNVWOufMY3PNSDBOgeqdeMNHFeXto7HHvvV1DZgATd

fWgKWIYuZBin5A3gwcTfOpQPiIkw20sp3EEg09tdkIx+tDpsk3tQrkErrTiJQg+UkMpeHPhtJv4mqSst

5stVJ/vkHcrCV2fF+S5EcbEvAxkZ12E3fzDGOFcfiz
sIy76wlRVITzKl/9TEkeb1fPogkRus6SaMFQ2EtYd8iSBD2JJXoblapQ6+4VImgBSZUo9P26HuT5ijmc

dAqoN9q4JZX6KIuS27XMmrOb8ApbJ+T31ReY3XhlNF43BJCq6EjcynyHQtP09bm2Q5rka+TNkumGY+rU

nI8uCNo8Yhsydtw/VJrTMdBqTWuSatrW+J4dWaJfAI
Y6sSBptt9dEiXhyT/Pmm2mXEd+o0WAymI6GgowHL7sB8KqMOEaiBWfEpqDrVV5c1fedMVrMnwwCbfxd3

fl/t7A3bc0ThPs7As1DsA0XUcPMdDQoiZSCRraPQyO1Kcgh0FZ3xJdOVYDbwmmmhwux1UyIPMOfona56

2AlyKoA9L87Q7w6Jgyd7aucZb6ihvU8jub9k6zHux5
jygpsI88CaWjzOG8hNI7eb6eHJjHxXG+Gq2IDVUtPC5yNwcdTB4TjfMQeBar+RakXHFe3isWkLLK2xJO

u4WxUqEmjbcFjUBmXIRhaV+a0dI7/b4vRXgz5WU9sIqDghU1WeW/h+KD+ka+QenxJ7L9Y9tI0PRzVCNW

rD+H6A7/sugYePf6x4YFYTKozW9ATZQB85mjno0Csz
uqgnuzMBKd/DM1a+x8i7f4ySfTnjw+U7PP0oQH46HnfNiuW6x+MQiCuuNb3CVIyGHugotC+kVuIY4yBS

HE3g3ViM6cPt93Y7mMVTcTw7d0jxF77mU/HlTYtIyk0pSCUS35Xp7+BOwnLXyDDWnYhEoL7nCaVTVCKR

MzWL0EnI/HI/7HDfXaPNnhTGgEarqUHuvrYxGIiBh6
ZSvFDmBBO02UnaBKYaps7b+xN0NtNAVsqQ2irG4uDy9uYCjMqzoRZACG8XcYDq0u3wijqAhky9dO39cI

FRwwFZJFhkA3FDMdK6NWmyXoz2BlobzYxcFL4g1Wwt4VAcvD2ftZbIxF0ztr9TpCaEg/d5cSlh6va86O

Ul5ipj6WYA5cFa9EQU2xQCzcBK7OuYtZWA7VIRzIWh
dfJUuPiROmYCXHjUyvWPd+hg2nQfxiSMjlVIhuCzEPrjhil6pLVt8VvTEndEvx5lAel79TwlWhpztqhj

bkLIKAwaKQ4HbwZn8zyUei0sWSgmmznLdeHpKKF9Y8ATAiwV8PrOhFH57sVwo7LRlJreJd6XoLFuFwTT

pojKDqLSL/wGi4OjviTlvfKzeb7zMeiItvWZ+8TIZL
WhCwV3CpXAZmdEUoqjMdIav50ir86HzN60Zpv8v1o/jPmYPvuQcB+7TU79WIdhkH8LAbqQtR8gUXHjAs

XR0VlbFPzIKDa4KM4kptiteW1mCpGAe8NukHwROSTGet+KiZ9MkyMtB6UFMHD5V8+2ppJlcrBtbFJsWu

vm6iNjJdCNtCzwgHC4uTHNcC8bzffLbuPDaY66+fmn
OdP8FTpuGX0h6W9trtI77/mL6L5R28E+4RscL/0R5xmKhpSdpWmM4P6Uyyw5VwbO9ElGltgHY2gRBkAN

AO7oK66f1UID1dY1arAY41oiNwhwS9tJ+N701+qixncIIVt3+s4BfnC7VsfyRzHHsvLOuQ9+xborqa/v

ts7B4VdJiN6oCj7ev++qpKG+ZGjarktLZA0SqgAJc5
qxQD6q4VgaAT8OlWCeW69BNmm5VovpsBzHJr7HyFXtG2mV9W0TtqWMS4Jt9YPwSVlCM8GTb1uUmXu7I2

H0TAu9lkT8Ou2tX7OiaNxQI+hz4e1ukxU1xbJD03kOpq8yymMPTP5grMDZtj8H+pqtq2mvl0I0gtB0BN

boUHMtT+enB3m33X9LktewK/2EH2vG1sKwc4fNkipK
8WGfsCuF90A1Ky23UtCiNZ/jDUvbHmLHExIgEICO9orbAPjlDE0ycwjFO4D0a3CogmKbUfQAU4KIWEGJ

a4dkEdLwAyWpq5w3vgsm31y02Nt6ir82X2UmY1+irboZCA0vnfVGqa1BwOvSat/3TyncBTP+hE45RjPZ

DlYAb9MS3yqn1SbsIgdaBibiKDJOP/bOL8PKzfZCsx
vLWa568Py3i/kZ2yikTB++SieTFlUMTKrxWLtN7ygT+xuzOoKfY2xrwBci+hrD7LC29cnn1J8LN7zZ9m

lZSfsVz/9AafgUEoPHwdSP6i5HhWfnFEDHcakwKWmrLEi9PP0cH9gJgjTDAjmHKf+Gvm4Ll7UjYayrh/

CfhGBwhSOY1RFs8wLGvWKqHHqC9HxYiZTO4XT/v7q0
kEA0GU09gJjqGZGe1ySaXXb8bGZY4h6dBZH83DI8ctEmg8sp8ZdgsH7ATF+iNYGblJRdrD7aLaWnYC7H

KuuG+zCWQjgkgALfNwtvOUJ2wLx2K4xK0yZ8zQo4aSssdlGbCDMFcXrgwMpiogHXbnb31Oobz3J7Whpp

d6VlybMcED8PKjs7hIoWe44EUW8u6TfEVNgTnXV2fm
8FndAbZsu1MPGOQ8Cz2INdcF9jydEPr+EsnMuXR6tpcxBJtNYk4/6rwAdBCy/mBrefaRVn+iAuJxQsya

4RLTOZ2cOyD8zyD4ry8nNUaKEXCeGpJXzLTTyI3hpCpjadbi3KoQwKTwpQYVLVAiHs/X+oD8cpCYdYN2

sGyNHhZCLEfqG3zKHqGMSBfsxboIpWz7EL7QjVUYFT
BdFnDmMv3Oscooc6s4jMUrQa5NjbewpyTIvtgpO1NOK/4cZKq2+hsDru5NnmSyxZS3NY5zGgTGtwChJy

KQtLXrIgvWqYzBAfIla7nkx1mE4re72kU4py4psDmmpPDW2LkDfKZ3b6eJlR1KHGEkJkGLjsuTVy0O7J

0FvyXq2J3FddIkOSCcHuWzDrzsg6sUVw4G8Fiex+kD
Eiip5Tc660mvdyG9aMD0fLytLsv75CFpLQ/ZffcPTLDEM0VephTEpRVvokckWOUslU1UkjhbbGqse0bF

K/6odhkUjiJkx4kgToueH5Fu1IkUbmpewr3pvK73lMKsCNHb+kqLhvkZalPCvWhWVunRkMGOBzT1xMLb

4vtba2A+GsATxf76O/RYnGLEfTBZm3QkKFGqZs/Kristin
nEgAmhPOR8OEllh61NykvoVo4p1qkrzqbQw1M+oh92pjT44W/64q14F01Ff7fKlFwLLQF/oicUmjjF9e

Hpc5mq8L0Cs30kvTAstGFPWA6kh47RkWq4Z5LC4oC8egmp6SeMCCiPYxwJAWIkJK2hr4T2pofxzr1lvs

I+NIsKWXM4oaUTLAbxLiVQgSANaB7I+Ida5y1KAaMl
uILlzVmC7zKP+CpqP+k+x6A8/XDF0tzK5xrdGKpdCgoFqZvphMHKB6rJ1It3Oau4WaWTyHhlDw3OxkQB

gnX6F5+ozPlE84hjthWEVhjos5J0awa7Xh+YDcmOy64Kn1F4AOZibs+a7NnZ3XxVU7mzzT0XvIo/cEfo

Pa7c00Q9CqVeixzZI/RlZoeS8dmlLQJED85MT51P+q
OlQMIVQbmfthiK9Q/7Xo1/Eo6zc+eRb6iGGo+yIEfGptxNp5bsDvbxZftw99PtYWLRlJDfgq7ccoUK1w

N39uuhxdxTVFXnPuUXgjCdFOsLvCbJztMiW1SUUxZQfRS0X97HukPWfD5WmcrV4om0PfjjGchczOTWX+

TPcS74YEkZ9RSajI49U/2nPmXicvLifZfJjmOEjmPH
m+2GdBUUPq2vukqCm7QZ0wVZmd/8TypKKlwjPy4P/QLHZMkZINAWbnyBXAUW4RwXMustyBwIoYWQizSi

8QRgoeQOTyCSgckA744YgS9YdPovrn8CX7EsgbBTfzbJxu64WBIpBppXSG699vadqDwqK7J8mcO0YNU/

HY5Tz00uhRkkRpm2Q/KvZyLrpNlwpQIyJuD6koFY0V
Xu3QJfZj54wRLGv7juFNaXvfxTer9pl2RtKtKtyHWU15O0W/Tvw9iX5TjTumo4bHhwD75RUxnaT11RF8

SDjnYpDV9MTHC0m7w5Aaq6Mjqgi/OgxdW8o/EaQ+QY7p8MsdrklOyGbLZ6KyqMT6+hS0iwpZIn44QKHX

KavGt9Nq+QYtZnD+lYDIUf4HOlFTN7oj7lhGoUsvfV
HlAzUx/w44+AQKg2QPCIb2IEMozP5m/azWB+pfGZulZlbMg46P3SVRR09qWJLLP1xVl0dS3sXUkePF+t

2dpyjrBrf+aZ8Np2NSSW3Ajnhix6Lj1ldcAcox6nZp0LPxKho4EDEfrrIJxqdw7KvhrpVohSwKLdEhDo

7abGbA/RZgWA+sMIqFl1xMRs5wqhD582D4bYo323xI
i+Y5+ux+pW++ozvUOjpRyRdlbqNrjGPod+mLHo9EOh8fGFZ2YD9Bux5KCwInwpVlrA2HW9Djd8r+IErg

oHZGMJ9GzXQrPDwjzfFu1BHZ71xfquGfxrZG9aeCSCRIussgYZYZ5mpAyGmC3+8zXn2owrfJk34yjDqh

tFTsNl2SI/5p9TXHWPu0yTQF5iIbTLEzDpGX4PxoBr
YdlC0XO+z4b9uFoNtqDq4GciMVfwjdEdvbxPvTzSmc1Vgd4Y9kfIRtYt7plaQ4xBICkGddjXdtWZfIHY

pUiSCc0E90VxsDqwkb6tO4tYg/SFfVewX5XEQ2PboQeXYugLKMpm1heZvl76pDTwewT5bQySbBpWnwm8

vwtYKsmYhX8myblgIRfBt0tfjwFTD46e2a7q2CCJ6s
6Xcn6NyUhmuz3KPqW1HiuvdTha9ZMCsQffTdMLWwxBb1fnNfYtxaYE+daIclMIj/8Ii12Tlea3MrGvPv

6SrQPrIXvYYSGsHNa+gu/VqapU+fMQ1r5v7i6fc/WTK3H2tmjaWKfT+u95zgMth14kTJG0P+S5MpgyeR

Bnnx8qI6T3ZrrBEEY8wD6ScIjQUe0F4H8bl02KLTCZ
dhnHRA+u+CxyCbQM8ESzPlDKF9D0zB6BUf3OVWG/Ws4N0wU1NO2OXErHHVcMlOShV42C6DSfnyE5xg37

nkU/EejJUZfnS3JZQa8A9NLuN7mKQhyXR1cPabfQrZXmSpxk6m7PpBZVr7878AhGN7cXw4YxcGb1nZ/w

c86D7Dqg1nUWBO7i1Bbs59GIfEWTriGdd7LHDzbyqO
Hs2dwRWkYJPw7Pz7N3PtC9jyrt2NUH1704us06m8HFn5L0ZdIZK6tbb8AeGGTezSl0W6WO+FkEu2GEXC

yPqpIZl+D/c6GJmGVDbs0t3KctjS9A9F3JG38eA/W7Z/F+ynLsSN2g6/iBc8HIZd0Hm5LzrK2BH03m6B

LVJl7r5/f9m92P0G+O0qPKL/ZKJVj72oIO6vv+zXhv
bsY69IrhoQ1mhQtQDXqMoYO2i3t5+qRExbOrS8q5uyy6REPEGv2zjMABIn96l+ara6KLeNqADYl6EfF+

k7X4DpNAZU4NlBLCqARAdxkg6lMH/cf1IEV3mJknI27fbg1C8/uP0S2T+Qr6d+eCeEapKuF3yZLoKg5X

E8jz8Invh74LM7XNVj80bl/Q5VyH47mEXAl/+3yxKE
wMzUYoR0oZMnFBdphs9fFm4zAg1y9fw0dWp1gZGtsopX4v9TPKxXnnGBvLU0j+jIL4YnbCFBEixoQ1PV

y58vuxNcZstwe4++xr2SM4Q/ob4pc6Zx1uWFtwwhANplF06OjFx4Dib/QrrrQZ+4082Nfa0I3Sk9q4A/

CxXA5h8t1zYuenWACLXaSvO4jqh+9D9OT7//+KMUNk
vj7KTbxAfrzMD3dK7D6l5arCklXuy+i2lChPhSWUsdwKf9RQOvJOfb08u6OUgwU+cQqNbTu9XSGgE9gc

1Ztp+V/cbJwZ2BtpfHMKegJkze9F2e48rhB1A33VwWpwUv1K3+aapZR3G4j/nn6uMmKNCS88DmL5vAVd

DdE4Px/VI1Orxk1KdIg0v8xSBNi4Qr4x9A8u8K5CZ4
24L+7Q4wqe49Bzo1tSc1g/1/st43A0qTPKiTA2GstOe1FOcmBM5LbfWQob4/9lp+qyZ6yO1+n/rddd5F

7OIFKUkT8m5JaB52ek1BkP4hB/fP+iPtjj/us3IYPM3uPtpYMBhoCuVM4zJdDd17UfNqRj/urD0vxgL7

j+vnm+NHG+KTD8rwr4IGicR+LT6IWiAdMm2A/9Xe3y
[place]itos+pojbJ5orJscMMKjVHo3whoH82kFjcpgyl9Jl5UaTjlch4k1X65fis+EcYx6W1skVgdw0xAGEZzEEm

xz+5c4gHp7dBNni/lduaX4x83NkSVKM3E54VByWhjo4Q+KOk4hAdPuLcIHhC9DWg8jOm6+6qDGd0UtpX

puuLZygndgVXA7MeqiAfegxtBpupJL97em7wDusciw
c8/p4I/uqqQJz28CsEowjC4F/Y8t1JEmn5R4izRyrwoo3Pwwe5O/lyr0PdCarPJ4fU+S+NsZ8lxfM5Ui

c4zwc+TJDMbPyMbtO7pwNxAh+On6vFkNm8rZ0uPY0TKaydxCkWCFJf2I461taZu6Up1pCfHhYXrEjwnq

JrrLTFPvVwrcdZXXxNOc/fYGBuv8Y51nruM5mRDmUm
1f05wwyy742mwmhn+dzac9QEMO3gGzaZLFWz+vsH48/Asb3hGc4noUTdBhnJ/zI8/qd5H31Axk+9wz2U

2gb9D5+xCIkyJFZtge3S8Kk4YFhkZZ0RPRJC/4wxiMnKoBpHpVAd34zxdk9qqgNxXI3Bhvi1yOqt+fy9

NUrU1Oe/1OTC47bduynNQTOROvt8753xwQzQnPf/rt
8b8n8I2De31N6gqq807ThVUAda7Bnf600fsNJRuP6wX6nVwH7PklyAG3iTlYAxUeTu7UZ8d6uy53UCWM

wBshyHgLwYwB69oo0IvI+h+9GF5Ctv8ZRG5IWSwTDg8iuHcq7AhhGSx7Pb7VjwMfsCooRLOS75hAz0zi

All64A71ajaR4Eoc9Y4upBk1gAwhrbxBz7JfQHAvJK
Qth1VUN8bnQZ169F9a63pmN+u0277xbL5CbnO4d3BXwVh3kKXM7UCJrmxAGW23ud1BHM/odpZtqIB9tQ

C08wjPLGxHip5tWrlHImXBFaYtbHF+DXMmlycr1IUnQQ+VJMHkfqFLkMwvEhCjOVFa9LC2gMHLtEEBQy

EPe6LaAAmcnaUX7oxCGJolc7WPF+YwvJYUP11tK07o
EJ73J+C3Okq9dYMMW/fEDQCvimJXuLVUY7LmO/gl6qBEn48ndUwlce5Pkjiqd2rIwJBthf5xv++/AOx/

oQHP7baPecCiWRd0vVnBlfBwu4yRbYOgb2QS8zo1L441Q4LUoFhInPuaqhKWNuMByJyehr/+XHvFYj7N

dFw6rumq9x76IUVBVzc+d25dP2ocukps7E2Ueyfm6m
XLnUTQ7OcpS16/houRgZm8ZF6D+Xd7uTqgwddz2kMl/ZpgyNSg9O/7QwaVCvUo0B54W9dvL8Dkec05dR

VvyENsf83uu3C4FvBrFXosa7YQgKqz5I0gJF7GD/1umMPzGMW4EBsJhaeAn5R/T13RZ9gqhoCnmfvkDv

+WsxVO2m2J5uu1iOmJaqACFtT0EbSYyK+hIUEc6HaW
/URZoum299R5Rf8Sks65KXrU1M4Om/hvwbp+Dm3kpwYEzyOmq5zV28mVgEbZJbVp3ECkd2Re2wzyF8XK

T0pFLlAxetOOV1AfZo/+RFFKTEDl1boEICe4iihtq5zsrPFC88Ptw7kqW/uDIlwFBiAe4PU92yBn2MbB

quxtbaBrs5o6sqnf7vWIJ+ZD1z4t0KcSHydlvXBYLe
QW35kKw4Zmoglsj5783r3Ct9/wzMBsuwZDjhhv4zjl8iwR0WI+H+bcM6Y7p2wyhqpIAXZlboqIoOihJm

y+jTAW8I9QOJcZchKuiBsgxUL+zMGtDyivn/G4b5bFiPHvOpkQzQeBbccmO94ETRCCEqAwnj/GkmPPev

fGEO4Oryuxp1me7saTjyaW+D77FNyLGnmwy1c4rp6s
l7t32Tse3TpOLq1LKkJsdIlm56MiSajQ13srv9STOCeyuEN+j1CjG37DUcMsFtm7nf8HSTx7/ziDmY8m

UPvmxFeno2f3Axw7w/nAhtqH+ZGyRNnm3sVM5Rh12xj+chukf/BUjkiw85f6sAa+OPp42QcXGioeK8hF

52TGS0wqOT1Hwrv9/1T9YkSt4muqop+XRtc9Zrmg/Z
Bp8Pb9ZFvAndhpRppqg8nYr2wp/x5vIls6CXhH1BZai9Y/ivsppCd8dofv2a/RN5GnaWiN/jzunl5poq

Zi6V5ksp0n162e07M6Wk0rMMFPirDB7vm0DXrbEDtV2UMz4MuewCvm4bJlDqAfjwCw64I5nKELasifzn

EJ7KUB/xIMNC7XXF20ijwraqFEjRcA6SexT5y/vwO0
XGwusqkfJLR2NkvgeFwupZ86dMZQSKGrjfie1U8B2X4o9Lmhocjk+U6+/9N+/J/2y3+x9c0ivmqtZ1ao

7pl85dfqHg4k/DnMszmavZu7CJHpYpGtlt5FxpswqySfe8zyApKGlwdeBG/piYJcGkiZqbJ77b+YJiw7

T4I9D/nkkb7bEa22tQbo4aofgNgIujji701zzp7OsK
n81m7l2fGdHK5a2HT3iF9RryxumXp+FE0/5ool8v03pALL40LP/kaGBllBX6g7DJdYRnw4MmOtcB3AsW

zrHbzoYINt+2bcrZwXiubDzPQjUir2ox3EkMWjm8HSej6PplxFRgh+u4DKvLLrOu5f8EVXnSb6X7Ecdv

1Wn4U9/YlzeE2TqXoN/J5ZL6IIsVMcO4Q18cs3r0m3
kdn55M7yuBZmg1Ae1iR6qsuu+N7LfN7guT75ndCW+L1I6n52lOb8gkEPggch0ok9U2xBw3rGP627WcnD

g63VKWByJI2068a/icyEahx4EmzR6d8aGeG45nFYk591Vrv10Vb/QYqNzsXbXQDY3CVhHh4I+DGMk1/B

D2g6/C+SQyndGai0pxDNPqhf0ez4mPk0lNkks/kOqT
O7VUvZR6athbNv8Zo+kO1WNkkSo9moTcZSZB7gb//O5wrMr+WuodaFUN4vHIi7ArFku5FUVSgVmfzHF0

mgPpmaoU+gRL2Q2xbi/KQlpx4WgN5eOfa7NdZ2PUq2uKVIW8eQoL76F73NJuwO4+AwDTR+pHVGJnUyx2

Uxt6egQg+5lBuH0Q3gD7SO0+sjfTcU9UCI5qG6EnO0
DZfKrWT/OZrAjAw6s3gv89Wery8f/oG247Tl82jzvJpLuqEq5142mFl9JZbIsH7ynnxgZR+Y2cExee+h

6tBaFs+HfOiG+fSGdbrosnaZPovqexukcR97xDSgdbWvpoL0Zewa2S9nnmySqnP1TphJ0IbsJJj/1++3

UMX0eNK0N9ryipVHjuYG4m/JdyqRdzcausvRoD/inU
fV07kwXffQgnA2jenpLh8T/3HgVujX+iz0CXQ4O9PhwPc9JvubJ7PGJV6ohMUPPxmgL/3VTl+eyyOK8x

lRc+peTOgb8crK+Z2DOk/pqcg8O9HV6PoIOp2uJox/Ex31nfcdVV779BtElIlYktwxV/huN/By0JdklL

vki9RL/tgzHGKIoYxPvQQKLNlNjsfndg2JClmtYDvO
mdknlxErfeo4XbTwJ9iV/RqOy5R7naKIW84Gzy/s18XYyo+Tc0N4uNw2iko005nVlBtkd5Da6VghJ4Nd

6DXEaEPkHsk1HfoTDv5jAWX5qaRtiy2dN9s1AokEZnMLWWGob/LhCdiPj4MmL1+d+W5v/i9WySk4H/2Z

6k7JQauiXSpykXu0DiGJ+sh+pr5r5qC8Nqz02r0B4+
dmda+Q8im56OkU4nc36VMulTybOGkXcbg9U4G6uYQd850zM1gaXF/3c/ma5s2JcQL+eu2BWvZQ0nB6mv

R6/2TY/vZ+whnR3vI5uV4ndwZeBrBTq4uMxQW5E9rN/BX4B/CGfU51/G3+7RC3S/1+6B6+A5X2h5oY4n

QmBIaIIxtgy81ksPY2KH+LM/t6FPu59acPT/hejboL
XpX3C6z7+jpVHtrHxEFuY5M3hOzqHTsDhOaqzQEM/D3hPke/CrZ6R/WeaqJ+lnzTLCNSXf8b5mfAVNvh

Xw2+dHW/N1+/vboLZ3d49GycFJ5sPAVqlYCaBRkqKiJu+/cT+kWUy/l32FYQKYT3fiY1AiiDB6e+S1gW

JQ/8DA4N9jxqyq3Yh/R4vIvnsE5hsAeOGv3ORl7+ot
GfZojJGCfC9u67rr0a0G3Vc+oXfwT3C/ohFnx/e1u8nrC6n2Fv6ySDidp2AJ+REVI2gBHz4WdvbcuwJe

iybY4oLCYbDdtZqIiiE+Rg22sGhATeHyQeS0VVEEhAxOIU8v9Spv88DckxtUeZp9fG/466o/xVwF+A1y

IDEPFSBnYh6aiHz1vPC64PE0RrH92JYCXgtxbrrWJp
eM3tFbGajIi1cR6eS+8M8NtRELJjRftn9axcp1DgGa3TuszBMsu1Y7qlTDY8W6vh7CtVmEootYibC4aW

fY+2CQ8s3Oej7+jrrB88SIi2dTJfqb0PvNHMc0muHOXC4EybG/Q1qtY3omgltSUELARCCWFy9xzd+VTa

BmBgeUnsaYpc9pqAVaBylvvx+Q603wnP/c+yI0+jiS
10xLnWkIJtzY5Xnx40p1WtuFKhCAnSgs2bsn/ftOGpun1pK/12GhPSK8u/YGxTjKC1BG0m7gC1/qPg+f

9Z1hBEPfF0cgRhVupXViHL4eA6CRtY+cFCVTqRTjGelrwrQZ3V+mypJH785VLsgPFsOgETTpses0lEsB

EmhbW/namrata+C61/T6dbfJgmgJ4ITmSpQwACkusEyV2t
uLYiBfYZUpQMiRultdBH6dj5BBeDglh8iYYzR6ewVL9LaY2uvDpO+fhxJAP1sAEr49nRmObaY/eJN6yp

oF+CLjnTXZcz+NPe9eBpdHwSlxKo1pz0RRckdvATk/EE8iu1H0USedeaEe+sTF1A5EyPb6S0p6W8EmFz

t2DfG5ydVfoqE244/1nexebBdM3grg8k5r19ovxolP
N/CCMg1Pq6x162/D19u0AuQT0O8mfxb/h9QfY8GYNeX/U2HYY9R9UNB0XTdUwSqeyP958O+8uqTmkf48

XSaYC3J1L4n9pCMh1pZ8N36MtT2HQHJCcHTlCnG/J+9Z7FJZvdt7FlBawKNziC07Hf2tv3dzp/wzzLHE

aoFn95dcpGoLOzQaAMbPDew/oeg3PXl4Ub/vp8mPoQ
xqeHaguRMQIGKXhWwQ73GWmzpi/S+mS0m1Nh7dOx7EG6yT896gBsgFSeU/BG1w9BsmQhe09MTYphh+Carlitos

MHeD+8WIO/rB4fMg2W17c8Degha0bxvE6nVhF5SllU7kGpZsWRKQycW2xH8zgr3YsGA85w5dHwnvGMvv

oMeT9t20B7CZLB/Ytee9yienb/z3X16tl2x2+yA9Gz
biOUAkzQlzc/uAX+wMNytHHhfZrKkakBCJQHfzTR1kMqZAGkQ7flW/CAniGaY5GF7U2W6Ye3BF5A6xFP

08VGLqxF4avg758CCxD0UodTmqoCoUCS/AxhMQ8wcgIkSK58BicejMcVgC9xb0JPRxXlAp20nHha1V2s

7ZD7RBJddKCjY9DhT95O3KB9PuoXGrv0Kk643zN486
s8vsY3NZ+WXqM8xukP8y5MtRTXoPU5bJmhUU6aPwIc3f0VwPqR/APwn+nUB64INm2B6DB8Q/nXjauk+f

Rn7xelH+PKM3N5dKYLSmog3Vobp664vOAonBYndqqvgqh6K+8hatWg6ECKtixs2Jjpp4LohvPvzobQh4

QLgipJpLKcEGF7VQ7bN/oRsVOQ/E+RE6rcIWxQdquK
EKFQK6dol32ghyKbTlEWcLD9q7QrJ01xbVlU+WFmcL3zmC7rkkBBVqM9Le9MXw30Fa12DY+R9yvviCYa

E52iXJJqQbpGV7PZ+jgUDxt1mOw0gD7liTRE/8b0Fn8quCjnK/G6q1Yvem4qsx+a3fjL/YwWP0SVHEUQ

CEZfHb/FSMESI/zCUw3X3ajY3U+Bjyt5QtxFJARni/
mke6z3pVltomwhl38whfVBxJAPz43UOpfNOSMjlux9+i5bi9qubSX622h47wOWc01mlo9qLZUxvva7q5

SuSD00QDIfHmKT4mJjbXlLedGxPJIb9f4AeV9HVguWgmF3MXv/CVnFRs8vqpDLDAHGcGpgxKuCdCuBJ+

KoEsT4giRE3rQJnVfsMKnqnz/QNpYLeu/pyAP/RKds
WbZzlEE1U9DVgpqQ/4KlyVVhakzb7M8zYaPTLyHGzC4doYdU6yuQcUvTno8wbmQ30hhgurGVt8r87VMc

3WnyErlJfEGgYFHwPAgzzJKd7oBQnvQYbUz2L6Qji4ZQkaDPodWZFdliDsOsCnJ2KRr8f2NLvsvRHpHj

L/M1JoAWxQDiDrEer4KGR+CBmiqrBn8FCa4ZpeKEeW
zVk2RUHGCtExcFRSzOxJ2vSwdpxE3N7kfGMy84aqxgcIWkU0shoLNS00FtTe35h2gJsIj17rQ2YU9hEd

bIGnAZYFqtchZrP4IitD7STjmcZVfcZ0QRSjjZ3/+x/3dXYD3nciEwbSbYRgg9Y1cwyPIN7x0Hq8pQR4

aYZHEmnaIaXbwYeUDFcAfXYaHyhkeiZKkJ1x7Fb+Oa
Zb1+kISusVYhaXeNmUs3O9heaxqJewksmshR7SCzBgBUlZZhAo2XTMorQt1Uzd1C9yfmNe3NCtRqjXFh

NEsUvASGhyic8Z7Hq+SFYGSraHWiBKDEUno60SAAEGlDUHF9lkOT5uyjzbYGabOD2KxOkWJt4C3CfiXT

O9gmk2vjNNPsKWKn2koW6qd9HtXdwyltDXEyHMadS5
XAQwUMCKW8M3mzvQYSRU4XIclRmyyK4tDDiIn5R6JnGFioZXjIkRy7lnI77T+r+T6NpSc9a5K2aRhsGk

bhqq7Rnvie2t6FaRHUamlogmNY2p25b+nrGKkl0r6FXx94SXeak0/x+RywJPYeOQnoI/Dg8iokiEY0IP

hQh/ucm4yJr2BIqANx5WiDhjk9jWtKDXAXFY/dNO9g
84yX+WyS7zSUirr0a5KBTbdlFOceLLzknk9sUIrJhHtJomAtGTrGcI/NVae/syihJDQTTZEGbA+LODHO

8XaKkGxzwrFq8ljeHM8MRX0Ju2mDMQYSZ6IpX1dI20XOLBAt3ebwyLO+oKiOua485XbqaOLEiuysQ/t6

6BxgplDbvpODYURPHruUE3XHCoHRbAMnJpNB1gnRrv
LEddqvtT4+RLxGJewyBIFOPYk5WVcnT9yUB3YgNmNJ6BHQODCAWjTNYxPEFIj/xONpMu8EOUDC+fPZQn

4CI64wr2Y2gJvHmAbN4BtG+2TsDM60xfyuqpgX4TfdcPmptRmoOLullBHS8iRQ12eOAh8aIodoAAjtC2

1b+CXyY2zpifNXZN3N8T/eYUoIJkE3CVFAoBSsxGS0
EN2eSiGrELC0/vCceEHMPZEBqgMydKhyotjvguevBibHpGzdWFtMjXM87HbUUu7eJYiOkFQolN/GFNCy

VX00VRIYpGN0QOAwQUaofSuWiQ7AqiUizbvfjQGoLzGjdUzDODKOyvrlpBRWgFxG4lknpiKWRK7qy7mB

OciUsrRzlFdB4VVyH5l2sv2pBsfOiC3pCMezmL8cEF
AdKlIwKTioPNB2nNM0bl1SYnIgzLDUrz2d+TV662KsowrGpviTVMpNbO4XullsoHT5VU5gKgLMPMZQJv

DHJpdDvbT4vykfKsZYm2YWnVRRcaQHaPkVhFrWyPRjWFkx87JiJ/cSe5VqbwxOXgpCbJZiQUodPuma2q

+yNai1cdNMoUdn1D2l8yZvnZdH63bqUk6RXNYd4UTV
3cMqlpCcW1M0En2+tP2v9yVpkgjiQjFFOKQmWSdd6ZNa1mIFCX/LSK7OtryeaJIqgWWyJcLsYWWy2Tow

TL8AxIMzrYrWZZqBR0xIA+IqtZFM9TetcKVeR+9GEaZwLdZ9tr4EmZ5Z3SQyLPeZYlsndjIQ2MAsuDsM

OR5Cw8H00z3WFt1AzNNQVkTpObDMFRujUO1BEGMwon
NbczwMKuGTdRpjjembD2dMEtFozzlaq0xhGrqPyS3LVcBDl5esoemRC45yawMMRUd5v6Ew+/Tcb412KH

5R1pLZd00s/15/fKV5zpwfcGQE5+PiCwH2rzXGLPn2/K0Rh16aF4QAuM0G/1BRaYt8UOWvFsmDpgGH9l

WKcUod61WkAxT9EBQvwKCscal0xXncJDZjYiulyIef
xAzoIqQ98ML33Rm7JfwJeiHNHwy9TP9iXsluTNOZ4u/LmJaFj3/4XM/f/4//hfgEf/O8PkMGkZEnWfWK

T9yvJwrU9PMO1he6LiEJfiThHeLM2nfz5IUSR6NZ7VxHo6ZLMlC5KpQXPgXLNrh8EuNZ7TCT6xjDsoNq

F4Ne1WQlJte5EhQQCoZFbJqTQKq16GCCl2F+o/+Niq
s8YVC2MzwTKPPq8d/FKQNUygjTFNOya4T0myMNnZnmQADx3EjcNK1hFbT5V6joF3YvokSgyOsliNXdng

1q8uiaXYVRdWe0hjqe/7Ee414LCL7Ioh2lsZLKoltJ1IiyXwYFC8qFLnNk1xXdfxP+puAvXo/Ag9nRAL

mTTJcFel8iN/Fp8T6Oi4oxZ5cHIIKeJG4W7y1ESMLg
LaOr9adyRcNqMbfVSaR3Z9Hdix/vN7W6UEgU7fnOQDI2n8q5N2IFHw27i9ES8qBS5y3EzufRdvT/TOJz

i15pfqy6qGHVfmsdCrlGDcCT7TQaXtYH4hgi7SXUVbSIChSubOFea5SHryVJ5SjJEzE2BulgVIGWBpxc

hpqQ3uTWppAU7Pl538WbDxPS7CATNUOVWlUXUsBKoL
BhVbG9KdP6YddPI3EPZmP0FcRDSiN0n6IHgsMT1WWYOrLJ73AJ1lBZXCCzJsV1AsPCkcMEMhHJrpYG1F

b588ScBcpDZaLWGpEyJiVNClBVDaJWT9UV1NIJFtPQRcxPubEW1ktICfFM8DiDTpCrXoUGayDI1Ft575

RmlsZTIgMTIgMCBSDQo+Vx5LDZ4cw8PsAQwnYGPgRQ
2oge6TXMqLNaGsE7Y3dIAiYk5ggR3CnRA4rDVvE9SGFNCbenMLzCQqKq7FXGHqFb9vgC6TVCTFMBViQQ

NiMOhzM0oXTF9VLXFEQSVeYCGjdzSecRtNJpZyW4RJBVW8n8NorZfzHm5nBIskHcWdhSU3vbvtJZZam3

WnAX0EhaSzconcXiEsNVYgxfZnrIgoTX2XyUTjcPPo
UF82CWZ+NmPXJoYrM4YtffENMZYjrlnhyH4hVSZ1GGIbZm1WMMVmTRkaLTSzFD8SRtXkDtq7HW2mUEt5

IFsxMDAwXSBdDQo+Jj0LPS6da0KfGQngWySqAI5bso5SJKfXWzXuI4M4vZNvWj7ttU9CpMD7eFZwT9I4

lYTkU6Ien1XCe564Y7QLGDBLOrfCqbkzeV7DbhLvGE
nwPe8LJYMwpEw5cT8PQUpvQN9OOAYbFD2rMM77Zk8mlIKgLrT0UVIjSm3NHwCwN7ZtRF7kI80xLZMmNp

AwIFINCj4+JTvoamRjQkkBTfX4AACjq2HlEIkoNLUhMSA9HYWdPgj9NNZ1ZQTuDZFkZIluSHQlPYI5BH

hcCKYvJZHdUUUwEQBzWwOlMVLlEcF7HZFtFiV5INXn
WBAuXPmqBchxRCYwYBH9QbihONZ3YREvXQW0JnoaESW5EDLqZLX3TuwiWKY0IBFuOWBuYCizUiO8BES2

QKATLhC8STS4IJDvVJF4NIq0BMC1MOEnVZowBJCxDLQ2DSSpYWI7ZIE7GAP9USYySvKoZOU3EcBsJWyj

ETmzWCF8FKK0COpfPmC5SQm2GST1UxPiFbU4CDG5SU
P4NcEhBJI5JTX9JzA5WpZhDRV6KEN2ABI5VFGyKZdzPS2BIfQbSRB0IPLyOTXuOcn3XGJyKPUdKkiuJY

A6CNR0GZU4AaAqLKd9INXiUsJnAKKiWZc4CTM3AqEdVXPbWaRoPEQ4DeXcUVCdEITnQWU8TsT0BDFyOQ

e2AHL9FMGqDMozSGG7VQTiIII7LjGuXYf6MTexWiX0
WMyfEAl1VYNbNJCcWBAoElBrLdcrKhSnTZQ0TUPlFKNaRcV2AMB7SoX4MDOrExA2XKE1HdM1VRQjAdL3

IHC5WjV1YCLcMgW2JIT5SyD7GILaKpK8GRC1PyQ8OIOsXdN6OVM5GaH6LFAqRxI5RHZ2StD7WWLxYaT3

REG1HzXTDhB3UxL3DPLqVpS2LTP1SdI6DNDiDxX2DV
Y4IsP5UGRpWmW9IDU4XLLoDWRaVGY3FRTjCMN2RVMlFQN3PPKxWGO0GHorTQU5HUa5FFRbMZGvAFD3RX

I4UYY7ZDOiQEbaLSAgSKXvDvxpXnv5ZWX2CrRvXbZhMsuoYW1BMEt5SJK1SCGsCOveWrX1JAS3NKF5Vt

MgLOB1LVneXaF7UdWiWQDzZMV8OFV4CURlPbQ6JHS5
LME0OBMjKrM4PEN3SDG1WiBrKsU3VYIeNhU9TOYyQOE6PVUcRbRvUpOfRoV3VKO3TpMnTuOjJzXrFYf5

VHS2WBNzDXx3CCPlNoC6ORf5KEU4CSVwVdy1SLy9RPE3WTmvMxh5EXE7YhO4KAhfUXAhLTF6ZKU2QXAk

CGF7SDDeUTL4IUdfIRH2UBQyDXJ8AEorJVKbZJO3Xc
B5EiRaNVYtISWtVgF2KjMfLmXoBNC1QvKiIZJkSfHzDCQ5YPDRIyK6EzC5BUUaGJp5QSD2NaO8PQVkQB

y3MOI7HSV1HZTjUFW2TRZ0BdT1ZtTxGJO1YVV5QNB4JGloJXs1WP8tQLqykfFjWbnDMcQ1WPTzv7DcBR

q5DD0JSXDgLOncXH1Hx568RTYjY7QdhUQpjz5GJOTm
Lq0ncN8wfJEuC3Pkm7WWAX6TRFQkRZKoDB71BCUnOzdmJ7VeEWKgE9g6SOKzTkcwAINkY6PwqLMmBaRt

QVsrKR5AzNXjaxMoVw2FMLGxTf0clSNJo5txQjFlEQL1KDZ2YOCtLYL2SP2ZCeZtS4l5QIueR3OfF0ch

EXOzA5DvfUGnKK1XAy7PWsZdYN0wcm8MOUuqJWNcZh
kIOim2BUdvGF1JtAPeS0WfnpGbJ2XolOclBE4YxgSbCThhYO7DSFZoZg2huA9QHEjvCBYKV6IgW24tfP

6gR7iprbDpv6tCgjLbPFcsEv8DZCVyObsgk6NVlZQePWHvW7ghj6FKhQTvUAQ1PL2JHYCvB4alnGftKZ

O3SAZnPd9YBRFnEz9xxASem6MqqDI5u0XdSGufYLUI
DQo+Yv3EDX8hl8RwKZwvFDIiQN6ivx5HV74MTdXuCS4xgb6HLiArLH3pte9APQzAKgJkJ7Vls9GMYIDr

Bq3EKKDtZXC8bB7LmSQyRUYaVY9mV3BCUN4AKQOkGj1ujSZ6VHNzDlAnEWNjGPHZZeVkZGJlJjPlCDWo

HTYBKSvaPXMvG1XnIFJ0TZFuWq6ECACsMY6YWgQaYV
AwIFI+Ny0ZLNFsUG7ujiQmoZW8VAB+Se7VDUWqQAd6J6U2QLMxAKc8S3GAS1EOCTXlYNhbBJjtOPPnTN

t0J3K8HSMaY9HHD3Comihtxj4+ZV2OJ53SGHLhLAe2F5K2kEVaO9N5oNrZlBM5EA8FUN7KqSl0vGWitF

4+JR6NJ3UIDwAuTFj4M8B7aOFqT0A0fDtEdUN2AJ1F
WP8QoNUfYLBndtGvRp9uN8RYQUjVRhGHTZY1HT5DzYTfNX1DzMDCR8KejKUzFx1xPHsthXRykS9hJo7y

KVddSZ1EZqQCERuLIRU2TZ5ZbDMuUH9OlFHDJ2EavHYmFs0gTZjnsVEpnw6+RJ2XVNZzIe1TKs0+DQpl

zgKnAprEUbFmADKmz1BqKHq2GF1XDO3nfZjnWXG5Ck
2ViTW7rKAhU8tUNO6IwFIiI25uqNGsAQAmDl2QFhH5ppLoaO8XQO48aQQnv1G0DAReH2ukHBgqr29cRM

ctPKmTLG9lTZHVREzbBDocTOO4ZjMsflkkPAYoRw9YRfQhSCt0oA1nhQG3KLK6EkgvfQKgYYpeOfWlCj

JzOpG4vEukelc5AGlmDF9sGLlatmydCIPzEog+DQog
TCVfOQAbNeeBLKIlaZ3ackL0crQgLOmrpWGkEk1cx1e7AxlxPj1lPd0mFJi4RmMiAmEaGQSiQg0ezR49

AUyripLjKv0DWaTqKOY4R3NqTnoKZWH+PZpmIMfbpSb9aKLeFMAtWt6MWTSeIUDcUGQeCBVgSLUrMGVf

ICAgICAgICAgICAgICAgICAgICAgICAgICAgICAgIC
WrCXDtATUoQCOmAMYnBUTxWBEsYFDqFFCiFXWbWRAcQJXpKFKkNHZtFLXzKURkIOHtXS1WLMGwYVSfQT

AgICAgICAgICAgICAgICAgICAgICAgICAgICAgICAgICAgICAgICAgICAgICAgICAgICAgICAgICAgIC

AgICAgICAgICAgICAgICAgICAgICAgICAgICAgICAg
NL6YXTDwYQRlYQIpORPtKECgVOGbYPJdAMOcCDJjOUSbVEVkDYBnPFCiZAWpHPBxMNCrUOHdKBYuDGIc

STHjXSVjTJLeCQMoINYnEMPqLSZtIFWbYVWdWCKnPBMtWXZkQWKtXNWaIC2FUBIxSYAyCQEwMUCoVSZk

ICAgICAgICAgICAgICAgICAgICAgICAgICAgICAgIC
GvJIJtFNOcUCVxXEBhAHDvEUMgMZYnGOKoBAYqJJDhHOWwROPmNXClRVLqAPPtZUAwPAVgFI0CVLUzNR

AgICAgICAgICAgICAgICAgICAgICAgICAgICAgICAgICAgICAgICAgICAgICAgICAgICAgICAgICAgIC

AgICAgICAgICAgICAgICAgICAgICAgICAgICAgICAg
AOCeGN2JSYFhYBHgWDArORWuTYPjLOUxHUFzFULmZYQhPYZcITJjFSAeRNLoYXMlTDAwOBXoWGUnVUNt

WUHyACTiCCWpJCNtEGGmCCMcPZZpHJJdDYFaVCGjHXIpXAIyTJJbKSRsWJKuLO4LCOViPJZpUCRaDMMw

ICAgICAgICAgICAgICAgICAgICAgICAgICAgICAgIC
BiRYRaDDPaTVLaHMFiEMLxLGAoLFXgKOIlLBRrNXTnUJCsITTaHGGjIMPoMAMsPKMxEAMmJVPbRE3HSE

AgICAgICAgICAgICAgICAgICAgICAgICAgICAgICAgICAgICAgICAgICAgICAgICAgICAgICAgICAgIC

AgICAgICAgICAgICAgICAgICAgICAgICAgICAgICAg
XJBrYHMrXV4VSLCtAVRzWAAhJJTcELHjCWAeFSJjBEWkWCCmGWEwIPYeLPJoCNJqEEBiDDXqPDHcTFYn

UPIeUOEzEYUqFLOjEATfYRBtKRNrGUMsKCXuTTKlDUDiKAOaUOKlSLNsWASqABLlUE9MJOElPRKhXYDq

ICAgICAgICAgICAgICAgICAgICAgICAgICAgICAgIC
AgICAgICAgICAgICAgICAgICAgICAgICAgICAgICAgICAgICAgICAgICAgICAgICAgICAgICAgICAgIA

0ORC17pVAci3T1GOBtST3rwcu/Vs0AWTbbfuJrfXChWG2BTgAyGD5jdh7XCuInWS1hqc7KVHsLYjChC0

S6bCAwXPCtZMLVBnIaI85kMRptNz08EGksPXToOmFq
RPt0Wm6GClNwX5epEPTvEnY3QFHcXwOvFVreBC2Ah8NmxDPbASp+Zc6DLU1sc6ZxMFadWsQyRJ1eny6E

VNjWRnZvJ9JaybB9YCJxIWNiBw6WTYIwSHDmzKRcDeQxZEFOVvMkA9QfsL41SXSBWb7+DQplbmRvYmoN

VuCjVTAbc2KtONs5BH9JJEBeSKe8dQYoGPMsSJCae2
7xKJIyA194ukZtxaLiqMVSvmzhFK3ecGEmxsHfavizSFFQAIA0PRPlZM2kUQBmUGJpZnT6VRYEDT5GDT

GaMVFbnUWeGIFuHVIDTL9DSMpjNYE9LuphxsBloSVbFVgxUA6BOJPafjVqKjZyRFMXWAt+Iy1FIE1pt9

ZdLVdvSQDlHX4zkr3HJOtCNqYoW9C0vVTmP3B9FDmq
Gs7PIOMvBUWdJkZnNWCULPkpSK2WJS7znxG4CE8CzKAgBEVtBQYznFMiNKo8X66odIBbQLvwOK6AHLF+

Martine+Ni3JNQDuCCOtBPJnRtVcFDHAEmZuK3PsA0ESb2YlH9OlHM99gNvcotVvVAxxRT7YRY4oSNCyTKWI

YQ6PeNPoxQ9mywOxXvEnZMGBJsQgB63olEWnLHIwRA
ThMIXjPq4WTOPfN2OowcTjyAuszpRmQOIgEFYGFQ8LAQgsidXqwQSzaPvdXB09vIdiVL2XSl3QGvAfON

6hwt1TkINkHx3WIQSlBF8QMOKaIPTjJDQfRWS3QDSyJhDuOZfaOBTlUGIuVWS3DSKrTEGkTT0PYmNeEG

PpTPq6PPaxZOZeKWQjja1RJXJjHSPbHRI0DxFyWEDf
RFHxDEcpYOTeTDOsYPO9ZXGlABLeFB5YZnKxZBGdRNXdCPOnEPQxCBIrjh5OBGAkUPNaUDP1MTLjZFIx

HNEcPPfnJAXgBCUlWxHiTMYlJYGrXF0BFnGnXVMcHKN4UXPzYEDdOMSfmt4NTYGuPBVzSsK0DXBnIEEg

CFCnERydNCGvGMJxHHB9NEUaPBLfAE8UAnSkCHOjCH
B2VTAqICUhVHIydl1SKVOjSIIuCgttCaZsPIRxFVIcNYleUSXgSWW8Qpe5BZPmJDDrNS4UMpHfMAGpXM

O2FUPeAJZjYECktb4IOVZcTAIdMeaaHVFnFNPpLKLxYZzcHYDoKIK1KIWtCWCbRAMuZB0ZGzMiNOVbZU

avOKblSRAzAHXgwm7KVCSvLJYiUXPwKUMjPEFeDDNw
ULblRZSlYTP1VvB5AVVgRIEeSH8RKzPsUSMwZWq6ZNqbMMCvZQJsma2WWYFsQLWlBTvmBkNmIXHgGPAk

CRmoHTVfIIAsEtGgIKOxJBYfXW6DZfHhSMMuNeJ2KmQnOQDjBMApjj3FVGBvPCUaILA6WfHwOUDpRMXe

HAx5ywDexSXpPQy3HT3SX5QtdjHeXbZRRn7Hd303CY
T8POUrVf0KU9wnOm7lGTPqUPAQYn5XGNs1WTNdOVWyGijwUPIlYYmlUcX3DHE5FglzMMZpBIOlVZT+ID

zvQWY2NYZzMFEoBIHnMlTlPsghXSbaMZC8CxZ6OOMrGq4vGAUYMe3+DQpzdGFydHhyZWYNCjIwODIwDQ

[id number]









                    ID                  Date                Data Source

 

                    [id number]           2021 11:58:21 AM EDT [facility]









          Name      Value     Range     Interpretation Code Description Data Vanna

rce(s) Supporting 

Document(s)

 

          Telephone Encounter                                         [facility] 

CJMFIk5sChLKFwKu90/WVPipFFYpr8AzHYtbXLf2QJkgHOKwT9PvFUZ1tZ4rFSE6BRbVQnJxXhTbGiV4

lbm
OlYjqBBcAtVMTcTntKMdGjRRiwPubusMCbGS2LwJT5RDUyP18xZJQaXJMzK1CgOEu1MH5+XCnbIJA0mq

ZpcU6JYYB2S53P9jZUjl+0/+EeQQJqO/5HFT7VPIilIcOC78kEnNOok7gBjxT+93N+NTljeVtJaR+ST3

e+7+3e8bRIfs9kMYOTIMKXm/PsLrcROOh5QL6fsEDc
0crygJGkalzbVkzBxK0rjXTRxvvltV3DrupXITKMXNpPyggqMWB4fT6vqksikz995K1oGOGFcYXRP6O1

ijuGIzGzHC8zgrF36Eu3YaaZkQSCn2G4ehBD4WbTPajUSkl4JlT6hfwPEzRWyVVLTulTHM7y1wd6cUxG

rPQvrCKYxKgGzggYzyPdoSaLkPMpRckdXa6TBZkn7t
8LlWc97VkNBvMKR7nQccWVME3rwRyCs/J81vNSzwzRz1lsCdAJ6pVdcGPRDhvzW1r2zH0X9gLioM/uCZ

bsawnPo+1GAx5IDfUTWTh1efp1Y/wh9KMS3PFaWrJZ7YmRQbHtesO/zrPjnjiLYqiWtI0/tjS93A6T77

biarDFPU9d8nU5MnSu7J8oUvrTQxUBg/VGQSqYlLCZ
1Yz1re0dcero6jmZF6tFiocX9/0FVLxy9b+KWazdm9PJ5y6H8ZaX95jxReqaAfqvLwcKetLoVB/yZJ7G

5zazgPFK9DYfHL60kbYI3G18XhSJEpw0iEqRt7mQ14La0b6sZ79q66MfbcnyyjLbG1SVYm4unuc71x7Z

e84f75b66YVcpHCXnL52vCI7ITsYJvmxgrEY4eWmoG
Lexh/qCwo54LYEun3z5x5TL75AgbgvJa68YLAAKQMt8EXlu9c3lcXzF06C0qpM90p32MRXMNp7V9vAU9

M+xlKn3P2eb9w/vv4DbJN7uMTCJwmY0RqdWE3AFtx1ZoFqo3ivANOYD6sB/dqR2PykeFYXjLsMEAYOk3

BBowZ9Y7vwXmuPvsCr2aTw2nlai6dvBeF2wqZ7W4L6
hppLoOlwqF5oksZF3qrrjzJlcugez46VenvQTe5Lbi/i53UKp/Ns/WD3jVyU6auUd/sChvTF+F7Ut/FC

/lmxdW/SA5y5ZzZubGVsXMvTEFSzEmro9SeUWog6QZ9ANR1so1QlXEOgMXhnzmBmNvjEGwUnFSTuOpnO

ZwBcKXrFRiNdEFXuHReiLN6PKKvmRUzaKSGqM1Pmry
SlhNDjXNFnMs5SRKRtUQ3DEUMxuVYxNSUpRsJoZLVWRaFdYRUzTIKzyGIXm0pkVbWqLCE9DEOlAyfpVB

1NWLBoUK3Il729SL68mzU1SQViNx9FBJYxZA3Dhf84uGN8JASgYeYxOXRqvsPfLMDyceN8UH0MTbCeZQ

F6qZRcJhoDCB7JPXGlhAHpVS5NQELqbFAwLY1+DQog
ID4+ZLhmvkQeQrgIHzYaWTXkZavXCdWcWsR4XCQfUjPvKTM5BBHbJfAoHEu3SFH6RlS2VWYeUOe9VOqz

FrGcQwkvEqNmCOSeUsPfSCrxDFx1OKL7HRGfWjNuNub5ZDK8NYC8VFKoIPH7OJU1ZeU1KREgMVG9BSI4

RoY2PCRzTHO2LST9PzF1ITJhElLkBPMsWqK6XBXzUA
teWHJ4LDG0TDYbGzKkVQq0YFW1WfZwStJpLAnkHwZ5IqJxQfS0QMNuGFA4IrxoQcPeNUP2FST1WKBjSe

MyKBZsAEE8RmJlZwHcIHz1NKU6DgzgFwv9QHbiHgI7OeyrPxJhQHndPfP8BmkdBOD7SYI1WaZ8JrmqGg

IxFQ6CSFLaYrCiEsh7ZHFqYwJ7RLGiGGC3QQOyEeM5
DMHfJvShPIJ1UiC8GNTnGAS4HNRiThU7VMEsBfVqLVH1RWKvSlpwEXV3QQS0QZL5OCjlEuNySPOfGQL8

MGHnOkOxKSP0LGL4TKDyEnNgOWQqPZA9OJIpSge1WQT8TbPEOnApYKP7JAMmSNAsPYwjYkhvUKH6FSD1

QRAnVxCaNOw2CCQ7NOHvJyFuUDl6WUJ4VULkQwAyDO
f6LAO2AVTmIoIeNLz4LVV7KLUaOaRdXBx0JNW8SWHdAeAoIRj1VVC4YKZgVfEaVUn6AWX6LUPhQfThCG

y6TWF7JCIdLQfxDPv6SRM6WRCsTkOdGHu9ILI0LHCwEnJdKAh5SIX6HJMiLyInTIH6DNAaXsUxSDH5TW

U8SoF1DZBhZZV7TIQ5PTO3FLFkOfHjDDfjHaPnXfQv
HSU0QXP5YYWiKrYmAzW4ORB2HrI9UXDpNJP7XXCqZnWaAnBiFxXnOK7ZJUT2YhFnGLF4DMGdAhTaMdXg

VhVgCVC6JMC7BIIpWGG5ZQffPHW0UlYuTwCbZFmdAzZ0ApWwHgLnAQzfApK7QgDjGnRbKCBjRCJtWkXa

HEK3AkJ5JhkkOdT4SNS0OvWqMkljZfa0RTH3ULGaZv
raApToPQmwZpZ3LypyMQjcOWm5HDJ8BdqvPrv7QBg0CAO6ZBYdFlk0HUjgMaS2YaAqEcFpHDoxBkP6Yt

hpTiM9LXLlUFY1QRVoNDD9VOC6GcW8UWWfUFK4KBE8DjW7ABenUNR2NGV3UgT7BUVtRIX7FJT2NlUhXr

rnPmh8XGA3TWPoVqmbQeKkYY4NHJR8GMKvYtYpQKSo
NDO8PAXaHvBlYJVoCJD9JNkyLrOzFVSzVMY6TCLrMxGwTLBxOAE3TWZbDmDhIDP3RdYzDE4YXH5ax4Fe

FIb9ISPce8JzTZpvKVo0DYjzVBDfJ4Y6yTLgOq5gzNQig3MxyRF0y5SSEaQnFHIvAa2wzB4ajKQmKVRi

MKjdMw0bKL3UFNMjKV5Yq3RqbsHbVDV4O4LctArqxT
lzgEL2CRHrHCUcC0WiwIFdGoNcKRlmHYCmJ8RyGBhlZHTqPLlnYHVwL6QiwfBDUv14CXqdXO9pWEPkBE

BkGWB8KAGmOLpiHBIrZ9e6DPpbI5JdZ3zePPSmR9KhqCPxOX0BAAV+Bw6SDY2wg6ItMYf9RKEca2XxYE

iaPEl0JHhkGZVnE9I7tERfZx5cfI2WpZU9mMRuL8Kn
nKBNcJGcA0Wie5LYc985C3JqfLYwMQPkxPNoLV1xw0AmpoelI9cnZP2jnZAbZ92ipX8iXPolNJQdU7Gl

xvA8P1ysdcIrBR9QAEI4B8dyhvZwAIPEIiFsLFZkM3dheKbbCNFbUJWVVRhfYPShF2UbeoUDGPAdkyep

bW8yPIxgAFYCGRlvAI2+DQplbmRvYmoNCjkgMCBvYm
jTFqWaKbB8IDLhLbVlQBO4MCAlUlnqCdC0JFG0AwS1FVSfSDd2BKC0AoIrMJZgQqImBQZbIiByKFvySD

h9JZN1WPWiUdWyQkk3HVH8JBB3SERuSQH5PDF1RsL6SAUiFOR1KNM2HyF9DSWxOAU4YHN6NkP2EOVcSq

p0HDH8HWC3YTHnFAkqVQE0OTQ9SINyYTF3XXBtINNi
CbP9UJG9ZsY5QfMfYwLzCTD0VoN7CSVbMmz6EDohEcCgPsdnVRLaYCL2YvQ2DVPtKBEmYNxiUwI4Qscm

WiU6KTk9BQL5GoRzLgT5FPXyVFN6LuRpBzS3RQz7HCY3PvisAuO7RHLoNGRUNgMvOfh4QMV5PTItHhat

KLL5GQZ7SpVzZnBmRAS9NLW2BpH3ZNAnCFA4KNX1Es
RbBgjxYTY7ECD6UaNwWqEqCfCrNSYaIODzJaLhLPVrGRZ1ChJ7CLLfTJO1RJO3WzIyPbBvVTHuGKP3XD

P0XAViKYCbAFicRsL2FHFhNSocRPSpJWK3VKHeDcJ1OPI9OXOaHhBsPKh2XIk5XEI6PVAwRcIeYXb5KL

I4DNQoLyRxSJy7AUK5WMEbEpTbJKs0UJV3NDElZhFs
ANe4EGE3EVHsEgCfIZe7SEQ2HZFgSsArDXz0VQV6AUIeFqUcAFo8EZP0FFIzIdXxZZ8SRDJ2YPYoNnPd

UAh7BAC1YJCqArDvPVm3ISK2KGRrLyXkEXn0BEDmCdxaCqCvJXG1TsM6LEGaZGP7BLS1TlQaZuWyMOJ2

SGIvQoE8QcbvWhdfZDP9DoC0IZRiHpMtETazPnV5GG
YtFLYuPLT4QBObRkGkDxFoWIAvFhGMAdRzFQr3HSYvZiRkChIuFzXbCLMhTcIfCkNpZUL5EUzjGGF9Tq

BeMJR4XJYgNEU2JthnElU8ZVA7RnX2RryxCbR0MQH1ZuFwFLYqXSpsBkX0EtlaMnE7RUX6UbX2WbndIr

h1KBP2RQFcQcmmFqf0EHszAfD5YyEfBew5JW8SRSI8
FhwtXte0JIi1VHI5HueiDYh1JOi4ZLI3KxVkAhJbNHnrHxX2RnZrVyJ9KJD0KxQ6KZCfZPY2FDB9TnO5

THXeSKT3CUY1RhA4QUVyWCw8MGWjQMN5QLRoJZL9RDA5CgZ3WJMvWsl0OJB6VOWyBwvkFdd4CFR8YtHK

MrWuURJ1WJM8NpY4YWDuSXY5XSN3XtF9OOVhEOT3JX
XdUVF0OEBaBGG9KEP8UdX3OPOrJNNzAJP9VdB4CUVgFDVINlUtIZ3vyr2OKRJcWGVcJejRRfSeUUjYPz

NoAJKsWLydYH9Iw234JGCsM4TwbIObem6ESUImVK5Bk433XeDhWH5TnasktY1FKZDrZK3Xt3BfwcGpLM

C5E8RzbPurmMmqcTU9MRPyZXTrQ9QfrGJeSxYwCEus
OMZbX8JrOKmsKEZlLUcuCBYeJ5PsfyQMDe65NOjfOI0eBMWfQEGdDZW8ZVHsJHkkWHDmC5f4JSegA1Vu

S8kkUZUsQ2YcuLJlAH6KXAG+Vv6GID4ol9HlMXckEJIxQT3due6WTTJ0AO8CPXKtFN7ZbVPdD0KwgkIu

I8ZatTuwKE8AseMzTLizVI4QAPEbYw5hdL6EtbibeQ
3KtuBaAJdsIa0UjG5RqhUqAE4ex7JjpqyPJzDmQIZrEvlnz7LEiGSeHAWdN3ixb7EDaBOfQPE8WV7VVI

PdOT8WbED5yAYnGRIvVTTLBhLdJELrJw0qwFFba1KgjGL6i8OeHVTfHMRXJVuoVP1+DQplbmRvYmoNCj

VdQRYoz8AgJTmlVFx7W6LmaRUthpXrCjnvnTZILGFp
SFFxA2gvxnk3fOFzJsN4HRZcYOFiZ2QtYMHhDgZ1JT0+CUqmFZY3vlCczH3KDGBqlEj5PBQO6ez7G5rQ

qyEQJEHnGk8NChLKWDDUX2EzdF3WKuUQDEADnXASLJsjGcXMo0DQHEREzJXTObgxUI9k0OpxDcx7BmEB

K9V3i5nsxzXwyNp92a//89xQ6775hkdXOO4cWn4pJe
2PPTUnEOJ3UnClxIKtiRazIuTzEQX0XGTNzi4mTvo5JEs2ZLmApCC+BRKAMRb5yCgr9W/95mgw4G6NJH

3p8WXzWO74jyZCCp0smusfL+InMw6E40TIcrKofa/RmfMWLlqd/DnjCX8BR6XuOJM/CdIAks7rYV1e0s

hEeQ6RiFe8XQDLgsg/3VceaKMlt5bW/8WVVKt2l6Ld
OeLPtG+Y9DfCtvszDO++rlPeT0QN/vRU0RwJvL980JP0v7m/dRpTlqeqiZK8WNdC6XoBSF/PoLrRNCSW

ft326+Fy2WGWn1WSooAx7tKnOFaGR1PsWWvZhMyZojabrYY0F7vvvaCwcxQFMz7ihI68cqKddvRPzlGE

XyGfWgdEm+mMEIojrJ92eKhQSFimT+3sJ4yVSuWwpO
MxLfxDoOwRiWXE10bj/D4U3uvT3SAjprYg2B1DP7WexvnZqB/pGlqi/G9tktRXrt+LpBnEs4DrzWUpWe

PC3YDQx5dLRcVFDXfQ2q4Cvuo30SBq2a2muhErfiYk+L5i/zA8g2oKjqFNpvZBVfM7FW6M8z2zAmFoPN

lCUu41UAombB9rCBTaSpo3UwAewO9aAmiQ0+ciQVyp
JIAh87tl1mvuZlL1UMLY0c9CIieGseMAj3Y0IyBOREtU4hjmkB762sZw0X4Q0Xqz9N8ojxnAlMngIvnf

tUqp7X9/ph8TPTkaaBcAX3NSo/+m5+yIMEJYbswQGrKIINmOK79OP+aFCPR5ZoX6XBsjs3j8Yn60RspO

ynYkk5l7aotC+uK4RyfvqGqsT1l4qx94CAcSJq06a1
hE0sc4uz8cnBbbjuZ7hW9wp4DSrO4mGmfU6UZjhR5zdamTQ8RTHjdLvkCL4nGgHwhSciGo4M54Bt86Gu

nadR2Wk5N3dJ1FcnQwZVoARBKCbWVIvETbMw+AV5WGjwCoHBzHT3cO9PTxC3jIokt5vt0IMQanlO1YW9

t3YH5b537qYtrjJJ3n3DnSaqfdL9283WJ2G55J4WHa
iEaiKb5+aQzUwUS5Tx0Xxv1wy1cU6MFcv1dYlHsyP/XVQPKhyWHwT5XdiT3aVB5lB1C9uCitOPvtrl/I

M3SCGkinRyraHoETjOzKhvx2X38UuS/yweEYuaE1wfMVIcxSa58ereJFSaEk4zwlwM/+6x5dMM5/X0d1

y+jD8rQO6BTbsIncpwQk+FXvlxJYbjIB33wb8kyN16
OnvD8a9HZKLkGYEbKOzQArBYTHdQ7dNN8v0S9RL4QZ5fFHDIJDtkLs5m06qlm3Ub/mIxn9LA5oa9O73Z

rzT87jSbNzmCsllvYKN65b5wZsf0ghtRS1stM99CGtA+20jD63Lyrw3nCKRR4ywu/IY3pZ56/rnxtTjJ

pJL55ki3289Cj1/+o27dKOT7VmckOm11uXGr/r3mKM
tc23MO4ttfCILz8pPRf4eH2DvhJZ1yO7rUG2jub7EBLvVX4Mo+QDLtmbWVkhP2gWWtXt2HBZ0e/Je+SP

guO9UxCMpPszw6i2rV21JQPZ6N20ck5c6pBGFR1eT9zg8VHdIhHCvj8U3cLwMs2Eg4sb9A3VZc0sunrV

Fq3Et/JBbSxGwV/1ZEPcYQs84zTqvkFknqfvFHUa43
3JWW1tkqEuLLYE7jdIYk5Tkuaku50Nl3mOqCm+bByiKk3flcTBeLJ0CzfN36KYo6QedWQRlKvZZ4zOpp

KpXwzX0I4E7mqSKiTtEuT77qhgzDiHho6Q79F5dz9LNzw1A+La4gn7xWSywRQTzNqa3aLaVyuDMPEbLk

ZSXtAtIR2A0XmcpsRuk32YgADHSe6cWoug2HEU8moM
YANehIT5DNTQWWkhtztOGMIyRzi8QNtgO2XCcJXcuKeDXHJpUZym6J5amkNjYyVPxEuk04ko6YEj87nn

uJk+aQcfd7mOspVdnOlBYi+IhFbzAOwhYh5xkVkz2hnPF/psBxoFK5aH+H5TafBhOukO16K2fgUUEUnq

rkbOb16xzZDAAlRs/2HTpiv7wUvofKvj5VcAKM3SkY
z8qGKB0VGQonntqS2+0HXfqWYPKm3B72ZhN9rfdqgDcpM8p4UDQ9RLkX63PQkeDy4IzmZ+W79VlZ1Zst

QD67GAUj2IacordFFzN53qm8L0ubn+TNkumGY+nNuL2yJNo7Uigfelt/VJrTMdBqTWuSatrW+P9xKrSr

LRI1zJPsgq9yBzMumT/Zps3oSJb+c6NDgwF5GfqfJD
9zZ0FpPMRuvWZzIobCdKP3v9cypXMkPlohGtwnx6ho/b9D7jc3QvOc3Pj1AsO7XBsYKcZEgoUHFTkuVX

wB9Rkdh3XA0tOwDVANbhswvtjih2MfCZJKqbxf732YlpJhS2L36J3y0Kssf8hozSe2tngT9upv6t6kRl

h0tlwtxQ75NiDibGR6xPA0lp0fBHrFeWP+Ub0VHWIj
MQ9uLjvhCK8MflLVmUaj+IbgFGKn3bfLrLZG8qJNy4DvHaLrygdQpIMxSFXmoX+a0dI7/w4iPWhm5LI9

iFkDkfB9SsY/h+KD+ka+ZntkK0L1R1iI2WOzTRRPjQ+H6A7/oxoQtOx8w8OTPQHlaW4XONRM22gkom5G

tsuqgnuzMBKd/DM1a+d6i9t6hKkMuxt+O9TJ3uJW90
HvlPimF8s+RBbEcgCs2YSNlJZzjqyT+pOdKM1qXZEE9z9IfQ8fGq01B6fYWYzVw3d5vdT09sN/NtGNxD

vu8lPTJV33Yh1+YRogRHwGUEmMjFvJ0oTqROWTHMDmUW2YoK/HI/8DLwBvUAgsBYwCtfrUExtfJgTWqW

h9UMoBYgMWX80XjnJCVqwo8p+vD7RrCEKviS3krU8a
Gv7vWIaTzzqTHRME7QhESf2d3xstdHsza3yM90rGQAymSIKLqlC9GUTnI7OMwwMwb1IpauoKxyVT9d0X

yl2VGxmR8qyNtWmM5biw6DdHwPg/b9zLxb3pn39MQw2ahh3JEU8lMt9RCV0tVAttPN6YxVxMYO9WQInI

EjdfJUuPiROmYCXHjUyvWPd+kj6jWtabIHdgCPtlHd
QQaykmr0yPDz7BtWLycLxj7jPaw40McaNdaprorekjAJAEadTI2OucIa1hoMwp9iQWueeffEhiGbUQT2

F8CYGmjH7RpOnTB02qWvu3PXsIwmTs2CzGOhLvDVgsiFYzFSB/cYu4PntfJiguXcyz4xDndHaxFG+8TI

INStFjX5RmRPZunLSozjToBjn78cl50OwB47Kay3s1
v/jPmYPvuQcB+0VK08GImrkJ9EBqoMuF9pZEGdCaKD2PgmHTcFKDa5XB1rchalkI9bIzUVe5XxkJiDBN

JImu+MgK3RjbBhY1LOZJR9Y5+9giAnhlRjuBKvPdis1mBlDyYAcAjzyJT8uRLKaR7lalsQpnHSnP22+f

thSeF6UCacZC5b8M5alkM68/zH6H0G39I+4RscL/6L
4wuTxcZafUfU6I4Mixh4NpvW6IzMkzjCK4pIOxZNAM2qR82s2GKV1bC6hwXK82pzHowgH3fH+N701+qi

xncIIVt3+n9JkkX7StpsSfHUriZExZ9+xborqa/qyy6K5HxYwF5yCs0xu++qpKG+FTaitseJJQ9NrmYZ

z8fcIB2x9RkxIL0KeSAiW96XMub2JjzehMnCSw1ReI
DhK5oQ9N5YqcCZQ1Li1OGiBPlLT7EJw2bHeWg7P3I9UUq6ccP2Ey9tC5LsfVoGH+no0c5nnfB7ncCR10

hYyz5krxCYZB8wyWQXwp1M+ohdg2cqe2T9ybH4PHreLCOgO+sfJ4q77W3DxiitF/6MF9pH2eIbx9vZcz

sV4WDzhDdK05D9En44BeWdPR/kGXtuYbUQNxUhNCSX
0zwlVNdsPO5smanOF1J7x3BoypHzSjTJM2EYHWHLn0nzTxWvWrQzd4u3wqaf00w32Xi2nh88X7AdF8+w

jaoLOF3xkdBBjg0CwMmYen/3TyncBTP+lO54XeAQVfDNv5QD1ctb1SqcKdahHmxgKMZPF/hNM5HYtmEE

rrzISd608Wf7j/kJ9hxcKC++YtnIYeQRDEwaLXxC1z
mC+ogkYgTiS8bfgEaq+zoX5US79aaj2T8UE8hJ0dcLUdnAv/4IirjMLaJVzoAR7r0HbEwhZSLYyzdoOC

ycWRj0YI3zX1xKfcTFVftKMv+Dkw6Li0IrVayyo/ZrcROddREI4CQl6pOOsZOtMSnQ7OsDdVXL8HT/v7

u1cWV5CH49lEhfRDFg4eGxWLf4lMQD2l1gJUD77IA6
geTuf0xm7KiqhK7WRP+cDTJmwDLxfC0bXvNwVK8VQkdH+sYFHpjdcEMoEbbmEMK5gYt5L4oC7fI8uRx1

vBhbudTpJNMGwSromSnhdlSRadv47Yvli8W2Wrtfq8ZscjWqMD5CBqd2vCbBr80WME6w1JxSKIxQdHG4

af4AmnAvJmk1ISJBU8Jp4MOzuE5epdTFb+BmgSxKW3
hxxcWUkDMr2/6rwAdBCy/mBrefaRVn+tShMeFmdg0DTGGO6gPvK3stB3qb0uRAmFHRUsNhDUqMIFwC8u

lPdkgvpg0RzTdFIuuNEYCLDdMi/X+bM1ivGTzUN1aNqDAuAXXYmbH1tBGrDNZKmrqzeCzQh2MK5TpCJU

PCGzKzTiOb3Nrjgat7l6sYFoJy4FqmbecoOUhkzzM3
TVL/4cZKq2+zaVeq5HpnGpmTD3DK9dLgHLarLePpBAqQLnZplYsCcJIfHwc8raw6cE3ab31iM2go7nyS

gliWYW3ZkIaSE9d1gCvV9OVRXdJlTKayhNZc1Y5W0FwoOy5O5LweLcTJAbDrNqZapdd0dUZl7T2Ykhl+

sITrmz0Pv839axffG2uRQ5oShtYlb96WYuCO/PpepX
XNHEY4OtpuYFlXRnzginJQXfxO9YrjbrvOfdn0iGX/9giirDiwLgx1kqXfdvZ0Mb9VkCfjenzj5izE53

wOQvJNOl+axJbfuIhrGByHhVBwaTmTRYSfL3aOEk4tbqr6Y+NnEDgt93U/GNnAYHeAKRl5RnXZRiJl/e

OrtVvJocSAC1MUckh45KydxiXk8c9aidlztSw6L+of
20itY37Y/00w82E31Nd1wXoVwODAT/rugBfnbU0rZpy9fg2U7Na35urKMhsTRXPC4pm71OkOk0K4EH2n

V2hjiz7NsHGQjBCxaUAJKtYT8cd2O8icoxqw8xavG+WYvVMCN8xkGYCIglHwFVhNJBnR5J+Sfd7d5FAg

CzmVDhmEcR4rJO+CpqP+k+x6A8/QLB1ycM7etjRGuu
UxcUyXhsfRIVO4xY0Hv8Ruw7RlIJpTljIn3OemDZmgX0R0+vpVaZ57qkufZLWcasr4J2ulq8Db+AXkzD

a35Vl1E3QYAsbr+n1IhY9UoLL7garI0IdPo/hWfxNd8i11H3MkArgvbQX/DqJiaP2rooTBSCH71ZI79O

+aWuAUFFMhmdfutN9O/7Xo1/Eo6zc+wOp0xCYm+qUS
rPqgqQx0adEdorUsah47GqKTQIxSLzwg0mwdSH6nD12pdtkncVUOMuYuWQskRwJTaZoExRyeTpC1PQGh

QCwDU0T22VdgWGzN6NrjnO5um1HhyrZehqiEMEH+CXjO13QPcL7XIsoH56X/2nPmXicvLifZfJjmOEjm

PHm+8UoWPCTk9bxtkBq5AI6tGHxo/4YhsWGqsjZn9B
/BDESOkIXPUPtkxPMNQN5AgDHsjreEbGzZEKfxKo0SQkogPCPpAVwqgP065ZaE0BoBeshu8TH7YuvjGB

domAkp71XAUsXwwTGO991luhrBpuA8O7ueD5QDW/QN4Gr15uzBrzUbq4L/TmLyLpgAdreSUaIaI3xoPW

4QBd3LRfEt53vYDSk4xxBAhUemeMct8mt5OrIwDwbN
PN49E1Q/Ijl4xX2TmDrbf7fUegN54YXdipU31ON2ZVzqViRU9JIGF9g4f6Gzk6Omfbl/YvipN2e/EaQ+

MG4g5BcygkoHtQhOH0EjuGU4+pZ6olcEDt39PHTCGhvEz9Nu+QYtZnD+rBBERc5NHbROT0ws5ncStPcw

nTHlAzUx/w44+JMRs8TUMEh8XECqxU6p/azWB+hoYK
sjXflSi62Q8HICW83vHPOXK9uYe2dG5kGGyoDB+t6zzmmnJkz+bS6Td0FYAZ9Gdajwd2Ar8easOdhw6a

Rb7DUcOnv9DLKyszZEberq7GleunEslQfUXvNcGa9icWwB/RZgWA+aQRoZx6mTXj2kzyZ091D4xCx830

jRi+Y5+ux+pW++ozvUOjpRyRdlbqNrjGPod+hAJe8G
Sz2vFLK5MD6Whe3ERnTmygJvaK1CP5Vso9n+KTivwKEAFF8EwXCzJWepowOe9JSA74axneAftkKK6slA

QQKXqrroOUGN1gwWlVyY3+9nBl1eugtMy70zoKfdyZMfWx5TD/3i4QSWNSs8iRLL6nXdBXSdZwZX3Fqp

JbDspI9OL+j2i4xXsUzqIn5UqzSOajjaKcbzoNcYvM
yb5Ror3H8atLRiKv9hosT4gBZAiFtcaLxpLOyZKIpYeKDi5U19LhbVhbum0yL1mOk/YFxXtaB8JFC7Bu

nLdRDcdPXDgk7txGcr65aGMcxiF1oEbRaHaIyed7edlTYzzDvU2nuripBVxJs8gwtdRXE48i8x9c0PZE

7v7Ewv8VeWauqs3YFwT4LhyuaOsa0BKDzDraScQOXk
mQe1fjPxQkenBT+daIclMIj/6Ld88Bytb8RvJgAp3FmDYyVPzOGFSkPEb+gu/VqapU+iBX2u1c7s6cx/

DWI2W5dwzdATvI+s80nxAoc53eGPW1C+B6TtodmWLtyl2nF7F5KofJKFS1uS9IwKzAMu5K5C6yc78AHO

GTdhnHRA+u+JayMaBO4FXfPfTZI2V3vX1MVt3ZTKY/
Vn4O7vF3LY5ZQDxZGIyQjHBhZ07G6VZyjbT5gr28kzZ/ChiVCCwrD8MMTt7A9FZaD8pEBksVB5tFiieD

bQMzNbhj4m7VqZWZe6611FtKP8wXl1KhtDj1lM/oc02X0Awh8uLWEI7t8Yto94NLzDPUouTxx2YEMcdh

zAZu1fiVWbNEZc6Gz5Q2FhU2ekho5PYH2902hf73z6
SAs6W1FkZCT5xjc8OmHFTytOm8M0ID+FsVb3LVHTtVdtZXo+D/h5BDcGJOaf1s2ByirB7F0X1AG43yJ/

W7Z/F+raDrCB3t5/tTo2RQQr9Co6IxqM6HE63b8CICYm7c0/h9e17O4N+O0qPKL/KNFCv90nKR5cw+zX

btngQ16TyfeW7qlEuATUuHzFY2e7i8+pXJubNkI0z9
kbz7DSHRSs4qxAWALx29u+hzp3CQzHrEEWo3LnW+b0H0VmQQWY0OuSTEzRTFsurn0sUQ/yh7VRQ3iDrf

V83qpe6E1/uP0S2T+Qr6d+zGeDdaZjL9fTGkRk3DA4eb1Adth63MN8HPPn77eb/H2ApM40vZIYg/+3yx

XNkKwGCtB7fWBfSXfjqe6fEd5jLv4m6gt1lXi5qKIo
lwoN1p7IZEnQyiRPlMO2x+sGW0CzxMLILsipU9KPa75jncUgRmyle0++lf2QS9W/rk7dm3Vt0hBTqjxs

YCunW96CoHd7Fqo/QrrrQZ+5871Pxu7N3Ox3o7Y/KsXI7m3r7wGytrXAZKEuYuQ7bqr+9D9OT7//+KMU

Pidq0SAjyJmijCZ0rQ4J9k9joPahBmt+r8uOqHwIEH
mssGb1HWTnBSoj39q0SPuhT+hKkTdRs0GAGzQ5hg0Pmy+V/jyPdZ6XcnbXRJrgYtaz2O0g37phX6C45K

dNnfLd3Y9+mwuNB8Y2q/bp4eFsUIAC78CdO0gYGuQsF9Gu/UB1Eajd4MuVy7w8vLWHb0Cl2h2G5s6F2H

X424L+4X6gli00Bpw7oJk4s/1/dm58F9hALGhMZ8Lf
dCg3GRahYQ3OwyWRqt5/9lp+pkG5oQ8+n/xoor0D6FTOKXfB5j5LcZ12ni5RfN4cK/fP+iPtjj/tj7BL

EI8pBquFUExpPpHF1gVqQm84LdNfEy/slN2orwV7z+vnm+NHG+CJW2bbx5IMwaA+EM3XCqMbBm9A/9Xe

3yjD+owdsG6rbHaoRJMkMFt9jjaT72lTnsmaca0Gi2
SvJilxd4k1D49akx+FqKg0E4zfFcrr4cHYEIdBExar+0x2fVt0jZYcn/vrpcG4t06JeHJNC6R08MVlLn

hj4I+OJe7mJoIyIpIJcU2RVd1kKw4+5oHCb0FgzDftgGZlgddpOCG1TvarIhdvpcQbirXC79xt5fVtle

rqc8/p4I/bjkKNy78PmLryjQ9Q/L8o8GRso6Q6lnGb
migr9Bykv4T/tre8IaAfvAX9uV+S+SpB0fkjB3Rfs5hgg+OVNGsZmOldF5duKjOt+Hv0dUkNg2yY6iYV

9HQqbvlLwHHCZp0C430cdJs0Zs7yDuVrSSrIadetEmjTQPCjLwlriDDBsFFg/rUIFzw3I61swmV1hBBu

Oz9r22aaaj373broyf+qqly8GDFG6zBcmWMEPf+vsH
48/Skr2uGb2sdWTxXnuY/zI8/cn0P64Efl+4ok7G9vc9T9+lIBvwJGIuxm6Y0Ii7NCcdOT3PGQBX/9vo

xXaUpIvLmWAe55jemi4qwoXfGD5Nfvu6dFsg+ym0TZaF6Xw/3UZO56nmuiqUBSVJLjb9476yoAhUnKh/

xr0r3m8E6Ud16R5taj375GgIUPmg4Iit093itUAVuO
6hP0hXmB3RqzjOD2nXpSJjFeAo4NQ2u6xt80XVPYrXxrqBzFnYbH99kw3VhH+h+7ES5Wcx7PCW2OWVzF

Qz1bnYqj8SnoRUs3Fz0LjeJpdUrbVVNF92yAa2pfVtp79Y28fpuB9Ebs2W7ryUw5tByqrgiKh8RtANPo

CSXyr4YVM5jvFX179C1b18wsP+u2594xcY4QjiR9y4
RRmAh5wFXA9BORtnqNCW56ni5PFV/sdyKqySA7xXX94jqBZTdJsp8mNavMRqNXSdNjqSY+CJQtelbj3Q

QzVJ+KNPQkjjTBxRfdOwNoOABj4HD7iKFRoFSTSpNEq7JxVPbxmkJE0glVQNsfy8CBP+FisSXBS10bC4

3yZQ56D+X5Qet7eMJNJ/oWOPHheyQXxYJHZ8FyM/cc
4vOCq74vlYzqrt6Qdlshb3zHdJSkwc9kx++/AOx/zGTD0oaKtvTiQKw7rUhMyjVsa4bNxKStt7VV7tz9

M113Y8EZlOyXlWcfdsNIBsFJlUwauk/+GJjNQe0SiMy4dxgd7x86QTYLOyu+q36qO0aoxzoh4U5Gtywt

9bKMrIGZ5RtnK10/amnKxEm7IQ5C+Av3pJzagrvt2m
Pr/BazmXOk7E/1PecCQpAz6E63H2zwD3Srpx13sAYruPLgm85uj4D1ZeLuGVlud0JWeDvr1Y7iVL3MZ/

5vjEBaNQJ3LIiVacwWk3W/O66PW8ejmtBvbggrPg+NpuZO0b4W4lz1bTdKmpVABeW8KxLLaS+xKFMj6G

uU/NCArcx645Z2Np5Vbp02UDaJ1N2Yd/hvwbp+Ly0y
cyLPvsQmu4mN42iFeKcPXeTb2VBjn2Fq6yetV3ARW1sDBtCtepNZZ9VjOg/+DFDKZVQt3viWWEo4pyko

z4uovFYL97Uip4ktV/wIBcnHJxKg5RE91iQx2UoRvqecuxQoh8e4qrsb3rNOA+FD6f9n8OfQPenqcXYI

JqKD56wPj6Ddzegyx8380g6Iz3/jwXKaskNQnrii7x
rf4xxY5RV+H+nsX3C2i2dobueQCJDlkdpZzUgaSus+oXRK1N6HAIuPujUioAkgpEW+zMGtDyivn/X1d2

gTcBTkHqfYhJmXswjnO63ZZKSXVyBfzd/FadLMqvfTFF6Lbqkbr8ha7auXerjQ+T28TZzHIelec0r1jw

6pf4v27Dkq2WrJFn2OUaPhbUye61VcTmiG68gty9SP
TMbznTA+i0JnD21YVyKbOan4in5NFJb8/fmYmZ4lDCpurZfnd6y5Hyw3q/nAhtqH+SCvPPkk9fST0Sd4

9zx+chukf/WSfphh16q0zNw+JIe35TaMLxhbN0qE99MBC0kxWX7Lwit6/7F4NjIv2xzwja+UNih6Hags

/XRb2Ok2BYmIvsorWxazi6vBa0ld/w6wEjr0HDuI6C
Icf9M/jgrwlYv5ehrr4q/VF5LdxUrL/hjbny1rveOv5Q1bjp1e046p23N3Sk6aXTBAvsZS4ia3QEdxBD

jS8LEz0EepnSta9nPcCdZjwsLx00T1sQTNrsfehgRN4OEX/kQALK9GIU06adcedyIHdKoA7YbtP2e/vw

O8KSzocewlVDF9EipxyDrwyA82eVOIQTRozbsn1K7Y
0I3d9Dqwjuuf+U6+/9N+/J/2y3+w3k5aawfvL8ab3gg19mzkDw5d/PtSnxfzuVt7PWAjJlPyug7Xoatj

xaSqa6ciKyWWupnfUT/hlYKlPxfIjkN81o+PKsv7H9I9D/fslh2nHo92zMgf6hcypSzPvkgq607lmm2J

oBp47x9v7eYnWB5a9VC6qP0ZsbvuvGl+FE0/2eaz8u
00bPQZ28EX/xjOEwaQA0p5DUfTQoj7AnPreP7FwEfnJkciBWLc+9ulmXwWnkfSoMYvZhp4ut1BeTTha8

XPyz9GlgjJGme+j4JGeHQzDl3x6JBPbGv4N5Siru3Ie4T2/AqlhY9HqUfD/U8YR3TUtQHxK7X74jd9i2

e1ktj22C7ifFZlm6Ch4mB4dvhx+V7HgW8zwY43wpXN
+P0E3a68kJa8mrIZhnfb3td9H6zBt3kER634LjdQn57EPSViNP6937g/dxiIwok5MgyC4x2aIdK81dEJ

x114Lpb22Ha/XIgSzrSdYIRH6RMoUn0S+DGMk1/BD2g6/C+MQmthHso5oaDWJkfh0rg2kLe4cCiza/kO

rHF5XAgEI8vqfoHz9Tf+cU1QQuhLd0qyBtVOYR5sd/
/O5wrMr+VpjrvTBY6qAOv2PcVqn8TDAHjJzffVD6mdXtosrR+qNN7N1fjl/IBxtv3WzR5aLpo7NfJ0MC

b4sECBM7dBqP89W82WHyoU0+AwDTR+aMXKZfQob6Yhk6pnGi+6sBuN1S3wJ2MH4+cxeAbP3QAW3bS7Qk

T5DQxAzDT/ZBfSvVa1d4yq88Aszi5x/sD911Dt51jo
rPkJemRy6949iTk7WVlEdT2gdeskQV+T4dJyah+n0lCkUc+HfOiG+sTDgqkfndzEHgkhaetcpI60vQHk

jhSqiyC5Gljl0O7jcexIydK8WvnJ3FvkNNa/1++7DQD0uBJ5N3zigvPWpqAY9s/JdyqRdzcausvRoD/i

lGzF10oqEbrVohG4pvwpCo6K/3HgVujX+ex5LET7Z8
BhxBt5PisfM0VBNN5sbTRGAawtQ/3VTl+epyOT1hnRb+dxUSea5psV+Z2DOk/kvlh5U3MH7NnVWp0zIl

j/Dp67kbumQW113NvXoQhFzjvfY/huN/Gh7ZkpdNanr4HB/bdkQXKLqJqFxDYSGLvLoctjzr6THumaBM

wTzlzaauNnzis8JdKjZ2jQ/KoFm7E6hrDJN88Qyv/k
67YOku+Gi5W7oJt4ctb203oPqGafd7Zb4KvtL8My6TOFiFGiNfb6AjrXNd4lMJL4btSdrw9vB3t2XusC

SbPVSXMqv/ZwAigNp3LoX5+d+W5v/a4KlGm9P/8O2p5HFeeaSAqgpKg0EhQH+sh+ue2x5gL7Rmu24c2R

2+dmda+A8rd85QoV3vd82CEhdOkfDYvNfux8Z9V6aY
Tr087xD6jzMM/3c/su3n2McWX+af2VUzWN7rY9xaE3/2TY/vZ+rzfW4dC4pA2sjdZcPrWBo8gLhHC9W1

wK/BX4B/CGfU51/G3+7RC3S/1+6B6+A5C3z6aV1lGbXLeEUgfaa09bqQO2YB+LM/v4DMm39tsJQ/hejb

bSDjP8Q0b5+oaYLrwTjGHxA3W5qWsgDZfIqEfzeBPU
/D3hPke/CrZ6R/WeaqJ+yquVLWEYAx5u7uhOIPwjVq7+dHW/N1+/sygCT8f08FjiNR6qMRLxmALcIToh

AcZg+/cT+kWUy/x92UMXSCM6hgF3OmzOI7p+S1gWJQ/8IZ9E7ofmox3Ed/H3lNmzqS1uvGmUCm6GSx8+

ybYaFkvUXEhI1d55zo9a1L8Cc+oJyjE3Z/ohFnx/p1
w9cnA6o6Sf5nQCdke0GF+KMOE5gEBf6KtcspzuYzjhkP2aGAEbIveRwKooE+Xi53oViOPrDiWrM4SGHA

kDqZCJ7l4Ybx17UassyZsNk2nC/466o/xVwF+B0bSNZXIAQbJz6jpKi6wJK61KM1BhV97BMSVvqhzeyG

DxrV2rLeLglEb4nZ6eJ+0P6KdBJRPiPule9gkzq7Ru
Qb8KoyaFOya0C7kpXZM4B3kv9XkHfUubhHruS2mYnO+5IJ8g5Qcv1+hayM70LPl5zDNjsv0HqHUQt2gl

ZKBA8EmbK/Q7fsX8lmmthCSNEMXMLNKi4ksa+WYuCkQzwYbnhFjv0vcSIcSffebx+C828fdJ/c+yI0+j

nY78kWwWwTRnoG9Frs32e2PubEBeEAkIfe1qqa/Chuck
Igzn8tE/19GxQFE9d/SCkMnJW8EB9w4qF7/qPg+x6F5wUCMdL2cdIuTwzXQnWB9yC8UShW+gMYLWeBPq

ZhstwaGK6D+ltsOH476TBdhMXpBsYQDixqw2dCmZTbwhK/namrata+C61/W5eesPxleA0GJzHgVwGKgvhEjZ

9cbUZqRvZSMzHQkEyrxfLH9ky9BDnRgoe4dUByS6ll
SM1EhB7ibSaZ+zjuRVW3fGBf06yQnFbtM/uPG6zfkX+CLjnTXZcz+NWi4dSphEpNuwKv5pr7ZIhqsfKQ

h/FB8oh3L1EKkuzsLh+gMF9N1MwNf1E6h8O5KiIrs5XtA7qqOwivQ932/7cmbebHhS3fwh7w4z36gtua

sGN/PCQm4Yg4o129/U27s8BhIB3Y3xcvm/w8UiY9IS
HmO/F6LNZ3V7YIB7STcGgCwfrR492D+7pvUcsy60KGaLN1B2K7r3vFBh4bR8P37ViV5LEXXEfBTfAqC/

J+4Y3PDDhvo5PmWpcLDhjG11Bp6hx2xfk/vqbKQAgrRk71xydNbRItJzDGlIUnh/sbb0MZn6Qk/lw7jN

vUowxMgxoEVWUHCMkXsZ48IUnbmv/S+fG5j0Jt2tJk
0BG1pD097yIpgYIjZ/RR1i1DcqIyo90ISRlig+jDMHeD+8WIO/zZ1tNq6K90y6Yvldk9cliE3iNqN8Db

qS5fFpAjVKNRsjY7nH2okh6BtAB65j1wYsoyNRrukFrN1j31M9VGLI/Pvpo0tyref/d7W86sb3o9+yA9

GzbiOUAkzQlzc/uAX+sIPdiDWojLqTvyuITPOIyvMP
4dLtYBHdQ2inK/SEioUgA2IS1O6Z3Mx7HX7V7kHM82EESggQ2smz904XTbO5TrwUwatWlBQB/RygZI6f

xaBlOI58QlrczEwPwI6ka3ORQlDsVf39jXdb4A8i0UZ7CKJfzAJtX0KlU61D8SC2BmfBJwh9Mr602xN8

96g1ruE9XR+NNuD9tqkY2p3ZlFBYqZJ5xKfdSH2gBe
Tx6o2SmQcW/APwn+zVZ41TFj5B5HM7Y/nXjauk+kNr7qbrC+JNK3M4eOARQinb2Apgq813lTXzwZBfcr

uygab8V+5tcaDb2LOAtfhg6Faxp4ZbqnRzkklFf2KJaltBnYCpQXR6DA1bF/oRsVOQ/E+OF3mfOAmAqu

eGSWZTS2wfk94xfzUrGjRFyZH7q6FlX12ceYkE+CPi
cW2ewY2oxyDUNyW9Qf3BOe66Om54EL+C6uksrYJkY27xAAVyYfoGC7MH+xjNBta7cAt0pO3faMNH/0w9

Iw5ogVdjI/N5b2Tmms8tqy+a3fjL/HzZI3SNWWCBLSTjMe/FSMESI/fANc9G7jdV3I+Uhvl0OujYSIKj

h/qcz3l1xTetmbmtt13sdpTIpIBZv15TYldCVITpwo
r2+w2nr1chxWV562i73iONp49mho7sBJWnvds8a3TiQJ33VPJqFmUF4jFkqQcHjkTaVYYa3u1OxT7KLd

rAfeM3LXe/UTqRDp0oweBCYIHLaHhmwDbHkQvUE+VeReJ3xkGF2bDFpNgtJZckrm/QNpYLeu/pyAP/RK

pvGiMjpYE0P4PFktsO/7NtmHUoisig5O5eVcNCCxGX
rU1ijZjN5djSeNkFmx9kruE96shqggXAr1p63DXp1JjkFjnLbFEwGPZvXTtowTQh1qHCdcCDyWh3Z5Ue

f9OXioLNorMYJjjyArMaHcJ9FVl8v0QUnyyKQyBxO/K4NePBbODdFoFyg7AWF+SYgiipNv4PFq4UxyUE

eBhZq4OBWYLyMqwSNFaZpO7fMgaieN0E9gpWAs08he
lxcLIrJ2navIPR04WoDf74e7uFaMh88wP9UP6qDcbNTqUOLQqiueOaG1MdcO2MSdqbSNslQ0DLInjK9/

+x/7dPGJ9dxmJpdIrNAre0K8tzlIMG6w0De8aAW1bBCESwwnQfZzlYvZTHeLzOToZbkkwtEBnW3z1Av+

OaZb1+iJUcqFOppYvCuDo4J4ojjloHnwebrfsY5TNf
YmAAaWJlAd1XBKasTb4Jlg9Y2uyhGa8WPqLezURnINdMjGAVjuny4D9Gm+IEMEUjlUTxNHECAas21MKH

REyMLTS8sfVJ1brlkuKOuuWD3WoEdUPg3K2PjzBTN4ldp0lsBZPlDYGd3lqD8wz8PsNeyzvsUWCuRRhn

Q7OEQzIONZN5G6ahzMFCCA7ZOqjKdqlD1nOSlZd9A4
GfLDrhVAkEfQy8vuD52W+r+C1FlGk8c8T0tNrgXtbtya4Skoyd7z4CdGHKzxvhiyAE0d51p+ymQUeo6y

3PWj43TJfan3/x+RywJPYeOQnoI/Kk9hetrAI6SRqBe/zcw1eRx5COcBUn1ZhEvso2eEfQSUBBJH/dNO

9g84yX+ScU4fHPklt3a1MNObifSYxnOEaadq8lFVyS
pQmOwfEqICzMwU/NVae/syihJDQTTZEGbA+AZHGG1OeCoZhpywEj9dikEA2ARI0Nc8kNTYQOV0QjH1iX

53FILUIz6rtbdTA+cSsOao895UofcCXJjgbuE/l88IjgwdNcjsEMSWDNDdxGU8DQQpMUjELbBkIY0qfB

woLEddqvtT4+AZgQCdyaRBLUUMg8YGutD9sZN2LyKz
UH6HZZSVPDMzGUUbLGOFk/qZGaLg2CUNTY+zPKBg5ZV19ra6L3vWxEoCtD4FdU+6OjLD75mxbfmcuE1Y

xrxVjxjIbbRUyvyOEC2pTB00gMEx0tVnbkLQiuC58a+XSdU6lfqfWLZR9A9B/ePQkELkF5JVWZpWVttL

F1YD0lRfMqJWY9/xKrhWVFYPCCbuUjmNdzgrwclhlf
TffJgSoyJWlHxYT20QzUJv3tLJfCgKFzeV/VGYCxSF36PGDSnVS2RHDoBWlxzBwPxP1BhiKkxklmhYJk

SjLsaDeVHSYUwbddsENRyYwY2bzimbNTVN9dz6sJBsxYswLqpDbD5SZwF9i2el9lFhsSvO2bGWqmbQ0s

QCFvLuLtSGkoMKT7zYJ2wd7DZrFetZPRoi8e+
FbjdrFpmgTDNnWdV2JtgnmhLP6CD8sZxUPNAPTLdINBwtCfbL7ohhxVrRPd8EEvPSYygZRnKmZnItPxX

SiMPib42YrS/tAd5PqebgDLugOvXEcJGkaEwpw0d+sKgc6hjSIaOkq4A0z1dWuvJmT00uzEu2JGBNq7K

SF4dOvoiCbI9G4Jq5+eX1u6fIxkmpgAqCYDVYrYHst
0DYf0fVRZB/EVZ2CzuutePJilJMlNcYeEMLw4GayQU2BaUPhiZcDOEeCP1wXG+UkfRZH0GjxgLRzB+6T

OzIyPuK7hs4HkR8S0OXeJNgTRxylxpXN9TUyeBdNXX7Sn8Y34y5UCj2TsGJDNcZxHxBULEciEB8QYDCx

qnMyfzmYOcRZsIguvkorM5zNJhWsrshtc4ubKtjBwY
8QZrYJo8byfuuGQ02oqyIBZTp8t0Gt+/Isc508NP5K7tESl90n/15/jIT0rueofPGG8+PrRhV6hgCOZM

q7/J9Ms41vO9DIsX0M/7MEyGe1NBPzNriYnqRO7eFHrJus89UsDkX6TUFadOYwlxv2zQekPGYdBrwuiH

bioVauYrW46MJ03Ji0HxyJxvFUHwj6XM7zUwfoBAPW
5v/LmJaFj3/4XM/f/4//hfgEf/S8CrFJdWZvTlUTX3ifLqoC6LAD3yz5DzOFlyQjXcPA8qyt3ZAHY0SE

0SaWj1AFRaK4GbDRNyYYYpp3WnJV2CVO5epPaoPuF5Mo3BZlBvu7UoCLQzGRfMuVJOk03UPTz5M+o/+N

iuu8HSL1LomTHWLg2t/LYKQBnkcIPNBmn8V8kxQAqK
jcHUZp5KrqMD4sYbY5A7wfE5BvaeHwuUvunMUobm4i0dxkAODChPp0bkhk/3Rz420WGZ2Ygc7gnAISxa

hE8WloBiZXM6dMSbFl8wJxlcR+puAvXo/Ke2wNXNcRHMbYbb5gW/Aj6Y2By4hoK5vJKYZiMY3I4s1CBU

HkOwXl6aweGiEbYzyYQhF4S7Bzzh/nN8Y6ZAcZ5wkN
MEH6y1h0M4AYWd56e2RE8dES7a3JoydHfiT/ETXdi86flie3tWAWpeffMzoFOkAJ7DVxZzPA1sap3OHS

MmBEVvAsxWChl6GNmeXR0DyUMtJ3HrmaWFKFCppjqxkE3nMYtbVB9Vd111TpMlLE8EAVHDEOVeKSSgMN

mSPmNvJ3DjW6LppEE0VRAuQ1RoFKRdW6b0SBrnNK4S
JRUaIF49MJ8iCXXFYdSuW6YvHFczVZDsRKwmHB4Eb599ZfFsbDMmFRMnSyBcGXMjRGZjOEU2CV0CCLXg

YATuyFkcTY2swQZhOL4EhFGdHuEjZQgyNL1Au007EtbyJDVdLIJqPTEDDYf+Ac2GIR5xu4JoEGgfYGLo

TT8nva3XCMjHEkAmO8S3uEWwUt0gaG2ZgBQ1uETgT1
CMJDZlvmKZoEGbBh0ORSCdMu1rlP2PZDRATIMwMZAcOLpnG3pPMS5DDYDHFOZfQOYxuyIhuZqHSpAwS5

MUFPN4f6YarIyeZp1zHFfuBhXpqVQ7qkqhHAYfg7YrAY5CqrQgmbkbSaEwNQJiaaWqrYvoXL3KwIAygN

DuTN79QTV+ZaJFRzPwX7FdpmQQAYRquvbnzM7eHPG2
GSRtPd4YPIPkKWexEBGxMO3DLlKcRdg4WV7pCWq1TOgcRXDeADZuRJv+Pb1CZX0zj4MvUNtaSkCrAV5z

zw8SPYpWTcRcU7Q3yAAdWh9hjT8KgVB0lUBrS7H2mKCmM0Dcq6ZHl535Z2ZQHRMOJnoPxublxK0LjnMu

VVslIz9NFPIymTo4cQ5ACVfjDR6VCEGbUG3sGW33Xr
2qkKHqMrU0KULcEv3THnUzJ1EvLF3wZ34tEDDeSvVpLZLIVw1+ZWwgxvUzZlfRHpY5FHUcj6JhMShvZF

EcTYO9YATmZkg6SSE9ZKXfFXEtOOldBTNlNZQ7DAhwCQVjMBRmNUHmUEJtRmIrELAkUaY0RADfLaZ0JH

CjBULuPWkaZbnxQINsKWB9OwawTMG1CXGcRZU8Siig
OVY1IZNjSMB9DgztIYW1IVDvCSDlKQgzAuT0DKG6OPEOYeC0BEL7UHDmVUQ8TJe9POP1TBNeRPgyHTRk

UGC0ADYcQGK8DPU8MJX2EALwRhUqKDW2NmIoTGioCWzgLEL3RMB4FKvxXjQ6PIn8NAH8YyIvZwT7HHF1

MSU6GoLiPLG8KSJ7QsN9VtTbNUG6IEG0MAN8NWGpYL
niTQ1LMoHoEZV5MHAvLMTsVxx7RDPjTQAcRzseYWE3GOI2XAM1QwYfPSq9SGEzLcZiEOTwERj3LEJ1Rk

KqLHIdVtOdFPS2JxYzXCJlZPQuDOF0XtG5SBTzLOw3IOP9DFUfOQgjLTI0SQFoYMR6VmMuNMz3QKtqAg

K1VRpcEJh4XQTgMQPdDRFrYlLyUdvmFaCxWSU5ZQGe
UYCyReO1PSL5JiX1HWHeZvI5WDM4NeU1XEWcIuZ5CAL6LfQ6CFPsJnP9DBI7TbW5XRRtKgF3AOO0WtJ3

YZTfSiT8KJF7RqM6HHAeCpP8XIN7XlP5GJWfGrL6PYJ1TlRTWvH9LkG6CBHeKzQ2OKI7JwJ4ZIHyYnI3

MHF7FaH2JMKoHcW9KRP8SJImXVYcKHP0UVSbWYK3IA
DzEWR6VINjIHI9BSnuXHH3TFn3FYYiLHSfOQQ4RLW3CTC9YDBhGEwjKYRjNXCvAkkwYdj4EUA7VqZbFr

CkQionPA2COLd5XBX4YRQrBYnjShI5WEJ1DZM0HrSuOTL6LOgnDoG6BwBnNHVcKLI1OJZ5EKWkQxA2EF

G3HCO6JJLeGfV5QNF1GRJ6UbLbImI9XDRgIjH9AVUx
NCR5QWIhXiUuVkZlOaI7KON2WyLcMsMbDxAxIEy2NPY4FKSwJBd1QUOoXmY8JNy3YJY2VWHaYoq8RGu1

OGM8JEnjUyu5HRB1LkV2ZMtjKWBhNER6VPI9VPAlTOR0OIYvMZB7PFoqZMY6WXIaLXP7ZAudRJLtMGK6

VxE6EoBrZYNyIKJmOyT6LkIyKxNoHTO8KsItSHKiGd
QnXIE8ZPQPTnR2TaY1PMExZCb4ATH8WfZ0NJUhZRy7PNW1IVH1TNDfWRP9LYB2InL4BdKeOMX3TKY1ER

J6FPlqAHn1QE0bSHbqtpQvSbyGNeQ4ACEwz2SvCMq5XR4QOOYcXWbbZH3Nc687LWXeO0FaxCLksh6JUH

NeMv6nuX1doLFlK1Csh9XUDZ7RUUKjBTKrKT68FLZw
BvofM5ElWJJfI8i5XZPhTptrDCMoQ6DsrUGxSrJpLLdxLI6RlDYpkaKcJv6ENNTfCp1kfCPIx0bjGjQq

BTA5XJL8FOPeQKB7IW0FCrUgQ4b2ZTypM6TuU7sgREZaD3DrcBQfCJ1WVw8PHmJcEH0hjr2PFBqyAMPj

SpkUYry9ZWipCL3IuKCsF4LxunSbP6PehXwxVH0Sds
CwMPctMO3AUCGyBr4rdW3DFKkaDOPDU4RxL50ouI4tG5qxwmJyc8cGrlHePHwnMu9FNZLlBubuw6ZZmZ

SjXFUuQ8ljp7RGgHLcNCT4IC9HXTNnV7pddYzsMVE2DKJvCx5RJZCwJo9gtGJjs6MmfDA6u5FnHRdfJW

BSDQo+Ix7WCC8do9HwJQccMYLsZB3dak7PN94ELgEw
PI3fmj7JHdVkTZ1slg8LTOyDHxHwM9Ghr5OCGMHdNk7WFYRmHGV5uR6MtJInIFDiPM6cL5CXAY4FEBWx

Bo0cpGU2PKAiLbMoADIrYLNQKyZgEINbZxDjJRMgOUGCRSayRJSvX5OaFMC8MRZcJu0LIATpQW4APlIw

OSAwIFI+Kk1ANDXeNP1nfjNhqWE6AKE+Ix3IXSUuDD
t0B4U0ALOdAUd4U2RFV2QEESKpPFdcAWshMACwUGh7E2D0GXGrY6GVR0Nuqokukc8+HS2RE05ZYWSnHA

g3N4X0yWRbF9I0gSqNkMP7VM1VEV7WhFc9sOGrqL1+RQ4YX7OOZkXvUWc4D1M5nBJmR6U9pOqNsLI8FW

1HCA6XnOGgANDzhpUqYo6bV5MPAWcUBeQBXSJ6PJ0E
pGIiWI5AbRGWG0CrxXNwHq5mKZfmnCYogX1kHl7yREgsUM6YDrTBRIhEFGW9BB1DsPLsDB7VmUQOZ3Ds

lHYsQk1sZHlmyXWsud6+KI7FIHWlVf9OMi3+PYvugwOpYprUAhQjBRCmb1QyMIf6MS1FDC3boRnqJYX6

Hr4NwQW9iLLyC4xHAN6CtTHtS78hpMQsUYFfZl4SKj
B7mgTvkS1UEU57pXPmg4S0LKIyG2dtAQkyh39wDFvyDJxXEI7tBBTJNDshXVcrAEL8AaUhfpmaBYDwXl

9RGuDiBLg0dA2ztUG0XPZ1NdxfjYLdYEqdDrPzFsEoQqE4bSumswo9DKhqDJ2eZJltfvgxBAGaZzb+DQ

slJHCiRUKwZaqHFGDbzL9hvyX5xxRdJOfhwIJoNg0c
f7c0GnpnFr8gQb4oQEi5WdQcFbUiRNVmRb8mnV12OXnewvGxMg6VBmFcYVJ2Q9MbGskUOYW+DQogIDwv

bFv7dJNrQMPtKn9TOWDkMAAzGHFrACNfCTTsHYOoFKZmMSNePSAiQPHdMHAhBSWvXCNtHUClTPUeLEKn

ICAgICAgICAgICAgICAgICAgICAgICAgICAgICAgIC
HxAYJeDSLjEPXmSIInNKLmNHFnGV0IYUPuWUPqBOFbBHLyOTAvMNDnIHZiEKSuTZKeTOZhWOYtLJKzKM

AgICAgICAgICAgICAgICAgICAgICAgICAgICAgICAgICAgICAgICAgICAgICAgICAgICAgICAgICAgIC

NyML3AZSKpFUGzPLNaJLFiDKDaJJPbZUTlRJFxQXSc
ICAgICAgICAgICAgICAgICAgICAgICAgICAgICAgICAgICAgICAgICAgICAgICAgICAgICAgICAgICAg

HKEgRZRwKRUeKCPjDL1WKYHwHJOlHMJuSAJoVIYhLTLnAVEoTYDySQPsJHRmGSFfDGIqHFTcFOYyRMFa

ICAgICAgICAgICAgICAgICAgICAgICAgICAgICAgIC
WdFAHeMJMqSWUjKSLfIQEsWUUhWEEmNG8MRQIcBWEmOTJfSNBqGGLmJRArECCkQWFcWRMaAPKkZFMqAS

AgICAgICAgICAgICAgICAgICAgICAgICAgICAgICAgICAgICAgICAgICAgICAgICAgICAgICAgICAgIC

RdKHGvRS4KJZZjGEXhZPRgIBKlKQXwGPCpMCCcGTTr
ICAgICAgICAgICAgICAgICAgICAgICAgICAgICAgICAgICAgICAgICAgICAgICAgICAgICAgICAgICAg

GPSpXJGrXQUcCSIdZVZwZX9PUZHtAXYgKIXaEEGzTMOhPDTpDXAnCUOjPABsHXJjVYXqDPLbJFAvCXOq

ICAgICAgICAgICAgICAgICAgICAgICAgICAgICAgIC
RsJQWbGFSrLKUyZITiNTNxZLPqMIJhMKXiEW9WHQIsMPHwHTImVCEvBIKoWKKpDKJyMYJmQXOxNREqQK

AgICAgICAgICAgICAgICAgICAgICAgICAgICAgICAgICAgICAgICAgICAgICAgICAgICAgICAgICAgIC

YmFFSlANAkPZ8NMIGtOJNcUVRyTUXfLXLxMFHuGILi
ICAgICAgICAgICAgICAgICAgICAgICAgICAgICAgICAgICAgICAgICAgICAgICAgICAgICAgICAgICAg

MUBmIHVwCMNeNNBeEDPjKRRrMU5SRSQfKFKjDCZjUUCfMFFqIVDmWAFiSIDpGHPjMGAiXABpCTIlMJSn

ICAgICAgICAgICAgICAgICAgICAgICAgICAgICAgIC
AlGJTgXDMtAZVwVQJcCGWoSLDaRJWpLEXeZVOzZY8GKC63bIVov9Y2DYCsQR5whii/Ed9WTHqeclJqeJ

TpZN6EVpKpXC2jpx4LFeLwBD5fbd9YUTuPKaYaV8N6wVHkLMKfPCRPYdZqR10gKHqbFk44RPluUTNrMk

OdBDw1Yr1BVhHdH6qyLVFkYyR8BZEdEvKyKNoqLB9C
r3CffOOrAHt+Xa0LKL8vy7UyPRfvOoXaUY9roo1DCMaRMfJkW1UhnhL0RZGgCYWsWx6CZKGbIORhmJLi

RxWhUAGNVvYbO9WmkB17IGGWIv2+KIegndWhHwwKEwUcEJUmr4WcTHt3CE3UOGZrBMn7jHZfXCMaRTLy

w85kZNTgJ341gqOlduRrfUQLWQ6mphSoOXCmsSYsuR
ujSs1XASJ3BLVwDY1iXEMsWJIsRkJ1VGGOWO9BAAOeYGNieSYcGGYmOWBRGI6SHMjoMWP1QypxjmKjwP

FcINljDQ2GXLReyhAlEjQkCVZIMDh+Kt3VLJ3nj7UaSZtfJJJbSW4ktd2LIVaUZcApA9S4pDCdC1U7GR

myGe8QKMBxHQKiWmLfESMNAGhhEQ0OET7tgqM2FH2Q
bDNmMKIdFRLnlZEdNVu1J50jcOUkGNjkSY6ZWGL+Martine+Ce7OSYYpPUSoIIOxTfNhUCMYDnKjE8XqF6HN

u7EyI6YkZV59pTgbukJyUQagEH0MFF7sHDZtWKYEWN8RxETpzP9bjiRmLyFcKNCTPkZyM91neIUeMTJj

WYMwYNQxBj4LYTSuB3NjskNoqGwwnjUfGAWfFBRGWL
0PXCqzqxUmtECkwGmvOQ21aUtcOM3YBa4XQnIdUW4rtn3NeCYuLw0QVJOsRX0RPZScGKNmWNSvBON5TZ

BfPwLgEOemTFQhTQYmTIQ2JQGdPWOvRT4BSkMdAYIyKMc5PRprGVHzIBNqgf8KULBaKPTeGUZyJpUsIW

LgDGXkVNtaANQjYEBpCSA2SODyMHXfVK6MPdYaKNHa
BHJvRwAnNKAwTBPpyg5DUAWmIFQnIDIfOZPhFPXxOLGjOZriMZKpQRQwIBmbBXNmCMRhTF1YKwAgXTAe

OSGmZWSwRSUiOWZhkr7DMCDkCNAmQwV6MSHoOGYtVCDfZFszYHThDFBaWIA8DTYcOBMiYP5LHuWwVGPr

FWL1VxQoKFJrDPAbcm1ONNGmOFGqWgfqGmVyQZGpGM
GgUSxyMAWlMHI1JyK5GJNsZJMmHQ2CRfOjUNNjAXH6GvNmAECkFYJopq8MTKObWSPuDet1KEXaLSOtJT

ZqVHnoDLAjNVF5QYVzNLShRCHgWB1ULjZgYYYeYWwtTyqiTSMqQMIemv4AAMDiDZGtTHEjTSPfMOJyGX

KdJUwtTZJoYAH4Txx6TPNjKYOnNU6IRpYrSHGdLIp4
CppjKMHjNSMlpe0NNVCnRWYpVYe5ImApLSJpMLTkTOweANOgBBQpMvXpBAOyVLOtJG1HHxZpKSWxXlRj

QUWwFLIgKKGqes1ORQDgYSNjOKU1VfGvPNYkHPYsFTb3gwImzYWhIIw2TA7LG2HkcsEtKgMJOq5Wq690

HJO1IOZmAv3GR4dbTt0sATDrLNYPZo8DFKj3W6J0Vv
u2IuSxJEB4RRQrUHv0ZRL3F2A8MBG6EvSuNVJ+YEgyNha9KVe3TKZoQyd6GDMoQbphAht8QeNaMXgfMz

OeQj9yTRNGPn3+KVveiIGnxNdiUIJMZxAyVhnwYOekPZDXAp9X









                    ID                  Date                Data Source

 

                    [id number]           2021 11:51:33 AM EDT [facility]









          Name      Value     Range     Interpretation Code Description Data Vanna

rce(s) Supporting 

Document(s)

 

          Telephone Encounter                                         [facility] 

CLULFx7sAlEWZhAm58/OPOviYYSji0LxVAepPYx4MRbhKUQlP1MlQKR1xC6fKCY9ZSaFRxUcYfGhTmI3

lbm
MlKqxCSyLeAUZkEkzFSoVlBIgoSqefdCUySU6BhBW6IWDfG45iZVPnGYOsK1KfZEi2ML3+JOjfGML3br

VzrG8ANND2PEkMQhWHjjiig/MZLgL089+Fhip2YrWaAaJy1URhYATrrNhNwZ9Z/77eVzafsVxIFuCQ/e

SZ+WmFY0IrNvC30UVazsAQGsgoXuNNygPo+fiBtGIR
D2b7a67NRHSdeG8B0ZIUytc00ygi9vmlUgFlUkZ4o2NMK6TmPO8MPjxqe92eU5r65avnnfEzTRJ/TIlN

V9zkxL9diOqg00FtgGdBswqFHfn+IGz2nZOkGqYLAeaJGAqr0o6gIlZsY0dRjCcuFFiHARRzXvTfa8LL

9rMkMQjenFXA0euygdY2MyXnyB/2sFoUooKjEEswwe
lwgsZwqYpwvgAG8DFakgN7AgZfKRRaaY2GVMaS9uTI0SahWUzUTVYZ0tjzcIJ2+8d6vjDuHtv9VmzreE

SI2Uwptp6XE9D5mUsYqp+vGJbwK5rUbmPQ8PS/ym2PMAJGcGGhCJn/fFGM5tibpOK9SG9Y0tS2aMwltz

7n8FnoVfOUz+u2p+uF2cq0DK2wzbwzejC2IDE9ABaq
4NnvK54xjeIbd4Ik6C1zBhhLzp4O5Bol3hEJBdE9MdUv5fZgCCc+MaYs1LINe37FeHaiYs/9jr6eI1A2

UTpH54vM/Kfia/Lh7HK+wCSbjvCp8iunENou64E+sZp5JlhfB/hsVo26CHRicVcyG18H2gA2OEU5cENj

poz5r5REd3T7/5LbSjP/Tgk41ysBB+MNmX4q3FnGO1
iGipMQ5/kOmHWSEIwTHbrBqX++u9U+YNZwqeKMkdfLGCdWx7o1xqeMLJunElkVHg5UZVJTpVNrQmcDHG

629aDQxMTJxW8sE1s8OGrzel5GSNr3hQQm0NFK72o3lkfPsc8UvIROv2NDc3sO8jACo2L2daBjDmy3vF

CNYlH8CYG7To5GsjJWXpLYa03VJnme2k8EFVkj8Afi
RN3B7caZhlDr3X4zikqkeyOgSzU+jQTz6lulcBf2butfqaUrhfyy9XlS3N1b5dQMtu4Dnq2ISW/1/sYv

8GiI4/xaGRJnq4OXBXZqqdeLO3eou02LKZMoMLGcgPTG3JfxUiHtFTopnSRuw1YuIj70rpZDSD+7z4+9

7Hwo2OAff0ctKKZMXw945rU5qaWUkYtEhglUz/IWrT
VXlw67IFLiz90cU0AoIu2k7z7S8KYxUugIpQlLguoHT9DgKA2PteUvSYMUaUBSZOCgXiz6hPMO0goX73

QFfJYD80vdP8+JLF05tzv829vm+wiIs7EBJwHjWkEEYEIXb1YOjnlX10fhbeG/wIvGKL4JAU2lb8LiHP

FtDQplbmRvYmoNCjUgMCBvYmoNCiAgPDwNCiAgICAv
VUmwLO1CROtrMSmeTMEpZ7WskhMcfQKlESXuXf9TFBUjNG2EDJVsoWMlPIXoNaZpVDJIKbSgPSNzOXNt

wWGMi2igQyIiAYQ2BOXwWiwiMC0IZZCbZH2Ua913IL83qfY7BXKeFf0PRTOiCD6Rse55dNK3ETPjFtJg

PDDqqpQeZIOidwC4JA1XQrUuQLD5vFZuCpfPYG9HUS
CwcQGbGN2VAGXynXDdYZ8+DQogID4+PDyfxeSqUsgOQiGxBVHyWpfJAeBwHlG1LRTbJiNrUDJ6RDKmLw

QqWPj8XIA2BtE3ZYGyEMk8XCxkXjDzJwtqOhKzMECnEiViOFkxLZr0YFD5CEOfTuSnSlm5GJD6FFH7MG

VkTUR0FXX3VeC7GJZfEDF5HRZ2GtT5RMEuRRT0FDH4
UlV3FRCsAlWtDDFsCkI4JFRsTNwjBKB5XKR5ZPHhBnZfPGt2MXQ5WmDqUpKtPHatEfX6LwAnZhO5LURl

HYP0VvieKoMzZGS2HIX9PFCcUdZyJMEvIZN0OnKoIzKiEOm1CJC7GnznTds9CEgqDhR4IrflBgHyXXpt

LkT1AaafAWR5BBR1GbZ9LtocVfPcEB2FYSDnTzOsGl
a0WXLvEnV1VFUgWSG0BGSqNnA0NWAqOoPtNCZ7RgU5FGXgNLJ9XZGvRhL7LPJqJgKoKYA5XPAkOlurZW

S4PSS0ZBA2XYfqVlTbCWJkSTH0HKBjUnBqVEG4IVR8EYZhKjQtBSUbGGY9LQQtEgw3OPZ1YoGQUzVgNY

W2ZILoQXNfDZdnVrtpJVK3MEI8SRMjWrVoUKf7SQR0
NPOgCjKjTJj8HWG5HDXwZoCxUVg0LQB0NPYgXiGcTXm1IUS9QHLcBvXpILa1NBU2CAVcIjQpENx1SKN8

NBSsKyNcKFm9QKL3USBfFgRjBHs8SOP6ITBaMIdnMCy0UQV5EKTmVjAmJSq4GQB9FPGmSzHoANb7MCL3

PIHcViJhAXT1GNFxVuHgUSI9GNZ0DgK0UYPaYSF7XK
Q8RDT3VVMwFcTlRVenLkCbOnQaNNV1PGT5UOBwJgUzIhT2JQN6BdO3EEQmVXB4PTSrVgEkCpIuUuCpIV

4IMME4RrFmAWN7DHPcBkWhXdTeZpYoUJU6FLP4NLGqAJA3DFqnDKM4WfPhJjOlHLytAmF5LwUiCpNjLF

ybCdJ6OpDxWtKjTMYuWRMvZvCeQXS6HwM2PiiaPqU7
PAK5SvRfWkeoKmb5UIN4JRWhXahuVmDhEKzsNcV6HqzsHObfBNh5WTA9ThhyXjk6CMs6VKO6SXBePob2

WMiyYoN4PvFdWlMjIWfvZmV2OcwhVlH8XUHqJLM4QMQnQSZ4PAY4BgN2XICkYAN4HHK7UvS2YUofGXT7

MBY5TqB2VIXlBJL5GQK0EgAmSuljPae4XTE2GILfKs
twKjXgJU9NPUF7FSJaDlIfMIQqFDC5ZENqTnSwXJXtUNC7VHbwIsIoTWJcQGK3PPYhZaEoEXNmULD7DK

DvUuArDHJ7CzSlSL0BFC9nl5PnCKo5OGUsu7TbCLyoCHv6VXrcAIMqH7N8zKBkYj2kkXCuq1ZgoRM7t5

AEOoUeGDDfUf7rpW2cyRTeAZQxKZnpLp5yHR5PFLPh
IX6Tn8LlrvTxUPV8P6WvlCmmwShfdRF6YUVnRQUdB5RwqEXyRiMaWLjfBTWuM4JwTTqzRCKmHKfoEITh

C3VzaiHNFx11TNcbUR6wFBWhOZQxKFN8BUUlQUpsZJDkP5u9WRzjJ5SoI2rzPWChQ6XylNFkBS3PSKI+

Xn3MWX5ch7LzPRi9RCDjz2FvOKekKNb8FQsfZNCxC6
V9sXYiOh5heE4IjJS8wPJdT8DqkAULbFIaT9Hsx6TTj766F4ValIPtPPKzlWMqUE2dl5OodtahO7mzOB

4fwAQeK97ivX0bNApdTEEvV2NtwnT1F5rnouOfCB7PMZE8N2lumhAlRJWWDyOtNFSyG7haeQnlHKJpZI

TXAWjiPAIqL9NduwYQRIPzmpixpF6bVCxbOLBWKLra
ID4+BUgnlbUdKgvJFjxzLUAqQxmRYpDyAkS6ZKQaDuNpAJQ0CGNfIvwyPoO6HBO4UiA6XDDfEEr5VBY0

AwLmLEQiWtZdQKEpJsYaRJxhWOk3AOB6BSQdQuCsYij3GTC5YDL1ZMObMIU4NQK6MdF8WPUgNQS0JBO3

JnR4CQHqGBG4YCG0BmK3KRTvOan8LBW0NRK5IJTuYC
tgCWF5DMK6TGPzWGK1NLXtAYUmXgH1HOB9ZmE8FxGmOnKhZEE8GjA9PBWbEoc3KUcrHpVrJpmiPFYoYM

E7XfK7SWOrFXOoRGijRgQ4LgrpEzD8MVt3PPU4AlSlDrQ7AEDwCTQ7RbGmVfF3VWp8IML4NceoRbY7KJ

WpKKXWZmZfCbk7ARY3DNIpLmovEVF7VWU4NbUfEsJo
GRW6VKH7DsL0CMRfZPH3LRY6JwExEvauIXZ1CLF0YkKdFgWsGhYcPDGuXCZjNqDuKIFyXQV9KvK5VLPg

QZJ7XJB3NcIkCaIlTNVhHKN9UKY8MVOhGYWuVPozJiK2XMNlQPmkAGZbICH7WIWmSsZ9QFO1UBPlCvQg

ZSq9DFm9EJE8LXWtRsVuKVr2JZY3QPBqMnMmDEs9BE
E3GHWjQgGgANp1YZE4PYYbJeGxRFi7GMW3AYUsBzSqRBb0UCE6IRNpEuOrLDi4UUX3XFHoCgQoQAc8YV

L5WDFhBjOoWI3QNJD3ZKXyLvLdHIq3EMO6PBRqIgLlQTn3QYB3CYOcZeUsXLu5YDNhCfvdHaSqMGB3Xi

B0PGQbYWL5MCE3SsDsKhRiTDS9ARMwOqO0QfskAyyg
ZEH8EcC2AYAaIwQjAOwuRfC7HVTbEAShPOM2YZXrDtMdHjBrOAHxYoOATgDtVKg7KHUbBqZyJiDmSpZi

DJUpDgTnHnWuMMU8KEoiBBY6RsInMPT1SYGeIKZ3MppeKuV7XOA1MlX2WighFkB0GXN3WjTvKZEzKJom

NzD5JfzkZuL4HMB8PdI5XsuuTxp6KIR1FUGoMctjFc
g2RQonGlL5QzMoBku7LR4HFVK7TzdwNdn7RMb0TEQ7DqfiADg2RVa1ENK9ZiGsMfRhXCdjTzI0LnFmDe

D8UKL1QgH6DXIuYIG6HAL9NpI8XSIdGCD9FWG5XdA2KTFzCHl5KWEaZQN5AYHxRAN2MJW3HaC5LHWxAx

u7LQP7FKAlSudtXbx7ONF9AbEAFxIaWSS5WPP4PqV0
TBFxJSC8TTF5RoP8PUZtBYN7YKLoHZR6EWMrJYL7OUR9FoY3ZXVyZMFsFGN3ZbV3WRWgWLCRLiUtIW2r

rc3SBVLvZVCiBnqZVrXhGCvMZoTrTJHsMXxyDM0Op764UMMqM3AlhSShtx4HJTDfTG7Rh367TxDgWO1S

kogagJ8DQCZqSW6Pz1UfprHkUPR5B0IppTiguTdstC
T6SAUbDRPyE2FhuQMtHdDaEXwjYEAdX7KcUOroXONhBVfoHQHbM5BlipDXTy06EDsrOL2kBHQcPOGaZX

N9RKQmEUcsFYMgR9h7DXgnK0FsH2guEKOiM0LqdAYuOB6ANBZ+Cb5MDL5dk6KeHUekJCAqNX6zfs8FHM

H8KK9NYDDlUY5NiZCkP9TujhDqP9KqfDxdYH0YevTi
HGovXS8ZJGVzHs0lrH6NjrdybN0PfqUfSXglFb9JtQ6QiiHnXU8gy2XtykdFVjIvSJUgFbdle2CFdXTa

ZTTuN4was7NEiLHwAHT6DI6IJLDpQJ6PmMO2bVHwHAKbTDFNFsOuYKEqPa9xvURbs9RrrXD7c5EdPSWo

MCBSDQogID4+QDukvlKfMhaRVeKuRLTjy4QmBPfsBO
v7V0JshFQvrkNwGrogeAURDVOnIPJhF7gbyqx5mCToQoG3KNAeXNYaA6QmAJGpZoE8FP9+MPewHEZ9gy

QbtU4GUPAhlRf0KUWT4uj5Y9bGbyUZXFOwVe9MNwMYNNWWO2UcfN7AVfLTFLSNoFMBBYynIjYLu7XTJK

PWyJSLGwqtZZ5m5JzbKym8IhNHA2J6v3ktphAxuPf4
8z//04hQ9667umzVLB5hXn8qOh3MSIGzTVS2NxIyzVLsuNjwLhFgYXQ7SXQApt3oTjg2NSl5STcBzAF+

HOKPJAw2qZqw4E/55xbk1Q1BSY1y0CUfOZ92pdDVKt2zcqrnI+XlCc1Q38GUhtKvto/RmfMWLlqd/Myb

GP3RE0NkCHG/AzRTtx3wCT3z6gqXdO5RrNg8ASFFvv
j/7MczlFDsp7hK/3LORSj9o3YlWhLNxV+B6LcKsvpxMU++ahZgU7TS/hZM0HzHxS328US0h9i/fPzXbc

tstWQ2LTkS1DtVZV/HhDoZFNWHil204+Ie1ZGBo4EYsdDp5gMwADwAA0OoHSpLxUdDhiestDB2X9tazz

MscbOSJj7nbD11dxRccdNXuhCESlByZxvQd+kRMYse
iJ07uQzJYXqdM+3rG6mQTpEmaEVuMzrCqFeHrEDS53wz/J2I9maU5QLaqjNt5X8VG7WcsmiXfR/pGlqi

/M7ydqIEvy+YnNiVs6XdnNLmNhPK5QNYb9xASaOCCGyS2z0Dwyl68FCu3q0tylIypuRt+L5i/kL0v9yB

wsEFgdDYHhU7MH6O0h9zXgWnQUfKDq92XQqcwG0lTZ
WyJix1XaFjdU7gSmnS3+gwFGopRESs58og3npmQcR0AWOS4n5MNqyUsvPRq7K5AsOYMQvM6ikqlZ479e

Jw1O1D0Hmk8I2gofgCzQngYjbwvZtk7E9/or7IAJkxmYeWG1BDk/+m5+qOQGZFzekFTgULFPtFS49BV+

fHULP0RiU8FEfqh2s0Fo39LemOaiDmb9z8jjxW+lC0
RmbzxYpaA9a3aa36DVfDAu40n5bH4bi7zy7ekHvidxT9yY4vn4UUaR8gGaxO1VXpqT2qcxfLP5TMBhiK

qgQD6vJkSkvYogLe6Z79Wq01UlzdgJ4Fg6J8kQ4EgbEeROiLEFBXmWWZoGBaZx+TR2OTyvYdSZpVZ7bV

8HKnK5mNrkb7nu1YODgzeT0IP1h6NK8s476vYnvzOH
3l7SkPfgjbA5236IO0I45R1AOscBbmWl4+kRgVbIZ0Hg0Rso4jv5hS8PIde1uHrHehR/GDOCYxdKUpE2

DeqQ8sTK3iT3A3zDmoUBysxm/QI8WEIajwCeppDrAMjUkZxlg0D41SuG/tcpCYjvC0khPZSnfWn00xim

WHRmSz2reyxM/+6d9xNM4/X0d1y+uR9iGN0DVvcRej
tkEr+LSaulEEcvIZ77bb8xsQ38UbcF8e0HPCEePBDqMJlEJdZHUQwG3cVY8d2C5FK6TK9gPRADJTicEm

6i60ies9Fl/zLlg7NT0an0B64MkuF95zFhYtcFjurgGNG63u2eMpf4ftqOF8kqV44ZWyT+03tP06Vzui

6aAYCG2xge/LK3xB97/qicoAmWoMW84ob0808Gr0/+
e00hZZE6EfruPv23sNWi/f5lKIme93EY3ejyUDXr2bMLc1kD0WtnZS2gI0cBK4bdu8ZXMwNE8Gm+VVYo

fsEWwjM0eBWeBm7WHB1z/Je+YVimB3MbPPhWlkt6x0wC72SIPS4P19yx8r0xBXEZ5xQ1re5QOiIuZHlv

5Q8hSvDg2Jl8kt3H9KSf4sbovOQp1Qj/JBbSxGwV/0
ESWoZEe52fYcxvTjgwqnPNPt540UEL1ojgAtYSDR0xrSZi6Fnzizc13On0cAxBm+hOixAi7ysfCPnAU2

MbhB03IEj9CkpKHSbUxKZ5bZdwObQjyS4F3S4unHXnYrEiQ39ulfvCzQpe0L94D5bg1QDpn7K+Wj2ij9

eSCgcNWKhHzs0iOrGonILRYyQaDJXoCkAA9S2Mlbyu
Nuk43DwTOMHu3mRjon5HHF8bqRFAElzAO2MUCABGqesfxLLPXeDnm0MIexT9TLoIAsoBcZKIIaOAam6O

2yzaMyJdSBjOgo56hn9HWe55vgvMx+jMhqi5uRpqWifHcXLv+LbQzoELhjBf9ttJva3anYG/cvWjsFK9

tX+R7TirOeVazI19L0tmLGAPvuofsFl50ckJWYBkYr
/4JUhcv4kPvlsLax0UoCSV0VfWp0iTPS4TNSdvyvmH1+6AZppNSHBt7M93NtO2bxeeiFbjN6t7PZY9KQ

sR67OHpaQw1VsiM+P01DiQ5FycZC22QLFt6TbuamtPJfB39wf8P5ilt+TNkumGY+iAxT5rZCj7Dsqhbi

e/VJrTMdBqTWuSatrW+O6jMxZfJDJ8jRXflz9xWdFv
fT/Jio1iNNa+n0FSjjD4SchdLG4tE3GmUHMzkYGtDgkVkCJ4t0cokVFqFujuAajow9ai/p7O7ad7EqHj

0Gi5YkP0KRgGDwAUpaAGVKsfYPvF5Ntks4OP4fOxNJQJzvikfpago6ZcRYIBhkga003JusJwV2K65Y3c

9Xyta2zlnOj2lmbG6eyy1g8vDvc8lkprhL76FiAezY
E6kRD2rt4tMWjFvCY+Km9LHMCoNR9eFbajZS2OidHAjMlz+NwsYIEf3gdSyKOI1tNMh2AlWfHltatRbO

IxRHPvkP+a0dI7/z2rPYoi4RB0hEvCbnQ9JlF/h+KD+ka+WidrC2T6A2kQ4APhOMEXtX+H6A7/vyfYqR

l7l0KIRSFamG7GAEYU84ckny6TccfxjbilODOh/DM1
a+x4j6s2vSnQwvj+J2YQ7hDT54CuwHvsD0i+MQuDldJv4PRGpQMdlgaP+sUbLG1oBTVI6d4CjO0uNh23

K9yOVSpCs0x0biG05mF/GvUSmZna2hRIGK39Ed0+JVscOYiOUCqMjWvZ1hTeVCDNIKXiTG2EgE/HI/8D

IlBvBMldAMkDtlgNTzciCkHUbVx5GKjGCkTRZ99Jcs
URPsxg3m+bT3DoLRJhhE2dwB4yUp8mQEiWvrzTBRNF4SlYTd3i9keerWzta2jS01eNYUmuEWYTswV3YW

JlA7NWaaCng7PhkobElrCZ9s9Lak6UCfhG4iwDpIrZ3exj6UpDhIa/t8pPph0za62WBh5bnh0IEV4xAg

5RVT5gEYaeRH5EuAzRYY4OCHlZWcfmXBoEvSUsOKIM
jUyvWPd+eg0nNdcuVQhyZXzrXcWDmuxxs2jQOn5YwITzgJtv2oGnc57NizQgcpuqwokyGNSPdmTL2Ptb

Ib2xyRnc3xMQnlnqzZdsOoVMD6M4ZTLenO1RmYzOG71yZmt7ILcPcwWr4PlMRxLrJEeolOKlZSE/oEa2

BimbLzakNcpp2xNkrAunGV+2QTPYTaSxE2YuYTCkwW
ZeglHzPbq13bi56BcN12Nxt4k8n/jPmYPvuQcB+4NG40PGknbA5MXirUyS4hDAXeOmZR8BquGGfRSPk1

AY8hvplonN8yKuGGr8QygLsECPKRuh+XoC1QzbJwJ4VFEGU1G6+5oaZzjiZcjBWzDqen9qBlYsCGzCys

hYQ8oGARqL7fgftFwgDTkE12+wzpLnB0VQmsYA1l7Q
9nveQ41/zR7V7Q87B+4RscL/5K5cfPxyWgyHvG7U4Xesm6VioA6PlAzjkOS3vOFwELSN7pP46l0YMM3v

R9byMR84itCegtI6kO+N701+qixncIIVt3+b3XleG6AokiXrWJvgZGqY7+xborqa/grt5D4JlVoB3gQk

1kp++qpKG+BSafzupYRD3EcbJHv9ttYK5b1AfzVH3O
vZNwP49EZxf2BgyqvDfDIu2OaZKhP1lH7P5ZfoQSL9Xf0VLkFFvJM6VKd1dPcKt0P7E4JDs8mcD9Pj0q

Q5GvoJsCR+xm0b6wpuP4nmNO50hJvx6lunHVWU8siELQkp6B+qnyu1dsj4M7bpX7EFawXPOkL+blZ4m8

5X6DguwnM/4QW7qV1gJdj5mDyjzQ6PPhzGvW15N7Il
81PtQnLS/pFVdiBbFDXuNcUZGN1ydpLGnqXA7zmoaLJ9Q3r5IrznSyDmUCC2YYMQQDw3jxVpMmLdGsc0

r0ovly35l84Ay0dg64D3AuB0+uissRXD7kjnFHfp8VnVpAkx/3TyncBTP+gW44ZaRLVzATq1OW0kxp4V

yuDcouKsqrFRMVH/sMA2UZzsRWeyaIJp273Xl4b/nW
2nwpSA++LbbPZhOXSCgsDLjT4zjM+oqwKxFoO9ezzPuf+yrJ8KA40xql5R8ZZ4cZ0wfVXjnGm/6WlcwT

IuKEazNS1l5CaQtnGWWWqnnsYHriJWx1VG5dQ4tKpqJZVbiZQg+Een1Sr0EgQvpqc/JfaCBguUNY6UJm

2kETfYKoEViA7HpYoLDY0ZH/j8u7tHU3GG01kXybUX
Ph2wTbRBf5uPNR4z2nKAY04ME7tnKpy5gb0PshoD3RGT+rMKKjdHCxlV0wTkWtWM9DZhdC+rLOGnuazM

ZoGbozXPI1tPb3H6eZ5jI9dWy5dJzfnjBaSRQOsXicfJfakgVMdss41Jlrj1W3Yuwnu5BnzgHhHB7CPq

x6gNoLm12GDY5m0NyYCKxFzZB9og9RgyXjTyn3ZCSI
J8Ia7AAtdQ4pfqGOi+OztPhEH1mdshCVmOEl0/6rwAdBCy/mBrefaRVn+gBgTtGzfm9NOEJT2iJbI1va

Y3uc3cJSdQHHCnMwPFkVOZlV0zaBdskcmm7IiIbKAgqBVUZHAkMh/X+oO6fbIQlPA0uLfQHpSCBZdaU2

qAObTMRFeyekfFqQr4BO6WlKOMOFLbItPdGm8Ndtxe
e7f9fFSbAi0GxuqfeuJCxiqwY8AQJ/4cZKq2+kaYyk8NmjMthZD7OV2wDiDChgSjHdHTzGKqNojIpOoT

XeSxn2enn1yM5ol35fV1wa4qvYtkdQJE0CsDjRG1q7fWaU5YBSNzIvHDtkyNWk4V2P0OrfOs8R4JbrIv

HEJpZkHzPybwo8bHRc3E2Rvig+zEUpau4Le778pwff
W8oZC5rHafZip89SXzHO/IlwnHBTBDN4EzfoGZxXImwrvqNHTvbX0PbjegnCnxp2oJH/6fnxnWmnQmj7

fhNpboP2Ta3BjTopghaq8cfD90qPUhWQWf+hcCefaJldFQpErFZziRjTLFXwQ3eHEe3zhya2G+AfDLqb

04K/ENrPLZpDNAn7SfCEWyXe/yUqlBcEuvGTE1GYsf
b95MlqaoPa5q5jvqddhJs0O+su00wkD62C/31n46F26Fp4bQcXwWVZT/frtAtlpQ8hJnh4ko6F0Di57e

cBHepXMUOB5sd29GgFd2N9FN9iD0qinl7ZvLQYjJSgjNSXMkVW2di7U4fyzbmi7yshC+HCzUHPX1anVF

OGziUaHRpSXZcN2W+Fwg7y6XTfTagPQwdTwT4xAN+C
pqP+k+x6A8/QKK4laW2apnDTomCocNoRatpONLM3sO9Ld6Vos8VeGSyLiiSo1RtpKQopR9Z6+kgSaK81

kswgWIUvims8H0jdd1Ct+PXnbJe12Tg3X3DAZzzq+u6YrE6GqZN6hejX1BzJm/pIrwVu2h17C8GoKvmu

bOR/NyBtnP5dewPHHSD02OQ82W+qMbQUWIGyjrjtxG
0D/7Xo1/Eo6zc+kZc0nWRv+fQUrVpecBv5opBdjmUywy35DqXPFCiWKafe5zqdWG1yN55xrtomeOHYXx

UjFYrkRfZUfRhCgGrsSwU6RLLfCIjIK7E58BkiTDoP7QdtmX8ep3XewpOcjgbZBWV+QOnE94BGzU3FEt

sO61V/2nPmXicvLifZfJjmOEjmPHm+3ZvFSIBn0mmv
zGy3DQ1yPMfq/4EplYHoxwXw8Y/FEBBEeGKJQJbsaYHOEK7OcTNcdjbUcNvRAFonRe1JJrdiXCOnDUdu

wW481WmC1JeMfeyr1PV7WjjxIOgoyZhm62XSAeOssSVZ391qpaaYyvF4R6htF7RQT/KQ9Wx06frVccAz

x8C/ZhNyJssZxzlEUxHpR2gdQO2HBi9GZyTe02bJOK
w0cmHBtZyoaHrm4ev7QiOnXuyURU63D5M/Oki2xR2BjRnao6fMnwA31XTmkxO99HI9NGzzEjTR1IVMA6

s7x7Dyk4Zyops/HseyX7d/EaQ+PP4t3RfqfwoUsGhZI6UgnSE8+fJ5aitJOp76JQYDIkoPv3Fy+QYtZn

D+rLNFGn9XIhPKM9pk1zeHhZrpsYJtPnIp/w44+MTB
y0NFHEp7TCRaeC6c/azWB+prAShfNavOb43O5EXUI20lZOONL1tBv5jN2fSAaoHH+d9fnpliRkn+rB9Z

g9JBQA4Vxewnd4Hy7xbqAgrf7lJu3ICoFkb7GMTmdkPVcmgd0XytbaDmmRrDQbCmHu7zeSaN/RZgWA+i

FJzYb8iBSm0umvW985E9eWc950qFh+Y5+ux+pW++oz
vUOjpRyRdlbqNrjGPod+aRRq9HHj9nPMM6NV7Psl3LKbSqsdCymA9CH4Xeg8m+RTawwSJASB2TdAIoRS

geabHh5PLB63fyumLmkcLY1vgYIXYCxdazCGBE0lhBgDzQ3+5lXz5uikmTs24suWznyJYhBo6NP/0h8M

VEISl4bDUU7yChZCKkEqLS6KkjQtGsyF9WG+d3r0oT
oNhoDg7OhaSXnejbKifhhQvGhHff7Rsi6G0kiASeKz6sukB8nDNUsNllrCxnMVpANWwNnGNv2E48MrzE

cxrg2wR9aFu/YSbLyeX8OJD9CzgRvJTdhASOwv3nlYtn17vGTmwmI9mTmGjDzJctg5nggWImrKmV1agu

rcDFfFl2gfrzPDU90j4o2d5GSY3n7Vdn9YyPusmy3Z
PmB2WpzbvVts5LKLxEueKnOQIpvQs7bqFlQdgnFR+daIclMIj/9Vy63Wuyl2MeWgLa4KyHTwVBeUFFAg

HNa+gu/VqapU+tDJ6l0z4x2no/VVC5S5mmfzPHsW+i37ejZfl04bVPW4T+O1CajpwSHkkq7hG9G0CyfK

YGZ5lN3XuPaDSz1O6B4rf09FJQOPsxqDTE+u+QrwPx
GV9SPqCvDUJ5K3eV5FUf8TXMO/Oj0X2aG9SX1MNXdVNPiLmUAyB44J2KNhkbY1yh31ywT/XkfJGOefR1

DLXx6T8WHhO4kCPebOT6hBrkqDdBBcLvit3o3InDAUn4077OmEK9dGr7VgoAb4nI/zx04I6Rld9jRXBC

7i0Yjt25VXzULOxhRit9DKZepjmDEy8dxYYpFSYr0U
q1A4EpH0rnfj0RTT3467sn59z5OXu7I7OkPIV2pcb9ZhSMUzvPf3Q4IZ+JrTc6OSTJoGmzQPo+D/j0DG

aZWGhb2r9UozhW0H2Z4CD50fV/W7Z/F+drHdFK0o9/yMd3QQRh7Sb9CmmQ9SC47x6OMLHq6s4/c7c93X

5C+O0qPKL/NARYf95gUE7jx+kSkytxT14IiqzH9iuC
tGSOdJvUH3l5x8+uJYusZwZ2p4pcv9XKDIPp1wmMZLZa70a+ikl4CSxGlADWz1FkK+k2O4XaXPLG1ZqT

ZKbUDInbuj6qCR/ox1BYR2wOqpS70wva8R6/uP0S2T+Qr6d+zKeHjpGpD0uXJfFd8NG1vg3Tekj05UA0

GIEo12eh/I3ImK06eTCVb/+5ktOYfJrPKtD3rGRaBI
oklg1xIa8xDb1a9gp7dGa1cPTembkZ8s4SEXlLkjRHzHV8v+nXN0VziHUOXjxlZ0SVa19hvvRrDqlyx7

++sd3GI4P/lo7pu2Vo5jCTlkrxKEpeG58MnYr2Nve/QrrrQZ+2300Xjc4H3Xu7h2U/JwAN2x5n5sIwai

AGWUEiYlB1ndl+9D9OT7//+QXQKalb1NZpcDmmvYH4
iK6E7w8vbBncYah+r8rObFrUSHxxzHn0SGIvTZoy45h2EZquM+kTrBzHu2WMClL2av6Qvy+V/bdCxW3Q

yfwFIAyqTmxe2A7p64yyN5B83OnWubBs7Z1+bvlSH0M9u/kr6kAtTJYL39IdR1vUIwCnN2Mt/LB7Eiup

2AvHa9m2qYEOw4Kx2u7G9h8R6KI650X+7E1xli80Qt
o5sXo5u/1/lp29T3lRNTgVP0UhsPo2UQpzOC7TaqWEcb5/9lp+wrR3uI4+n/lfei9V5EEHIHrJ7j4DxN

55yv7HmY1eF/fP+iPtjj/ab6DBKJ5iGyxUQWhnXxBE1vZyHh14KrHvFh/fvL5higQ3g+vnm+NHG+WEA3

zuj6KEkkD+GU1IJzGrGm4O/9Un1nlP+jmpkF1uwPij
RRPeNZs4onxL08nNbfghyn7Pz9WvUrglr8p2Q47gsx+UwZi9O1quHdjt9qTKSVzVDclw+1u5xHx2dHSh

y/hfzcH7q50IuKCDY3F92UGjBxto1Y+ZBb8yOvPnUkAPhU6XMz1tDa5+8gKPa9VyyKycyXIuuepiDAF2

DxtdAqfgmwJuuwDK44mw5bSwbndhp1/p4I/nsyGPn8
6ZqYsfxE4D/G7g0UUlh3S4hvQgkkpw9Reqr7C/blj5NcVwiIS3tV+S+PsX4ifpT9Onq5weh+SPYRtPoW

vsC4fvVsWp+Ad7wYyMo3xQ1bID7MTdxklDhNNLLu3C860kdTr2Gu5tHxMzZVrTksqeYcwMVLNcWhflmE

XXxNOc/dKPFsg5P75ovcC2jASsVk9f02jxpw370lvv
pd+fsfu0RVCP6zHyvAGTWq+vsH48/Rqj9jWj4bvBVgDwmO/zI8/sk1N95Poc+5nn2W1lc1L5+lLAaeNI

Mtas8H4Nk9DYlxHW6XUAAD/7jayWlAuKfCuTQf03vqva6omdUiDV4Zqdk9iBlb+qv4ATrA3Hc/0IYJ89

krkjfFQPNCNik1420zpLkVhNr/sq6o9q2Y5Rs67Q4t
fn212GeGLGdu9Ink281neNCMyN3uD7vBxV0YoahTM2aFtITaPkHf4UT7y5ez97IZPTeBamtLdPdHpN79

ad9AkA+h+4WA5Wiw2APO6EGCcDEo3vyAoc0StcGWt1Yv2MmkXzzYtdHCMX26mOu5nbLst24Y91isrW4U

hg5Q7qlCe2zAxriojKx6YgUIVzRHYpl7HTZ3ghVD50
4C1s42yzS+d5382erS9NqzW3l6HEpKg2oTZY0RHLjwkIGQ30zm4EPT/jtdBlvNL3iPQ26jpJUCoUha4n

MlmLMjZAFpSjxWL+DUIqtsdd6AGlKI+GZDNoxzHMrYpnQoVxWFZu7RV8gGYYkWGBDaCLh0ClPMbrgzVX

6ngCXLngw0GOR+StxZMXC94iJ40kRD66J+J5Urw1qC
TCO/pFNOJaurEFaKPSZ6JuP/oe0kHGq48ymFacqt4Imvrat6pCiIGkvy8dr++/AOx/yCJV9qxZwqVlMZ

y3cQaPeiTfd5sSeIDtp4BZ0ea0P987K9BLyDuFzFefbeMMNvCPaArqlu/+QFgHWp2JuIj3mwof7e02ZF

PAWqd+m03eI8mfyvia1R3Waueg3hPGeKOT3IvbW49/
nnrNyOj6QX4B+Ph3aHssamcz6aOg/NrrrWZo1J/7ByxCSpOm7K10T8tvS3Vurc06vJIvuQHxg94od6B4

UvKsECwcb7EYlQhs7K6mRU2CS/0ifULmPKF7GJqDdkrXb0N/N52JG8bbjsGoxfjbGo+WtqKZ3b2H4zp0

oThKxaMFTtG9WgJVhL+aQARf4DlC/XMWhas306G3Yo
1Vlm53PFdI2W6Vp/hvwbp+Eq0kdwQOyxGly1yY78wYsTpHBqAp8BLfz7If9ripC7NLG0iVFkHoumAEG4

RvIb/+LOZNQFQm3llJWOb7lzfmr0gccGCB32Vkv9fmP/yPLtnJVmAh9UE63tHp7FdZmwzcesMby7l9rz

zj8oWUU+QH6v9y6WrHHrngrATMEkXY39eWr1Pxxfcl
x1448x1Ok7/ayBYrhoXBubjx8vzl2xmN8PV+H+zmM0W6p9vyikjXELLdjjyWnPqxFao+tLSF3P3YNFyB

taPqbQrnjKD+zMGtDyivn/Z6z0jVaEGeJdnJnAeWwdkdR73WCUNGHrJxgj/VyqCIekoTAK5Xvuebg2ma

3xiFdmlO+O00DBbEPpcnv8p1fx5at4r61Vkh2GkQLm
4BBhEreMex97LrUgwR21fjt8ZCLFuecPE+t5WtP08IMnAeUaw2sz6FRGi8/usXgD9qRJpojFbxu9p2Pp

k7q/nAhtqH+QIhUVby7cOZ8Gq29nv+chukf/AZspxc62h0zYw+CSv97BwCXnzrY7dR41PJR0xdHT3Nfq

t9/8R4AeXg2xruzv+FZuy7Opwf/OEj5Hg7XLoCdqym
Yonrh9lRc3gf/u2oLki9YAjB3PYto7J/vxavtBq2lpna8v/DU0LvvIyA/gjbmu3smrTf6U4anb1d985t

95C4Jp4kHKBBijIZ6zt9ZMiuYJrS3TPh7BqpmIbs9qWlYfGntzXz31J3fUQLznbpkpZE1QVY/xZDBH9Y

LJ32kwipuoOVlNuO7LoiS0u/xyM6YMsczwpzZNP7Nb
pfuIthyD42aCYRXYJetizf0A4T7N2e7Grcpdkf+U6+/9N+/J/2y3+u4i9yzzbbN9wn6yy02ystMe5h/M

nHrrxshUu9GFCxZbTwzf8FnyhzsaDor0oxQpDDvjdhIT/piGFiNroVpaG82m+ZOwe4P5T4K/inpu3hJd

21iKgf0pnahLlXhhir063vqe0PaYl97d1j3xPlIR7k
3HW4sY9VmrejzRi+FE0/8mhb3t17iXWO74DC/fuWMfqVH0z2EJjTAjq3ZeXgvX4JgAbcKryePGGh+1nb

aYpBkgbOvKAcZeu1sc7BnJJsh8VVju7XohzIVqe+j5EYpNTxXx5k1GNUbFj9A7Rgiw3Fc4Z2/LjkjJ2D

eNgL/R4WX6QLiDYdH4U18ar6z5w1etq99O7stHTdc6
Li0pP3zsww+C2JgI6arK22ayIM+O4K7d56gHm4dlZQslfv7bd3B7aAa7zVW538VptUv38NRMEuNQ2812

y/osmPtdx3EbsH4m7jGwU50kAAz409Qwb53Ln/JFxRswVcBIGB1BUdNy1Z+DGMk1/BD2g6/C+DGtjbGw

d5wgXAVpog5qr7xVx1kHgrk/yEpSO7CPhGC7oiquCb
5Ac+sE6GXvxTk6dbKdWSYN6oz//O5wrMr+DmuhlKTP3dHBo9EiXfz7EJNRgEfnsKO4cbEfvweJ+bAL3R

1wec/MGigh2TaF5lNcf7FiH7MDp0tJUEQ5pZjI63S71UFzzJ4+AwDTR+uTQPLrPnz2Yqt9dhPj+5xPeJ

5X6lG1EO1+sabFaO6TMI7jQ7QgG9ZCiKyDK/EKpArU
o3k9at95Gtly0f/cN206Ok24kvuDcYnjMt3176tOj9YFrHrI5rngppOT+B5pCmyh+z7hIgNb+HfOiG+r

VPqctupxnJBmqsmvedzK70oNSupuEpltF1Ljaw4G5hotuZzpZ1RaxF2IacRTs/1++0OIN4oYW6R2ycsy

JNpaMQ0b/JdyqRdzcausvRoD/ebBwJ72prWsfClqR6
ikelQb5Y/3HgVujX+va8MSH2P9RknXc9HmlyG2BYRH6xtBBEIstsP/3VTl+ncyBK2anXp+brVSap2zdU

+Z2DOk/kzts5Z4NB1PsUJb0mSrb/Ly77ifulNV034PcKhImSajzrP/huN/Ci2DzwoDiqs8NN/mbrIHFI

lPkZpGZDSApGssujjt9SAyljJVvMnpkuyhIlczh3Mp
NbZ2mE/TbSq6T8ubADP63Mgq/h34OZjx+Iu3B8kRa3abn940oDeRezb7Ul5CusR1Fz6TIUkRLrYhj4Ni

sOWs7bIPH7afKkkb6eO5h9CzcKUcISGSRtm/TbYafRn4JoS9+d+W5v/o5TaUx9T/8V0f4KZsxpYClasX

e8XfUT+sh+tt0d5gB6Vth67n0V4+dmda+A0hx28WnW
0mq08YPwwHwiAGiTqlj9Q1P7qZNx968jF7buDI/3c/wl1w8GmML+ga6GSfZJ3hJ4vcW8/2TY/vZ+hrpD

2fU2gV9ldjCcSuZYh7uKxQF5G7iN/BX4B/CGfU51/G3+7RC3S/1+6B6+A5X4r6hJ7kEwZYxOIdnel84v

aJV0EZ+LM/q9SSq48pnLK/bwwuzXVnP4R0l0+myHQu
wMoJFvD1Z4fEcvCXbUgBpgyYUU/D3hPke/CrZ6R/WeaqJ+eooAKCAABt0n3rlJCMefSu0+dHW/N1+/gg

kMC1q02NbqFV5aSMTflKZvSLbyQbTx+/cT+kWUy/w78GMZXUL3pdB0WdmQN0n+S1gWJQ/9PG1H0gxnup

1Aq/D3tCdqwF9xtYdHBw6CDa1+ovVyLovRTUpO4z31
je1w9H8Qf+cWujM9R/ohFnx/z1f1fdN8m6Sk2bGGpun9OU+VUMW1jSZj2XbohvodFzxauE9tDOBiSrxH

fEcmB+Rc45zNcBXqAoBqE8JZPMnNbIFG1f7Kjy42HxbifSqWr3rC/466o/xVwF+V8bXPHPSVKrHt8jnC

p1zRG53NQ1FbJ12HCATbtycmgYCsxL0nZvYfgOr8rZ
5oA+2N6SaTGKQaKdps6ptxu0EpFp1JznvQHqq8U1dqLTB4C2ed7WrEaTkwvIcuB6hHfA+8OZ4v0Gwb9+

xzbV96WTj8eREmsn1AnEIRe3xuTFZD9WqxV/P3alI9mncaoTIZUGEQBZHp0aof+KLoBvQzcRxeqSqf6l

pPEcZoaccp+M036btD/c+yI0+chN43uGnQqDLbgF0W
pq10t6CplFBgUCkRwc9mtb/ivAQiat9sR/42CgHKZ3o/NWwEtRK3DF2m4kT4/qPg+o2B1xRKRvJ3auYi

YsnUEuCV8pB2DBlA+xVPXVnRShBmfqfpUY5I+tzcVQ778JFxrMZvBpOVVgcmk4tUvACewcT/namrata+C61/

U8sldWjuxS9ZIiAcAzLTgjnPrR1ytDCgSbRZCrPHgT
csjqID7kh2UToDxxf2wTZsC7jhJY3JsY6euElL+jukRPG4xOWu63zImHdsU/hZL2famM+CLjnTXZcz+S

Hr4zSbtDzDeeGo4jt0PQqpbgFJi/BN5mx9I9DWbqheCz+lMX7T4MjMc1H0v3O8GlZuz9YyG5kxTqeqQ1

33/5cbojbBtN7gqu5v7e20ufnrbCT/DNZi6Nf5v942
/X41b9IcXS0C0wqpp/c6NkI4EFWnW/R3ZSA5Q2TSI1LGwXgWxkkJ966P+6msJtla35LPfEP5P9K9e9jV

Yz5qW0L38VkL5ZNJIGqXTwEyC/J+5Q2VVFrza0JyQcoRCumC46Hj4af9ozv/imtNBYsvJq38jfxTvXNu

NaYMjLPsk/hus5DKs3Ns/dw9tXvNjlqSuxfNLZTIZF
sOwI96OIxypm/S+pD6x2Yu1sKz3EF1mM677aIzdGEsC/CM5n0LvgBlb62UOPsdv+jDMHeD+8WIO/oN3s

An5W04k2Exbmq3mjkY3oSnH3MzqD1uWlOfAWLTjdN8oD3lwl7SnDF56x8fOsnxABvxfFpA7z95O0EMPQ

/Unsc9nrqbi/x2S60bc2c3+hO8WeuzKVGyxExpw/uA
X+bOKbyZMndZjWdjuMKMXVsdVJ8kRqOPHrB8ixF/ITxsAtZ7TJ8Z8O0Nw7TR5G7lCC93LZNzpG7kfh24

7GGoR6HdzVcipBuNGO/MnhGR9hsvZwNX98UumduVlZeF8gy7VUOvRbGx30qZwv1T4n1DW4IVQvsLQeC8

KpY12R0QV5XgcRUhh4Sl919aH679s9wmQ4EN+RBdB7
iviK1m6JqNQEbTZ3yNkfZZ5mPfJt9s2BbBcL/APwn+aSE95ZDi7T9XU3H/nXjauk+kHy5ykbC+CPH8X6

eIHKYqpj1Tovy738aXVvjTUchtutebx3K+8iytDn4YEYdbve9Hxma6HdkuWbfinEy8MKzvoLmOEcTWY2

FJ3hO/oRsVOQ/E+FO5ahKVbQingZMVGCD9uqe77euv
LkDfMTsWB0w6TiQ55hfKuX+PTjlF7yoB8qqyDSElD4Uh0JAw84Ia45ZT+K3frsyZAgG17wEBZnLcyXS0

YT+yeLEhp6qOs0cZ0uoNHD/8o1Mf0bnXqpC/I6p8Huhv9ekm+a3fjL/WjPJ0JJFLIPWWHrNe/FSMESI/

rBNi6R5bhI1J+Aewu9DmfWRMBvg/gde5i2pTedeyzf
v22gbvHEaTMDn96DYusZTDTmbei0+r0ve4wgcNE636g89gREq23xtj8bYCNjqyv1j8MoFI41PQNuHsTV

5aZjhGrDzbCoQOLy2m8GlI0RLrfWskE7ANz/BUgGBc1pmjMKCVHTmRoptOeDcPdXV+CoEoS5ruKP3hIG

xBmzNYzyli/QNpYLeu/pyAP/VInxXtRrmRG5H9MSoa
yB/4GmlTEpopxn1Y0eRjXJKqDQmL5niWhI8pyVzVzYgf0wodL74zixuuZRi5w23QGm2OmuGtvIcAKeOG

BiCXlizMFr7dRHzeMQwTg1K6Jfx9RTjwDKkcWUBhuvWkWoOcQ2EEv1m8FBlutLVkAfO/C9NdTJyQAtJg

Jdz2QWZ+EHhbsbTo2BJb4AdhUHqTpAx1GTYGHrCepK
FZiZfQ4qMkmhqL6J5qvKUm91eqoixVRvS9ikvQQI41GgVz21d2nDnKo56jS9KF9pBiiEAxCALCvgolXp

I8XlcD6ZJkzyVNhjL9SNSywN2/+x/8lANI6iamLfzJwPVxt4R2ytkBEG5c3Ft2sUF6zMESWlniCpKzdS

iGSMdGbYFzVbxbtkGNxW0a4Ho+OaZb1+rYZnbDVjeN
dWeEy6O9rzidqUoabhnciW6CEhHiRLyZYfTl5AQSreQe8Bjy7W3figUs7HJuByfJZkHFtFgLSGvhwp5J

4Zw+SWJDCgyZFvIYWFGzt55GRHYNjQIDZ2xpRA2rwwbkBRnlSV8ExXwWGu7L0FgoAHZ0ynt2teYRIsON

Vz6onC6wk3ZxWjvtxkXYSgWWdyX8RYBeCBMAL8L3xw
wHNDIV6AVrhLivuG4oRChKq8S4ZdUEvwSNmBbLd3axZ29K+r+O7EpVb3c7N0tQrkEwfmdz5Prvmf9s8R

bRCPknepuaVT0v44t+vyCJwl7v9CQn57NGjlq3/x+RywJPYeOQnoI/Wk7ivpmZV6QIvFs/mfv7xGg9AQ

cRFd0UqJwps9oCvJKPKHMI/cGT5z27mC+ScL2nCDil
x3v1DANcvjUKoaVXrbmt3gCDdAfMpLtyRnFQkRdK/NVae/syihJDQTTZEGbA+XHJZO8SnSlJktmcBc6f

rnDW1FRM9Lc8dUYUWDX3GrG0qH27DKLSBd1cwnmFR+yQyYjw416MaxkIYLqzvtX/k74VzsdoEjxwOWKH

QLEcuWC2IKVqQEsMIxDuVD7ceLjkKJkmjirW4+QJxU
NcttMXBSAUf1BWisG3mEJ1NeBlMV3AWAQDNGLrHMFbHFTQu/aTOlOi3TYLIL+wKULw9CZ33di2D3xIiC

nUaQ3DkV+3VsUJ83gkppcesM4XfkoVxmrLvcAAuuzESC2gBP55wKXh9tUgiwXWnyJ91k+FGjL6kwycTC

XB6D7B/dXUzYDjA7YFJAcRVlfRZ9TO2eLaJpTCN7/x
XizOBNNTYCriZilNdlkokgpdtuPjgPoGxpIJbEbYZ32RnTVh5xHGmEeXJxlO/RATAdGI26IUUQaQI5JC

AzRCacyMbIvE7VldTqhsjggTKqPtHasBmJLGOLtfvchKNTyZlL6jutibHVJN9pu6hAQdmIqzHctEqG2R

AeK4u3ky8lEgsZpU1zUZiuoP2xDQHoKkIgFEjhTRV1
qUZ6hr0GAdEfcSWMkr5p+VY379GmzuzCgluBDAfIeQ6HecntwIO9LT6eRlYEUXSCWcFKPfoNwiG9hlcs

ReILa9IIbCPXwuMKoKfEtIdIuHCuVUha86OiG/nEc5GthriNSayYiXJsHIzhAfyw7g+eUzz0gdDJsIko

7L0r3xJcfVrL14kbUi7BFCGa1UOA5lHppiPiM1Y6Lf
9+bZ3k2cCcqifiOuLUSXXvZDuw0VFl1dYIOB/HAC8LjkleiXEdcTNvMlEyDYMr0EizNX3FbWCkyStDMH

lIY9jCM+JejSUK2WudzAKwI+1RTdUyPlX0zp5XwX9W9GYtYCeHXkspciNC9TNhkRkWPU6Vt4N78i6FDw

1QpCXQQxOoHpIKCTgpYR7HIYKlifKizkbVPjHMaOuj
mztqX2hKTyLjzipwb7coKihLlD1XHeIPw6wxfuoBF36qbeTIPGk4k1Sc+/Mcc059EM1Q4qNAx09e/15/

sCO2jubupTBM1+ZpBlN7dlYKHLf8/K1Qy51jI1JVoH1W/7AIcFa7QFXdRggPvyBU7hVNiBgb40EmGbJ0

XJFvvIQyrwj7dDdePOMzUwwmgCudmTlgEdZ89KJ12S
f8DlfKkfJIJmf6TB9qUibbVWFB8p/LmJaFj3/4XM/f/4//hfgEf/I5VcGJaMVcSpXPU5uuDzxI9OVC5a

r5ZqHBfmEiFfLV1duj7IHQH4CE4StDw3NBYpG6HaVBKnWPKje2OmFM1ABZ5fzGgqTcB0Ff2KWaCuf0Am

QLIqWEqYqYHPf83PAIu7Y+o/+Tqrv7PCD8KdaLMQXv
8q/ENFMDtcsTAPKpj8G0rqEEuLohIQWa6RaiAY7vHqD4Y9mbR6HrrfOysQsggRMpny0n6lhjJXEMcId0

pxda/5Zv544TOE8Bye8xvVQIzztF6GywWpMKU1pJOyXi5wAuiiR+puAvXo/Mm8sBQXmTPGfYkh5lQ/Vz

2R2Fp7hcA2zOWFFgAX5Y0r1NYGNcNgUe7rjuPxMpBs
fUWuN9N0Hlrc/fA3V4VWcW7kxYHEY8t7n3D8XHNp52v8KY5sLR8w3AgisFplS/RUIon54jzub2qXOPnk

qeAqmMImNZ1GHwIyXP0tat0WAJMmXMPgHrjRYly7ZIkzRK8PvYYpE0ZdfsZDJMDybuesuR6aXKyqDS1J

y397XwIdHA4CJGUNLBWmMCWiEVwKFeUfG8OjS7KbiK
W3CIHhM0CjWFDhI0h1GVhjPK7YEYTqSZ48AN3qYQHCRjAlV7RcLUmjTTBgHJmiCK8Oa799EdQepJJmOL

XnKkYfNDMaEPXxCEM7ZO5GUVByEXVpwNqkRG5mfELzJB7ZmBOmPiGjSUfdJM8Mi354RtjmYDZcYWPsBM

BSDQo+Bs1FNP0tm9MjXAyhFSAwYQ9chw6MRBmDIwTs
H0Y3eUPbJg7elU3CpGQ5aMKfT2NBLBHovkUFkUZzJf7FPJQnWm6zxU4HILWUGFXdABMtDAcsU8hQUC3D

IXPKBSYfHNHtsgTpcEkCGuQmV3JYWCX3l3AsrGguYv2mCKozIrPucSX7dmliNQRjw0FrTE7EreYlpytk

VzSdETFusjYusIuoDW1QaIQmrRXmQT89WCT+PiANCi
CrM8BhlcBSPRIcjpdkuD6cXCN7GRUvAb9GVHVtQFxaILWeQA7HCbJdBne1DN8rQQs9NErjCZDcFPOgRV

o+Tn8HRR5ob8RuWVnhRyUbLG8gan9ZZRlYMoNsG3Z9pCYwGv5iyL7IlNQ9nSNzK2C6eZGnJ8Fbj9DXe7

42Z6OHDLLUCqpJpobqfI9DkzUuFRxvTz9JZBQtfIr5
cN7QONcxRU5NLSImUR0aUY84Gs3yzTAnFrE6KLLmJg3WBgNeN1YoAG4xJ06eQOHlRbMiZVTHGs8+DQpl

kuGvTjrYEgJ4WZYtt3MkSYpwXWRtZFZ2FXJyKtw4FBL1ZFBcZSDjCWbpKOIjUXP2GSklQJEsIEUdTGOh

XBOnDeBdJGBsLbN9ZSCrKoQ2XJKtFAMiCKcpXkfyEY
TwEMT8LpbqUJP7DDVwHTH8LspyCVK0LULiLFQ7BeflQEJ2PHWyMXFfTNxrArA3NJP8LUJVWmL6SCU5YG

YiJQZ0SPm0DXA5PYXcNDnpLCLaFBS3JKOnHER4HXM7WST2HYUaUxKpGKR9NtTpEPtgFFpeHJP3GBM5XV

fkLyL2ZSi3XXT3GnMjVlB0VMA9YUN5HzSeQQE2NQT1
KzJ2CvHsWMM2CWE8OVL2KOBxEXulAX2DEvIfXXR7NWBsPVDnBwu3HMXtVVIsAnrlSBW9FDH5LON6YjXp

HWt9RUElXzIoWAPjDSs7HPW3KzRfJKJdLxHxUAO2AqDvMUYyQLIfHCU2DfC4ECOwCPi5XEH6WETvIYuv

FCM8TDHoYJZ0SaLvQIi9LXloZtW5URezDZt8TGDsXQ
WwOXZcWxPpPmqdYwLzLZO8TXEwVESmYbQ1NEC6WwD8HTDpAnO7UNP4ZsN8HZRcNhZ4BNE6VlM4RFBlHy

Q2MJX7FpX4TUTkHhB5HXH3XgY9JUVuCjJ4XGK9HgW9CLAfRlD3WWX5JhN1EVCtSrH2DOF7AkVCVdN1Ch

Q2ZVIiCtT1SDI5JoP5FZQoVsU1LHC0XgR3DQQhZtH0
YLZ6WORfBROkQVJ2RSFaHRB7DPEoOYP0TUEnPDX7QEkeXUL4DDq0IKEbNFYdQKG0BTO9HJY4ZVPoSBrv

PMDdMYRxCtsqCez7SXO6WvWvZpDwJkieVV2CONq0FXB8YRJmHKqvHaI9FMR3NGP4MyWqFCX6PPmwQqD9

BrMyIAZsAOR0BEN7FMRxWpL6CFV1BYC6FOCxToY5NJ
L3UYS9GpNyMjG6VFRnWbX4AUVcWUS1TLCsKwUdPbAxJnW7QGF0CeHzBeFaKkNaWYw2CXV5PVKvRYz3VD

UhVwK5TOp3DAB4OXQjPbc7GRg9DTM9LEjmRhx2KTQ0BxI9ISncVRHmTVP7PVG3XYZyZHX7QAUqFHE5AX

nkDDV2WSIgOMQ9KUytXQWoTFX1DrV7FpEfCVSaOVKu
XzP6YgYqGqCzUGZ1QtRqPYLqStJrMVJ3IKWJPuN2XmM6IJDaWTi2JKY7WrC5ATIdXNu6LUL2DXK5YNYv

BUL7BCZ3SmY1QnPmPYZ4UVV3JPQ5YNeuXJy6QD2fUUuackKmXjyVJeD6IUObc4AoWPq8VT1NBHTjGCte

CK8Bf251UFEeM8HgmZIcsg9DJSUwXk9lnF6pxMFvD1
Ged2KZDM1JHLUsYDPaCU75ABLxSrqfN4GqOEZpW9z0HLNwYuixYPHcI6HalQItBpMiAGtwHC9ToPZcdg

AdJi5OQXGcWe1uqFZKn5suIePpBSD0JUV3RDFmLIL8OG7PPlOaM9w5HHhjP8MtO5lvKYHbE5PtzOAuKE

9ALl5JPlWrDE8mfu4LAFgjMOJtYtsTMgz3FGbfSQ0L
eKHcE2QkabUkI7WhhFioYM9WzcUdGDuvPG1RKZWyPs8nmM6QCTegTUJFJ9JvI69fxO3qN7mtjoYlh2qK

mbBnQTyqJk7WXOIiTpcwd1OMbOUjDPUoG7pex2NUtHWyWFJ0NU5GCOOsZ1ofpVweYUE0KKYgBl9ORHYu

Uy1ysICsa1MicKU4y1IcBSmpVHEJQJt+Fl5RRO0jy9
ZxTSwuFDCpBH3azp3DO03YQoXoRS3dzf7LXuAuJE0ayc5TTSyXPtWkY1Ygu8WAPXOxLj8VIBJlXYL3vI

0VuEFeBEJnMN2aP7NLBM0NORRwAp8zuKA6GXSaEvBuIASuSSRQLsSlSZVaIzWiBSKxGSILLMqzTDAkM8

BlZOO5DKBvEx6RSMSrQJ2ROxGlUEOpIIF+Jz2BXUHq
DI6koxFulUT3DKF+Fz0LUDWrSGl4O1B6MPLxYRo2B1CXO2CUCXRpPOodJKmqLYCyPHw1U3K0BHJgV4NC

V9Erbmyrko0+YZ5ZQ86AKHZuLPd6W8W6kGPfE2F4hHmQgYD6VJ8WIM5UxFi7kDAofH1+LU5HQ5FEGcEb

DPm2V8U0mZZsU8U9wGtLwRB3BJ6UHL6GoKYqWDFsgi
KaAv3oE9IVJFtLDvLZXQW4QT4FjXXvUO8BiMDSJ7XfuQCwFp2hLXchtLYbsZ7mFw8gSFheGD3KXqJHHP

oAIDJ3TO9QgUTkUF6AvKDVM2DolQZeEn9hZNtjuIEknc5+HH2SZXMvDz8QSo6+DQplbmRvYmoNCjIxID

Sxz3DbLUq1WW9SXQ1guAbzWVR9Sm6KeCD5pZVbA5oF
UD2GuXAnZ15pjLAeJDToFh9OVyQ4jbChuX2WOD68wACda1E1ODEuG3luETmkg94hMMqhFByHEA6bFRII

UWfbFNorVXH4DjTspqsjTPVkYg3LXaOcAXv8uZ3wwRD3GNQ8PumjoXDvJZlwUzNqWiNiJeF7gRewcvo5

BAkxSA5tOZrojxxaULVtZhy+DQogICAgPHJkZjpSRE
GscR3glsJ0fbKaLMultFIwLy1so4a2DbkjKn1aGr5cUEu8EeLuFjZcSAFwWb6qxK24GImqrkKrPx7HRf

UyHXD7K4PiIfnUPOU+VJucXBhuoUp1gFXgYNHtJf6UMZUtNJQfPZLtLFZxSOZfZVXqPIRsUPNvLXTcIO

AgICAgICAgICAgICAgICAgICAgICAgICAgICAgICAg
ZKOjTOAgRNTzXCLpQBCsVFPpDCDpKUBgJUIsGTJtGBLtJHOeXOPaHW9LRYVeAEHbHXBpABXkFLHmWYGt

ICAgICAgICAgICAgICAgICAgICAgICAgICAgICAgICAgICAgICAgICAgICAgICAgICAgICAgICAgICAg

DXVeBXLdSSAjRHHvULRgSSSdQRKdEJ0REFQgGEZfWL
AgICAgICAgICAgICAgICAgICAgICAgICAgICAgICAgICAgICAgICAgICAgICAgICAgICAgICAgICAgIC

ZuHNCiFXFzKDFnPDFnCTMiLBRpKSHmNQRhXEHqULMuOA7MODSmGAHnNJSeRDYhCCOoWETiYLSmSCTzSD

AgICAgICAgICAgICAgICAgICAgICAgICAgICAgICAg
NONjJKSkYXPpTAEvIDTjUSJdRGSgAJHvBVAqHAReIJTtCHRdDCSlDMNaAK8RZVYqLUVuSNGbOBBzCQVi

ICAgICAgICAgICAgICAgICAgICAgICAgICAgICAgICAgICAgICAgICAgICAgICAgICAgICAgICAgICAg

VCCqCXQlMEAsMHLxZTPmJEQyCGQfPHEuNN2HPCXtOF
AgICAgICAgICAgICAgICAgICAgICAgICAgICAgICAgICAgICAgICAgICAgICAgICAgICAgICAgICAgIC

LmKOHgPQOxGCUaVIPbAAJpKSEfMWLxWRZeALOcADZnCTNyLL3GPUYzVCRlDJUlKAGpNDShUSTgTEWvFQ

AgICAgICAgICAgICAgICAgICAgICAgICAgICAgICAg
LSGzVJKcOQQyPHSjTDYmABJmUZPmDQXkSGEoQFSqXXVvBTUmEYEbQNGgJPJvYZ2JPFHoHQWdIELzKVQo

ICAgICAgICAgICAgICAgICAgICAgICAgICAgICAgICAgICAgICAgICAgICAgICAgICAgICAgICAgICAg

KXChWNXrLLSsHFRiCBPpMLOzCKBuNPVeOZZuUJ0EFJ
AgICAgICAgICAgICAgICAgICAgICAgICAgICAgICAgICAgICAgICAgICAgICAgICAgICAgICAgICAgIC

NcIIAxRVYxXMLaDPRnPBEuSZBrRTBdECEeAZMaWNRzUEQwPBYcCU0FJBVdJKEnBVSfHUPjZPIaDMNkJN

AgICAgICAgICAgICAgICAgICAgICAgICAgICAgICAg
QHMyOOTlCCRyKOTfAKSpTEOzVENjFDQfGWBlKPCiORNtGKTsHNDnJEKiGWYfBKBcWZ5CRD85dUMzx2K0

JMZzKL1oyea/Ju2ELSulecYpzJFmAE1SZgUnFI1hkw6QOpHrRB6dto6PXDuIAaHvX0X6cTAbZNFxMNGF

KnPxG16pCUmiAg10CEfkYRWlZoJmJOp6Dl7KAzAnC8
dlDMNaHiJ2FZWlJnTmDErsVV4Ia2DfyWMxMSc+Bv7FLW0sj3LrEWpuTuToJB3zia2UAFzUXrUrO1Twnr

C0ESEnGBApHj3JXEIhUZPqjOZuJnElXCICYfKwM4KtqM19PLZAPh2+WHedsjEoCicMHvEmHTTuo1XcSS

a6ON1EXAScHIy7iCNcNHGiHEEso52mZYYqO564kwJg
gsKduPXUWTCxFOF9FITAHUNrwJ8mNKX0VQXgXD4cZZSgFNHdLwL0TJNACA8UQGKsZLHcvJNhNUTaBMJU

QS7SOSrjXCU5VtdkwjPkjJFmQKlbAF2LWGNzupXpRmNaRSJVPAh+Sc2VPX7nf8OxKNbySCHzZR2igk7H

PLrABjGvK1J7aXHgU9G8JTieQl6EGFImQFQfJbYnZM
EOJIcuYS8JWG2upwZ4TW4PlIRrYLEgCIXtlOCmVVg6P34kcVLnUSjtKQ1GPHO+Martine+Wf8EDMKaZWNvJL

OhCvBvNWAJNhPtM1EvC2TPe9CgO0YyFH78oWkmlyMcQEznSH6VQT1iCZXySDXKOC9CaFJedX6legJlVc

JrMROWFjLjF58leBUxVUOzUNXbPYEjAr0MYJTxH8Xh
wcDzsGifvwBfYUBsAVPDUV2AJPsgjqPliIFtxJdaSH01qHenUA1COp3KDfXfNT2mac3IoUFwPx2SMTOj

UY2ILJLwCGKfYXZrMSO7CHOsMiYpNRjfPXTxGJWoHDF6URHyLYQdRJ2HJjHxLTVuTMz3DxUvILWkDEVj

bt9KQBNiFVZbSFQ7MOQiRVWtWYGvAJgwWPPkCOWrMH
P1YHGwYSUvQP8IOxXmQYEjCINyYYJnNRTbWEFlmo9XRBVeHZAvIJH4VeQuVASdIGGnNCwqJYXbDERfJi

F7AOEoWJYkVH7QCgGyOCDdMPS4LMBvFWXrRYRqky0QTIMaJQAxLln9AARlVUJaCDXjOQlnSUIaISTzAe

i4LQDeIZUoMT0WRfWaSZDqSNW4CRGqNYYkKSIasc5L
SCEfKAYyFYN7SHHzBXCaOIMrUMlbDXRuTXW9IAO0WALxCJUiPE5CLtPfRHThYTF5KUIeZKUiLPJgyf7I

LOLuIKKsUci3YdSlKORdOJCiCNyiODOyXVW8ZNroGIZlHRAkZS0YVvPuSSEyRYrwFvkqMETpTKFnau1A

MGJmOVPkLEN6EOWyMOFqDCBcELreFDDfFUM3XZC5VM
XcRTPvXK0UHjBcAGJoINw1VikrMEXeNXRmkh9FTNShLNKvDFD2SADnVSGdONKzPNueWPBsBNThKWcwZU

GgFXIrFY2ILkAnATCkQlT3MnGfIRXnHROiby7LPLNvWMDzDNqrCVEhTHHhBOQlKOn6duYktOExFSe4YR

4SZ0ZzwhJqPxHDVy0Jm187QUS2OFRkCv7GU4qbYk8x
XTSwDZVAIt5EFNa8JRKpIXC1AJH4VLWuF1ByK4MsAmsmLXVxMOH6MGFbFBU+UTwzFWV6WqciQYH6HMP7

JZIqFbHoJSJoXFTzNDKfV3TiLM9jRCPLTz6+QIkcvMIbaLmhDOGMQbFnZAJ1VPgdFNBLKh4E









                    ID                  Date                Data Source

 

                    [id number]         2021 03:52:00 PM EDT [facility] ([facility] [place]









          Name      Value     Range     Interpretation Code Description Data Vanna

rce(s) Supporting 

Document(s)

 

           Bacteria identified in Urine by Culture Laboratory test result       

                           [facility] 

([facility] [place])  

 

                                        <content>SPECIMEN DESCRIPTION        MID

STREAM URINE,CLEAN 

CATCH</content><br/><content>CULTURE RESULTS             <10,000 CFU/ML 
REPRESENTING URETHRAL HEBER</content><br/><content>REPORT STATUS               
FINAL 2021</content> 









                    ID                  Date                Data Source

 

                    [id number]             2021 03:18:40 PM EDT [facility]

 

                                        SPECIMEN DESCRIPTION        MIDSTREAM UR

INE,CLEAN CATCHCULTURE RESULTS          

   <10,000 CFU/ML REPRESENTING URETHRAL FLORAREPORT STATUS               FINAL 
2021 









          Name      Value     Range     Interpretation Code Description Data Vanna

rce(s) Supporting 

Document(s)

 

                                                                       









                    ID                  Date                Data Source

 

                    [id number]         2021 03:27:00 PM EDT [facility] ([facility] [place]









          Name      Value     Range     Interpretation Code Description Data Vanna

rce(s) Supporting 

Document(s)

 

           Protein [Presence] in Urine by Test strip Laboratory test result     

                             [facility] 

([facility])  

 

           Ua Nitrite Laboratory test result                                  ME

[facility] ([facility] [place]                                  

 

           Glucose [Presence] in Urine Laboratory test result                   

               [facility] ([facility] [place])             

 

           Blood [Presence] in Urine by Visual Laboratory test result           

                       [facility] 

([facility])  

 

           Ua Leuko   Laboratory test result                                  ME

[facility] ([facility] [place]                                   

 

           Color of Urine Laboratory test result                                

  [facility] ([facility] [place]                     

 

           Ketones [Presence] in Urine by Test strip Laboratory test result     

                             [facility] 

([facility])  

 

           pH of Urine by Test strip 5.5        5.0-7.5                         

 [facility] ([facility] [place]                     

 

           Ua Specific Gravity 1.025      1.003-1.030                       [facility]

[facility] ([facility] [place])                     

 

           Clarity of Urine Laboratory test result                              

    [facility] ([facility] [place]                     

 

           Urobilinogen [Mass/volume] in Urine by Test strip 0.2 E.U./dL 0.0-1.0

                          [facility]

 ([id number])  

 

           Bilirubin.total [Presence] in Urine by Test strip Laboratory test res

ult                                  

[facility] ([facility] [place])  









                    ID                  Date                Data Source

 

                    [id number]           2021 09:33:14 AM EST [facility]









          Name      Value     Range     Interpretation Code Description Data Vanna

rce(s) Supporting 

Document(s)

 

          Progress Notes                                         [facility] 

JFJIPk4zLsHQJfBs96/QGOxlWBIsy8JwRIajDKc8CPxeNZLoK7AfKGC0fD8qGMJ0IRkIQlVbHrRsUpBa

lbm
FzAylKZjOrHKGaTipWPxXjXFhlPsnmnKQyFF9VdFV9YLDcT92jHFMjDKNrL7DcZBAlJCB+Hy7UXMXyvB

WzYE7SEbuP7H6zo6hGPG4r3J0fTX+kVCkoZ7pfCDO3nlLXnuqjxjz0SMcGQs14YHdyRqTXc6tmIQrhDv

xyKLlxYhjSZIbnPucyNwvR/PeLmIW+4zii+rP/NYyV
uFyJP/0REDm0og/z1WtoNE8COfDAmPokk144/8EsdkPlsx/4MFHJG98LvwHcOJKE9kF7cWafoPsp0YPg

XdbynmEtbJrvCdzdVLlHtH0rorSLZ0d16LHOZ9Dq/Oqb02/F+ZirDrcRODjesubJYKeSpTqFZNm9Qfpg

SjoLJYRNs3Zi+eXSmci3ly3Vv2A4y2SGXzu6yvF8h1
fUcWu6tj9xpRt1p8BdGCoikXEWNcZXjEXeifktkH5VhFGROqXcUe1mO/laI9rtcPUTNjsJaZoqDoucCp

d+OzSfrBydO83GDz9WIoV8XyP7d3x9+lcQ0SXe4TTTlQSzuPLAL9Zqcwdd3V/NY1oVpS1Fwk2CM7TvHq

t5zMLNuimXBkHJ/2504rEv7d2smcxr0dx45+50Wva8
IIoO6M+leCcgNqaXj0k77GEBpK209s5jrneNaGs1AkDXSHhZ00Uau84//+o4F+pv/cCoW26kt3fRvQgT

OL+8VV4sTQwH2ELb4Q3gumF4Iv0UaLy59gX/qNfURqZ6R9cxoeF6GCNUW7NRA6K1sAsnmDxYHjl4rwWk

eMD+W+ad98po7glCa/YyUfnrZWQDcMQhVoPt78vJlh
FZjvtlALaIDk0HywStq9hBAAhfSRas7dtyZAwgIEQO3K1Sd7HVfyuoKeaHKuyD62xTlsnUU8GslpEjjE

TYkn87flDev+9RfOxZCKfhdGZhRABmL8UEfgFBDfXUVCh8hEsC211sSEahkXllSvnxDpfhJG+3ioopEW

9j7SOvnaqht7BeXamsk+xpxnkR0CtYaJUZdtUGpWbB
WWhkkN1tOMm6ljmWtOVDBmM5P4Z0BJVFNRq1WaWGN68Cp+JzUw+B3m4sJShWOs2RRGFokuWBFBSC46EG

6EZW6NRPmJWSQliTD7DfMEM00pcq5TdM05YO6bYtmgk2MzgXq7qwj+q/y0BHEwVctdCjIuUh7sU/ZkTG

6gjwq/GU4Np55QAX1unneU03KP2y15V+ejUBMO4dTO
zpesNyl3YNjU9bxr/VJLL3/BLI4UW0XbKZEgYF8yviVuUE795pp8BK8aS1yj5i0eu/QNeuFfBTB2ODsJ

piPqhAa575Yp7L8VDFtYui6MEritEgkt9+q+28oUgWlDxMPANFSerqTP/FSIWPDJGGUjjU22i6LErvDp

GiWEtOFx+upsQ2wuqlbA4ef55Hf1KrYynQl+nNoiz8
bAAhucpv8r2NV7QxPz+2dmNVt3cH+D6WAZw4E8kpSB3ZEhkW3E+aqipYuq3z/VDS5p+QhpCJMDvsAqB+

irRyrJ8y2U3tKU6NiSsjcp8YCnExcnMP3ZSfTOxh4WKmkTk5TL6AfJyr7J7NWB6s3SKsubBIVJmf7OEl

k1POcjO3ewKh54ueTussLNUG9IBur2UN0wBAchWMBQ
oN8TQv4CbYcItaG4OjADK74D5NfNEPxtakeBffJOnQhPUvln9VSVLNt81GIOcPSw0Fg4CfktGzn6Zpqo

FeviTgaOz5afatVhV7hDxcIO8TXGouzcK5Ak0ghfSa9McxrGSbeO3aWogwmxGLD2fGusvvlP8Mj9m4Kv

3W9vBfMaYB2mAt+f9439pPVm7E6QFIfZrSmgU+IhvI
BSkv9FU2DR8OX52QmcgMEfnuAv6tT0RxbRkttXX1jD6GPd+Ti9J7paaGPNPvaXuIFAZXXFT0NewVpc+p

eKgMtvFa0ChD4vl/N1/Fi5umEcgxmicXOnVxD2kGJFm8YjtKQ87QTup8kFcWBSKTQ8JejKKNHkjse7Mg

AxZvEhBOB7pIQHBWcUfZlsdkjmmrSxOHuCacKg2hdC
T1D1WFEYrjYvax9J7nwImamZLuRIoie0/dUc5e0O6Xk7V5lAsHj90oDZIgceWPNvuqX3XAjyEr46UX1n

YF7HnwIODYTwGyWFuwzC/w9K//DVUDVKE0T1wTXlEaTSFh0V8nRsixE8MvKb9bYmDMYnWPCLaqASIZxX

6TfXXqEtl0sITzsv1Lb3J1zBKOe2utsyWQiAUqVhFM
CycWti25CJdoxyzBzV3vfaaEoEhxyATTDSdYusGIY/eqYDFuUG/EQCQQGiX2cD4DdP92MZJ9+qjAmMFR

M4JcGs3n3Daa5QnctpcOrwJKoIhxFclfcPjzrEGOBQVIMF2HkCqk26BjOVO1KClK0bgs+h4KDmTUyC4G

T2QAjGsjTEsHsxDXUXsBdHbNsWF4BqwNiQYhCUKgbB
nYHvtva0+TP1SlJS4u6jWpXBOeoW80wCFBI21shAcCWJhChjfyacvorDHbJ/2+5UVqpYssFSZbkFFYm/

ZP/W0lRfvZTdzx+RwUsYTQTH1OyPuRlQG/Kxq+kEV81PHlGswMWlZjSuxdi8pj+5f8E3dQVaJ7/ETGxC

95coTOMgCwDADfSaRU0ukD/0gPZtECVdDEPg1gzmjH
tfBnJBnDFF07WKdkR9O1WGHCUIno4LxdKQ+flCA1Swp3BGU6vz4bT0/iP9DfqRgA2t8v6uXAf3kYNKCF

vsJbn3yWjpjvh9ZdnlTkFZ3taE0+pvpQ2z+8TeooR+tetvdyqr/3REiSiA13mEDFRx5JeVt4wF6MKVlA

IFD55YHu+BY5P52igN/aWeQjcOTRlEHLUv/XIZhVM4
e8ThbVOK5XccPazfOYVxVEomZOj9H3tjsVhEJynUMd+vjQ2c/Z+kS8S+zHzS1W7kg3xvJjhLmoS9mLbt

7pGY3AlKr4nZhqKh4EwehBEpWo1DISvFTJzqkhjTv0/meUBCbFekVZtVKQIdHH8sTPO2tghsCrREPiYS

cXH7FMjo7ziRMSh2lKdA1OgTXNlghEXzvLYcrrYTtZ
AA7DycGBzDnAbTRZdj/nTfSItEMxn/hqB+udRgoMKpuu+SCbmA48jlEDvAadyd2kcJdals6WAuK0uTgv

kFnlkN77ToCFgZSLy44cNrUmOE5BxA9pS/S7XBPYy34ILPB1Twx1l4wVncMNfDIrIPUDvq2M42ViU7LF

3ieOHSLt13333ZnJnDJgFxu2XVD2O3eLIeHfP6lKar
jMf5RPbsFC7D/TbBblaBu0dJGndPvWU3yLSWqz2Akyce5EsEugTAju31ubW4QBaE7A8HBNMLBVxb6a10

Meop98GDeZ7EUQFw1dvfbE4X7a/bLbNdABQqTTnQrdP/E4T41WK+zH/cuf9gc1Co8/VOrOU3bdZLbYRC

cfWhCjtq8NeU0ntP9S2Kx526CtQwuMfwQ6x3deLGEu
/vPh+2Az2zp44seIZWbkJHCiVehjUwZSdvhBit7ofy1erarvi0nzqm7dp8+Pgkwap0DaayFq+luIHQMj

K1rPpIMwJ/zuaXV19PYymK2I1Ge9ml7h9CCEDIwTROpGN0bHfgCaH87hVRQBd94XubOwhrfabvETVlmP

qHxGTLafd0D6HuTNQO1JDP8NS38l9cXJPZXfXTYXxo
mWlDZz/UqG4Vd71GEAtqViN/A4PDAdl3dfsK0W1UJWsYLN5hlsX5uv12DWQ0Nu/MKmYaj7OPSfn8boem

k6cpFoEXAaT3REfzBvp0jlqduxlrQrKCT08E+ATVudzRw4gM7QGkOT8u7M9r1CuPVYN6MDlLaqwTDtdl

4bWOlGle1mVDztFASx5rRtdA0Rq0xep0DzGXHwFH0y
SwpMTe7L8vCNrDJBTuLvPz6zQvQbgE2Zv39bor7mP7zC0Ylbmp/jxv6O3W1JlM9Oqw65cI4KN6Eg0/IJ

ATTvcEaG82vWBEy1K5G+iEcTc8maYi4ythd2mZLHiTIGJMGOeusGwH5RvHmqs4XatVKliJG7LaZs3iYw

voj+oWRp83CI3shSG4hSBR6+BZM12+SBW+Px12MkFn
C/6G8IFQq3THYH6jWLp505W7vjqgS5pKzBaDxeBG/l0De9HmE//xcZJIuiU6SSm/Ci8ZoLBXzuM4SoSn

0StaGXfEmd65xd94Jz/ebSS9AIHZschcEkeOXrBX3BPsCnGD3uzh5FUUOdOE2sbw4BLBI2IO0EOMAmMC

1YqZOtJ5NeR5QLQyWxSPPbBNJxOA00QIDjWDBDMAoi
CLItS8Zgj660ywDznvFzLUDnMm0QFTDeEY8KXRNuZHSypJIlUFWbRZUhCcE7QWRpBAxhJUTpV4PtbtXh

rhXyLPGmOPKSCKfzMXVvH5wez4SuTXf1YA9DMS6TpkCpz2FffeZhE1qrQ7DVCT8IPNIhB2RVM8YsP8ql

JjYnt0TeR5xyOgEir1IgNo5MCrBjSm6AHnVuFX7ktr
6NKhUsOX3tha3XMVX9OJ0IwHb7TADzR7RwJPOpWIOsf5RpJR6MKB9gjMfaYWV7AQ2+MUpmHUY1fmUvbT

3HEKQgDmsi95OXyV/Bd8zDGPuZ6x09QTPvcP2uvYFyfMIsF0bjXWlZ5QgciJLJ/69SkEl2n8EDlgaTxC

2fjjc7g+L5tdPE1Qw1V2Lc8Q2cjB55s6NeuKqnoo4D
2SO27plgWwkS0dw7Jkf1De8R2ypu06Uj9aJou1zU3yR39782rJ/PnDg2mx/FPi6grio7yHv76r1guTFB

wvew2EclXxp+7EeNc/rt3rpKb3iEp+0Es8qHbz6KJ6v83eNAnbgV3aUQPuduQn7dbdZ/QmptTVmxqMUo

fzORy2mKQ09RRU9mKGFmCGdLJZRBjwd2mqcQVuiKui
XN7ZB2jWpNuf7i6Y8mmv7Mj9hO3aiaeFWewqQOWCHxY+mGxYReMUt/zHDJ1FeeuvTVtrDTMOg3mizUJV

10aXcS3mFWw84YnjeXAQCAAuQPducLV9tMlZzW7D6OtehfRmc6COfAarYi02KUaL7Zsjjp5VthkyqoKL

5OTXoBiA2uysOv0OD07PVhoz+1C24SdMBm9WOREgrw
BuqDrhcO75IXLWtZn2aBdZgptSwh+MLO8yY+WiYbnCTLnAgUVqpyAoBiVyKARVVQEsE0lZKWjb2G2Ukf

ZaD7hYjQznxniQT6IcefzGVBJw9eFuvDe6lhY3Ua2wFM/jVL4BrxHM9EV9t19JSuOvTGJ4dy3eAHP5z2

bKN3l6LMM7f8vFaNnwoYHMNhUz8qXhz6cA1Z2c8pj/
YBsOX4sPpgYz1Igp0bNTuiTCtnN5EVZClUPsiRmD9f/ValfaE85A7lNqRXxDe2AFp9MKes7aCqip2ct0

eQsI2vZLWai46YkOGQFWAYVsyou9mGrFR0suJoAKVIM1nySy8IAyGJ2C2fdW4j5evXZmCu3LOOSu4h9y

M1Sp+NbZSup+Y2K+5wcZs8QPnK+n8dYu9tJBt7+JjZ
eFx98Cxh83LHINoji5373nVSyGrQc6RuxJnkaJZ15nD9SR8In7gSXJK1+7EpM6GGeOQP8oN92uQDpGug

+mSdoGrX+eWyQbgRjBVZOEIUJVCAaIXFNhTYMPkITaRTnzrUeLUPgjWALQkpDiANJ17RnvPORR8q8d0S

D8yQRY1hwcLQNhTLbSXCaiNX/yLAFORZpB9LJytM3z
JRC90s5xCfcHhsNZXE98YeePK2xvGudnX1Z2/m/LZlhCUrkgG4V2KoeJ69K+7dkLd4w3YlTKJ3qP4RXh

mflnMAQG6yuhHLsY2OLQrEbF91L8S7vJtJHjxAKiMXNndYD+EI7PCXOElnTJih6kTeJvvndSC4PThGif

phARAA4PEsrJ0uw0W9qgneME9WUKbl4pUU9Y7zhCvL
ygmyY4jI4jQGo3oUfC6zpM6GNtOtv08F1TX+0OUDVPi3WqAA0wE/iEky9jNj5lZh+SM82r4uCWGHsjEZ

poNYAwo3idxvzpJOFTJl/RidF4AKvK1WU9RGDL+IG8zwuJ5oTKM9jmPi4/8+BOrqH0TVzEN8mKGTWgIu

KPp7oEc6+Q1vqAn5PW0S+PFPYenozuq+ms2mhEfkVP
WYBbaQosXeOk8B/N4gkYfvl47fqAl46HF6WZ//Aduzg9gCIjZzMQN0evFhfX1AMG2om7VfXTa9DRBoc1

IrIKgpSEa5TXyhQTWhZ1Z1aXVkJRMvFX3AIYMoPE8VSYWxeaQoKjBkPACCRwJnIIKvNnPcm3IcZ1LzLF

AvYNBQNKauCZFiR77tTNiaMy45GFxdCSRfYzTlYSe8
Pg3MRmYjRDRqV47thXFxaAKkSvIqGIZJFjFmZEXwC5OfzIMkJHigP4OpI9QoYO6wxKHsIP1imPQiL1Ye

C2ScujdfZEBOWvNsPFOpAJpdJQCsBkKqCZvcDXO+Lz6WXVG+Ty3UJD8ea8QmCAi9WAKdg3GoYNjzKNur

RmRqQJa3OHHrLsdwHxNrKEZ6OCW6FTIuTMM6DEb2WK
H8GkWkDwP2PRFvQhEiQjEjSxl0DBV8DXXsFknpSkRqFAZ0NQYbCfktIIE4WJI3FtX7OXEeWWI1NDO1Rh

D2IQKvCVT4CKN8EiU5ZHYeEGU0SRRzMqUnBcMzTMq3TT9EBFM8QRYtBEa3LPNsKLX0AkMwLxTgWThpRv

O9PsQnDmAwUIO4KrV8PQLdYiu9VKkhXxCaFqubUOD8
KMmfDiR6KPKeXGLpAApvBlP6EzmbAeP5JVb7RKQ2EiCpYkY8HVXeINY1EtDqWwX2EBg4LZB6RzaoYdF5

ENOtBQHKXnWiBzYuNSH2KGJpRaAyBSs9JEI9SbZmFdKcDON9NQYlAZX8EKZiMxLaPLE0BhJaIxOuVmRa

UBFjDAQxYrsmMin9YLW1JlMfQlecTCv5PDCpQBX0LU
VaOeCyXIFyHPIyEKfuOJX4QZZhHeU2LJYoWOM2SYy8KJD0TRYdKGcvUMM8PlG6NTGdVwz2RRQ8NLJxUY

saIFy1GRy2XGC8TZYbPfLcCBl0PQP2MZSuZnJpPCz6BDO2DUAmIgXjJRj2BRB4UNWmEiHbOQg2QWD5TD

AlGsAhUCy7YUM0MWAtJrRfCEq9CNX3WXKiYyJxRAp9
GBI1MJAdJtGrAG7CLPB7RRYeOtUtTXb1YJF3TLSjDsCkTRm6CKV4UBQhNtCxFBo6FEHmPoluEcLgUDT8

LiE8QJJqKEN9QET4MbIfTKJjPOC2RCOaWwR1IggbVjouXWC7GqY4NNUjXlYvZWoyIoW0LMUoVLQzVAU6

RIAgCoMjHqAaEABkMtLQAyPpDTl0ZZA0SoJdMgBmWt
HuSNYjNmWcFaHmHMX9SDzkIMA4BpWbWGF7ECXyYEA7ZqWfOgOlSFeiFxN3HqEbTsLxEEaqOsYrCWHfUW

btRsV8GxkjGjN7NAO3HxA7FshwJct9MPI7YFNfLhuyTzp2UJfsBnC9SuEoMpd4SU4ZADI5WxojYfm1NK

x7JGZ2OyveEQj0IWm1WDO6TrUlPvOiEEghNuE3OhRd
NyT5SOF9GkP1TEXpCHI1QGX8RqO8UVAdOET8RVM0RxS6CTPhGFn7FAR4AsD5OMCnPHH6HMH4RaA0TNSy

Fro4QGC5XVEoVrdgXsg4YJOmYUCXReHqXaVyJSOsNDY5WWRrJgRwTZKcZIG7BOJqYAQ4VWHkYTK5NUUm

JrXwRUGjTVJ7RUQyMHH7CLSqLHE2TXKvTWQSXqMhKP
5zdu6OGEEbEG1wmi3CXNA6NF2VBZUlSK1PkPQtD7GppvSWAIMzxzcoaD4iLNsjSRRaB4UexuSGNM8oN7

XciQZeHZAmcOWGQoWpWRTdGZRnMW06TZafLD2LUSHNRRpudCAbSNG0W4Kia7GrqvNsHLPlXg2ECEXxRO

2WfNYfetSuAs8QBVXiGC3We475PmWciDGgJCFnCzNt
UEIqDRHsLSW1EG6RAESrXR9OeVGtgQQKkphlUWDvZ1Q3DA1GNADTRaLpIk1WJoJnQP4fsx5DSRSkRJXm

CkjXKdGyMNmBKmFoTLXpFSurEF9Za694Q4X3LeV7xXVhDZM4LAB2eFXuMmDiEQNqpbTcOZClTHsfYz0u

SQ0XnlEfZXerWr6ImS2XibUyHE5do2HbpdyHQvJyKL
CyDjwrn9MZyMHoEXBgK2eni4NSzJTzKLV2RD6JJPPsAI9BuHM5eMSeLQXwGDROEwOfABBqLq2jkKXvq6

CrxQJ4d7YrIASzCIRFIoXiSp3PDaInNT5mja7TOIElOLFyEsxVGzSjOuA4DJDsZkOlKPF3PVWzBgedEu

K1CDJ5SbK3NTEqAGh3KBL4GbIbMKWgBgDhQOAvRwDp
ZDewTHr3KTD3FSYcNxWuIrm9SMH6RNB2EYZuEPE5GED8XfX3JPCfRQP5RJY6GjP4JMJzWTH9SDE2RnD9

JHPkOir3TPY4OBB6LUVwOCmtPOY5RXM4NTXkLGF6IZXpYSNgRkN5ZFM1EgE2MpMyGhYhVPJ4OgT9QUYj

Fll5FLcbSbFjNeyyCIRyPPO7EiD8BEHhYSXyHTngZd
U2IepwWgQ6GAc4XHW7BoDiPaD7BXWmFEM3MtQjStY6UJi9AME0OuubXgM0CCUnKKEOYjTxIyx2FWR0OS

GhQkrfJYZ1RGL3ZfTlNiUbVCM9EQI5DtJ7PTKyKUC7OGP2VcIxDxfdORP0HVY4OaDoFjPeQbDeNRLzYO

VcLbEuNXHjQPQ2RgR8SLWjKRT9DIL4JuEsMhExKNUr
WES0BAD8UFXqHLCvHJnoMaI0PCRlWAjdHSUbUVU1BCKtQvN4BAV0BFJeVlKpWCy7UDb3PDF2QCTeKqPg

BIv7ZXP6RROvPuZdPAt2IXI8KLCuXaMjKAv6CFA2IGMhIePlHWp9CDX7ZVSjFcAeKXy3QYV0FOSyYnOt

SYr0NDH0WLIuBzHgTKe7LLJ5TTGcJxLiUK5CFBG5PG
VxSjZdCVv4HQW2IZInUuXaEBf8YXW4SKXbRlRoBQn1UXInIqiaOwKkSNK6ThG4LLTmIVP7GKD7RsTtEt

CjXBK1XSWuPsP3PebwQcsuBCD0BiR8JMEyRbMpSGzsNdA6MACtTABpGWY4OCFoHlVfMoRgMYJrZmWRPl

FoSMa5AVQdGvSwRbZeTiBlQHHiFlCkWuYiUTB1RDfd
HJM1FhYiFNR7DKDgWTY1BvyiTvK8KOR9LkE3KskvRcG6WKW1ZpDgOHDoOFscBbD8TtosZjN5LJZ1WdM2

IuhdMzo9QKF0LHMtMpvrUzy0AHooLgL2BwApEox6HX7VATA1HipwLue1CMt3WSF2BggvGNm0BTf8XDT8

QrZiFjPiLTpoXtL1EcBfCqI9EOA9IeK0UUAdOVM8QG
P0PuL6LTGoNDN8EKV8XgT2RWRiOKx5YDKcBXN3KZLzBWY3MYV3DfQ9VTQsOjl5TVS5FBJzDlvtFro1PP

V6BmVKAbGmPMQ9XYN9FxB9LNKeLAU7BDH5RuD7AULgTED1WNZmQZP6MGEaJTM4KZO8RvI5EXLxQOBeNW

W1FzX2BIZbIYVXRoKhVE0hxs5BZOPbVNElZuaWObUr
DLfUYcIaRQAuZSfuFT5Yk771ZTSjY1OnpRSifz8EIMVyRE7Gh088ReAvZY9AyvqraI4OQKTpSH9Ky4Zb

onJcQHI5W8KzqZrduPpvgCK3VPBmKREmT1MmfICgTlIqKHndKMSrC6MkFIsaCDYhNDgqBWRiJ9JjcwRY

Ww37IAyyIW7xTXDvQPGpAEG4EIChENpzCEDhB9v1BD
zuT4SyG0amVQSuM9FoeBXsBT8CSIH+Dv4KJT6eb2ZnNEitNiZhBF1ghh5POGT4KU8QIRTiGG2JvJHnS6

TeuuReM2MxhTwhLZ8GaqSzFSnbIS4OXNHpCd5hpN2KgupskY7BrgMsITcmIi1YjO3BqeXpXO4uv9Jkpp

mFHiDxLPRtXoytj3LRiNXfTJXpX4yea4JOhMReTRH6
SV6ZYKYcUP6MhYW4cRAxSTXgBWNAJGyoCZJpR8MkpiROLSYseuxniM3hDRSsDRXrNp0GUFA+If2MJU0g

y1IfBRzgOBIwGV3ius6NLOB1SX2GxEj8RGGlG4UwFXVdDQXnj4RdUD5IIU8nmSneBWErDVMqN3fpdii8

aDEgMjYwNDQ+Qk8QPVFzuBRjBU9UGspP9HcElRLR4x
vkjra8glLQEPTG8F4VahTxKUPJPRLAmPG4KNEPDWqFroKMpP4GNUDyl04KZZWVFsZOYFA7EhdlrAQ52l

6K+k5qgirO+7+v4a6kemMknXG///N8z9+h553cZoKm7w9fbclkJdHFYUyKjPc5w9kp2D68jR1O6xJa9k

aCLPsPaSf387WBkAIGAoyrEpWZiHZ8/aWXLPRv++qp
L+BgA2HnwBTp5rZtgdggTzKKVzPH9ZEzPkJ1bUaNXFYhj2B/3XmlTr5ceppQd6SgusPynQQfWp2ipbjs

anZL8XANC8vqOQiUdtoZi3U0/+tXYioY8i+UTy5VXKzcX1+66Wngv2TkhIWkrSUig28/sk2FnJCrDi0r

HqWm7K7mx+bJ4I93r0y6229NN3RMzyk8HgDZabPPo9
P76IFdar5h9kycocNM173vW9fL8rJtFui9eNNUGxpBX7uXWeS6USI1adKecF1nSEAAMtvxNW5KjJE2et

n7AfcfcN8lu2oOVUdjAXTKYaT54KW3abWkjTzofCrqLMFIEVUd3QIPeGFGMYqIhxItRRiA4wwkSis7J1

nAK+KyOQrQxekjOnah1Ttigh6WoSJDY7fUe3gcjobH
VdO1YT2yF/UZ4GaP1vI3CHDkcaXR6j6R/bZA8qVltM3FR9fTtow+Iw6MvWm3g7cgyZ42rl2Ed8F4mmkK

rgxg7i7N5PwnCH7YH0rnWMPnTAeZiWmOb1w7Jt87o6OlUsJbuF1UOfoWFlqIrFRPcXAU0bWe2IlGpxkX

KKuN0Bw2NLlCtBptnfSx+NbSX+hp4sq6vk9uPfNO0F
QNiw312VMTx5Cbd3KvOc9ypJJ7Lu/EzKf/plugMGBER6k5XwVzqnghj1tuHtpyANZS7vEa/Ez8JK/G9x

emMF9XueMzrCwugnHrJphAzXWlHpyYXAaD+ZD0zjI7VZBEqIjvSuq5QVB/P9SZuvOzPXwrb3/Ku7xp3z

2aMzCfemZG9dzkBYPL2Y4mxWBeKmEfBDJnujBuB+0O
/SD9XE1Zgf3+zaOVtJV+EfMsuylrdhAmoVMuihyWmNFniBiYD8WwvETjrGQuOC7D4W23Oa71qaE+2Fhq

3Gh+BAmW03ncA+t+sjKspTQO4+FaSH8nrUfNdtFBehvfD+gjYYhGoim+fpEsCsQV+D6pLyiXWcX2DQzM

Ka+Wo2FS5kdclrejGXOcPEOkhmtKz6QmTX+Nk9s51P
G0H4BafO+9Pdf2SP1dmI2TjpY7yHePite+c7sa6y46j2lXnf7gJty0FJvKwjaPqeTlkra7R3zr/u2+41

6Fp45WapurGsBT8C3T3qjSB5TEK/cyv3UPLx5w4w+sHMK70w9MH1BlL5BXIHJc8LQCbQzbfTaGasvhrT

UPKNkfFU+pfq9KQpJaC4ebPL2fy7elTAon72X8cJAz
Im+TO+Id1ewBngTPGmR7xKn8DWfEM5lErkeja7GM3yD3d+1q9CicQ0giYGj48q35DB8VI8hN9H/W1+uf

046mP3zFxE/NaHOeudSsNf/uRsijxPFJL67irnhuq82K993slH204Lk1Sa3YwTGkGc7Be1zvmo279fHL

PUUoNDMBdJRBS4ApQlpsHYSN0XUpAhmINVX5ZbTB4e
qOiKhzp4PYTeyboZvaWAlrkMwn7D5xE/V99K3+DGr1FAygFoeKp+CRvtSKOME3OUglCk63THlC8cZ+EB

12EtxDnvytaYcfNW3hRzKd6UPJWWFwx4Bn8wRnFjeH3tJZP/LauHcsWjVCH9rZWwVbpROQccDw4xP7UI

fdqxzQyuI4Vn5/Hzs8PTTQ8RHZ3hu8TpCrm7XD8cPE
Fy/NEMnFoY77aPTaWo32cP8TC4cX1sX7wa97AO3UG6VTHSsc2w5J4Gltc9if/ovuJleWOGlHrbRE15j3

zCjUXxMzSBMTKUU/VO18qXthS1ZiH55nFFcuZ8x2bdfYolbxoMEb6YlRvLkFpfaN25nkSY4gpJpm+

iLK1f4fbp5uS2LCA6lKdR8LJw3bKBjpsevJTyOusW/
JSNypX8ItHWiKsovTXKkzNTwN7kWapl4SYzxyh2LhoFUE1S+iDOJg3N4h9BKjiE/g+Cg9z7heg0l+kfM

x5YheGCWf4UsHclTe7zp7KKb+FQfGsr9eZ4DvLinCeS9RrJ6zk8fXdL5V568qII4YUUeFswJIIhr4Qjo

rT1neqVT66MpsfqqXFAtxeJSsknXn67bi2Kv5g/gut
kCqsKS6hIpNfT5AOu12ihmQnpUEfC82zk4tAGysTProeTp6TqznSV7d17q2d3Md8Vv3rAVlm5qkrnv8c

r7HQu7iilJof8ZWl3+IzYgZ30/tW2cTC61/njUPweNlZpfpVZkJkzI9TPJZweS8Pf4QSaMqC+uJ2tO7X

OH2U1ujNIK3x1OLKHapdzG0pM8B7qiMFVPoPleGRwN
MlbWnVwyX6fSj0UEC+mT3iAgmgBwQgc9ytjmV4YbM8QcP12dxU8Q2pbJLRS76zh6zoRoxpCzZX28k0sf

yqQ8hY1uEp5sHzUnBAXafCLnqO4Ua8acQMrJdYyNqnFpSfVPWXapJNVF8VlIQNFoTsmQ4ljLdpvh1BR7

XLQMO45Xb2WYWEzNcO4RivlHKYKf71D/mtHSO/3d60
OsE9s3vEj7spoUzS0Sp0gxf/sUhoXF8tWYEOg9P8dHBjGNPEp/h+gO/46h8NfP+ndCATmArcA+wBjgAe

+Xc8Y+E0lXqjM6tbKDincgOCyzzgvTbhvIuWdwgdMqUhwqY3+iNpXf64qUaeZ6MymMRfE4xIzNb5Pu1y

yFJaMaARFJEYjN0yFYUK5kgz8BCoU3oUFPkPSf/e7k
/pxQtkB47HONTJpslR5bpaU5EFcdCXhBGxBgsfvBmkQwOLKPFgieNDFllzW/Oz7To1MHKOPEWgP60UMy

YxIG5rnf5cO1qk60RX+aLi2bMUC5rSNyoBrSkUN3Rllx3Ka5A519TcRztksIyApCVIL4wwz0znPj4dHL

6e93HarOlgH4aHAJFcaRZwsq4wrS0NIn7NSOV1JEQ1
EX08yjde2quy7BmeBOTOCiMoE9f5c3FPv0p383SVDOleMhIuX/Wm2+DiR+r1K+DiR+at56DCj0V0k3HD

q1Lc0MLwT6BmQFo4sQhkGyq26Dt4i4uu0CinTs0sW87Xd0POGxItBCxKumK0WSL14e87g6ajlsrDaIbt

JPecEc5S1vMdEIB1RlMzAAFhhbUyPHuebPwoIq7QsQ
fbKkpNokIMJGM7PQz7XKXhmDHas85vvfdjTxghOGPHxkLAOVpBvn8q1j/8WRtyox3hCtuyCJN0HE008T

VIFznDnRE6jVIYyEW5qh39UqAB3qIK3M9bW8peg4M06k9Mjw76/9o1bJ89uUKksnIdxNLv7FU3VQo8qG

k+VCYYAuHnDtpPAQZfbBK0hwe+k6zW1VzNYBOShXcI
H6YjtFxFSZtloOqOUaVCdfaHleP9lC5vxwtQSOJlrKfMxYorYL7eURTREcoxW9BPjbjaQ6eyet96pLeT

k50/Tdt3ksIWLMsD2sKxEqKMI8gwR0g3KwZzVyahEvDuJE/+v0foZEwkS7MKXTJ+XJW0cRuBj/QsSpJb

QTNqkiYiWUxNalffbtGUU+30/ZL0ie/BcFeK6tlUL/
in4IgwtnV3ZKFlf/reE0fU20C7rSfdO9gMNkKjtwU8N4hb28K1znAmPVjg2L3VWUeiqhPmfapZCrYpes

i/Xs6uvE048jUaYDOBqGIHR8aXlnt1lLsf+wtyGT61rX5xY9dFBtx1XYoQDmfLLHpG6LJ3VesaFmunYf

n7QhjJTRZkmOX5kfqDXF8YQ5+qu3D6wpwilfxa8M46
er2mV/hGkOkiua6TpFWymITi9Y4O0LkkH4co5Bl2n4TIbFNC3GPlOI/y2FbJkVRmQyzl3u/9mB+oFdHj

ZgeTWCEGsrYR2eKdLR+1oDeVGC2DJo1t0Iq0mhN1MRyFFFJme9kShnINQhgZTeKWkzuGjPbOrQNoGCPX

CatDDtHAMmdUBmNi4tG120izlIcWatLGZ/FJOMndNi
u0Ki+/fRs8lh5xm3gYEnjiYKGyaC0N269d1VQq/ifH0z7hCoXGdbm0JoL6iPUeoh5BNahBroOWSA/5xT

/Wt5NmK9KyjVhYgDD0avT19pv7OSoFff1AlPuWORmABzVwXnScsouaJwcjXMh36EM1ZoqpZvCLvZYG4+

nTKI3QkaHmDNm6Tj/+hrXFAjmvHKMIyFSpUeCR4Z7E
C8Qpv9achkLHj1ukuEx6XQXfzSN3QOQLi4VaC7SJ7Af2i5/kbGA9bI4GMwzCsQJVRyUrprxySb21yljS

UOU0v7+6tIgj/OCfyRem1AwvVrMltvB5qtv4gX6kWoUfLNYKOGzBPRddjaKQWMnrWltErxhEMCQWFNek

6a615Bq3Newibgwdyok4thrNctIftUiQ+4ksxp6JI+
Wy+xEUzhmgwmObfbpNR01aTxBZqioSTUMpyxxxFoP9YYW0HXnj8WANwtjKHF50RnT2VBeT4HgzAPH6xN

UvThLIr9SjGlQQdgVRZQEzzKDVfKIX8CYhOxKRKPzyLpStQzs0rwRUjPEaY6l+q8DKUQvg4sp6m6lWA/

ogLicULMmuWEiUF+jLznuREPh6yKtrNAiyUjkT8n6g
1Tbd2hcvW6XYZW4D2DB5Hj8jwRSQXL8ciz8/cUXK8FvcyT8hR4E9OpEMYIcyixsFSKPUpVbo82NCdivt

BZFrVHEHSlMbX0Ym3zSYiHINbt+0og8INfz2bJNZ5EA0OWUZvZ0L/+GOSppxuNOxI8SjiNBSXunUanzR

nBJi4f6d4BJ7nHmPJfqiawiHdvh7+TF6bXeQibK9Oh
Pb5+OydMBaWM3iQPLBtYKYA4G0bUEOkjGZ0vRpb7wB0zE/KjWsdhcgUH8AhWjcjBhKiNOUUa3nsCyu9W

uaP091RjIq6F07PV+eKAIdDAvKgWZVk17lh9j/Owo4uFp7BgJ3LLesmH7C6sxhwoXWpHXATzf3lzN2E4

16S41FO1WHc+0QD8KoYRnvsMd4WGrWOliil0O16vs4
SvoHhzf0ZhDjZAvpb3B5EFgtQJ8vGQDJhElnsz63NPMdW9Z4DvnJlZccMbb9x6ROjfD9WIrXUQKcYZXq

gBIgn/0k3xErkxgTbcqNAnpu9vX9R1M9NTZjRs5o9kC4sLlcW3Joex/Ng/9+pNuDudCHeZBmlbmFViP8

YuGKLvt5nHZ892F0Mgl0MR0sn1fkxLHeOu4U+dzo1L
gs1uAaqQlHK0FoErcnDWRVIKtPmWDygKSjORgIYr0j8mR7psPyLGlxfz/S6LB8Y0qzbtJYb6X4SnIoey

zQ2KFLOjFRYfrZs1hyyVRHrufb/pGddnWE14ipx65nZX8h93olDgpYA7r9WoZN/qhRGyLIZVkJ1msX95

pRJegcIBEYJ1+ulhdNXucvTqljdERhmNY4qY+co5fR
mUfN3Qg3gpQc7uBkJFyy8/jg3OV6X36Ld6f8QiDqM6o0XW5BGeqTaCDG6NyKe2gvsa3kQmo+vzBACP1L

+d1Be+bfutT5QZhElp649cMqQ/+55GwbOTw8RfTC+irnNGjkPlsZ9rC1gQ5l6mcbap3lzU9WtgDVwqzs

mC71UmqOo9haUp624KkH8rjzgAF9EceGbQkYbFGmFz
7A4fyyhS0TGVpxH/ApsBGYA/ZV9uOSTQrHpxiUV9GHv923QdE0rIgFvhYZhIw0XvnAu6wf2q3oOx8r4B

fC6mlR6lx7xmZEiDdCnb65i2dDvLXC0osp5R+S28tksm1p6ugs89XNQa1NaQ3R4usIvRfzVhmRWx8tj2

XNqnxZ54HgXc8waa1mPhm8RQvpSpHgCmAAQd2HQ+xS
de7i0Qr2M2yCc4LtFdsMZB0yEiWclJi1IfcXZwM3nzhWXP/pxoXJLI96u3yeE0cEeibWuqR6NabLsh5Y

ldwf1nVznKl1KO8w1+lIwPVjmu32FTaefFYQQ/woKYIzya0PfKKd62uS/r1GC2pd4Tydbx+MQ5aF+tzG

v0BsokaN+A5I5FwxDxrjXVZchOWJjbwRFmP4/x6Yrw
dlwkKwBsetJmG22NrKKTCIIN/SHm0l+kyV9lexdKHOLHsbALCABnlhCVhNWIMl/nzAEDaOThpzkWPJpe

15gaHf306l9FN4Fu2RVEuNfY/CBpuQuFdOsCcdDC4b3myazYAuP78g89/PF+cX7Nsw7PkbRyoPbR/Gke

Whraw5Anazdokj6si5/hjmIwsxllI5p5rE9U+kYemr
2CjJB0rZx2EvZ489inACVs5ocM7gAsP5QYiws77ErP7EVk2Z2rbsbN3XZItTba9X6r+wgOchv277vqlZ

K0cjgxzt5ZezGnndotMfwhO52Ak1OivULS5pt7nr1NbjDLNwWqvXyw2c1vd6RJ6+xjefBr21HGbFKKwk

lMawmeGO2XFM27VpTpAxp9KqqoM0IqsJFmsHhqu9Gw
BeJgKWGZrMRNPeLvprjwqEpLXQFSicoWZqR47Xt7G6215ThmwdrCkZZK/9BnYJEnX2Ftk1VRRFulto3e

kh9iCYgbTsTEjCIn/yd50RH4T2rr0dC69v0qFS9UOLkBO7lKqxn+yJr+jdo1GvVM5i15rbl47OaKi87t

z9GzC1ZCqD7jNJ0/FghR9g82StdwVLLiTZ/2TrmLYV
1UzSbM3fbItwfWGhVLBG80n3X+m3ff7V26hwYnm8Sue3jTcXhkhBobORx+YoKserhoZ2Y1QyNfM8HkCt

GubuDUjvc/K5rsbfWX9nT7vCKAjD08ptCZ9HLzUPrPYC1IVCe36E38gxwDpU4SGwtObDTUMK/1HWlgu9

29qArK7OEjkn6H1cB03DUvqIgSueBj2khrWQrXqqX+
ZuM+qm/gXu7Ggty9DYGI8/tmRlnRW15cdITQys/gmvL3U2TPsD3nH0+BzZVS0z5x+nC/SpVKFVY+y9Tt

I8iLK46vo5FCc4LMmixR0L7YKV3eHcnc6Z+xesg+PXKgzhiiA0DoKNnJiGpxKUnXuwG5SovD3FxMg+SV

7IWi+nK+Zo9FI59LbGmwj1hHTY4sOemQQQhbFoWL7W
Bni0hRp7zg8Ym97ulWKgFFjP1+j0kLUQv784XD4cL1cB7S6Rxgt+5L347P5yXzJbX1ut6tPTtPDaB2PO

lmX53XPtSn8a84Dn5TLJnt2r+R/DQtCP507cwCb+pFTRKR0w/UkmSlMzvWJEjPPhgP9xAxtGjStGvma/

tEe02Vc55Ha5oFCFpl/22Zuju2K0Xd2+AG35B/BeGP
IhjurIb3y1lprc09FSD0T+/8L7cgqIg/FR1ZfaCUHIOsHoljmK5Q2Tx1A1Hk7+QvjtKjyi/2SWVcdtHh

ke6Y/n22c4dfsJLEm7IcgSe872yDLzsk+I8QxbeNLY92pWIZtvZ8+wAUymLtpalgljsOOpfnMMdWTIDp

C6BgyCzPHi/GggSlQnDjlp8JCDo18J3OA4GvRw72uf
AGzosf1letIJ2hJz/7j9Etk/kK+qirIPx2LK6N+aGarUrgHzQpUoBeu3ymT8GFEof+kE4qiyTEbf/cDz

wHP/f3dKzZOTeGImy0JKBgKBQmO9rt6eQgbWVfFpOqBFhjaB3QtYt+fc3surVcggiP2ZtFtd2O4oC7Ee

WuBrvFhQPidyjD4zxZ3nz8Bkqv1+4AmCv25XjDVUR4
tKtTi5yvGR30E/M64ReHR1a6P328BoodDSA1F/EuBJ+L+Mcq0QVRrchUCpRTVnDBeLl6NsDXSJUuL/Tk

+///deACAENiA03RgD4LMAc9t/TN3+lTGF2zQm/m+Qyk9YAZK7HlHm+jd0Iwc48/S5wkwIjn/rwqEXWM

saEwYeZ3vkHycczw2uICE/Z1BmykQs2TL8edf+ZduZ
3SrMl8CUrqNbVnK4mjQLWTwCblp0nuLDwERhY+xN2cqM5PiT2OEX2u7U2ft1P+DHGKOJNqyDWLtisNbt

nx22JqZG+NuC/dByuh8rESHAUkCz/wv6u1xY/TvH6OhOHhJtlyXYhG2JJ2UotJ9NZz0q//EocnX7Iupl

/p/99PQkVrYxxoY3koQjNpWcdbpt8FB/ITv3z/oj7Y
4/7Y+wS1x/WC0Qbmu7vp2EcqmKF6Zy/Vnw3kJ/8CPh70fUFO/x23ukMucnbGR3nSHrcn2J/oUe3KdZSe

XdBv/V3t8ow/sIZcC/QZH8pKRHUXzZyfPRagwu9yE9m3K/Ce0K3KO9QNsnZHwAQ1hOaT3VPuX93MeZf3

ZCDMItVD5Cz/ua95Ao+0DFaua8GA7e7DV+AIiwARuk
cwhL3TE1Z+WPaC5pIEuY2k2Nt2zqFhhUQl15Ws1WJK692Zvc3JoXO9wIxHn4/RB8PS77dw5JK6g6SYLl

cL3vEEQq7nOI1jJA10Ljh1/UE0nb77ag4UhM5+QlyIjXdAyy5GvqyaFmeu5la3Z1gOpL2Ulrn5FDgcf3

tudxuvbOyhVB5CIwo6VfN3FmMOK344FR2VgB9ej2M1
ISr3891mIl151lJK3N2hj5P+r5TEMvaGd6f6ur5YRQFKmC2vv2h4hJ78pE2MGY12FSyE6gNBma+jNNp8

8LMhZNwxpV8IOK4UVtlF9tsCQazGSy+ltaQkmHv8oDDSk/r7B+PP15p/Wyc+4046gaV0lv9mHN8juZZK

5z4OguB1mYzJ/Q+awGjCsyZDpwvgTde54aV3k3FhbJ
1Fmf/b0Dv3Jnk06Ea2Vf/KXqr+ZhT5PQrIQU8k6et07/h3nSF4KoFwk5KTShHys7b6o9DHrNt7G/Pebo

PKelWWR30gjb2lJOgMqtQahz3u/PbXegL3OPH2xr/D1XVN41KqlHoDDeDJX5grYWzlL3eL5oT9FbOLAp

e0Gs6QZ0RV0xxjfsmEJ2TemHiyKFGYzfiPi+DqruDl
DbIMF8YTde1ihwg54Qxfy6zQEHThhE13pkng7QaDqJwMxmio3qbEmkj7SrziW3D8MtPrZF+5cm5Q2nLK

jOWUaGjq7LI2GRAtkPpzG+Fmqr9IH66skrkCg7qf8+JifC1nDNDUd2nGxhqqlktm20bFSLw91zAwm/2q

ff9eGHMfRTdMULiJX3Z7kIaxXHjaC3LRhUy6Gp/kUA
ttP85WAIV0CtiRy1JcHHFltSgzgN2JbJOhKUf2gMslkiKYVcDvcM53b5Y5IuEIKriYI00q6DahHvA2mP

wgO/R73cAIBg1eqtEIN7w7tUjmr0pbeqK6Kk2SXlZH5sC4s4LGonP4O+1vt2T5KRXEMuf1vKu0OI6C1/

SGsclqLjy027CKjVQbWU0LdJ64FNMK6GcR+2oi8J9Z
fxHqMKlAbZlCcFNSsbk7Xp811tj4Xyljaweqm//gjoCxH+suGrhbagiTo5p9DAwAllfgfrtlD93CP1i+

KmT2PziQJCSvTVmZJA6uCUY5I2a2qL+5FbGbor4NbBCor2t4fL9/z353SVB6Qn+lrojGIirNUtPFtsql

7DJ1ZRJZePSgOL1pXTjeOWobY407LYAOzKXyljohzh
YT5zjEq+S71tuF8X+xW8rqAGWbCpP6wsiL6tlv9IYQg9GTvCla8EPff0HOLqkp76a9MSbSLIc7i8Yorp

xEfjEjS9eD4DK64F+ndIqf7lvT2fnSIUUtjAN7z9w9Y1it9nQd9yt39biU01kQ+L0iRaXn4xPyLupqTx

7v5UySaBDbXCAR64moYIU0aCadN//jcwOQ3URbmckI
Vo7CXCde7462ARBD5qn7+n6Lf3vrqHVUATCfvcTabBLcn7V6Q7xXBfMi2n3q2yWeMrAwmwELvd7yGJRy

OpE43VXiYCJvOK8IWPrPqf3qgAs4i+B79/7yyadtLmHhV66qBmnWqKED3Upu/ruuyQbvlIu6zzxFu0Rb

m8Z7icqz88ef6KJcYwkAp8qmYr5h5C55td/rtWsT6m
r5/95EfzkI1jkslskL8hS9f5Fi+gzILOEjx6Ir0wwdDwo0l47MkKOooNfylIXi5JzEFsGmutoVVaVWbQ

uA19FAyfm1wnnSSSdRT6KkKrMZCmh1Fb2+/FbXTkihYC91gVGKqW605wKgjHp8pBCG5joIK3xqG0h8F2

/M6tkbRJcKWv53Gg4741+H3JS4t2dQbey/uJ2ZHjJk
XSU+iJd/b0cxSfeP/bIA8xSW+pOs07/kh6O+uY9o973dFN7wgJ4l+Onb8qORN7wd/DRmG9ZoKQKQvenu

H84BZc7G1WG58v+L279l4aGv539enZg2cojl04aOXh+W7TH4ZtAH/YM6m086s0iZZzgk6c2CkyT4n9ej

4pnKo+Fi2tV6c+H6He8/PdnN9B/J1e5kMETgTJOj1s
NJ7DyRJBf8eG3vj5Q8E9E7WOCt4awK8aTEE6Ubd1Ro93zZDlFlbcQe4NCUl/Ujj9/brn227DZ1v62dL1

aEPZ/42DjPK7aPW62tN3sM06AkKRYPyOAIvOzGBn6WH358QpC6ZoS3N7Ps5BsklUyk/Gchsn1hk/qt6/

Y7s3vUe4PeW1s+Zp5Ub/YZjExyuKz6BgYaCAnw1XP0
K4Fa7aEP625urAHqaHQ1r1j7yt2eHtgDO3K3qva+8btIXlV7+CPQ/98j0KRukf+9nK6kHs4Wu3m1l0D7

gjw4SzVQh6jwUg5erlqurh71uB3zOgzwbH89aY/xDZD0mkv/Lt/zG1QtkjNx6hehWJrJ0J9fxMAdjsoC

9pgnCmPLXa0r159VYKmiaJ48y2wUj6U6cDFadC0oha
NXdwy8CGC754sKwuKEaoZsrVV9dq1b1dD0KdMVIT99bGrh75Bh+KTm2vmi11pUvRM/y+SSuhwbxmaGfD

zyK8E6vk+7J89vA+pS7jtXnq9bmv+c33a/qgLvZl3IY/AGe3xWhwI+nd/K0jvg01Y9v1KaUuyw+IU3d6

pWjzvZS1TEZDgteJeEYAvTAbq4qwKJq2+Xf8HCRMpP
nyeuqmTcd++FXYO/KDmkFPJ8gMzJnHZZyQWSD8PdecwWTqbS3cBedsercO8qqU+8XBChscr/hsH3pZMs

LZ8uJ5Pas7BluxS7+atcr8ADUNUravesvNLvb82ZcjlrKkDqe/zucKzK/gOS7bLvdapel5bnlmncdY50

HPLWZ7iu2njM4KgxWProR62glIgN5M00vJTplxpr81
+7JrxRG0ScsMvWeikfVZnGKYnr11H+VdDU14bhS7RkI8CspcUSrPOgg/gaF6osCm7fDMClqx74BGLYPI

m0LK7NFw/R8Qmm4j/gPmPN3O7h/DnqatnZfo8a68NaiNalhLgqZEb2vRYPuv41A5/UwxYRi63w7q2hv/

aMqfH8qsoeeTQsyPp6imivlrE6M2q90OmvOQkc04dB
fM41LUcB23K43tts7go+08itBi2SmFnUI0H7s9w1KH/6lky2SOt5gjmN6l1V2HV6v3GeW/hCyshQk0Kk

rL0aA/4p1LQ+/CAXySXx6TqaMD1ZPylp9a4Ege7/sZOlmWOh+68nPevDoN6pntAl9XVOdtAS7wtBt95Q

4ier56x9p9UMZgkbk0hQ96obuqjwgD8sC1xdq0EMH6
xOkO4tj5/xHO+o438IK4Ng9RDBR2gnTgQbO6nbo0iaK7Hrjd5JtHA/5j8oMzFQNKJ0ypoZcEnJRfahke

wv4RgyfDL9U03GLCH87kdSHTU5enzUluJZ2d/mN2OJqkvv0/2Gv7EaLll0jU68VB/V4S12AiE449j2hJ

1eRuxFpYDlQdLMoV96Oq/VMMz0H+CJqL0yYlB39VlN
+8aTO05Gjr7MvgDi/z8cOfIKT4Fu4Oqkvhf/2+p8xWfll3eqfsXKT00kNu2Ue3sS41S/rIfrcvIvcF/D

6H+0ud09rlTgJfq9ZjZcB4ou63VgoowUv5B1BS5JJvwG+F9b0jJ+3LNbFu5or/41Y2zs2dr6sIwq4e01

uZYulLa4TDya5i2N00luq5M/UuOtOpOuGw7FBdWkPD
BpNDSPv0TrjQ+Smbti6Wolqy/f9Rt0p0vfnnrz3Re5e3R+18NLN2I7Ijz9epx5hLjSAcmnV9dsLB1umw

G9u1Se06TtDLaWqW+/bqx6Up5lWh3joN+64hYpvnh8ESR0a1Hkr61W7Xbku1beo3wxfvfeo5STkP3sij

iLA7OQjucu0AigM4jpdnj8CIzWdxebD0QtW+pk1RI2
hC3H3evUBHGIz0Y/fCtGd1HEaPj4L00fcXHKzbR6WQMsxGWuJZ+GeNkyxOepehNMa0uFr9AKU+4MU6lg

7/ZRsafvGDMaK5WAylp+gu9Nq4X2OkvQ/p8EqWMu/8XWO7w2tc5UgpLLvao+tLB87YBDEpnK3E4KzZ4U

QFrNpfdg5RfdmFayAneO6t87vCPfdtM+h4qUXH7T4l
SBoHUGo2izHmIOTycs9yoCDYI2VutEEoTi+JslU0QlTjoOvj6tCioYXe8KONB4yhkz3Hzyf4jOahl4ec

08sv3lFduwSoGx0Q08nf+xHrcWNoG3UDeaaCGzIG0LS7Q60QO4SfI3oGTHiz6w/B4Lqp07aT0mE2Ob5F

pzBzTru9RF0XyrUu2iK84fVcKeHR+RCn+wBh95aaTw
2VDM+rsTWCy+V4bk/jv5qsc7IjHGMHQzjzVLVwi84BgcuN1xvNUhdQqRE2R8dvFiJElWiSSgqlzeOEwl

0CxlIFx3mxY9nQ8KakM81YfZcf+K+RXrvmiVJ5zeDIchma8sWChFH+aB/+az5qjHCAvt8jKkLHidi+pu

5ZXI7i1Mo+R/Ww5gdzfYxeiW4MQDKszS4Dlvuia/oD
ZI2smT2h4D4G3fJtlejjJIPgf9XOO2OgiW0stGWlZh/KhcFCVeC0v2r6zkimk7a4U0iNfBnR1FIoY0fa

S3MZZ8LxXaOB4xVpMK0cNExefCZNv+e9fKTwflsjm5crShGc1K1cR2Kuu5mbB/pGq00ZYsIxjrokrXC3

vWZ7gHuvprBfqr55192TQ/cy07JV7yqzQG5AhLJNb3
97aBCRGPw3PwUG+gfJW06mtDtixOgTR4Mm6D+R+g5ZfF1TBm/KWKzyYIye8k+99/8Q6615WNWB6Bs13+

E9sXpq3ZMdf5PPDFrVvg8/ZcxxUG4XxJvZKkLuYc/6Bn9wDLEM8yz3f1wBxeoNK91mPcimdcQ6iREDN/

r00f3FZ/CNLSrn3Xobsn/l2v3hLXPrK/TKQthSRVhX
1lG02h/bibE02CB35W3FQL6wkYybpi/Rs93q/JoBq8W6mhsHDcxKpJ52isVtBtQyYPzb6MR8hQ+QFJvV

Vo0wH7xiwQWRh19cflHghavp1mCu+KYRmnYb2glDjcbB3q5uBifZ4jn4SKmkBdOgrpA9ixQDRO2j97Dj

RE0C6slrdcG8k/cWoAf7Zk5Uu+HJbVSf6FPUXBA9C+
CHQqCyqE4eVqAF72JwAA1Yiui+Q0hZaGDG/e9YdNdR8qSGvpSmXISp55KGXZIK339toB60n7/0o4QZuo

+L+/xpBBt37dyEXY9Aq8D8mQ/kDD2Ef8pPw9c21ghUruiNnbR+VrS3Ir5Sju6oJni16TVP/RjfC+l+FZ

dDD+g31YRDFG4EYm8TT3TefMnAKiMMnVJwpK2ToORH
6Z3HDmzgTt2vAnr4neeRz4F7UpBzVnOCXkjLUafOw9mJkvP3uLFNcspZZP4Hkn4sIeJmEWuRVAImhlrI

Hkkn2QWsuBtnnSsMbsqLBXx2dgi+H+twk0HyZHXxeCEIWAHHUWfSjkdAk6gPuR4K/lf1YKlAXYHqXBgE

d2499cjIj9j6k85B/gjx/F+dLGdUmqqvGwn3rPuMNE
07A2YE4jPY8o+wftYXoD7/Gj94gIe/P43l8o7IbviKt3ZbDTVPZpjw64qgFDxBRj5Id6PkVHnPdDd6u2

vsQde42CGbqCsyp281t+kB4dnz75uvl1j56LrSxiZ0FEpuuz1asP9RVwne18dPdqZx0+HWm5W8zyfkR/

rwWZvb2sCGzESpIbMzyd79tsB/nmhT7a/OF0t6LzqF
1CV/1YFJ7ruAc5PB/mwYrZmLXpSgUJyb53w/7M2XrGmOLH0ElOLk5/fHlRUkX8SB5jF+o31+FfkJnrtk

PoUBZv9Ep1I6rVJze1p6B2t9qAEVY22jxOWp9QKk7fCbEw8s559cp45m2eim4IX7n2eacU28v+qYmtIh

8M3K72wWBwquWttGOl8bCpLAvyQa74UU67mQNN4Knq
afSHL1P7Rv2G+aFgq25UYeGw8WJprGDOmAQYSFw4Zty2lJ/mLF5j+WSjlI6MLnRYslAO2wId8NqDW9ps

6cgD/7JmdXJchxtoiGv8F3sTyn+fy9Oao8KfadYK6vFRWd8fCGhNyJEME/V1V5eitigWPjDNooZUqKDE

z7fDnnIP8dSN2KaQPxDELDo6oKnLAJCur1ffR3b0Hz
DSzFdf8erHBeUDflKWcrDqBa7oldshGMi4f9aF6PLnIVrTUc/4eCturpwCguFL7KvWSn/uqxK0qBH120

g8WN0swoHruBuMkBRuBqYTHNKNPIhlGNihbe7gWpIZhhRjN5JZQEplBJlocanKn+1yPOHsCBFihzNU5Z

5roQIuOYMudQK4mFq6YTlCHb+E3AtvMLzaILCHEi/V
wTrpVdP/sf+6QUz/eTanUWeKkEt4iNYN7gHsM+reIhRu1Uq7lF2ZiBVfLqSUXxxweLPCTdD5S7eJnvl2

4cHmR7YrdmX1xr3HK2f4I0cHAcgVfIK9vf11LTdhUup3x7ykeUABVzUoXdN/EJalRF0rDIB+I8oyu0S4

qyGMVPSVwZg4gKOF6mmeqvk+IqTwQyaFf6SB3HGkVj
422HFrsuKjwM5P+MuNiI7bhx6z36ZK9g9MELKjXPBJMoFElc/T47zr6rQuUGB4V9NUj+Mid6CYckd77i

J76sTv2kBlaI3VfBtue5/V6pltaZuvQ9flJq1bi3pza1BmbDntT9sYlDSPl45E/T9xCc6Kb/mNiWzXNm

9g+Idv3JRX4S6+ZI1fJLRmZdKER2tOz83MTgXq+3Ps
8ifQbBQdGYwzHTeByEmc0xnyvNG2ZfaJ6KS8CS7hHvvkbiEbRIz01klzmU/SyKTk7+f4zxohAwSkLEwQ

wxQ/3TTvYPOMl/fyiEvvKUbTbOrFnEf0nkZ8xVFVXZSJ2WtDgtEAcaFvJpCwvQRCxPMmx2LivQH7E86U

AwbZyejnrfSPhJqqbEHr7a2jbxXoFFJpQJ/LlVxVUl
tVbltIA/OgW9Uh3eo00ziUEnTmfmu/lBhtEmITXkk3nvl3onRC0iegvgEq1UeYADv5Ei8NfyN3WjTSuM

MFOiAUPcIZySAlm1S+SwFlZM1UMGMA8Hn9tPT2Jh/JnWn2NxilbfNFwxzIpAbDZQ8WXziig5HAuNkEPS

ThNOsq2OM4B9LjLa+HU2SOnyYpfeMeN/yBsAOqM0UH
PFv0SfVfnGtRgTZhBLuG2QAF8nJPLcQ5xM2o0cdBbXU6eoj3H/u5JT1ftolgtApiy+wo1ZUeCMMoCcJG

xaQoG5nPrT0TSNMuKhHhDJ3Vv7PfZDGcwn9gT2xUz4F0XNIPP6APZd3zYwPkq6uJoZ2VSsamI2WQI06F

VcXprzYYvehxzBYhj9rlbsBXrEQSp6FAJnQb87MMjp
7a5KTzGRrhjFsUf1D15AGLvKbOXIH21IuhwoblVIJjRFD0UXYZ2MfV85SuKJMg5I/K/sVL8CTDsFG30T

VIqM7eQk5kF1fG0opaLCAaQJvXcjlOX3dWH2qHjYm+kZoW13WrEo7yTY78XQBlcrtUDQVStBYJwueMo1

nIamYLsXS4oDFspQNCKM74wnUvIHCTgLPW0HmgrpCJ
2sLjilEryDl10ilNkwt4vNtUC2gCuga1KWfNBkIhSvv8wk2QyA33Iwu0VsiqamfgFpMSByrEXcxwnRYn

CBYD0SDcqUKNnbWekdMjzcigwrWg5hWC4PIAadYeCKYFyMZvNwCF9PSJWGJvnU3+gO8GG7xk6x6oMe40

T3BoigrGGZorVPTwmJKJC7RoUc/cVF/eAjDCIKgZ7q
Dxmg/bjtppAgvToV4tUXF+TCSgDHm8MN7CBS5cRjJZ9gI0JjLfB7h9cuHeBbDsVAgtlbkafGXDSMX4RI

tGqFW8jmiWs8V8LSOKwbq335L9wblwgKnLVtxM2ZJTZqAeXRFSPIjPeHnEh2CJ8gh/R27uZ5YQTtkPp/

vypM+IJn2lDjwFuVbgkZ7hl1QOvPNMKDCadEonVuLV
OFmSRt0je/8UP1fWipCTyxSsOb/NSm6nkfEDTZxKcM2eL/cFyYFpKt+iKSKy/VC5qiVxpAR90TNH2kGR

JUJXJqzKIJzrJhpx/NKKPkQbLIXzXLWXHn2abAxGIm2GmjTmwwx/x2uNgD9/+FzP3/+P/4X4BH/w+xIx

FHJRxkjnHmoMKzAC6GItCbCB7spw8GWJZfWCRwVtxZ
HbHfJKwkAregaJXoDO7CzYP5PNRuS70qNEVzIAJoP7HpCYLgAE5+VWoeMEF0lbVqjJ3FWXO0RzshhSLI

vFfqP/uIvvwA9TZWIm6XvFMcSc4BjHdsrCRqZm28jCD8YmcapiFm8K2/AM4Qx+qlIhtAfLLTNQZoLRnG

yY9tTSlsBJ2oDPHcr3DvWiu3yHVw/PsKpmQ1FCxcyt
1PPkODl6g+LL9w3KUb+JHwv72NQD0QLRazbnT95N6Qf7OJBOAsIOf4nd5Vn4n1bypU/TT2UAvGzIqwc/

LQ8QKPRooJ2JicLJVB7tBiQKag34+QFCa6ub6MeQz8jAVsWYFcpp+n+qgEnVHf1waaeqLQh9g+s+w5b2

gJr1/0zic6O+cXM2+VqH7EKH4qn9BlXDCcICtvinNa
ZoqQJyS2RVFnh5KvAIc2HE5NHZWsEBssDE0Tb758YSRcB4UapPQvaf2KQGArHk7lbX6efCPpZEPXKVPZ

K8CqaYQnNEpuCH3Jk8DzfaJmIRT0O7PmaWwqvBhfxMQ9MUNyJDKrV8FrhXBtMaNpYBvvCR3LiZPcmdVm

Gf4KRWAfSn7gjGKSo1apPbGpNGIzAeSjPPPqDXvzET
9WMbXjC6v4XDimD4ZrU9qtWWWgQ3GdzBXoXG5KKJJbDu6krBSmoMIoYWS1EUBvYd9LPz9SXpAtOI6ywm

2HLAwcROFjRkqWIqb4UTzxTO8OgCLyH6XvmrSuX3YyoIhoKL2XQYXRw168VIsnJYGaKgPiATTuyaWbPX

UYEHBOD6HssPPpN2LAYFVxE5dVTPMcA8uwZS83zPH9
AWuaJP7QCRLXoBP7VY9SdeFtUUg5W8PuV9lhoOM7FJvMRV4cPHceD3EbREMorekjAWaeUJ93eJL1TSLk

U9HfmDnqkAAipLScVn0dNEseTI1Fc409NMKoI3WprOHaqxMqPuHiOSGYVeHaN0LDCEOhCIUnE1dxECw2

CMToERR3OTNfIDCeAL4cFX8UKd2POaHuOQ8ipg3YLW
hwQKCiCccSUot4HPulCN7FlVMeL3XrsdFaU0JwoPzkVK1UtZIjSH4NTDNeMr3kkB5EMSLEACGqFNWyPW

enQH9no0DhfecwKFIztnCkxFuhWC0CFAPbYPHyB0UpEAAviXOsqiBeWJqjRfFzSXHnNWrvYT0If3RnmU

NvZGUgMTUgMCBSDQo+Br9SRT5xx4TiAQljQUFpED3p
th9MEvJ7HCPbWMnkZDG5UbWeQxejMLhhETD6PiR5TtHwNDE8HHa0QBUkSSSlOyQ0YKY6MZD9VNXzCrZ9

TWX7LHO9QLQcOhuhMXD1VuC0MyHlMPa1HHB7VVJ5QtQlLCr8MTH6ZIG9YuGjSDc9UYZ5QJL2WlJkOsrd

WFO3KOG6EMaeXEc7WBciQyV5JMIiNWR6SIBjWgPwWN
D6NXT2XyUoHcF5MNYaTbT6MjChAkAbWMi3BbElLWarOSN9FGL1IPT9OELsONV9IVc4UTG5MHxlNnC0EW

s3HAR3ELZaHDAeFGV5DyK5BzJpDwY4PISjSBPnRaZ3CKGrKxJ9GScpPQnuDrdsQVC6KNH7MFW2CMmzMA

K9KRPmKFL4AukfUcPlBOD2ZGE7VDnxVDEsNGY7AfQ9
XQyxIpEyFEL3OURwOHFcARH3HSG1XNS9LMJbNeP0FYFvRWH5CmXdPkD5YRI0IGY1ZKSiYsh1ALUnPGN0

CDYjPdv5ODV0YuRAToPpFZY4MzieQdI6XRKwKrLnFfmsFuD9WIJnWkB1EFMjPaM2CCJ8CkW1KHRnOdT5

IWS5OjN7BAMsPnT3SQZ6JxT1DGHvUhJ5KCU3ZfU0YZ
NoJtN3PBS2DgR7RPYrDhS3CZO1QyW5JMBvWzH3LCG2RgV2FQIrIlG7MD1WKkJ9BKQ8WnN9VUMcOjN3PG

S3KoR8JCWzCkC8TOZ6IqY0ZVSrSek3OYDaFfW1FmKtMUi1FIW6LgS8TlIpJdK7JRZ8OVF3BgGjPIn5ST

DtKEB4FLWhRmI6KCExGKV4StTnGBB6NWS9RRE2QTqc
WTZ7MFMhCXToYAAzZVf9LJHpMPE5EPI0WHDjWAAdAhk8TAG3TpP8EVXiPFTqOUj2OGJ9ZvbwHAF3HYpg

KlT6HZKeWvAeFPfvQqH2JONuWyBcBYqhTPF2RtMnYSZvEKKpIoY3EsEpTPCyDOFrBuHmHJahTpP9ZYOd

ZtN3WvjbIcboZBi6ZRVECal9WIB6CQmbGtC8PHs7CK
Q2QLkqHnN6MLcpAlY8VhLhYzTjJSxaUrH8FAvcDyZ1DNBtGHI8FqeiPHZ4NFSrGQR0IendOJV3VKFfDY

K8UfjqASnnVQW0MMW9NDMlDWNdOQZ7LMFyZLDzQkv9GNV0ZXDfAGRmDEpbIW0FVlM5FDOrEUM4QJBhQs

ItAZDpAVN6NAVxBgJ8OLLmOPZ0OONcUeH6STHuZPW4
JKXqQUI0MAOxSMQ8NtwoKHVKGoQmQK0qzr9IKkMlMVAuPoqCKny1QZpbAN7EaQMzU6TlagRHQHNzfmbk

rG2lYVipXK6Wo879TgNbCO3ZOGymQOMOPYMuyZQAQfYoR4XcN3VviEFnOBs0N3OcaFganQfaeVGwJIx1

B5Qck2QjcvGmYHG2JO7XSOLaAjdjK0EsXzHNGxFsD7
VmjyFNUw99JZvzAW7vYTDcIOYiHUMhCGf2PL9TXWHrZYPxqNnxNT1diLHmEB2YpPSrRyZlYGx+Pg0KZW

2mn8FzUVymURBoUK0pju3FNKxCWtYpD9Y2lNOkAz4tyT1PjTW7yCKwI1NbdCQZiXYvS4Lmq9SCh327O7

HjU79bAJadMr2zSV8FzzGkYQplWd6WsE6JliZxAX3j
t2QglriUFcNeA6ZlwyF3O4alxcOzQG5YPTJ5K3aptjOjCPPNFxZtB3muNSZwysHxWASrCZTIAwVtJ3Hg

deQVDHElbrqtyF3nNTSdFRFnQp3NNy5OYoDnQZ1ztv0SFkMiFWMwBuuQZwgvOVJzBFVjAPTfFfFsXml9

BJR2TtZ4NAZePWA6XFznNDP0PNFtKxTlMKBwVrBmDH
KwMDW1JWV8CIQiOZkgBEZoQVRmTqThJHXnYCV3UGVdOCI9YeocXYS9ENSeAQY4DxodCFJ5TXHtTDX7Wr

hyCYT9CEJsVqOtIVtcJvv1IH9GMgt7QGY1STO1CBkyWOK1KEN0UPG3VeZpQcF4UArfCYN2GGHcTYz6UZ

F8IbW5XHHmGlL8OWS1BpO4DSIjRKhcBXi6MAP5MVnb
IyY3VRN9GHZ3XYGdDCr9RQVcYPB1KdHyJem3XNVkIOV4CwPiVUioDRY6DmZ3HtHgWFwsVDPtWte3GKMi

JsQ0EPVkNWZ1QAT1SRF0WQznBNj4YBN3OoB9MHmzALAtUYDmMfH3FIkgIFE0DQM3CyIdIDCrLIa5WPW5

KfJ7YlJcHYN1OBJ2BdH3UCzpXFo1RHF4INZ3UiTgSl
E7RIM5KiV7IvbgYtCcJDW2FAXCLaX5KFN1IHVcIzDuMWMiZMZtDVXyUyc3CNXaZQG7JDXoGqI0KBS9Rj

O8GJIwYiJ6UGY1OcC6XZGiIfE2WEX2CfO4ZJBwAkL1WSZ0NgV2TGGwJfY9BGJ9HtA6LBKrQgS1CFM9Fl

S0RTVnBdC2HSX1PkX6UPYgTqV6IF6WGcI3GBW7VrL7
WEAcPrU4WMF4KmN3DMRvJaH0GNK7DlJ6LXJxRbb3LLD5GXZ7GliiMBM7NEA9VgX4UkmnYxN6BVN5MJA8

TOXsASscEQG7WhU7VTFyQsy8APTnYNU9XLPkQZK3LVJ9TvG3FLFjDaM9YXM7QaAlDCKcZXl7FyzyAYU1

OMIlZjFmZYMrEvWzKRWoOJL5MSEiWNR4SQghGRD8OV
AxAEZ4KOD6RNF6OTQdPvP4UBR2UAU0NNNpBvF1IPl2KMB4HGstUVM9HMOpKaB5OOKpIMN7SIR3IrEkIq

ZjQnA0EDH2GaI2YJYtMoY8XCv9KHRHJdv3KKB3VQXyYrT9QFl6UHF1KUTjWzR4EPB3OoF8OHwfDdf2XV

K2NrM2EELxRSRdHSS9SRD7MOijJVJ5TGPpUUI6CHqd
YNW0DLNhLYB4EpEvLOEpKIYbBcO7TtXvUOIoGIDkGuHyKUKrHyWrGTA3YoIyBWLqDLX4DX9JIRW3ZBS2

XkZ5VYLpCUs8GST5VsJ2JPXzJld2CTW7DoD8SaUtPPA1XCU7SfU7PbJnZWt8RKV2XLA5CFQlSAHVJkEo

SE8rwc9SJvCqPGWkQqcXXrx8WPwyWF8AnOGgH2Gssg
EQUOEdkcalbX9oZNgvNN1Pd594VzZrMF7FMEmkYYOPJFnkWM0Mz4MbewZyHME3ER3CNNIJJNihwTOgUC

U1WJ1RFOCvWT27ZA9dXTSOAbYsL8GbGPebLZWxXQdhKE5Bi479YjVvsCVoMSOxQkJkXNx6QTDhEVn2GF

0SEFZiMWVadQzzMS1ppIEcZC2NsCIkEtChYDt+Pg0K
AF9rc0UmBXukJNOpHY3dwc9HZGnCGxRwU1D3eMOxGm7gzW6CmTJ1iKIlU4XaoGZLoYNhA1Qow9MHk897

I3TyG45gBMtlVS3bf0WiuaklI1klER7ieJBlX11fdW6zENsoSM8NbGAxdYRaEOXeCcRpQFUyuGQkBCWv

QcF2DNokOX7LvNN1eVBlOyFvUDKTPByiAV2Qt889LV
EsZ1RgzQOtimQxSeOhMKUDBz6+MMcllfEqWutSCtE5ANFfz6YfGPtpNOWuJQU5XWVtRdc2IZC6SUPyTJ

IuYRjtSSZzTOD1MWqoURYmIOKdPLNlBSWeKaPaGVAyVfD8HEVfPhO7TADaHPSpCFmwJaucNLZfXFY5Ce

eyCTD3QAQaLKZ3DzihWAJ3VDVoCJS3EdxgCOC3DXGj
SISzQIjsIeI9VHG4UFYGCzT2CTI9LOHcDUO9KRm5OTT2BRHlQMuaTGUcVKR2RVHnMIE6KZK1LSP1ZYVc

SiJzNEC6WnQyPBwsRNunNZF8CME0XDudFiU3WQl3RRZ6QyYvSoR9IIT1AKO8YnSuRTR1GTC8FcE7OcOg

MAG0UXC8ZJA3DKNqWIqaOI6VXbAbWJL9LHSvABDnSe
p3KHAwMPMeVrplQXM0AJA0WTY0DlMfQDn2LBUpLbYmWSHpUSu6JGJ5BzHhTDUaJoGiUGR9EtGwLPZvLO

ZlWZE1ShV8VCRrTOd0ZOO6NZLaAFqgPVX9GXViSHV9TdBiYGh0ICatXkR2OXoxIOy0XJRaOIKvSQVeXo

TmXvppZfDlXOX1YMGcXVLzMcR6NBR1MaG3ZIZcGhX2
WCM9BfD6MVYwUjA8LAF5DjY9NXLbFfK7HOF2EqG0CUTsReG3KMB9ZmF3EYVcUiX7VMU7HjL9JBYvAxH0

HNS6JlJ6WUNnHmF1SCW6OfPTMpI9RrX6QCPwIyD7AFA3HqL1QHHlZuF3EFO2ClA0ARBwEyU2GCZ0HBLp

UOEtXBK7AKSgBAH0ZYPdEVY8GUFyXXH1LMhbOZP6KE
u4HQIgJEWbCUE4MLM7MRQ6VBEpQVlhIAJdWMVyKxtgPok9UBA2VsEaYwBxLcpwYX6PRVn5FAI5IEAtXS

bnHiU1FGC6TYV7ClIfRLQ3ZLrzPaM3FvFxERHtJSN9RNM9TSSzKuQ0JPE4HZW5FLYtMfX3BYM3UOR0Vz

WdWmZ7EPKuAyQ2GDFlJLQ4NFBeJhXbMxUoNaT4AYF1
WdIjLfBuXiGlLZj3IMZ5BGWgFBn2ITKqHtS0NKp6NKM6TLYtBxc4VLp9WKE7KQwjOiy3JSG3PaD6SLvr

EHQzMJN2ODO2QYOdXSO2VMZyOUE2ZYuxJHL3ONEdOVB4BRiePQPqAMO2BzX9JdRlIOBzWWCrYrZ1FnXk

QcPnEUX2QfOzCTEfZxZvYNW2MDYQUiV4JsQ8NXEpRG
b4MRU3QbW5HHRzWXd7KNT9KBW0QAUwOHM6CAO3QbM0HuXdJJT2UBM3RJQ3WVkhLAp0ZY4jPRcgcoPmMx

qFCcC4LVFac5VmMGg6IG3NRGTxAUyqXQ6Vi111ZVMdS6XxzCCdsc6PPHKeDb0deK0mvFWwK0Zjs4WHQD

5KUWZpDOMuSA99XYYuTlzvP2QtEPXkJ1r7TNMiGplf
AZGbB8XlyOVgSeCsYLazEB6YhIKpquZbQd1JVLWxAe9uxRXBp2ohAzAzEJQ0NXP7ZOBpPNU9IN1FTdQh

R9d1VPojR4JnW4zxOUVrE8KhuWMnNQ4ESv2QQrOuAN2hms4UKiwyPTGwDgxHUbw1TNlqUL1UjIHvY4Lk

ykScG5WdpDfeVP9VifDaHUmoYV2VXGYzUe0mrW9YYZ
vjXLSSK2VpB34gkE2iN0jlpjYcy0tUtnMnVWeuJs8QPCMxZudvf9JWmNClCVViE2ajx9NZqXHpUGM2OD

4UXVVrS2lirHzeSJQ1HBClEk4XOYOvYs3zuPWis1CpsMC2u6JoOgGpYEFXVSw+Jf4XBB1wu7ZnJQagJM

QcMO9zaj8AP15ZErXvLE3rpa9JUtFwLL8xzb5VDZvC
TdWpK7Zpx4SRKADjBt7FVMXfJAJ7tT7CaXVkAPIkWP4qT2YBGP1RSYImFw4qkHY5WEUnFgGiAPMzLKDM

WEziBGMzF7RmMMBjHAYxXo6UAJQkKB9MGiRgCBBgWTPNJsPoXWCoZjQcSqWqDYLUOEvrIIZkT9G2LAV6

KAZxBf5MQIXxPR6YSLDtRtJhIHO+Bf2HMKUnVV1glp
DnhRY6NHS+Rk1LZZJaNCe2I7Z9FDBzBPs1I0IBX9NKQZQiSMyrUEinVHQbEIg7S9S9HLGxE6SGL0Givd

tlbj4+YD9FK91NGTOvUCk1Q4X0hCFrQ2M9sFnIyHF0UY2WXD5XzWs9bIZynB5+SV3MS5QVCoCsUBm0C9

X3gDLxL6C7dIaNjYB5RK0LVD8RtKKjWYKyqoRkGk9f
C8STJSbAIcSDXDL2TG9HlIDgHE1NpREMP2LdbRFsVi7bEOcjvYFuoQ8aBk1iAYhpOQ9PBhHPYZjPKCN0

CE9EpMYeDQ2JmYHEK8OtwNSaKy3mKFsjyOVfax3+MK7RDZMfDi4PWv7+NYdqfaVgOzeLKpF3AWJpr2Lq

KDh5SA1RTI0tmVcsKBB9Oq8QlOW7gLHeE4hDFP3UwJ
GgW80ngKRqXCNgVx6IYuP4ocGvpD8JMK66dNJtc3O1DFMaC1xkPJbfz68zSEwhRSbWCR9qHQIIEOugDL

dxUGK5HxSaqtiuWEHyNw1CWeUgHIv0xK3feFF7PIW9HxinpVBkYYvtGwKxPhEaBoE7hSwkavk7VGkbFA

9iZTpuczptZXRhLyc+UFenAWKuBDSlQnwLEYYfmA3l
uwI7peCnRKbksNHtHu9wh7n0KawfXe5qUa6kLOh2HzJyZwEhAWKhVo7muX21CDalnzAiRg4EErRuXDL8

C8UuVcnUWIG+SDduFXywdZy7qTKpPZMmLr9TVUKsNXQrUPGpMBJyPMOyXJLvWTWnFFRpBYTpIZGsXRGz

ICAgICAgICAgICAgICAgICAgICAgICAgICAgICAgIC
BqHDFeMIIpNZPdCDTlKWMlHAMrUTChBSZuGZGfGACuKKZvVE8XIXNxQVMuIFJoPUNmGSSjOOIrLDXgFA

AgICAgICAgICAgICAgICAgICAgICAgICAgICAgICAgICAgICAgICAgICAgICAgICAgICAgICAgICAgIC

CpHUWkPQVjQKLnYZIdFMPaCA7NPXJlWPPuYJUfCSOl
ICAgICAgICAgICAgICAgICAgICAgICAgICAgICAgICAgICAgICAgICAgICAgICAgICAgICAgICAgICAg

YSImIAEoEBLkMDJjLTOiPWZdKCUaXPSiYVOmGA9LJTDzZOWcHQZvNOJzBNIfSNZtDVQqLJKfRGGbWAVj

ICAgICAgICAgICAgICAgICAgICAgICAgICAgICAgIC
SgXZXaTCZoNIKuXIXyBAMgKSJnJXUwJEQeZNThSXNmEZJpYQDiND6SHRNmHITjAOPhRUVwFOVaTTRgUD

AgICAgICAgICAgICAgICAgICAgICAgICAgICAgICAgICAgICAgICAgICAgICAgICAgICAgICAgICAgIC

FxCYPvPVTbUVFiNLLiRRYvZOMgNI2OFEMuQUCwQLNa
ICAgICAgICAgICAgICAgICAgICAgICAgICAgICAgICAgICAgICAgICAgICAgICAgICAgICAgICAgICAg

ZFUmAMVlESJtYNRnGOGeQVHxCPKrMTFsZHBdBITvPG6HBPWvXMHwQXVmLUNnDHElECMzORIoCEZwEDUa

ICAgICAgICAgICAgICAgICAgICAgICAgICAgICAgIC
MrGFBlJEBbZFCdKKNdRTGmFASiQMZwRBVwOOGaCYKxAYMsCXAdOQTgYX8VMJTzFNYtBZDwGHInTVTaRZ

AgICAgICAgICAgICAgICAgICAgICAgICAgICAgICAgICAgICAgICAgICAgICAgICAgICAgICAgICAgIC

GyFVJoMRQfPGKzFQAfEUQpJTThIVVhFX8OQPMqWTAw
ICAgICAgICAgICAgICAgICAgICAgICAgICAgICAgICAgICAgICAgICAgICAgICAgICAgICAgICAgICAg

EJUsIXIvXTPbJGHnWTCnECTnTSBqACKrBTPmKNXwSOStCP1BZSVyBXVrNDWbBTTyYQWcDMRxFHYdCMXa

ICAgICAgICAgICAgICAgICAgICAgICAgICAgICAgIC
RmHQCsNLBwQRZkUGKlQHHxCWYeAHQhHZAlCTKhUOFqBPKvURZiTQYbCMBpJG1YTP12pYLku5V1KGCeYL

0ndyc/Eu2DXSovctDczSIaST1GQgVyUN0mkk8YTeBlUS6dfa9IEVvDSmQeH1D5uHVdTYYsAYGRQiHjW0

9bYJjfUw87YGfsGGCmSiTlIXt6Uj4CFfSoT8lzATUh
RqG6ZHZlLrK6BGRoOiPcJLemIL0Fp3VwxJZcKKm+Sy2EFF7aq2FhHYkgNYKcHI4mau7DWAyKJkReQ6Hq

fvV7OPGkCEYiNl7DPPQtNEPvnAQbRZTaFYJRAhPcG4QrmE38FYRTZg2+LJhxcpZuMgcITvPoXEPbo7Jj

HHj0UJ1CRBXoHBw0tFXiLSDhF9Rdr2LkCz87NLQuYg
juSEApo2QdNGkozBMtmfCtXDYOLRIekAQdIyPmPhDmFoOjZWd9PXDtGU9fCUitGG8LJTS0LRtmCNJuNX

FhP9vOShWqDEbsSUTmjGpsTH9HZjMuU8PcmvMpqUHvTRUzCOJEOf7+SUovsbAuBekSKvTwPQCqz7SsYW

d5WF9SUHLpCDbwEA9BFIRimR1zRHknZV6LWwVbLXWm
AZTTGlPqA73sbWRnEDl1K2YvWdKiKZCjQfziRQLyWFwxQzLbLCBkEdIaYVbuDN7+ID4+VTuoIZ4MNOsd

vxHzELYaIs6TNKYcNIUaRG6hJXQgDIYqJ3U1pAefYPCKUmBnS4xbvycnDM3fKMSgJ502cGhshaRwVFFr

FDUqBh7AGFHzOLR2KVYddCDsAulePGWWHTihGL0MmE
BjNEH3kB7kYHyzBMUfSZEkL1yFTwMaoMcnNW21fIshxmKyoSEnZIt+Vk3TXA0tc5HjFLo7wnIyXUrqYI

KjPTnoGBTdFNJwBMFaLEH0XUZ5DZGTPuHmZSKiUQPiGUwvPGCvEOIfyp3FDYUyLCFyLVkkYACvXRYtKM

FlMImpOPLlLMV2WkGzRIIuDMWgAH2OCnCrRJDpUAKe
ZDiaCITpMVLbfo1LQANcPUAlGgP8IgPwSIXjNHYdWJxjQJSkNJJzYRj8SGHkTDVxXG8HWvUsABTuQJW9

LCVvGAVkUSVeja9EMOMuDNMqXMf7CZVmJGXgNEMbCPwsWYRgGHE0ZWOrLAYfINEwBV5PJyXmBXZlBVId

OEPfRMKaDOFodl2YMZQoDKFtDmVvAXKaRXNlSRGgVT
vuSWRwESQ9QmOhLTWpHQFmVO4IZsWcLJMbVYm2FEyxPQVoDYHdfi3RBOOcMUNjEdB2YPSaIGAaCJWjNI

cqIYZaYVN5FOd2JUPhEWJlDI5BJuYcAWOuLnOkVWSyDHYiKPStpb4EQMAnWGPnLVPeOWEaEYVnLGXaIT

nuWACzKMBnWeZ4TYGrBJKuGZ8BMhSvRCFqZfI4UXLo
PXJbJYUixp5GIPDpVRMbZMt3FtXhTNYyAZPnUNjwRFSjPWRjMCH2YNIdPCFqME7VBhNzOJPcAiShKZJr

GQPdTJKhkd0URSLfBXVlXhB6RXBvYROuQULuAOnuXQBzMNXvKeA0GPKtTBGtGC8WTuNpYUFaDhN0MKPk

VUGxGSZori1TTYTvLOUzBRM1VZLbCSTeAUTeMSzePV
RgUVF5DqY6MDPyFFSwJY5FOiMoILZfBoT4NERtAMQvIJGcgm3EDCIkIJMaHSU8KCYxYEKuEGGvLOsrRM

UoBIK6QdY4ZXUkEACcBS6SIbKeXOZkThM4QzRqESIwROWpjq6CNCTeKQKvRrqiChSqAKDbLRTnQWu6ph

SjoXYwTVe5LY9LH1OttwZnCvPBNv8Ms581RGGzHABm
Eb3RV1boIo3aZFPoBHUPZv6TDPf7Eux0NkC9FfA1LDQoVQN5PLY1BStjGrGpLKs0XcAdSqK+IDwzNzli

RSAoMBhjHyZsCyU1LkW5CAFhPvPwOEZzOJEjEV9rSBHTVc3+DScfjXZwaIyfBEUNUaL7MDttEGizLBRD

Rg0K









                    ID                  Date                Data Source

 

                    [id number]           2021 09:32:14 AM EST [facility]









          Name      Value     Range     Interpretation Code Description Data Vanna

rce(s) Supporting 

Document(s)

 

          Patient Instructions                                         [facility] 

CIRPAi1cOfIQVrQe03/ASNbmTQOov6VkHCkdXMb7CNwsKLZpL2EkSSU0cE3zLEB3UAmJAyPqNwWyQdXf

lbm
VvRsoOEvKrPPSkSsdRUlTfRZyzNwxmmCJrOO9FdBK3XAVjA56rSKDlKMCaG7YgCIMpCfC+Kx0HYCAohF

QiLA4AYjjIyThFd2vRMY4D7F+VMlSx0u3yX5wBeuQYfHUC75Ql6ZulYZfHFJsiBjOx/L5LnmuHv7KhZI

l0oFKSHmei4d8BVpQg1/mZXwXubJP5e/zmcgANtp44
hBn7IU9PjzczQTjDNEY1SyCrRJ/dvWPNtIG1VhcTzSU76AnRqEhGsdl9XnW4D3rU9vzTtvvTQrqWsD/R

3yldfa0o3HG7RZmE+8fsEAylQs0qMt8i0F9fp6VxHNneL2ftmDQpbggJ5AbQWubggLo3Nlz+nQQ7/wKJ

oWn4KFa7kcWL1eXMzcN+80vH8rZ7q0r7y78Qbq/3An
F3pGMtVNNV8gATORynSiZ0vOO3jFbP6VkZRUkR6lmmC2GRistGgKh31MHOkwzuXDhn7MdwTtYsSv6al2

LB6UbePQ1OXRZWUeq4G25lTYbZMDgOFK2Ikzbs9y+LRRgEn9CGwMTkDvb7jauSL/T2xA75h0riyFMgMF

SJZWf/Drwfck2boDxrkXht7Nno5oEBL4wEqSA0WCA4
vkKCvprkjCn42lBgv4MSGT2KDl6iP3VhNtEN04dVyU+/CXGrfv+XRp+ejz+qPZyKo0bYy1RUIgDw8Z0U

5pKEX4JOpNxmNzI0qbPt9tX04fWoi3xcYmuCcMO39vJxMWtRHVogjgUN7vjfH/9T/x69x0aQg0SrFvsA

fPo7jeO2hzsI5i9kPv4X9AVkh9c9pJTTA6X6++ksS6
UqNFhhkxzhf0kAU92pqgRD/sYGyt1WXfzhjzP2kPGkAza//KYEYm6zVABSwRd9/Tq3FCfg08jj71l6Pt

JZ4cWXKimM4FA70DQsQ8TYtG3I+S+ow6ZPGWkCtRp2ibI8nMyVx+PHxTlrqZ6Cwj3NKXWsGZzIIAaxIm

K7PmRKh7z4riM6ecl4PwOLpJhnGii+raQFfXS6F4l2
pbFN/u/9EblpuI5E0AD33xE2nLqGbJLl6hkqqXKGgapBCIP3zMI19Erz4G8gCdTBr2KJX0DGHTmD+XOL

IAyTheaz++PFS8rQTBdGt3reIFX0zPV+xHvc+hXpZLqMJUwMUn/alJlpjaVBy8cQXyb61znJ1DCYC2oc

p3r9a2/qPP1SX0OV5uZbuMsJgiDBjU9yNunRVIRgJS
8sh+7UC00fiK8UUJO5zsjFpPkQsdPnXqusqBeHp4yNBS/R/yrLvE4YY0NihUSSSDe+pzPc9MzxDa85O1

umEYyJ3yXVLZl2ClpgrGWMO3PI0pAOmGRGZYDnJlVIuJ8UUm7ykxLqPN+Ah5OgdAwi8cTrfurkja1zmB

D77pHBH7CEpFTJ+M1jst2aocIK5x2ie+/eI8p/ujHl
+cy0gVOO8mhNomsSFhIEyp3JhgkWaK8yqkijlD7yZ5F7D2MvA8eDhkv2BjS/WNRjdlm/hU1kYd9RYtTH

JObUK7KyJrQEeiln4lZ9EMiZVC6ANQ7az3KrFECkTGgwcmYeGfhZAzJzZLRzXlhWHcTjFGjURjHaHFQb

AHkbTW3ECKomKRjbLOSmX3LnvcSecDRiITSoXo7IAS
UuTP0RADKlgEWnRIDjQfGhKEVCWjViMYIxVDZvzTQGm8isYxLiHUN6EXNhXnpdSQ0DTMNnJU8Sd954IR

48tbP4IZLfFq4WLHVrHK3Thy06bJH8AWVzVaXdPQLgesNxBFXfpwS0JZ1TEsNfRKI3cTPoYriVGM4VUO

DntNMtFJ4EWNBygKZiVG3+DQogID4+DQplbmRvYmoN
EgAkZQSuGcfBLlYnGlI0ZOEpWoEmLUR9JALdNpLzRIh5RTN4FjA6MJRnMWy1EFnnVyPyCiypNwLtYZJi

FlIpFKnxNYv8EIG7SDEiCuZwIvf4WPB1LBC7CSWoFWV8ICU9WbS8LAHiTDN4ZXW3XvK5GGInOUG5RCI2

MwF9ATBsQfUhSKLjPoQ1NHJkIBetALK6ZLA6XBWlYj
IuHQu5UJT7ZxPhLlObSKfbFkD4YuLaFtX7JJDlOSK9MmdiBxJpMXU0AFJ7GHViKiRqWQUfYSH8SnAwVf

ImCKv7VQR1YvqoGxg8EXnaOtU6AjzvCgOuBTmjNnZ2JajyLQU4LSV8GiZ2InxaRcDyMI9LGHEcGzIuCa

j6UXDrIfU4PIVmUUI2XIEgBtG2PIDrDpHiZPW4AwT9
FNPvQRC7CNEjUoR5LDNaTbFbWGA4QNTaDumiEKQ8ORX5RHX3EOpgUvOaLPIiPCA4IBOoYzHgHGG3BRP3

HGTsCmHbNCAtDVO8VMNaMtn3WNR6AaQTOhOwSQI0FHBgKKVqOPfzXohxYSA6ZNB5MUGdXiFqSUs4CXH5

KUZpSpXhFGn9HMG9WLDcUuTzYZr4VJY3LHMeDkPbMR
c6CSH7IGUpHhDnHNp8LHX9EIAuQiSpSOk2SZS9RKObVePyQNx7UFR3YBMbPtPiXRz3HZC9YHMwIUeeNS

t2KFW0WISjSvMsKVd2BBB0CRBoAnObEDd7RLJ6YTDoXnMkECJ0FDHbMoKhUHS1NYN2NaF2MYOiMZD6BP

F2IEM0NGPwAuCgIYyhWoBvSuLeMHQ4ZQI4LKDpDmQb
XxU5ONP9SuY6BNYsOAT3ZOAfPiYcLsAzKdDgOH3YKEJ6FqNoSOG6QSOwAeYnJnGcOmCkSLS2IHH8KEGd

QXI2OEpiKTF7HdOgZvZdZZukXeB8FmWaOwZsRDjhZrJ8VqLoWbJiQHTgTMAeIoMxHDQ1WeW3XylbHoJ1

FGR8LiKnJiucKcu7LWM8VXKuSjxrKwJfIAtmGqQ8Il
hlNEblRYa5TXD2ExiaVei1YRy5LMG7JPYiQhk2WVyxOwQ7RsRpMuLdSXwtGiH2YernIuR4GZJrVXB6AL

AgUDG4ZNJ1TbG6LYAvCUJ7CED8OaF1RVqgPIU8MAA4LgK1LUUiKDX8GGF9VsJeRjioZkq3IZC3LKAaLx

uhQzJjEI1ZRZI1QLHlZjHlULUkAFZ6IQNxOiCnZPZy
ESP2NEviRxUoEAOmXLK7WBExApZnGOSqHJP1GTCzOkPcDXJ6UiPgEM5IPO6dp1YzVSx4IAJsc9QqYCov

RPb1HXwjKYCtW3R7sEUcFc4zvMVrh4MxvPH7t1GTLaJqWDErPy6hdM5wkMYvLYXbGLacDy9nCI0NMUWu

SX4Qb5JoccZxXQA7C5VvzAphbTqkuRK9IHZqXOOeE6
TnrYQwGcAqMJhnGALiY4EbAXmuUTEjDLwfXWHyB1DgvsDPOd65KPtrJE3lFGPgSMWrITV6IPLpAFluXJ

DdM0h8MFfvH5RwO4loBGXlJ8DvkFQnVK1BZKF+Ka5NMP5pv3TmWBw9LPOtn4VxETjkBIu9DZecLIDfS1

V1fUGbWi1adK3JsUS7jMCmG3WzeGLEkNCpC9Phc6TH
g145K5QewCJbLONzeMAwUK8wq2RndnimE5znVH8xrGBaB75ljZ4iJOpcXWMsA5ZjpsP9J7oofuTsYT0O

YJM9E6pnpcSlCFDKEaQcSZMhU1ncuPlvOZKqUYTFIUtqDNIvD3FschFGBVUkeqyuhM5tKZpzEDEUSAhh

ID4+XFgpjjIkLtgTVemsFFTeDpuDFfShJhX5PNWoBh
YrDEX7LOKgZfxbHnK1HYZ6RxO1MAKuKPl3GKN2SsDpLGWbZfOpXXSyQhRfNGwqXHv6QKD3ZFHpCeEoVh

p1BMS9WSF7DVUgVAZ7JXV4HlV6ZNSsBIP2MZQ2RdA9ENYiURS6ZTV0GcY0VQFbEwi0QEW9EFM8LTEnDB

faWIM9WRU4MYWkLNY8TBToCCWoVpZ6FLS8QaE3YmTz
LsOiZCO0XiB6QGYnXle7LEolFcVvHvwrGPQqZOW6CqN2NWTmFNBeXHiiWkD4LssqSaJ9RIz1SIK1UiVa

BdA6MVHlOPG9AfEfXwF6SQq5VLF3FvhdRsE8FPUlGBGUZqZbJde1SBF2DKQfBckiRBZ2UAE8NjEuBiUh

GCW1LLP0CxJ2VPDhYKT0FLZ8YmMqZyndXOG6TOI6Mf
WaGiTySdUgFQUwHIUvPkWoKLDrXMC5DaM8QAJxNLH8MUW1GmCoWcUmTIDoTCT4MEO4BXQaWTTfZDncLa

I1ECIxSYucTIQaNHN9AYTcNuA9HYW2IDQnPvDmFBv8HOc5MTS0IQCeXuEgORd2YGL2EHRhYiZlEWd1TQ

E6QQUkSeXtBJd5QZA5UDUaTkCqZTq5EML8ZOBgEkZs
OZt2ZPA6QRSiNvJyJDd0VIM0RTIlDfFdLKd4GYT3UMSfUePgHI0QJXH9OSMuCdZwCRr2SOW3SHBoYnAm

AGv2QOG6XDHwDaRdFGe9KSEeAoflGkTnOMU9LrE5AXMvQXI0EPU7ZbSlIqEhYRB2PZGqWkJ3ZzqtMlau

JEK2HdF0VBUmHeYfCJegPrB1CKHtMBLvWFD1QORfYh
LiSaWnRXQfEvXPPbRvQNc8BUEwPeRsNaPqJxCjIAIoGoPaDkZgVJH9VXtwETJ9TcNgAJD5AVXiPKD1Il

yyIbQ2PEW8PlK5KfprFlD3XJI8BuYlRVAgNAbrVgW8IkinUlF4JEB2RoX3GrquOvg6VAQ6JRYqPhtyLt

n8XCjmSgH9OsSiTsm9NX5HCOB9UygnQer0DZj5JFO4
GgkuWZr6KSh0QQU2WcRvHwGpZMpeHfC1FpKyApE6PRM5JkK1DYLxTSK1UTK5HgE2QYHrIBL1JBZ1SbH7

PEAeLPd5MTQuEGQ0DPJtLXJ8TEE7LiG8HSJvCeb7PHT3HEDfCvunOru1BZU3NsZLUhIyEFW0TGT2NjB7

GUNbXMG5PLR7UaZ3MMExSFX3VISaDXR9VNJgZNS4ED
L8LeB3YSWfYGIcYUU9VrS9WWLzZAGFMdMwLA6nnc0AYXCqZGOdOosMTtNwQScYBdVrUONfXMaxKV6Fq9

97NIQqM4XbaETqmg5GQNFlWX9Li811DqPgLA1FkfxyfQ5PHNSaJF8Us8YvwvTzUAT8M4RghFcjuWrppW

D4XEDvVPPnB5WigIGqSzAqLUzlPOGmJ5DeDUfjUIVa
MHzoEDAoR5MqxcCSBo33YRqzXC2pPBUfQVTiMIJ1WMAxUJelHIOmL3r5POneO5QnH9fqWBGyX3LpgYUe

XS6EBXU+Bn8PPP0zw0UuZAjdYWUiYQ0obx2AVDX5GL6RLEMiSP6RyNSbT1PmoxRyF4EdjPtmLH9DfgOc

UFpmNE9YIZMiUn1ibV5WgmioxJ3IwvQlJHjcZl6FrE
8OvhNcFW1id1BwmzmBIsQeNIYcQdxrc3IHfKXcMKWbH6nyg7LCjBPaXGA0ZG2RYSCmUL4QzWJ2sBOvKS

BcBDDMXhGnSYUrAn6xuZBuh6WfxJM8l1RxFHVsMJXDYDvvMA4+MYyjvmZoLzcLYiErVGQuc3SeRSbdLL

l1I5SyuDUkceNrSjcybUYFUTGiHPZfV0qqacs8vQVg
DiK2KOUlMNPdU9HhXKUkBlL9UQ8+GSdpMYL0ztAmgQ4YBNMipWb6BTWI3rn4M0lMozBAUUAqXc9CEgMN

DQQSF5EimU3JYuPCFPTEwHKVJBszUcXLy0VRGUTQqUSBPkddII5r5FkmGha4LeZJJ8I7k5cozmKfpJu5

8z//72oE5558pcqUDF8kGr9bLq6UBENwHHF2MaObvQ
ZrwEssVwNoIRV5ZOISai9dCub5ITs1XXtItEV+ZVDMCMe6cRwr8S/18llz4O5GPS6b0GQmMH58bdAZJb

9mosbpM+CfDc0B20SYwvUloq/RmfMWLlqd/LzhHO3LI2QvYPT/CeIZtb8dUX6e4htWlB6SkDl8ATKQpp

j/4QsqmSTau3xQ/7MTZCp8s0JmMgUSbF+N0EmDrxrq
TT++qsSjC7UR/dDL2EtDrI112HR5a4d/dCdVvedfuLH3NLoK7ChHEA/CnNpSOJNPco821+Ly7HISr2XK

evXh2dIgQXhJI2HxRKrToHuYntkezYM0F2bohiWtizPLHr5vyE43tpIhzoGFcmLMRbYgKvjVa+kRMYse

eT80cObOOXwmU+1qI9bMRmVwfKNvJvvKdQaSeXOW58
sg/L2A7idW9KXjtdZq1J7XA5RreijWnG/pGlqi/E4neuCGuh+BpOvRu1DjoZAhJzSP1ICMd1aKZwLPTM

yD7h8Dkng29TQq4r9flcShjcBv+L5i/jB6s7zYczMBliOVHeZ8LQ5J9l9hWtQzFErWOl19CSmjnC3kWX

TvEbk2EqNieI7sBqhM0+ysHWxxUUMh06kp2bmcBlV4
YLKX5h6FAwnHcoEBp7X3PrMEJDoG2ckmyW293qJt7P1Q5Vdy8L5hihoJqVvqDwlchRuc6W8/pl7ZOUxr

wFyCA4EJz/+m5+uECEUGoikLJuPRWSiBQ68NT+gQHTF1YzD4ZTfxr7q9Ra51ThpSwxDtb9u0pwwA+lC0

FnqazWnxJ1n8ay60ITxIOe62y9oO1ee1kc9btOepfe
Q6vU6eq0WIhA9vTcqF4GSwnI4ebtaVA4EHVhzCnjQM2oWbUukQzqJa7U07Ti94LlqdiF4Sv8D1qC4Gmj

KvGCfOFTPFlWKZuFWsEa+IO5RLqrUaOGsPZ6dB0SOiQ0nHeva6lt6HQMymtT4UT6g4NU0y799tBcblQC

6m1UrDkkpyS1989HF2C49D1DQpsUsaBu1+kSwKxBX4
Fs5Qpq2oq6tQ0DYnz7xIyItpO/IXELHffXXfT6BcxA7qRD8xA1B7kTvpBXvhrn/YV7PXEgpeIexnZvGO

pYqHuhb7N04UwE/eqcFSpcE8ujUNYfmZj28hceBQBwWo2sdeiH/+0b0qLC8/X0d1y+lV5qLK0TYifXhx

tkEr+BBawyJIajKW17yf3wfD31NbeG6f0FMMBzVYSp
BMaZZgYWONvX7bCY3n2I6SC2GI8jGOPYFGfdTr4h47oin4Sg/hXdv3XW5nz4L00MhxE17pEkRbyUulya

KZA61r7sPfm0gwnBO9ueN75HDmA+73gT11Nnjh9cDACK1cge/ZL7yQ53/tfamHkFwFF68zv0551Oc2/+

g57uDLQ9DfnmLy03hQSw/x1hDGvu52XR7rxqPADq7b
FVb0lZ0TtiJR4bN3xVP3dnz2XSIiKQ1Il+GKWlhzCUlyS0lDJkJd2KWR2u/Je+EXlwH6WvXXzNtsk8t7

hV14JQRX1N71ql5c7rZHVW9nW2ow2QEeKkZQal0E2vHrZf6Uw2kg0M5RMt4lornBBe2Bv/JBbSxGwV/0

HNGlTBb33eMcsyKkmefwEGVu911ZLI3zbpUmDTUZ3m
pJGx5Nvdeuc14Ip2iBuRu+pLsxXn8siaOHnAO7EqyR50OKx6HtwGQLnFfWQ2rPhnYiOoiN0B7E3nuOJu

FhNqM12mpryCdKes5I79Q7oq4QUga5C+Hj7sl8aFMbiFQEiPxc5uNfCpyNJPGaYwOAKqZoHP2W0Kvgph

Oto81VkEEFVz9iWiyy6ARN4ftJOXDihQO8ZGLOTNsu
yxvUWQDzVgt3VDotZ5BZwWEylRvQKOYkAXwi5D4uujMoRxALzHkr57qq7KTn27eqqWk+dYsno5lAxjMv

oHaYOe+JkYtyFXgiZf8blRun1phJX/ztMekSB5mB+A7DvjMlZstU82H8lvPPIRvenpcPf25hvLXPJpYz

/4QGiqh9uWmnsYbs6EkNJU6BhKl1sCZD7KBOeyjgvK
4+3YVwzQMSUe2J96StN6syaccLvxU1j7UMI6AJqZ04SUqsQz0ZxaG+I95OeV3AepGU61ZTGw7VrvzboD

HdX37vk9X7yai+TNkumGY+aYoD2eQLk2Uioxtbe/VJrTMdBqTWuSatrW+V6lRxStAIT6dVExld8rUjKn

fT/Dho2vYFp+x5KFeeW1NmxjSE7wD4BlRILvxZDsIq
nRsJX1i0fitCIuCzntJwezr2oq/g5P9lp1KwOq1Sy1EpG5UYdCUoPNmmKMETybEHyM0Kcav2JN2gSgSX

CZwzhnvcgir7WmZSXKpiti347AorBsI0Y96R1t1Kcrm7eqfPt9cusH5ger1w7lYju4vhionO21NqGjiG

X2wRV5gz1uCFaErOM+Rw3BGTGhXC2bNbgaTE3JofZS
jFbe+WssQWIr0tuWaDPR6rEPe0AdUtIcfplLzZAkFEJseG+a0dI7/d0dDEzd3ML8jJiElfA6GoI/h+KD

+ka+DtvpY3T9V6lR3HWaKBEKnQ+H6A7/pzhBzTv0v0KUTGEdvM5SKELJ74mqlg3TdsrwckabUVKr/DM1

a+y0o5u0oOlVnxb+C6IL7tZF90TnnJhdT2z+FLcRyt
Mb5HAKgLGytwoD+fPmHL0vPMYJ6e0YdS9hYu65X0bATNgTs6n1jhR02uF/HdLLaQke3mIJUQ01Du2+QH

abARvMZNbUfOfE5yHuOQTVNENeNP0LeS/HI/5UFwPeDWmoPJzNtkzKZedtAqOAuVs1JKgRAqAHM83Sid

TPBpwy8z+rV7EdHQYunO4ykO1qAc0qOZkSdrnLTXZO
3KuTAj6z4sjclCmph5qQ80vCRFgeNPTJvyS4OKIcQ3WOzzEsg4CqhxoWosJY3m0Aqf8EYyxB2gwJaLuH

4jms0SlXdQw/a0uIeb9wn23XOl5xlz5LMK1lIv2XJE0dLKqiGR0NyRiRNZ1OEShXAggaBOxZyTLjPHJY

jUyvWPd+co3bFarxTCzpUYnxFxSPwcfbr6mRIr2RpV
SqdGjo9pHfn50ApxUzdmvkemogJPCQkaDB5JioDq4rmJhi6pDQcgqehKhbCiKFU6H0YLIypH1QnQcFO5

1rOza8CZfBmmXn1RwGZdIwHFescNOmOWP/hLw8VmfbHvsmVtvp2gGlaEqrIY+2WLPPYbMrU1EyBGRcuJ

NszmIpRmx06mb70IuH73Lbm7y9c/jPmYPvuQcB+6YR
50CMcteK1OHpoWaM3lBOLjNqRF7IftRMmPQNy2KF5wcjsvxL3xIhPNx8KxrKqSCQAUhk+YpX0ZtvLuN7

CCFCZ6A7+7nrGxdcPqcRIfWwkp0nNzEkNVsBatdZO0zIPUuV4syrqHzpOWuL19+ejoUqZ4TXmoSB5v6M

1hbbS81/sL1B2Y55G+4RscL/3L5qiMsrEwrZiY7S1V
swj8EouU9LiPsifTO8qICeWFAP1tZ72z2YKU5gA6zzNQ72haJqkdE5fJ+N701+qixncIIVt3+o7QhlL7

YnqtFyFPptYKkN1+xborqa/bsl3W6VzCgV4lLa5iv++qpKG+INqurjqUGQ9HjaMFl5eyTC6h2MnsAU4O

mDDhB24JPxx8KlendCaCUo8DsGCbB3dE2W1EbhFSG7
Pi3NEqZXeDC6JKl0iIqUj5X7U0OGq1ncR2Nk5vV2MscMbKH+oc7c8qpyI8xhJL28eVou5ekpTROE8hoM

DVfs3U+yaqu1fff5C3nlH1ATwlMZEaW+krG8k55Q2ProbpZ/5DN8aC2zFft6kEsqmJ2YRzpAeC87P4Mg

81PtQnLS/aBJujPdHRDkShTEOP4tyaEOvpSG1ndndW
W8W9y5KunbSmVwHLF0RYKHJBc3hkNvBiCvOly8g9wknv23e00Fv4rg68F4ToD3+wdhzQBU6sfiVGqb6Z

cOeFbs/3TyncBTP+uX48ZkEWKjOGn3WD7riq1JqhOxjuTkgwTMFXD/kLC0LJmdBJnbhKNd760Id9x/nW

2nwpSA++ChzWCfEYIBonWHbZ9yvZ+apkIpVjR3yiyG
horne+wjK5IH31uws5H8TI5aZ3cfQObpWe/0EgvkPPvEHszHE1z2JmLuxXHLMilsxSOveGMv8GZ0xP0tLtg

SJEzxJNk+Irf0Ya4OwRkmao/FtgMPnlYDI2QIi5pTSgIUxYRyL3PlVwLQF7JJ/i6d7mXA3TF03fTrcUE

Uu5aUlAYs6pDXM3r0zRXW93OD3giSkh7jy5GlqqM6D
LM+oKJBhrMQtvD1zDsFcWB1XSboB+bVHQrinyQDpTsdtXDA6yJy9Z0xD3hI7lOe0oZntefTxZLEVyNms

bUmbplTWyzt56Uazu8Q3Dzceh2NvxxNdHS0VPyd2eDbQq20XFQ3u8MwDXXcPuUN7kp2RpyJhIyn8XYOX

A2Rt7FRblQ5vjlRBp+ElzJeTK1ffhjWUjLOh2/6rwA
dBCy/mBrefaRVn+bIcOwTgjy9SMNYK1cQpY8ygO1se5yBSrMGQUwTiTCwOIVeP2obHrqvgpc8PaNpFKv

vIYVFRDuOp/X+nU2imOTgLD4sTfHDaSYNOouR2tGOkDZMPgyhxcQkBi9HE9NoEKZPLXrFmEtKh5Gpqge

x9t4nRCnJq8JvqoweaQQqanyH5JUL/4cZKq2+xhSpb
5DopZssXT9DB7dNsBIdcZzCkVCtWYxEfnXhDxJVuHgg4ooc1sN9qg38uQ3nv0ttQqhiSTY0ZkOhSD1h4

pAxT2PVXKcGrUFmysFOh1O3J7DloWj6E1DnsXwBBRvZlTyUhmjm4iMQj2J1Vcas+lYFbxy9Ho647bymj

P4cJF3vSgwOvr67MIvYX/HfweVTZXOF8DoajPPkUUj
ejtoUUGmxK1AcdvuhAiox9rYD/0wujjHseGwc5xbDnyhE9Nw8ItRtcdipe9byK97sTXdQZYv+voHhguK

veFJvUkQUxwAxEBPXuY5rSUe4uess7A+RbAPeu28J/UMtZSHfMNWm1QlBRHcSl/mKmsWjQedOVG1MGzd

r06EuulcEt8e4omubpzFu0U+ya79lpU94J/37h64B4
0Xp8aCqAlMWOV/sykUulfE1dZxy1eo3H5Ll88baIFoiIQKUJ3qr66MaTl5Z1FV6gL0zikf0MxVMJrBMt

yGRVBlUR6xc2Y5gjriwu8kflH+FVgKQSR0vpQXCZfwOcODnWPRrP8G+Fzh1z4RRjVxoVBeiYuV5iBE+C

pqP+k+x6A8/JYG0itM5znmWHtcLsyXqOcaqEZVJ6tM
1Qz4Pjl7MqJXlJseYw5XpeBDvlR1X9+uwXnD20plrvDFPrluz0D8xlg9Rb+MIrlXr82Ks1S0NQWzqd+l

0TkM2LdWH8wacN4HbVv/eJzuIq2k36G9LbVbttmVT/JsSsiX8joaAMMKV69XL92H+qMbQUWIGyjrjtxG

0D/7Xo1/Eo6zc+cRk3zZPm+gFBuPrdcQa7kxXzfhJh
ci81PrRBYZbLWsrv5wuzGQ7dG77qkmkomQCBDvYdRIifHrYNbJzBoBktXuC5IVIuJFsYH9F87UdnBOvY

3VpzpV0vm5HgmaZjremAZEZ+HIyN36AJbL9THxiN71D/2nPmXicvLifZfJjmOEjmPHm+6WrDMPXr3wrn

xHd3NY5sDCmn/7AduFJvyoYl8C/TUJZBzUGMLZfyvI
HHPM1NtFUcfklScFcWGFtjHk2KCnvtMASyRThclD447DpK1OaQzseh7MG7MymlLXdyqMrl84EGBnMiaT

MT290qmakZjaW9H8xmM0BOE/VG0Tw25efPbcAje8W/SxDxUkiAddsLByEwX7zxXE7YZh4LWqAj22wIEJ

m9wqTZdQfxcYlo5zy1TcBqLhoVZY83O5Y/Hut4mE5U
eJhuz0dKmdK29VLrbkO76KV1LPweJhWM9MJCL0t3x2Sdc7Xeuil/AnywM4d/EaQ+PS3v3JsjjgkGvPzQ

X2SkyDW7+bK5buvAEc76XOJRVzmEs3Kj+QYtZnD+kVBCBi5GAsHMN3wq5jrJzAmcbAMjAkGf/w44+MTB

z5DOOKm9WBWnfE2v/azWB+cjEKatHctKe81V8NNAE5
5wIQEFU5tZj9gB7fTMorVZ+q8uucnbMch+vE5Dl5NEUD9Lwnzxg6Vq4sjiMadz7zSt3ZXgLvk9SIPfgu

TMjoxt0UfrgeOnaAdYXhHfMt1sdIeV/RZgWA+iPUnNn4xWBb4vhiM201F3kPr095iEo+Y5+ux+pW++oz

vUOjpRyRdlbqNrjGPod+iFCj5EHp8xUXO2VX6Mkg4D
DyNwqwKciS0YO5Kyt6w+CJswpMYUKP0SdGOnEIfyhnKp4KSY73owebNiunBD2lrJOWFRjnlmWNLW9dgK

oRdA1+0kOa8nfacDp47hwCzkfJIoMn6VD/9x4XVRUXd6aEME7mOvDMRiEhQY5DgtAlFbtS2DS+d3r0oT

kDllCh1FqiYJvanuSqeaqNzHlXpl2Svc1H6yqTNcNz
0qaxX2jDOOeUvkuFsxDCuZBHvCpUBo7A00JvvUdtuc1fE7wOg/ROvEaiC4OSX3VuhDvWQysWHKtl6ruA

pq30mOOvtaZ6rIfJiBfAqwi1htdHImiDqL8dsxuhMKtLn4vbwzUSQ10p0i6n6RWO3v3Vni7MgEnzwe4V

ShO2BijyjHhk0RYDpVafBuNWGdbVo7rxUbSiqjOJ+d
aIclMIj/5Kt84Azac5ImZoMr2EhHAuQPnHHKHpLJz+gu/VqapU+qDM6v2d5x5xn/BDP8E2ddnrCVaE+t

28ekPeo10vWIM7C+N0YvviyGBmjr8iZ0X2FeoOOKQ4qC6JuLuQNr5H2W9ql60CBRITxxeSMJ+u+QrwPx

PE7NDkBpTHR1M4yU1MIr3SHGC/Oa8Q2vP0GN6FDLkU
HUrXxKBwS71N3CAdwiH6lu62uqP/DdpSWDcdE2OYZw6L2LSbS7nTRhoTB0wRboaUnBPiGrub5f6NdQFG

x6908FmAJ7fZo1WwuUc6wK/qe01L1Ihq5uLJSX1e1Oaj93MUpZBLkaGzi1MWMauxxEZz6fhITsVEIe4V

v2Y5ShQ6jmgd3USC2096yb37k6QNw4B2JaCUO4zsq4
PoXSCibVr2D2KX+TgQy2KSBYpHlkMMe+D/k7DDySRDaj0p1LzvjG9L6K6RA30vZ/W7Z/F+spLgTX4j3/

tNg2HASd6Dz7AzyB2MY62g5QVFWf6k8/p1b01E6D+O0qPKL/GKCZr76vYH8vo+lSlzuyU64OompA6keA

bEVSrJoPV9p5i2+wQRbeQmE9r7zpo3NDWIGh1vtWAR
Ow46l+sjf1THcZtNJPe9PvX+a8C9VrCHGH6PxUPVkZICrpjz8eLM/vz4NKR8wOdmG17lxs5U5/uP0S2T

+Qr6d+nAvBwyEjO1fYWkOg4YI6gf3Oohf55OD7WOAn70ny/D7QtE17dTXPz/+8rmZKjXuYFiB8gQCbJZ

tmqm8pLf7rNg0v8hl4nZa3sEUkhzgE1g2XGGtBqhUY
zQC9m+bHO9EoxJBGDmqjY3STm38sfiTfYxvvh9++py1YB0B/ad7qb3Ck5jTKudpqNTetT32YcWa7Gxs/

QrrrQZ+7860Zug5U8Xk0g6T/FxJZ8w6y8tZfgfIAJSCcErD9xdg+9D9OT7//+MKVHbme5KAvaCnptLP4

uB5N3g6csIwdBkb+h1nJdClNVHhpoZa7EYUnWJzy94
n0HNskA+dEbLyMb5HLAxK7ob3Mfs+V/wjHgI5MoteDHJxaMcjp5J3p37elA1A23LwRudJe9I5+kstZN8

J1q/fj8zRiMWBB57MpO9dBKjXzN3Se/OO7Molp2GmFp6k1cMNZt4Jz8l3X5c3N5JO112D+1S4qol83Gq

k0bJn3q/1/ll87W6rWDXyFH7PnkGz6PNnoQS3PvsVP
rd3/9lp+hxQ7jB3+n/mwqk0Y2ZHWXMdX4c0LsR80eh9WfL6zT/fP+iPtjj/do3UBQK6fCmzBPIguUoIP

0dShJi39AmTwGx/rbK9jcrZ3x+vnm+NHG+PIR9brl7QTfoC+LP9BInNwAi0W/7Gi0rmR+nszcL2uvCnz

KGWkUJc7hiuR80hNxhwaph8Do7VlMheky6s6C99tfq
+NeBm9U3klBxot4oFMIXeABiot+6w2pNm6tIHvg/xjroK6r45XsFXFF8C86VIfAehn1K+IGk1sPxDfGz

FUnT9TTw6eDu3+4nCXz4SyqImahYFtyskoQIG9NqfvHnactjYrbnHS88fv6dRkjlitj1/p4I/nsyGPn8

0RdFofyY4M/G7m4VIyt2Q0jlBiejau3Qtrf2U/ozu0
DtFpcWP4tU+S+DsH4sxfG8Zzj5uap+RPMAuBmAohZ6baLaQv+Qj8wMvAd7rC8rNM8YFrlkeUjWMESv7L

750eqEb7Dl2jHcKxUGmGxxkgOngYMRNgKmhxhZYNcWKv/jAASqi6E75nsfJ1lXVhNk9o52wnkp026rmc

pd+pfox5PDRA3tNacGJNHu+vsH48/Dko5gZd2bsDAz
MvqV/zI8/gx5C20Ohv+6pv7H2nu6S2+rSZmcTFPqjn5E9Mk0YZchLY2CYTKQ/1fsvEcEyVkIrTIo34ju

cy5vuyIqAY6Uuhy7wGgi+nv4SMbK7Xl/0INR80uulmyHGJQNFju5689scEjVlUi/qa6u9x1Q2Tp95I1p

bs544HcCWMnd4Bky377pwKXAeV2zV9qKwG2XtddQP7
xQpNNqTtSq1SG3x0tc12NZFUeEcguFvXiZbM30ob7RmH+h+7XD4Ecs9KNY0NZSoGKb7cvLvy8CbbMSl2

Em2IhjWoaMvwENNL14vRt7ykCel88M55rdcB7Bef6N6xtWo8yYcfrpqAg0KxCELpSSAcx8QOV8bnJV77

7Z8r92mhI+o6020zbN8KtoM7j2CDwZr3dETE1VQLpj
xMZR65ph6EFL/auzKloPF3ySL16saXPSjWxu4lHviXCnLHQvAkuPF+DUKciklu6CRbRJ+EOXYyhkQCaZ

ysFvIyXTEg3QC4xKNTnBWYXhORj0XsJJemghCX8mtTSMack8ALN+BjhZELP16rX15rHY21O+W8Med1wV

TCO/qHUUNglkZNnPHFR0BlL/yu5yQCm28xnEcxsc8T
iwrlg9eMxLYgjs3lt++/AOx/xREW1dwPpkCgGMv9aDxBtxOlq7kIeLAew6LF1cj3Z847B0KPoHqVdXja

nrDLVnDKrNtupz/+YGcRNi7NaOh8lvfd4m63BCFYUic+c54hG3zkgasm6F4Aidbs3hAQxGAD4XdgJ61/

ssrFhRk9TB4C+Sf1wOvydynk5sEw/SuulARh2P/6Wu
wQTuUc8D04U8joI6Vrxg04eRIijXUpl92gq5P3AtQyVJbfm6VSsTzj9I5tRI7HX/7pcLEoIBT6HRoBvd

cOk6E/M96QN3dulpVpqxskVa+CriLI3g6G8di6nFwJnzNZMpH2QaGFlB+zCCQd6ZuW/XBXtfe992T7Pg

3Qcn10MIjN6A3Tz/hvwbp+Gd4oujONitMdz3kJ37sH
gAdDYbGh5PPpg4Ay7caaO9OJB6zTGqFoxkSHW8FcQb/+NWHVDRYk6dcTPXg7hjqia0kozJIX67Mnw7iq

B/yKIcxXLzHh7XU89oIe7IeRkejnoaUko6g7hgpv9oMTH+FB0t5x2PmBDcyweSVWIpJV80gZx1Qikbqs

s9029n2Ut0/dkUWjcdTRlynv6gae8lsG6JD+H+eeB8
U4n2riljmFLMSnoemOuRtiWhv+jJUV6A0CVFpIyhGrhLewdDH+zMGtDyivn/S1p5mXiVSeZjwLqQgXhf

anP00ZEZNNNrGvpw/XjdPFhowPQE2Ozeifk0xi7muLzemQ+J84FDzGSkxrk8p5ew3al9r81Iah1ZsXEf

2UZmOkxFck08HfOdcJ92pom5AJUUopjUT+b2CqO49I
ZjSfBtv2nx8CHOk1/mxMiC2aIEwlsOubg1k7Pdb8c/nAhtqH+AEdVHfu7uTY3Xj97la+chukf/AMjecw

76g0sQa+RXh68OqDXdzyK6pA84AIB2ejLR9Oupy1/3Y3BhGp0whztb+RAgn2Rbok/QVk5Yl9OAcHpcci

Dznsr1yDx8vr/h7oXbe8ROvB9QGtv3V/dhlxbAy8ys
lp7h/GL8MnnZfU/ajiyk1kqbXf0C1tno7x803e16Z7Kc3lXOVEbeTF3gn0YJdcBZgY7ZZr3TbjdCih4u

SdSrIzlyCd29B1sIIPwbjlfdXP8QNL/iGAXF3REK55ljwznpLYiNaT1EejD5k/dzU4UHctopyvMDR5Iw

cwqPbvmA01aSWHKXIsyfkf7B7G4K9a3Bcgxfdy+U6+
/9N+/J/2y3+x3z3bohqoD2pr7jc27rpoRa4h/ZlUotktqKa7GROzKuRcta6XmqysbxBky3vkAbSJtghu

WJ/qjYCyDhrWpyP03o+HIpx0X0Q3X/atri0fVa20oUqj6kkdkWrXrhiu593pom7KrEq37g9b3eUdVY7c

3RR8eA0LzilmeJh+FE0/4dcv5x02sJON41NG/mbGQs
fCY7y8UEeHKbu3UxTryJ0XiEsyTrwrOFQa+0qtcSpCxpoPdBRrGsg2vv9XsWPnh9XCme5ZzsjINre+t8

HBpPDwWj6p9AKAxYg8J2Qgyd2No9J0/NzcyI2LxYwS/X1KO9VRnMHuX3K97as4i4a5jhj64U4zaAEbn7

Si5yX0xspg+B9UnQ1ewQ53szZS+Q4Q5w73fKd3xlEC
qgob3gb2L4fGu2xQR600UzqSa10RDVRiAS6873j/bnnUxaf1WruQ3p5tNfL04pHPd976Ymv07Bg/EJgP

obKsUSJK7IDwDy9P+DGMk1/BD2g6/C+UInrsCvz1wbLREzmv6vr3mDl3zMvfs/yYjWC7JQgMF1xmasAf

5Ac+fX2FTcuYf6mhUdYVCO3rg//O5wrMr+JcnqgTPJ
4rGEh4PyTzv4EWIYhLeuyZE4flKthknT+xWH2S1gbn/LMpcw3TmL1gUxl0SwR5XPv6jZDEG2kVwR03E7

0XDeoI0+AwDTR+jYVYVtAcg9Obz1mwYm+5kAcE9W2qC8ZR0+gurEdR7TJS2mV1YnA6ARmPrBC/EKpArU

j9m7xb67Iuor6o/nU834Fu62rmfPmOmgVj1750qPr3
JZwGzU5askloFH+F8cStbw+o3qVkDt+HfOiG+gZHqkwceuvBVphckhgswZ06tZQdxeKmkmH0Fxrw5R2g

exkJraO2AhlU1UlpVXc/1++3WMJ1cUC3T3eapcSYxfPH1u/JdyqRdzcausvRoD/vgKmC72weYgaJdrY8

ugoeJq6I/3HgVujX+dx4ZZZ5W4GwqXr4PagsQ7RAOV
4dnDNRPidtB/3VTl+tktVH8tgMj+myEVrl7owO+Z2DOk/kzva4Z3GI8LtTAk3aMlv/Vg14jaryCF402K

lUwLqWdapaM/huN/Bb1AiezIvkz5ZI/gewPBCRrEsOjSMSGDiBprhyol8IHzrcFEtFesuwfzNdxrv1Tk

NbZ2mE/IcWb8U0ktQBM61Wln/q60YBwe+Nd3J9sJj7
rxo148rFrRogw9Xj5LqaY8Ki0FEXtCIfFca5DsmRLm5oAGJ6uwHuit8aX7l0SlgUUhKVYHVxt/PuBmtJ

c7UmP0+d+W5v/n7ReMv4U/8G6j1RBrgqQQxzqFx6DuHF+sh+ly9t6nP3Dqg38l8G3+dmda+B4co14IjJ

9gf76GCglSfcQKpNhoa6C5B9dDZg702gS9lyEG/3c/
ly4j7ZgTO+aw6EInBX1pQ8jfC6/2TY/vZ+jykA0mD7tM7eagYsKbDDk4dHtTY2U1oI/BX4B/CGfU51/G

3+7RC3S/1+6B6+C8E2z6kF7jTxHYsCPpnjq93mtIA2GQ+LM/v9SZk69czFI/zwyslWRgG3E3t8+myHQu

cQwYLsB1C4cLdvRZqBhAnicVKY/D3hPke/CrZ6R/We
aqJ+aivRMVGGGk8z5sbXIIkwPh5+dHW/N1+/ispVY0q05YwzUQ0pHQLifEIgKGqfBzKp+/cT+kWUy/s0

4UBMVLX6mfN5RxrML2p+S1gWJQ/0NW8E6bmyts5Tz/E9kKlzlG3mvIwCWj5DIq7+njLdHskLOIgD9q63

mh8v3D5Pm+qYckW7Z/ohFnx/o0i5naL2x0Di5kHCts
s0JZ+JICD0vEIw8TozwhmuEjjeiH7vNCCaAdgGnIfzT+Cl82pDxSUgGrJcX7RWCGiCbQLH0p3Beg53Qx

cauWnOj2bA/466o/xVwF+D1wAKHGUUKgEy8ooGl6wPC13HJ9TbQ43RJKAjwpjzfJTtwZ4nYaLwxQn5qM

5oA+8N5OjUAXJlKlwi4zqil4InLr7PxugAWqc0C5zc
PJL1K3jn7QgCgFbonDrxL0gXzH+0DX6l2Yjj2+bxpZ13NDp0hEXwzq5FuONAl8jlTZRJ5KcvW/N7ijF3

nkqymCZFHMSGYOIr0aak+AAyUxShiAiczDit0owJKmYrddhn+G477xwE/c+yI0+gbS84eIdFsTPlvS5Y

mi77k7HzxYYlNKfEze4qlf/wdHGxdi6cE/43OeUJJ5
a/QPzGjVL9OL6d3jF5/qPg+o1K4oIFDhS4hdBmVuyKKhSH3mQ8SVjY+xFWSQjRTjAdpqvzMQ6Z+qkhYB

235CDvpZTpZaYTGkuqb8nXkDXqehG/namrata+C61/X2gfbQldeM5QJbLcDcKRplxQnN0bvVAwCwZDBcQPhK

uxjqUE2un7WGeHdzx6wYJiZ0cvYI9HxS4poUsA+xhr
IYR5cWPl82wBsUgnH/kPZ7ljtX+CLjnTXZcz+XQz7zPzjElTloZw7eb4SUrwpoHBe/LJ2kk3P4DSehek

Cd+cGI8U8QcMo6K1z5C5UyLve3WmK6gvQswyM150/3mgmhjQcB6kcw0d3g75fczlpEL/XVGu8Bx3w427

/K20x5DjAX2J9mkxv/q6PpR5FUGgH/F9PGY0C1CQF0
CMvCvYhqxK361N+7tvMios11YKxHE8K3A4s5rQLy0bM1W44TaY9QPVZJtDOkEpU/J+7E6HBPuri4TaMg

pKHotM66Wt3dz0upj/dnxNBUkxQm14czxNwLBwUaEBtPBjn/cyv5WCo0Yn/fh2sKpZvrzIwusESICIZY

sBuO40NHxrgp/S+mO8i2Tk4xBk1QQ3fZ150jYbyTWk
M/HO1y1WsaAet03CAGezz+jDMHeD+8WIO/cS9gLk7G37l5Cbgux6mygF5kVrD6YvzC6kKdCwOQGUtbB7

rU3cut6WvJM08z3jHdelSLusvUhK6x32Y7DDEA/Dtbo4tdwly/o8H76ht6k2+lQ2DbodGZVhtKntu/uA

X+zSUufXOufCcAowiVOHIEzvRE9zYzDTYmD9dtA/DP
gzUiG0LK5U9K1Pf4TY5D8cME37WVHsqV4swt743JKzG7CdrNzflFuSPU/MlnHW7ouwOoXJ00MgnuhPiU

oB5yy0VRKkPsOu25tDws1N2x3UD9IWQrbHWuY7QlG81P2NK7VjuINoe2Kr655fN209x7cmY7WE+RBdB7

rqpX5c8CaTEEpPY8aOuyVO5tEvHz1c7OyLpK/APwn+
nGL14LGp0A3AJ2G/nXjauk+cIp8cnlW+OCY3Z4mDASKovo2Qqsb742vBCkiJUjjwquigd2I+6vybHw7T

BGicjd2Gxuu3LqfxJxutpAb8EWxddVyLByQYR8PT2vI/oRsVOQ/E+QQ2imPEhKnhpVUVEDD7wwp13vby

DfUrHLaFK3j1YpY37mjBrE+YOyoZ2ldI5rqhEKOwL0
Wi9KWo72Cf92KI+V4ujrsPJsL07hAPQyRipIJ2RW+jqYShp5aHb4kM1cqSGC/3i7Md2gyNmgD/F1h8Jo

wa4qcg+a3fjL/DdXJ3TUUJTPGLVmAy/FSMESI/dLLg6F6cnT4V+Lgfa8GrsMXDFri/qbj5t6yZbfqsuu

c07lnbPPjBCLv54DUmkYPMHipxb8+n0kw6kiaMD305
w81uWPr18keq9pJZEocmc7m9DvXW28YMKyEiUD5uNbhFkIhiBqACFv9w5JtZ1BTcbOauF0IZu/TUwLBh

3dgfKEFASVhAcciPoNyTeUL+YwHsV4ftHD7oPTmKgjBTmxuh/QNpYLeu/pyAP/WElyRqCraCO0P3YBcp

yB/2WqvJYmipda4R3hPdZJImDDpC9xaRzY9swNtEiF
lj1jvgU87krtysIOi8y17HSi4BxrHjcEiNHmYSTmZXwucDZf2tIIumLNoRm6L3Hnd0HZbkBRqgURCrbn

FoPuSjG6QIb8k2AHbooQPfRdB/N4AeKJwUZlHeXml6WQJ+TZacztRv2SYb6LkuRCuXsXb2JIAERkWtbT

FJyMhL7bRomacY5W9vuMZe51waamlFXoE9kslVBV91
PdOb92o6cEcMm29nY8BY2sNaeIPzMINRjasxGyV5EffT8DEiusKIkhL9CBTerY5/+x/8uHPC4qedZgnM

qEKom3J8cgtKMT9w5Hl0hIR5tZQKBqgwPrYxxWdGLAnLaGIyGasylcRXvY3i7Jw+OaZb1+rYZnbDVjeN

eKjNf6F0jsjumPxoqinwbU3EDnXhCQmDEfAo6VUUyt
Cj6Mce0X5xdvWz1ZHiUneWEbRBvFmUOQazeh1G7Ho+AFUKQinYYvHYMVEtq66TYKKOcOARK4gsUN0hcb

dxJDfoBV3NgUtDXq6W0DbkCCY7otz4hwBSSeKBEf8ukG9do3AoZckpqbLCSxSKzpB3CZXvTKFPD7C9gv

yIJJCO0UNnqGtxcY2hJTlOu3G9RdLUijBMfJkSl9tb
Q97U+r+G0VeGi2w6I5jTuxJmxufq5Hdrbb4i3GlXCWftppyuMC7t80g+laHDzf9j6MWp66EZbqt0/x+R

ywJPYeOQnoI/Az3rjoxLS8FVeDw/qub6pZv2ITuRXq5WiRevc1xGhYWBOFXN/rSL9k66jK+XkW3fUDgh

s4u9KOCdauNTizERsrmu6xLGvSbCfQzqMvQSrNpK/N
Vae/syihJDQTTZEGbA+SZNYK9HfSlRygojMf3mvaTQ3QHE8Zz0cKNKQFP9JxW1jC47TOSESl4ncvfCV+

kYhNzd141HavcJHQgobuN/i82FjbgjQsxeJJJNAUTokVY5QSXqJRtBNlHyKT8ckCoaITybckdD5+QJxU

EonhLSGLEQu0ROfpC5fKJ2GeHpLA8XSMMQBYQmBVCg
AXGFu/cQZkOo2LRLVB+eIMKq6VZ31mj9E1fMpLdJvI4KxU+1GuVQ85efrefixF4JxtbVyhvQatVLarrU

RL8cEL49tYTp7wKcnhZVjpV89m+KVnA0jfzfLSUJ2C6D/mTHoVEaB9QJWXfBNykZK4HH1wYuMhQBZ0/x

KrhWVFYPCCbuUjmNdzgrwclhlfVhkJzZwjJCeXbFC0
8UcNJz3nLGlSrRFapJ/SCEZaMA64TQNHoKU2PFBeLMnmtIoAdE0XziGlylrlpHYlDcOxdSkTKSUXtgaz

sFHGwCaX6kvvazSPER4lk3zRMpxWxyDfiQpZ2WXgI3n3dj1kMtgHzZ3mLZlmlE8sVFQmWaCeSGubGWP1

tLD4fc6QLjNgvCEIkm4o+PY628YegxeKhizXGRnLiA
7HpcwlbTE4CV7nGdBLUNNARzLDDbrUriC9ioktRqWFx6SSpHHSjsMSdJjPdIpUqBNmECry10YrR/rIi5

UswqbJTtjAxJQqNLnxVtce4a+dUbt7xlLLeMcf2X8t1pBmqEvN44zsGa6IPSCf9IBF6yYszzNrS7M1Fs

9+hL1i4bNwioseRlFZNHBvJFnx0DWu3aMIKU/IXT7B
watkbTSzuSKlHvQpIBYf3MjkYZ5CoWRthOyGNSeVV9cHP+VlmVSN3TchcLSpH+4YYzUjQwJ9in8CdQ4K

0BIfKLaXVifglpVA5ZXqcOuQWQ4Cv1T33d2WZc3RgJISUvRyGoQBRWajBO3JMLPbvbPzwhxMBkJPmUie

pxinY9aRUvOsbodrz4vuVdgEiV8BZwMGh1zzxjwES6
1nuiLAASd6v2Rx+/Lcg164SK8K3lYWd20k/15/pEI2skdszSUT1+CxWqC5csTJEVr7/T8Xn78hI3WQgH

1N/0ZZwTb1JZKzFggEbpZY8tLNcWvt96VjTnJ2PHGwbOSksby3lMtxRBRwEttuyGomgXhbTfG65EF94H

k7AprRyuQEOtn9MV7eKcplLMDM3o/LmJaFj3/4XM/f
/4//hfgEf/R9AdCBdXMfImGYO6luQghP4KUP6vg9CxTBbbZqRcQL2hbv9YLNJ4AS0ImSl8QYZdR3TvRJ

OfICSzd0KcOE7POA4ieIxsOlK5Pz7AMbPlm6HzDQAsXSnZlETQu56DCYe0R+o/+Jvlm1VEV0HxcONMEe

8q/YFLNGrnfAYQBve2C7grRCmZguNFCd6VnnCC5vEe
Q5N9tpY2AsvjMstKmmvXEvxd5d6mndNXLIrVh3hqtu/2Kr605NZP1Ouc5wlZCOjizT5NiyAvBYR3aBHq

Ax2oKgybZ+puAvXo/Yf7uTVSwIRBbJzb7qG/Ko8Z5Pz6niJ8bABFRePI1Z9v7GSAQiCfDt9azkQwHgCq

hTVuZ8D0Xmfh/cC9R1FWwY3opXZDI7o8l9A7BFOa17
j6MF4kWP2k0CdtiTdtX/TVBkq47hvks5oHBKtkmuOpzSDhMI4YQsYvWD0nat6OIPBhBYPbIrfTCrc1MF

dhJX0QsJXoU9HgrnQYCWLqbnvrjE6nLYpiHA2Ix585CfXsHS8VAWXTGFJeLSKfNGzTPtMfP4AuJ8HltP

J4YLRmI5VlJYQsD1i3CHdgFB9VOVPnZH13IO9kDAIZ
PuSfF1SpVAepSAZyDWnqLQ7Mz048RsGmgJGoINGoJaFkPLAtNHRuHKY1KT2QYFGyLHObgIyxET9azBNq

NR4GdQHfQiOxQYvyJI1Ht967QwatDADnEELxQHACSRa+Hp5DVO0ib4DvPOylUYPjYT9jno4WPUmHVzUi

M4W8lSLfEn1clG5XsIR4iHTzI6QHUBTrkmLVqMDjYk
0EDSGfDu2piD4GJZVWDQOvUNAkNEzzV3jMWI4MHCPEYWBjTPSyprJjnLmQSaXzM5EOQIB5v0UdbUdlCl

0pUGhmImGatYL2cmztQBXcp9GkOU9KrvAhcswoZsSvRKFqpqEbuFstDL3HbQLgaXTxTG27HFF+PiANCi

GsF4VlirOMGVXjyliuhW5mYEJ5XICeUh3BQRDoTZsr
VHLnVK6SIiYrTaf7JA9wGWb6HOjoLJIoGTUgDBi+Cm4WCS0sn9FePNuuQoPiBQ4uki8GZPgWSsHzN2T6

yAJtEj9olJ0IyFA5uXXdZ3U7yYRzC6Whn9WEj937G5HCMSSPXeqXpfxpcA1LggIwTLzqFu0XSVIqmVe0

bP8WADyyTK9IXEUiZK6fME64Zg1xeLBnOaH9FWLuXp
0PTaTwT9AzUK2aM77zRMMjTtHkLPYQYc6+RThxcjAlNhaOGnG6HYVpg4HmZGmhYOMoXLH2PWOzPpu8YX

J8IPTsJQFsMCzdNFOsOAZ2VCamMGIlLYArZEKkOHSdJlCjMSFhMzJ0TLKaNqU9LDGzPBVpROieVuenQX

EoMIK1XewnSEJ2WVYoHGE6BvlbVGZ9PPCyTPJ1Xqsm
RZI0BQRxTDWlJLcwMbA0NPZ1ELHVHbG8AAT1AKDmNRW0PDs5OSW5NKFtHJsbKTOpLMJ9AVLjRSC4ZGR2

YUZ3NDUfFfCoWPI0PwWdXRwmOYchZTE5OEF8QYbeDlD2XDj6INI3ExHiBcY1EVI4YKU6XtUfVCZ3OQM1

FnO7QmJgEDP8UDD1MRD7XMVzNWoyOI9CIcBiFQG3AT
YvTIKzPjn9IQZmFDXzDeuwXKE1LUQ5WHY7WpZpVBs5VBKdEgDiGDBhPNt3CUM6JoAsXNIoXpZvZYH8Vv

RpKMInTAVnVIV9DzP2EWLdPWh5GMB7VHBdQUssCPJ5IGLgOPA2GcQmCCt5TRnyTfF8QTanQDv7AHYgFT

NvWAHgLsJhCdmoFjQqRXP1TWYwYXAfYfE1YYZ2JmJ4
UGQpHfB1RCS1EwZ0PNYjYtA1FDE5HbC0DXAmCbM6YAK9MkC9UZDeGdO9SUF5CbP2YVJyIeE5XND7SiD7

YANaLeQ2FTK4SnA5VSZmFrU9DPM2YaLMBoN4TxF7LTGuSpM7VAW8VfJ5YIUeVsL2JJT1UpV7TDCfTfK9

WJI9XGLzSKXeRUK4XORnWIA5CAHuBYX5NAElXVZ4EG
xtXOT3MEd8ILIuXFPyFGU8LTI8ZUI1SOBoYCgmXZHzCMUlDnrwQkx8SIA0RrIvNcQjAhteHB1ELQq8FY

U1PRWvNCycGwU0HTF9ERJ3EgVrMGQ6VNfvHwY4QxQvORKfPIC5EOX7YLHgPtQ6HHN0FDS8AQKwRcC3JX

O1JLL7HuRxZnW2RGVyAtB4VGMnJUQ1AGZhGvLvAzFr
JvF6HVR3WaEtGpItTbTxOFj2YYM3OTJzGZt6TRPoEnX6PYy6UTN6DOCyYdv1ZEk9ZDT9MFmkNdt5GEE3

MnW9VUwjZXBkLRL6MVL7VROuKTO2KXCaGBJ7RLmaANY7KGFbFRL7SYydXCXlDRU5TpT6ZyMwGNZfVFBe

SxI1NsRkTjKqMSS9UoUrVSZsDaWxXXB4YHQEQzD9Vt
J4LBMwEJg5YSK4KlL4UENrUCg1MFD7TVV0QEAcUMQ0KIQ2ZpG0RzPsYZZ9EAN2WDA9GMnbDCl8NP7rIN

eccdYjGgtJIdJ2VMAna0FxDZf4SS5RUBFkNEurZK8Gt154EFJnE2EbzDKolw0XJRJcUg7diN0ahCVvR7

Wuk9MFER3JRQSbPXFfXJ53NGRmOavkG5JyTBRjO5d1
BSEbQongITSvE3BmyMZkYbJnFDovBP6SoLHlenTmBk9PDROyCa4soSKPk5ciKsDnKWM8IVV9PYZkCYT5

VS4SIdPjI3r9LFcxK1TaU0jxYIFoJ0TebUTxLJ3XQy0KGjSmOW9cjm2HOFfvJICpCfnBPnk5ORskJK0B

vWZyA5YwvvHeL4AamXvaRL2ShqGyNQuuVX3RSQWmIb
9whV7AZRohEIIEI4OdW81dbV2wK3rqqhUtf4aIleTtKHpsCg8KCDNkRyzlf4GIdSBaPFNrR7cqz3XEwD

YqAIZ5ZK8ATNJuX2frkPnrBHY7AEIvDu2WHVFhZl0mbGHcd2FxpLL2l1HhQFivHQRSTBz+Yb7CCL1uc9

DtSJvgXAVwEG6wpt1AK34XMlHgWL2dev9VRtBqMN1q
kg9KOHiISzCeV5Gyp7WBSBKlXp4OZSDkIUT8qX8XgIZjPTTeGL0oN4PKEF7WCHWnZo6vjLV3JQHuEcIj

WOMlGYWFNcYjVGYeBhDoIBBpPQNREWltBSLfQ8LjYWM7UVKiLq2JGVCuLW0ZInToHGAqEMP+Fy2BMNFs

BJ7kvbTxhAL4DFS+Fg6NRNWyXJb6U5J6GDMhPVe5B3
HJN2ZKSGSqPYqwFNrtMCAoMGs6Z2L3KCXoA1RHF4Ntetieto0+HB6VO72LVGEzJRx4W9I2hHAvF7B4sE

qDkMM3GT3UDS3SbRx2cAHpjP1+MO7YP7UBQpGiVEh2Q0L7kIElJ1S4zKyNoZG1MO2XOA6LnJGwLEVrrq

DtYs7fR3YGSVaEJjCXTGJ0JA2YfRAoCC2IbKSVJ4Lu
iUMcWv9wUEmmaFGtlK3rLc3iBUcsJV1UHsHDUPsICDG6LQ8NbWCkTG3HePWAD7PmpVRwWw3mFNcmeHXi

bj4+VE7RFDHbGm4SKn1+DZqkntRbLsxWJkHjSNBzc1JpXHk1DD2QVY6auJbjJGE5Rj0LdIZ5zNMxS1oO

ON1RyRPrS55dgMUyILHnKl2UHvX7whCjkZ2ZXD44jC
Irz4Q7UUCnU0ogEVzgh36zIQchHDtRMU2pXJIQAUrfXNvdIGQ3HtPrtwpkTIBlDq4NLwZeHMq0dW6daA

I7UCR4SohwsTMuXAytXiFcSaFeNhI3uOmtdav0EOxiHE3ySPqagyjhUYImIlg+DQogICAgPHJkZjpSRE

HikR3fpdL5wmIaDIvifKKmVo4if1y4ZtvoAw6uUm4f
BFt8PsQaLjGmSLFvAd3jrX77CIwwowQqSj2BMhFdVUL5Z8IzOedFHLX+DAdaIYhshJa9dQOnRQHkQb4G

ICAgICAgICAgICAgICAgICAgICAgICAgICAgICAgICAgICAgICAgICAgICAgICAgICAgICAgICAgICAg

ICAgICAgICAgICAgICAgICAgICAgICAgICAgICAgIC
SzRZYvOEWfOD5NYZCxAZJzXTVgTRMmPSYqJUTeFJVwRBWoDTQaLYJbVRHoWCZeJHFoRMKgLPFuPPOdCP

FfYHJxPLPrWDSpAGBiYATxDHAeTTTeGOXtCJJzSPHcYKSnFZEvLKQqJEPnOFQaZHAoXU0XCXNpSFFzKC

AgICAgICAgICAgICAgICAgICAgICAgICAgICAgICAg
ICAgICAgICAgICAgICAgICAgICAgICAgICAgICAgICAgICAgICAgICAgICAgICAgICAgICAgICAgICAg

GK7XTWYkCZIkGYXxWACdQAYaUHNeISOuAALvXYGrBNOgJPNfBIVyQHGdEMRbGFAmCSSfQCEyHZNnCULx

ICAgICAgICAgICAgICAgICAgICAgICAgICAgICAgIC
DsHRWnVCPlLORpTQ0PJIAzTDVdJQFrREXrQTRzTQRpFFHcEEIyBMFrXHEoUYSyIMTiWFKqABTgUGTuET

KrBKRwCCMzTUWnUJFyQANuZOEfTPTnCQUfRQFzOMWcMYAaYCYgTTZcRHFtWBShTTNsWYTaFC6USKOkUB

AgICAgICAgICAgICAgICAgICAgICAgICAgICAgICAg
ICAgICAgICAgICAgICAgICAgICAgICAgICAgICAgICAgICAgICAgICAgICAgICAgICAgICAgICAgICAg

JXNvCM3KGKUyNTSxOSVjMXZeIHMqADJgGJQaWYBpYCGwJYMfCEPgSXYrNRMgPCFcIHItBXPcDECgAVLf

ICAgICAgICAgICAgICAgICAgICAgICAgICAgICAgIC
ZxIUUcVLFyRXHgCEOxOA0KBZOnKWTsYVZcXBOkULOvTIPcUQAtHCRiNNVnQMBfRVGyREQwOSZuEICcEL

CgQSWlRUOeSUIwCCGrMDEzMQJuHMHbHVObKWFpQDSrRJBzKOBxWQQuSPWfDJGtWFDfUGTsCQJbKA8SAJ

AgICAgICAgICAgICAgICAgICAgICAgICAgICAgICAg
ICAgICAgICAgICAgICAgICAgICAgICAgICAgICAgICAgICAgICAgICAgICAgICAgICAgICAgICAgICAg

ASZpHIKwOC1QYCNwKEKdJXYmXLKqLQEgFICoXPGgBMWhFPRuGYXwYRDkVQXgKYFnMPBuNCIbGOPpWSLl

ICAgICAgICAgICAgICAgICAgICAgICAgICAgICAgIC
VhXUAjJMFwPUPrURWkWGRkEZ4GPF97mEErx4K5JEVqSS0dtsh/Sx8QQSobguZetNIsQA4XWrUjKH1tzv

0IYpEdHB2bwb5TRYwPAdIsY5T3xUArURFqTHGCHuEtK45mEIveGn16BZifWVMzHuFwPGj3Rw1HGbAnP6

fvDBNpFmM3MRGxFmUdPIwrUJ8As7YncWWlGYa+Pg0K
DL6gn8TgHIazVlZpZI1vdy9ZZHpSQvFoB1PizdV9DTJxOBCuZr0NYVXbPHXcjSPeSuJwYHUZUsIgI5Ci

jV71ABJLOr6+DLxzmoFnCirTYgMfKPDof5RjENy2SK2QAEUrVGa0kPJhVQX2dTHhhPUBoiT4kfAxxCet

qvEaBybpUIAgt8TsKUyzuNLlyaZjXTMROENosRDnEk
TmNgZvMjGjJGr8OLVvTF2sVTqkCG7OPBA2CYzmMENvVGLoI8oBNiSjELqpJFMulEppNL0EHeSrK5Lfqf

VudCAyMiAwIFINCj4+JSwwggNvLxuLAxJ1AEHdc4MqZTm7HF0UDOSbESxdUQ6LDIImyE0oUJfzKH4RBx

EsJPBoKRQQHlLyR12slEXeUAr3J5ZtZzZgOSNaMyad
ZXMgPDwvTmFtZXMgWyBdDQogID4+ID4+HOeaZE0RQWluxdUtMHXdTy6ZFFSrMQMkLL5bABUlHRUnK5H9

pEkuKXHWHiMuO5qwjywgPX4iSSHjQ316eJrqziUwMVCsVVZnMj3CVGOwCOR4TIEopSMhLpWlMZRVNGet

GD0CcKPzPDV7bS3gQAbuZLPpWITmS5hLWxUibKhrNG
51bGwgbnVsbCBdDQo+Ps3ATG6oq8MgJTx0fvQlETsrDFI2HUhfMTRfMFOcVMRcQOG3SIP3PQZKHlTyDL

NqDCPqXBkcFETmCZIqpd1MDYHkIGTuFGb8JCTqLKVpLGGcOPjtDJEdWHPmBOS3WBQzNJEyRO7ZDjOyUR

DbNGZdRHptSDOpYUAlfk0ASQOtYMNnGVPjAaIxMGDv
YIXmRWtlJAAbKGJeMHL3MWNrZEAcXM6GFmKyFLAkFLD5LqClFISdFQNxvl4ZUKZeIHKsCfI1CAFwPDIg

ILZtJYsbLHCsWLDoPKDoLOHiWPVpCK0KVmSnXXYoEDM4YLlzHHHsNZFkoq7BBXRcXWFvCQOhTtJfBKHv

BFBkMLbrVZWqWZD0BGv8WECgVUPsSW9IBjInLNFoBM
O1DYitKRRdIUOgvq7QKWRbOEAaElc1WrWzCLJySSAuXNknNGQsZBP0GIubGXBoJGFtSX6VDgCfSNIiDX

uqYAVlYQGxTIPgcn3HTTSpVSOaGpR0ULXcTPLqJJCmXWosDULjKRX9DAWsDMZvXODeCY7YZtRiQKFcJJ

f6YWZxDSTbEPOjwt2AVDJgJVJwMCt9CMFoGZWhPDRf
GSljVJLwYGN9WVe6NZNhCVHuUV4XNxTsOAHbPvR9IUCgXVArUEIeiv5JLXHrTZUkYBB7SzPnFXYeYVXs

GXvbMNNcCQDpBGP1PPZxCCSzHP2VJrJfCLayETZGKrg7SAxdS5z8OUWfGW9SL3Lhs5CcOaMmIWEKUHgn

RT4pewLhRMQhQy6ZU8aOGyidXJIjHiB2APJ9GcTvQH
D7YQZ1AGCzIMGhFQPpLoU3Wp9oQKQbIYQ5TjhkFiVfGXTrHADcOGCoIuFmAWJlIfDxPYuuNgOeCW1SVp

4JDsC5MEB0oDRjOt8XIvXwVTDIKoWbXO6NTRm=









                    ID                  Date                Data Source

 

                    [id number]           2021 12:16:55 PM EST [facility]









          Name      Value     Range     Interpretation Code Description Data Vanna

rce(s) Supporting 

Document(s)

 

          Telephone Encounter                                         [facility] 

XIYCBr6yOhJMAzKn12/ZJAryXZVac3ZrMGitLCh9JMjbLELlX8FtFOM3xE4fPWG8GAgOXlEvFpXgLuV3

lbm
NlVzfNHrEdEMRfCggKSrQvTDkoOsrjiFDcYY6EaTI4CMSdZ03nGLGsDREvW2ThNYIqFB1+QTsmSLY0iw

LzlN9PZEHkY4zx9vHOpawyu0kjaalhMk5566fm1OebAuXXnUetsaiGlNYeqGRqicBgk18qHhOkFHF0OO

e9U19ht0ijKECN7w4U8PZHQPW3x87ICkMhGs3aGvFt
649dOLnIrUWKRam1XOdoCwyymr2ZxRovR0JRxFZNAem6DBOmcsrmoSlkb/41x9o/lkTprWVX4tFVVn1F

3r6cANaF5joctNRG2xHR9OjmD7GVdT3Y7nEpVdBSPARaLL7a0CJVHzyQVj4tSECR5GxH1ka8pCi0zoHB

TgTpWtBEDSxVBVzNm98QAISmtihWNhnNpLU9/VQK6w
Rt9Xxi6IClwIfCMTicsmsUgkOXiV/hOiEgcjGunpfGXRMR79HSMsDObiJ6gVvkgN2Qm+nKNr+3tD/LSx

eMb79we2gMztlFw2bSXuwYmN/vO1K1MAnVi3yWZZy+JMw7mj7e3h7JdIegYKn+6P149PPKg0A2//tU1L

G2kVBM1k9BMyYwbRsnaKVIoQx2Rs3zAVu3MN3n1qzp
QoWxq//zFw8342a011FLas/Di2rX2uI40xAvmJ4+Nah9Eqxb5Cw+zV1TGtNv4dUZsdX3xANw2/NFPO10

PhFLlQs6Uq+ZWT8c+6catr3lgn+0fEU+NKkztBCDZRvIa2VagJ9OlAJ6S/G5lDUuFQDQerN87VEKe4Ub

cOB/Wfvf6Op1UrXVRqq7aUybkwjQWDx///CaeytP2a
7zH+qoWJD7/Rz/daY4qkPMAFclySK81JnugTp50DvPQNUhLFph9i6Aj4I5zsv8yH5P2ls3+FFAZUkW3b

2pPkO4+qxG2e00WovNqWjSrGay3T6Zyvuj6EVZGUy+GGAFMrrYCqCqJTZ9ivTayQ4AJX7ah1PvSHh7HP

Pbm8RdOIoyNIb9RYuxPODgQ2L2uJZpFPUlEM8EYESq
UO4HKOQungRkLeVbHKMWGmStJLSnKzOwe5SmG1ObUPPjVYOMFClsGZZqZ13rHSruHf10GGkkHBCtYwJq

JNe7Zf8BGsEyZWNvY30vbWSjaQUmMNSuADWVBdPjDUVoP1AjbYOhXBcwJ3TcZ8OyDD9otSUqNW9uoPEw

U2XdX5QsnazjCPJCGnUvRZGbPWpmELMlQoAbJYfvTV
A+Yv8GYNF+Ch4WIN1ky5YdJLg6YIHrj4MzGFwvOAdyLqRuEFd7SVYtKejrZfYuNNE2HHA8NWFuMMO9LI

y3AJC4FsPmGoE0ACUrAgDoHaXtMsj9UFK9NDSdIicjMmZvJPN9NKOaJkmlTMG0VQO7XkL2ZFWmXIL1GP

V3VdP3NWAxZLC0SAD1TiI1UMBjSKZ2PTKaTdThBrGi
FVt8AB3WDMO3FFIdTNg1GZCpNFX5OgCnHaQrMVlbEeU4KfGbRoKkMKM7WxV6ICEsAuw2OLptGqCeWttm

JJS6DJwsXbD9ENEqUZNcDLcrQmR3YakkVdN3PZz6NJI0KuYrXmV7RPZqZTN3VcAeFmR1EKs2URY3Msrm

KzE6MVMsPNHQZjVqPiXvGJW3QNBhVgPwCCj9ATY8Pd
ZkDnJyHEN4NKHeLAH2IARoMpLbOTL9LaJpUoDcVhUwWAToPTVlIakhSoa5DTC5GzDrPrskFPo9LJCkXE

O0UTTpQbUcUJIeQBZlSUirKJF4DQTkCwX4JVEvBSM1FVq4OZW0ZTWiNDnhVII0WnS3ZHCgBhz4UUM1FO

SpZUgaBGu3SDd2SBU7HTWsGqDzWIa5OJD5PMTzPpMh
HCa3LPT0JFMlNvHeXEq1JWS5RMOmVwVqXRi9XOI0QXOuVmRhVAz6XQM5WVDoLrGtMYr6WCG0LUZgDhVc

UUf1RVT9YCThWsIuSO6EJSI1XLRvNbEiTPt1XIT8ZDYhHqIqHNd0LCU9YBBeGlNzGSh6QRHoEfsoIhQv

BYN8MpQ5QLPtDTH4GEW8HcQxMPOkOCC0IJTsZsA6Lr
iaQgwzMXR1McE5HXOdMqMhDSroYvW7JLYnOGCbKTZ1FBMuOiJuDcEhMQJpXaGPSlNbTOu0NMA2TnHbIv

LnXcZeURJxOeStLyUoANJ3FOgpATZ4AmCeNHK9QGOrDUB4JnHkAsMnWGqhMoD3LkJeHjZhIMosUfSjGG

NmTBnoPyY9ZtklGuO5BWD5RyZ6SzjcXap6EBI5JMQs
DqamSrk4BVvfBdV4RtQbRwv7KI7SUJS5BdmvZud1YPc8SPE7DomxUZz5SEq6MDM5OzUfQbJpRUpnJiY7

IlHfKcP4UHP7GhR7DSRbOSN6BDH6TvA8IHZfPJV3KCE0XzM5LVKuKFm4IWS8EcT3RIZkZVR1QRS2OxO1

RRSwYuk1ZCX1WKEtIzzvZqq8CRDbMZWZFcQzCyFiNV
KtNEX7DOGfMoKbARZpAIY8IXEbWCT3RQScQAK2MNJuHcNzYTNyCAW1TBByMLE1LXYvRNO7IAWpUUZWIf

LtVQ6jpp9ICcTrMF7wnf7TGJN5DW3VFXOeIL7QaLAjK6XmhoVUEFHswknddB2zCQtgWPIkG5DipjSYTB

9sD9NtkCUbVTQihZWZCfNbQBOkOBNsIL76OBjtWQ1P
YMENFRllzOFtNJG0S3Nhx8AklrYmKIXtCc7JGCLoYU9HfALejrFdAo7GRMZmHE7Nv394VnNoxMZlSRPb

AlZfAQLrRKVdWHQ6KF6NELPvWU7UlTAosVJKvkbjYQGfP9C9YP1OWBNDCiTlCd3PGhAaAN0vck0SSOLg

HT6hjy0MAOR9RS1LZCRiBW8AqJGuG5VvtoPdM4OupC
jkLS4VlsGoGJtnYA4CCXLkVi6nbO2LxqlctHeQr0urU2FgE62scG6zQ4inmxRvv6kYomZjGWxvBj2URS

YfYP4EmSMjdEJmGRXuRdCbOVBxlFPrEIAvQiF8BHyjWEAjH8hyBXAqidI4TNQnEi8VPQXbHB0Yo627UA

MwR6MsuWUaqjO7DUJbRj7NHPP+Hu1HEL1hb9IyNUx6
ONIda1AkJVxoVUolLdHqUXd6DSAbSmpqVam4BON5LEM3UDSiNEA3YRx8HUG0YjrxQDsbUOChWhVjUtNe

Uxg4UJR1MGRoRmqoBeUwSRC1MTDeIhbwLTY6UAS4UmG5NMNaSIK9VUM4UkK0VDRuBOF1KYZ0JnJ7VNRi

CFM1SGK2BPZpYlwoDZr0GI3SWRS7TRDkDHu7ADL1Qp
XtQYB4RGP3NdQ3BjdaEwMeLNqcMcG4MjhkGxFqOMy9ZVG5RsJeWtd1SDAuXFZ3RmwgVFC8VEcrZzS1Zp

KkPyy0XAX6NnN8MlkwKvLfIRG4IvX9NZBpWwWuMBD5ZaZ9ZDBjQkI6NEP7UgI3BFVzILtsCSE6WSYtWp

nbQqp3GXD9ACI1EKNrGaGoASA4MaB4AMIhBWKyECO4
FbC4HSKdWaj3SQX4RmI6TIAtWkTcBNYoOhU6SEObBxDpFLrmAuM0CAGaIYL2QAA4OpB8KHYzAqNaQTGx

GGQsInzrMSW2XFTzZHG1AhNdIHKjKH0OTQK1VSEfCGCkTXQrBBUpTtWuMfU5YCT3HES8HYYhFdIpFUd7

MVH6QEGpLjIkNEs8LWS2RLEgJzOeNQq7BIT8KZKaUh
XkQCn2YXY6DWSsFoFvHJl5IMA0KJVxCqKuUXq5WHP5EMNvSuUyZAf9RGR6LGBaBzVzIUy1WUSXPaWrFa

VrPWa9SAJ4QSCrAxRhRQw6UQD6WIXxVnOuRSd6ONV6AQRdMuc8ORUuRpU0KZLpJRB0PMC0VnI7CMHwHt

AzVHJ6JoScNtYjEoY2XZJ6DAA8LYZrZIr3LQCpFhF6
OoszNWXjYULqSJF3WAzyFyXrDUEzHnJsXnHjMOmyFDP7FwU1OqaiRgPhCFCjYqDdFtOiMdL6BJI4AaA0

VjYwNRK9PYooQGN0XZVoWkS0QWC3EoT2ZgizCuN3TTC8PvS9CxvhSMKjPMR1BgQeVlE2LID1KpE7Avoi

FkH8GPS6SXGzTyaeHxo7XXI6LFL6LySdRrVdSYq0IK
AIWhWcUts9JZa8CUI0EqqhIym8DCA6TSD9KjsmGgRrTGwyGwM9LlOvUrNzJZU7VfH7QxrmXmSvPZR7Jm

U1MOAdWJJ8ZZE2FnM9JBGwCIL3ZIt6JGK9HFTbZBS3DQQ1YeG0PAPiNRG7GKL6JSZnVyvqYjd8WVS8CX

P2FTAhUBkcYJW5DkI2VWGcQPY8SWR0DpR1BGTuLXV4
AWH5SYK0CDTtNTC0FFK7RfA0LDMdXBF5KCOuWBQ3PMJeLOVwCF2mFIxqblOaOtvFSrZnAQKmc6TbTQae

FSx5WJntUCKhO7M8lHMgJr6ulHTot3XjuQA9o5KZMnBqXUWtKu7hyE8deKNsQORpRYlYDrUuZBRyRSDi

TQ73VMmbOA3MQQDLRDwwjNVmAZX1X6Xtf4QbixEzXD
NsNl0QTQJjVS5SeLLersYzDf7KWCPeWD3Nj723JkRpfUDlPORtStRuKDFiYOVwDUY3GS4ATJSwHA3NsR

FlvYOLpfdeMAUoM7B7LU6JUCDTAbDnEu8GNqNkIA0hch4JETUrYLRgVshILiUoDMzJGjWgNRSfPWulKC

2Vm406O9V9XxO3wHSkCPR3DDJ6pUDbOgNcONLbqrAr
QMHiGGktIG6nu9HznbexL3irWQ8zqXTgW96qjI9yZQsoCCVgB8CozqZ7L2dseiHvOE0GDJK9M8penbUt

GTGVPrWgTTAmB9mheExlEZkiAWJEMKnsKSTbB2NhznQVLTDvnfrswB9vERSsQZFhRu4ATIY+Ji2CGD3l

u8YoSBbvLkMaBV9wdw8QNOQ8GF1RnVa7HVZjH8XiGD
PtLZKol6ZmRL7AOG7cvRazCKHwDQAyR0nrkha6cHNiVzYpXLU+Ol1UZQPxzAGtOR6LJwqB0XnWbWTG0i

bouzr1okQXZQRY9L2WkoBpZTJPAHMBiKD2JYPLXVqIvwZJrW4YIXAbt14UMCNVPmEEVDQ5LbxexTK99e

6K+c6cwzwE+7+v7b5yfeYgxUW///N8z9+b913xOfYv
9v1bhpmiOjHZCYvGlRg3o8hr6U45nR3D9lMv5sfKBOsFaQf498OGbYROXfkjAnYEcJR9/aWXLPRv++qp

L+TaS9WnqBAc9fTvcouoRrSOQcYG8HLmDuB0jVqMZSOgo9S/7RepWi5vmtcMy4AqhkNlzQDpQi0ovhlg

deVW3SNON6glYHeAybdOk1C1/+iSVdrO0y+JVi5PKZ
maO7+33Hhts4FvcVItbHYib05/wm9LrZSjPe8bDmWh0E5rv+xE5Y44m3s3735SH4LLcrm5EgIPotLKh3

C44FJabo7w7elzcmRW652gV7cO4kTbZkn5tKIIAszLZ5wDDmE2XHK5dqKeoH0cMTPHRhukIK8WlDY5az

k9SgiujG8ou4eGMJkvUHWMTqN90WR9nwReqFwhcGnh
CEYFWKWd0CTLcFMWVOgIzrOgKKmM0ewxHtp6D3gZS+UwMSvQmeidSpbh8Hwezh8QrNRJN1wYk1vjuebM

TqV1MG4pK/PD0QvU9sS4JJDrmyOC5l7S/gKM0aYgpO0SE2lLwnu+Ax7LbDm5f4khxP39nd0Ig1G4rhmH

prsn0g4U7BnnAO1JK7bdOSSyAOgRxMcHw0g4Zm86m3
XuIuLnwT1CYmyXNddOlSZGjZNX9zNw1JjQpezNMElD2Hy9BRpLvIecenTr+NbSX+uz8qb8uy7pKhBD9K

YTho640PYMn1Lhd6BhPj5baIH5Ax/EzKf/arxyDBYJY2l6RiYmybdph0aiQjceIWDB5rQd/Ez8JK/G9x

iyPH5XclQvlVnnxbEwRwrQmYDbJxcIXGdH+SH3bqU3
UOZItOmgPrv9EBL/W4NNxyCuKLivv2/Uj1gd8m9xFrEomgKK9fkaOOQE8L4aeMVbVeGuSLFaarXcQ+0O

/TC7HE0Skm6+zaOVtJV+UzBcqqmyazJsaIRoakiSiYAquPcXG3BziTWhiIYsCD5C5Q21Wk31sxZ+2Fhq

3Gh+XAbF11xgC+t+sjKspTQO4+FaSH8nrUfNdtFBeh
vfD+gjYYhGoim+fpEsCsQV+D2cZqwGSxN3ZLjLPs+Xb7FA6ohpuexkPGXiXCLhqfxUa8KqTA+Nh5k01X

J3N8ZvgU+2Eoe9DD4paA8CkjY4qVfFkqz+h1qq1s70q5cFxh0pGne5JKnMrnfIdqZcgxh1T4kv/u2+41

6Th84HiwsxLcAB0A1V2wfQR7KDJ/pke2CWJj2o4q+r
DTT38r1KP8SwB8TZAPYx8ODHcZrdaErIibbnzDUOVWfmMZ+cqx0MFjWfK6jzON5zp2ysBPmp85T0sKDz

Im+TO+Pz5uxZsxMELhW9oNh1CGbYK4oStxgux6VI3yK7u+6s4DxjP7hmHFd43p31NP1FU5xQ7E/W1+uf

737bH9bFnG/NaHOeudSsNf/eTbzirHJKU53esvmrh7
5Z308nmU142Nj7Gb9AqFFlYg8Ws4zwej709nGVMPHkZRODpBFFB0KkIrycSGFR0OUiItqUEXR0NcZL5s

sHaWbzi8ESBwfhpDdlOFryiGxk9J8kS/V99K3+ISe4GPpbEeiRf+RTaxDZKCK2TWfjTu02XFaU5lF+EB

95LztVzdapbDerZX9eOyJf0WMJTXHdh9Un5pJmHviS
5kCVH/TzhBlaYsUGX6cGQqLomXUCwoJr0oB0RVaxskwSdmB1Uw0/Hoa4NQMK0QPI4tz1LuVno9SX6zQL

Fy/GHAyHxZ38xLUvHb06mO9GU3vN8pJ0ig93WE6SM5EJLRnm2g6D3Ygwm1it/bsfOsoRRCiCdgFG12d4

zCjUXxMzSBMTKUU/SS97nMovJ8NjQ24nFYxwM0i6ya
pVgedfvDMj2QzAfYaAkdtO25etCK0uyUem+KMhaUK5t4kmq7mS9TAC5yUgX9ZVr4oYNnwvqsWUxMlwK/

BEPfpP0DqQEzFnwuLSAwwJDyJ8aWcys9IAdemh4DaqDYN3Z+uXTSl4T7j9UHvbY/g+Nq9r4bhj7t+kfM

q4ZqbSJLu8BbWwfWc1ji5RCv+NMoVno0wQ8IqOhkZs
V9ObR1lg1bItZ3Y247hRZ7PWJaQucENOfy6ZwwtW1qaqRF39QckjyxZMXekzBKmuqZj81lj6Ul5q/gut

bNqqTU4uVuFbE8INp73gvyNelBUsP93ri4nUMjwLOeaoBy3RazzAS7w34o9i2Do5Wj2xNNtd0ucbvn1k

c9DSb3kxuInn6XRq4+IzYgZ30/oH3vPI56/nfGAbsA
uJaboSDaQhzS1GNQEliH0Ha0CNtLyR+uU8mV8HCC5K8irXOM3m1BOIZfkvkY9cS4P4fqMILJzXgeANoA

FhbHqOqvP4wVm1EER+mO2nXigvDiMxx5cdnvW3BsA8SfK87jfG2K5zrJHDT01fh0ojYzojHfGC27m6lr

ujH2wI1xVl4zKcMrRKGmmRRrlB7Hj6iuTKfPoDpWxk
XwRbBSAGsqBHBL8OtYPWAkKrkT0qdPwfql0BK9HUFRY86Uv6FASJsFiS1FqliPKCYh42D/mtHSO/3d60

VdJ5o4eIt9onoHtD9Kw6wtx/vUolZL3gWUEGv5B5aGOhSHHGi/h+gO/12m8UlJ+ndCATmArcA+wBjgAe

+Xc8Y+S7fZauH2bxHHwgzaHEdnqvvZauyNtSeyvpWn
DtbzV9+fOpKw53rDxkN9UihFNsT6fIbNd0Jj1myTAgGpTWNVPSkW4uSKIW8son2NFwO8lPWDwYKa/e7k

/cxTynZ79ZNGKGqsrE8qxtG1GVohIOaAOpZpfkzStuPyQJFXFkkyMKIujiA/Dj4Je4GPVBCAAwO87TZb

KnGH6ptk5pJ4eu68FZ+bYl4mFFR4fMWowQwThLZ2Yp
bc8Jq6Y824XaPirnqEdLsDWGH1pvj9voQm0aDT7k83AbuDwlQ1yZWHVazLKcql3uhX8BHz4ZQCP0DMX8

MA36uphy8lxs6HckHECJCpSuX2r2v4SAn2x532HPPKhaTdYqL/Wm2+DiR+r1K+DiR+vq73WNf4W0f5DS

v7Jh2CNpS5LhCCw3xIjaRou13Op7z3kr9ObyVo5mP6
4Ft0RWUbSaTCfUugS0ILH47s19g2mqfajXuUjyUMclQo1O5oCkNAW0JmJdFWUehhYoCPjvlWbyJl0MrZ

vhKgkTprHWLBP8JNr4IQRuyQRfc59gcjcpCnonGCYYhaLZCYgOwg8e3z/4VJmube8sRvzdVVG7UT254P

YPApqIaDB1lZMAzOI4yk88UlKM6uHW9H6pD7wxx5X1
8u7Zmd80/3q2uA99gHDqvcAltQIs9OV3XUd3dHs+TVRFAcGaTyoUZNIbrID8ska+v5uD6TlGAYTOjWxC

U4EdhFmZMRrwtUcOSdCOlqzPuhO6fU8xqomOLXLzbHjKtHbfPT8wJWMOAbuaE5EXbjzmA1fods90uXgN

k50/Tnw7ehTHUTgH7yPtYwVFH6slV7q9IxGfNfwyRb
EbHC/+q2edLMtqW5LULWQ+WLB4nJtTy/QsSpJbQTNqkiYiWUxNalffbtGUU+30/ZL0ie/XnFqF5cjJI/

ga0RncohQ4HWLbs/kaZ9hH69F6fRscY5rVIjKcjkA5N6bz90A6ygGzGFpi6U6JAYhctfEeszmYPdYckt

i/Gi3haS142kElENDDdLLYM4cEjct1jXuu+wbaXL05
aH8lN2oQIsu9TDvFRerSCLsE5BZ0OngxMhvoFsa4QnpPSOEhdKQ0aepCHH6GG4+mo8N6mydazojj2C36

er2mV/mHoFzgtv8NaSSrbUIp7C2R7RzfA7ro8Lc6x9AHmBAM1HPjNF/c0GgOzUHzDefm9d/9mB+oFdHj

QukNZNZEzzDL2pRvNN+2hIlUYU3CBq2i9Zk4vnK0HJ
eVFWVqh0wTxcQTVvfCZdSRddyRfWeZoIErTWQCRrzTPwSUYzsTIcJj5tG880fbsSdKmoQHV/FJOMndNi

u0Ki+/fCi5bn5jp2vMHdmzXZDrrI8I314d1XNp/gjJ5h6hDcOXmnw4XtQ2kSOpjw4RWqjOzuTMZR/5xT

/Ro4TfY8GmlRrAwHR1npS02wj8VDnDlo1OnUhBMIcF
JkJcQbRdinrqSwmoNEz95AI6SspiFcRTtFVK2+wDRW8RrzSxYWd0Rh/+smZDBociQGOAnKJrAnTE5D2S

V9Cqk8rwmoSKs0pgaFb6XMJavOR8AOZZs4YoR9QE1Ll6t3/wqCZ7oI6AFthEvUBJXgQjmdbkOc14btaQ

UOU0v7+6tIgj/UIgbVpu8GesOiMuasZ9zyy9mP6kGu
ZxUACTMTmMRGtuyvNWUIywUxeOqltULTCKUEdd5v763Ot1Iqmdtqklwrp2kygNbnRzbKmZ+2vqsm6AM+

Wy+zYZzhnhmuHakpvRB30mQsRJlerAYUNbviwdLlR4YNU3DYoi7PTJajmTXT23UqH4AEkE1LmqALR5tL

NqTyUGy7XiVzEUlhVHFVRgiLQOdXWL5TLbWcOFZXrl
TyXoLxo7saJRgGBlI2k+t6CVEFwt5mp0u7gZT/ogLicULMmuWEiUF+bZzolNITo9hXrfKJunGolM2s5x

0Wbb3jcpJ1MXXY6H3BF7Mc6xfZJXXN0cng0/uENZ2CxkdE3pM8K5YnKEOSoftpjDEYJKkYzg95UBrnih

YOGmVOEEBuHfH6Oh9mAZlTBLby+6ub3PQjh8jPRB7W
R1RZLRjJ0I/+ZOLbqvkQJhX5QslGMHHjzWjiaQmPWg0g5l4YJ2yNcPMmftirvGaja4+CE5gFkGswS9Km

Pb5+IpyFEsUX0yGWXNbFBSG1I0qMJCmcGZ9nJnz7mC4vA/MeIpzfaqTK9WzGkseWhCyGIIHb7reEct7F

xoQ921YxIc9I60JF+dLTWnZHuLgUXDg86ko3i/Cxp0
pBs9DcM0WFccnM7X5iktbhEEvTXFBxk1wbA3X871W22SZ5YRa+0VX0QoMFhxdEd0OEsMMfveb4S03yz8

HzjUbvq3TiZvKDhll9X8FBheEK6eHBQYfQqbfc90EMGeE6K4KraMdYgrBsm8d8NIkkP1VKoFPHGxJEQo

gBIgn/1r7jQcjhlLagsCUley2eN0H7J5IPRiAb0h6u
C1bFscZ0Irxz/Ng/9+eHrYclZNsQBkskmEXrS6YnPNRvk2rCX060N0Jwn7WA8dg9nyuUFmPm7P+dzo1L

ex8bAuhKdPI3BuBasdBQYIUTrKiUZkdFWtNHvHVt6i8bV6fvWhDCwyme/G3SX5O8bopnFMd8L9GcUtfu

dQ2TQVVsROTsbGv0bpfTSVqvwr/hEqpyCB87lqx26i
NI2f83sbEjuFP4w1LqLP/yzUUmVJWUsT5pnV48kFOstcNYKWW6+qabvUHojjUrqmuLBoyTU1fI+co5fR

fGmB5Rv1xzNv4rYfSEjp4/qy7VF5A96Pf1p4EzSvC2h9PF4RVusJoRJD7ToCp0oown3dSjj+heUYMI6Q

+d1Be+ijylJ6NSfTtw126pDpT/+21UeuLAc3CyPL+n
bzCLvdZctV4qP0fG4y5hnfhy3jpU2QyjCTtdqenU62QsxMg8gaUu693LgI1runzBZ1ShbHoRmBbTGwNt

1Y1sbjjK8RHRmbQ/ApsBGYA/JC6xUFKLeFpswAE2UNa957SlH3lOrCvrQZhWf8UczCg9qk0v6wZt7k1L

qP2xbO1yp6yjFJwDmUpz26x9yAvVEV5jps5J+D68lj
tt1q0ikx58DORj7ApV1A6ilYbZdoFjbWGh1wr9SGtcqU16GzId9ajk4lFna4DWhkSwAvBqYBFo0OQ+xS

rk8k1Qm3I1iHk2YtDqgJUH9oLpFljMv5ReuKHnV8vhmCQI/ufaGCCF24l2mxL3bJtqkIffD2HduOxc0Y

fgau4gCkzYo3NL6u5+uTyLQydl39VFzbgRJZZ/cpNB
Vmay9QnKSf59aK/h9VJ3zs4Jrkqi+MQ5aF+lbPj9VddfrQ+X9B3NttWnguBKZsqPGLvvhQXcJ7/x6Yrw

radkVmZyjbWiR76RbPNFQVUS/SHm0l+xqN0xekzHEOZLxtYUPKQbbtYLsWMGVn/nzAEDaOThpzkWPJpe

21vxKk554h9UI2Sh8PFDwEwQ/CLiaSiLsBaOzlAZ6m
5pnqeNBuR63w29/PF+tK0Lnf7NcjTfrYrX/EgpPgtjg6Irbbikeb1qh8/ytbGkpnncF1o0xK4J+kYemr

3RyRW3bAm0UoA906yhBWJa8wcD9jUmF8ZSxgb76XaA4BOl5E0obinL0OWAaWez1Y5j+etKata672mawH

P1wtvxjl3WhlUfejcaZfocM21Sd9RlnYZB7xq9gp8C
txARHeXtiZsq5y3bp2IV7+ldwoWb85EAtWYQtjyJghccQV6OGZ96MsDdMhr9LkzjA2IafNBrhAefr6Tv

EzNnOSYEiDLHOuRlzgwyuObRQPQCdveVHdV35Bv6Y3412UbvmypGyQIV/6YrHNVuD4Hqn3HNUCgyjt1t

pm9jVNfnTcBXaKHl/jl47RY4F8za1kM27n9sVI5CKX
dYC6bEtjx+yJr+dui2PnMZ4j67yzs97HnSf90nm4QxC2IKmJ1gAR0/OloN1b11HuhyAFFgBM/2TrmLYV

7PiBgG5dmPikdZNhKGSV73f8E+n2qc4Y38jpXsn4Qkd5aUkFlnxGqgFEg+CqXkycbxV9J4FkUuH8OtPw

GubuDUjvc/I2lwocGU5bS9kBJCgA65umXC3OClLSsN
AX3ARUv50Z78sakRxV9VByqYtNHBWF/6CZedq706wGpX7MFatp1Y3eL77QTruAiIxrHp6rxjHVoJtfO+

ZuM+qm/zAy5Zkww3GYBB6/ttZwyIR95umVJVyn/vsnY0Z8WJlJ5eS9+NhGWW0d4w+nC/SpVKFVY+y9Tt

X1aJD55tk8XXp1GQyxzE6G5KUM3oNjzp8M+xesg+BT
WodjadO2XlOWrDtEhwZUrAzuT3VodE0KxZo+SV7IWi+nK+Ue9HO29QnIktq0rRKS3qRwdUUKgrYwOF2O

Ulr2zTp0zz8Bw95ztDIeQWvL1+n9eURWa481DG6vC2jN6I5Hnnp+3W898A8vJsRaX0cn0aQKtYOaL0NZ

ggY23QIgCb2t09Ls0AIGpk4w+R/KIkCV097zuKx+tP
PZKO9s/LrbTfPleEIFoSGvjK9hRzyGwBzPgxd/bCk74Sx80Dc2bLTVyp/47Fanh1K6Ac2+AG35B/BeGP

ChmgfOi7v8rksn00ULV4S+/1M4wgdFr/TW7ZlrJFYRMcRvzzdY4X5Yf2Y5It4+QvjtKjyi/2SWVcdtHh

ke6Y/f95g7dhcFPCy1PlhGq683zRJack+N4VzljFDR
94cPXQfcK8+qLIffRnwmccrzfGJtckWPnZIIMtA1SceQcORt/ZsnGqOgQdvj7KEGq11Q7TT6NtSm74vl

JWzvhq4pxaMM0xPu/7j9Etk/kK+iyuUQy7NB6T+pBczKpvAxCmPdCgc2afA7KFHxc+oI3lpsCRkr/cDz

wHP/d6sEyYOSlVPht3QNMsIDImG1mp3kQleAUoOpRz
ACcmnK2IeYk+co5iqyLauatI4ZeJld0Y2mE3IuBvNknHrWIsguvM8rnX4ls0Zrrq9+5EvTj85EbSIPB6

vHqSr0uoIE51G/I96CwJO8m2I489LcfqMQX3G/EuBJ+L+Myh1UHHnytLAjMEBiLQrMd8UoGMROEaB/Tk

+///lmMZTYUtS81QfU3BVDf8g/TN3+kBOG7oQq/m+I
fj5GBCU9WaYj+ew4Bzo16/J0ojbCxp/weoTBPVvzDaRxO1dqCsgqre1eDHL/C4TdcuOz0CC5nqp+ZduZ

8WcXm7DHrlMiVrP8kgTXMRnPebm3tmSPgBNlH+aB5xiP4FkF2KTA0t9V2xm6O+DHGKOJNqyDWLtisNbt

at95LzMP+NuC/qKhzx7nNDWSVxKn/gg5l7pG/DfW1A
aDJySaqoPFbF8GJ2IazK0YTl8f//ZrwzV3Yahu/p/62ODlImWhjuK3xtVcStXldldw0AK/ITv3z/oj7Y

4/7Y+wS1x/FU3Jrqg9xn8KkauOV7Sx/Vnw3kJ/9WAw25fJCO/l10btMkswgFW5eOFyql0T/mWi9StOYs

XdBv/V3t8ow/sIZcC/YRC2hKMRCXeYzdYLimzg1nE2
r4M/Cx2V4MU6KRsuUBaOK2uSyA9AKfU80OvKe3DSCAHsAF4Mi/ua95Ao+0JIjvx6PH8k8AJ+AIiwARuk

nkmL1DY6C+MQpX8uFEuK2m4Ew7gzVaoOQs86Zq3JRI343Xfb5BfXB8pQuGs3/YI0LD65ri5OI3z0AZFl

nG9hRZLx7bKX5wAA45Wvy9/GH0xb93le5KzJ5+AkrI
fEiSjo7BevpxIlny7ep1R9tEqR7Tjmk9CTzmy3tzkxdonNboBB2MGtx9VeA3FzWPL317YO8KuU3we3B9

MFj2242iZj993yWI4N8xx7G+d5WNRqbWg2y7pp5SCPNUwR0dt5b2aV44vE2DQP97DTmF8rBHtr+jNNp8

2EHrFTmezE3FWB4CDgkA0bbBEqmTWv+brbXyvEr2lL
UGa/r7B+PP15p/Wyc+6769mbX0xl4qPS1nhXPN6o5XapV6sUqF/Q+nyCpXyrLDqnjaCxn57yI0y8PnrF

1Fmf/d9Mf7Veo29Hz6Ye/KXqr+QrU6XGzVGO0o9rg28/e4pCP9JzUqf3DEIxXad9u7m9QWwAu3V/Pebo

GPgzRCH23hpv4dVEeLxyPxya9r/ZtVhlW9XBO5gc/O
8VPD97EuzWnLZbPGS4ugKTbgQ7fH2gW8OxMYIou9Aq6RF7SR4pxoyppYR0OybSscBOYYgssDo+DqruDl

YhRHD1ESff6kuqv45Ymhn0hVRQYlqE55dplk0IlReCsSvucg5skAqlw5XairT7I6BxBrDM+5ny5Z2uGF

rWZUsAok1AD8IJDxwXinD+Agir1FD07agvoLn6sm0+
AajH8yLSDVw8eXymkcypun30iBICt42qJdn/2azg9yKZTuXTtRPKdBO7K3fDbpZZheE1MRsQd3Ka/kUA

hqU30WORU5GppFc7PlGNUygDboyM2DqEHbWYc7cVbkiuYHGhUmhT78q0M0LhYTZabOD56z9AulWgB2wN

wgO/E55hZQOd3lahVTC2a3kSdbl2onctD3Sv7BVyGN
2bP1p7YVdjO3Y+9hc3Z4PMHLJem2oTw9XL7T8/TCopiwXbw573LKzKIhEI0WdC09JJTO9DmA+5oa9D7K

rhGxEPoSyOuBfYCJbby3Im433yo4Fiuezuikn//gjoCxH+pcItdxpofMq2u2JTgPvnguasuoR45DX2q+

KwH3FduXWTQbSUaKBL7dFFI8Q1d4zY+2IeFnkw5UaQ
Wto7b2jD4/m193FUG5Du+iszlNDtePByQHtiba3AU0AUCIaSMmCM9pGSscAXrpV745GQAPuSYwzxkupe

OJ8hrNr+B43qbJ4Z+fZ4ojFPLaBwK0fsoF3sfo6WAEo6SZxEob7NFxa7AMQjcz22v3MFyDEEe8v5Kxqk

mJybNyG3xG0GK91B+lsIyc4zrX2brQKJVzpGG5o6g9
B1zm4hWr8oh42mzN84sX+O2sBlOq0tNuCrvsRe4z1JyRvXLtWUZT63mcZFZ0sUadL//bfqBC1SGhiwlH

Kc8ITChi7082NIFE5uk9+g6Bk4yhdIICUJKatrVlyMTou0Z6O6bYFoMb1o0z3bIiEmJfvvFSof3gGDSj

JtM29CFsLMAvAN5IOGvSyc8skJu4j+B79/8sygnsYy
NtA43iWlwEjGRZ4Yej/pptpBkyyFu8rvxQi7Kiy9U8wgvk68jz3HYuLvdLl5zvTq5f3T84lk/srIwL5f

r5/03SafdE9bovtybT6zG3l1Uy+kqUWGMfq3Jv8jvuSec5c52UhMWqcVrpsAFm1KuUEhEbsxyQPrIRnR

qT94QWhvy9keiBEBsPC8JeHrLWRjj4Hi7+/FrCJnjv
DN70cEORaC012yIzaCm7sAFV2vwAI5zrY4a4X9/N7jntXGpOBz73Ir1442+C3UA9m8fNkrt/pZ6ZKxCa

XSU+iJd/u3ivJrhH/dGZ8tYD+pOs07/kh6O+jF6u182lHB1lmK5t+Hfe5lLLY0mh/BTqZ7CnVSEEveej

M51SMb9R2OR47w+I105v9uWm915oiSk3bxnq94aRDq
+W6KG2MrWY/ZH0z984u3fJWfol2l1IcbS3o5bk4hfFn+Yy2iX1v+H6He8/PdnN9B/X6n4jOLIaMUAe7t

WT9RnHUWa9qO6hn4B2W6L2HBAr8ozH2sQLR9Ija7Rh61eRAqNksxBh0ILAi/Ujj9/gwl756VB4e18uS1

aEPZ/58LlGS9eHT77mE6dJ71OyKLUMvCTGxBxHTu6G
J886VzR7LjZ2S7Mf8FwkpMob/Yehip8af/qt6/K5n3kKi2LjU9t+Zp5Ub/SReYsuySc2IcVrCYqf5OR4

T5Ld7gDW533mwPXkcFF4m7z3dn6sLftHI0X7hqs+4eaZHrF6+CPQ/99z7CDlax+0iI6nXi5Zh5s9r6T8

zzn6VgSQc1czSu6wksklyg57eW0mXztjjW83xJ/hRN
P9nms/Lt/wW1XrqoNg4kjvXUxG9K7egAGtudrV2iviAmKBOk8u135CJSpcqF98n4tKl3W9nPCtlS1ttx

JOfue9ITN054iGyqOYpiEpjSX1dm1n2eW2MxVCBS85pUna44Hv+FBo8eui76fFmLX/y+SSuhwbxmaGfD

qyX7V1qj+7J89vA+eX3dfVhu3dtv+c33a/ilYcZz0Y
A/WNe4iEscI+nd/C0hap41E7p5RlNmqc+FP8k3pTzfkWB3SOXJwyxSsTUElSGjr0ziFWm4+Vl0JJDXjQ

nyeuqmTcd++FXYO/FFiiQDE3xFnBgRIKyGXQV2SzuxlNFdpJ2wUvkrucoJ9gaS+8XBChscr/vkN6eKQk

HW1eS0Xqw2NlcfF2+ehmp9LFQHHgfwrxgRMrf30Tuc
lyDdHnv/zucKzK/lYI7cUkafpea0xxpxlpkT47HXNHX3lr6jiA6UmzMJohS40gdWvT2J01hTDbahlf38

+3QxxOR6EodWqVxrqiXRkXZNvy70L+ThHP24wsL5QiA5UjknQApPEcx/qgF5twGw3wRMBoyt95UHYIKW

w1YI5KCf/Q0Lhw3w/aYjQV2D8i/QwgxrvLhs7s44Sb
uStfkEexKAh0qEVKnh29H3/WvlTQt30o4o4zi/jLiqY0xpilwCIzqEs4bxwpffK6J2t04WkyETpn58kU

aW10CGjD69N15qkf9yg+92lmKz0FyRfCN6C1y4c5IX/3vri8IRi7jkmW3i5J2SC5u2TvO/tTvxyBe5Uc

rL0aA/4p1LQ+/XTOpBMg5UmbEV4WQgeo4s0Tjf8/sZ
OlmWOh+13uQnfLyQ5rexXq9ELZqgHI6riIx94A5lyp55t4i6JJJewsv9aJ05wqedxikD0uG3snd3XSG2

pYeK4za3/xHO+k990ZK5Td6IDHH3naYsYeL5qpr1bcF4Ffqc8QaSA/0q8gWmUEAQP7vhhNzVdAWnnujv

bd9AbkaFS5O90MUJM36hoEESI6mdiEoyZK9i/bX1VV
utja2/9Fn3FwPot1kB26SJ/X9O61OzN197z2lB3oPxiLdCJzOhHXjR78Au/VMMz0H+NGgO2zEwX49JlE

+6lPX01Jfz5OjuQh/j0bAvGZP5Uz6Mhwzek/2+v5gIokk2coroBEP50sQz0Ke3sD94J/rIfrcvIvcF/D

6H+8jz13jdDxZvl1MiUtU7eh80HyfyiKq5G0BU6RRv
fU+F9b0jJ+0MEeJd2oe/08C6ow6nn5mLcf0g24uIFmyFc7UQry4j8T02lfc4J/BjJxRhIuOk6MUkGcGZ

NjOOKPn1QwcJ+Xdzsg3Mqcdt/k3Zh9t0hlexux4Gr5g7X+06MFN6V8Lox4klf7xIgTEavnS7rjXC8grx

J9e3Tj42VjPTlGlF+/jne4Zd2eFz9zuA+82nQjdrk6
NGH4y2Vfh55B2Mkps6dnk9rgvgibg5WYbI6bdpuQM7YXwajh5BzvH5xtzti1KDuUqouxD2CeY+pk1RI2

bV8J7amLHONBw5L/eVpAk0UUkOw8S52brDLYsuX7MGKqaJCcYY+CvLwhnZgmjaTRu8rCx7CVA+4MU6lg

7/KRagwpJIUkM8NBaah+ot1Ds3B0SibZ/l3RlNQr/6
PGI8h4dp2QabARhbn+qUR47UGQRerK4F6RvT4TBDcFmlsd3MrufJtkAwyL2c39tWPmqvB+b9rEDA6R6h

UEfFGPi2bkGjGXWndq4jxCYWM6MwyLBfEc+XppI0HvLzkJbu7aMvlEVq0XKNY2kdvl0Arrp1pYjst1vq

69cp3rYxgoCbLi2T41dw+kUqqHWnM2TDndiXMbFK1Z
R7R16TQ8ZpS2hTEJdg4z/V5Gom32aD6mC6Cb3XiqNgBka8TT5TdmAn6nY65zZfTsJN+RCn+qUl00dpXs

2VDM+rsTWCy+V4bk/rt3upj9KiBEBXFuyeENTtl34BjveA2msHVbkBiGM9Z8wzTfDRkXqQCdxecuMOjp

6MsnAQm8udN5aZ8PubU63WgKep+K+TQlzopVE9hwWU
fnxs2aWClHC+aB/+jr2qbERBnr7nEgXYoqt+yn4DLL8s6Pd+R/Dp6ccwfVrbuB3ZJGXfeJ3Zqzkuz/oD

VI3kpY1k4H4I6zQdjffpCLVlw8ZWM9TvrV6zvCDfEq/LlmIJGoU3w2q4ptqcw8h0N9qYiFoF0QGtR0qj

V6WAG6BlXnLD0nFuWU9lQVqjwKORg+n2pNMbodqpb3
hyCcMe9N7yA7Mgm6ssR/jUm53TLzQrravaoWV0gQJ5iLuycdPboc85532ZL/gs41AV5cykZR3ZsMSTa0

65vRWJJZa5WeXG+iuDR30joGcvrSoJP1Tn2S+R+z4PlX8SOe/UUIocXTtb4z+99/1X6003HHHQ6Oj30+

N4xTlw0ATkb2KMAAvFcm4/QubrCB5KyVnKYxEwWd/8
Tm7wGFAM8ti8c8iMjznSR07oKpbjxqN0bUQVG/y42i2XE/ERETtb2Qhgkg/c9p2mMVZkH/TKQthSRVhX

1lG02h/vomR23JV45K8LOM8ouTvvaf/Rs93q/UuJz0A0lbsTZgiLcK02kuUwIhTuMEjz6MV5tF+QFJvV

Tt2kB9bguABSk64eqoFuynlr7tBr+WRArsHu0jxPpu
yP4a4lPbcF2wm4BFzrPpBbncH9eaYUOP1t91InEI0B0cpuhxG3m/uEaYp9Ca7Rf+FKbONs0VDHKYW3V+

NJKpKgsA9wGzBQ15LhUP7Ehwd+M0zYqZDA/g1ZzNbB1tHHwdWvFQNi33RMLDTO137znR56h5/0f4JLch

+L+/ncBLi99tnPXS0Jp5M7wB/gXO5Su6rEn1x30hkD
zziMrzD+KaB4To5Ber9tKuy21BNG/RjfC+l+FZdDD+y39FTMRQ1MHu3NT6OqeGlAShSBwHXxkT7XkJFU

4W9JEyjrSm9wRxt6mqaZa1G4UdGcMgXVBazAMniTw4uSshW9kVWJbgtRPH2Bhz6tOjSfQAzISATktibI

Ytdn4AQyeRsfwWiVsjxHHBd7xjd+H+szt5SxERVyzG
RAJYGVDXcMujzOy4bTjK4Y/nr3IHtFSGToOMzZi9359cbAm5u3g71O/gjx/F+sQNrIisjbJvq1tFzYXM

83Y0IV3uVR8x+wftYXoD7/Wp20mDe/I92q3n4SlscYu5ScQLTKBxqj14fdZQmGYv7Uz1IgBCtNcKm8r4

ovKws31GJzaVkwr865x+wA2ldp43mxa0c45EgHbrW4
QToxtd7wjK8WWbqn85lFbzMd7+EZy0D7ebtpY/hsNLcn8aINnVMlBxQchc15pmG/nmhT7a/EQ0c3LyjW

1CV/1GKR9gjLx9XB/zuMrTuLSjWsANcj55s/7T5IbOpFAT6TmAUw5/bVjHDnX7UF4rQ+o31+FfkJnrtk

KaVBHo6Ud3L0lIHab5e5U4c8tSMNB12viPHv4WXc5j
PdMu5a741an61n5dsl1MS2t0rygD48f+yXkrBa1I9L44vPGjitEkpKRc8pYtLJktMv12HO52dUYH7Viu

oaGDB5T1Gw5C+gRfb79FDlQf7ONafAAPiKISGKv4Ctz0fL/mLF5j+IEveH0IXbPFqbMK5dEp4JoQI5tt

6cgD/1NpkAKreetgxLq1V2qJde+fp3Pkg2UhtxAG1z
OGLw1vZZeYaIGTP/H9P6xfzadSHcIYnjOLkVWVr5hJexSN5sIK6CjXKaVJRTn6wWoZZHEyc9grK5z9Zh

SNqAxa5vdQFdXYnsRPzqHqQd0jwvffCPl4e8fO5DVrDWhURx/8zAbgoxeQwnDR4AxYEc/baqM9oYB816

q4DQ1nqoKaiXpNvOEhOeFAUJUOIQapWIynyt2eTrVW
axXlG9KLQGmtXAfjkmbMi+7aJHUwTEWybnPG7O9rzCSuZTDfpMH4yGh0XSqMIk+S8SseKZqkMWWUPl/V

wTrpVdP/sf+6QUz/fZtyTCvGaUv3oIKC8yFoX+pcKlGz5Cs2tA5QwSKfZyNRTeawnQPBLsH8A3lOvks8

9jRoM5JnwvW0jx5YV6z5B5jQWopFzTE1da88CQjqHs
p6z1pllDFAGbYuOmZ/FXyxLP4vSOD+W9ilu3H6uyJGCOTLfGl3sVDJ1tmjscy+KfVrKfqIb0JI7NAnFb

999RYzkiEvoZ5V+IzXwB0whq4x13JD2h5PGCCoJJAXSiDHom/Q87ev2cJpPCS4U7MNq+Hlo2EIbps70j

P69yVr9sHmhJ4IwSvfv1/K3bkunKaxN9wwAg6kf4df
l0JyqFtpW5oYtFVBd41U/D9wYl4Ii/hJuXfGUg0w+Epp4TWM9K5+JG9pRUDwOdONV8jCr40QNxXj+3Ps

1xsXxXHoNLytCUpCaMbv9mzlkQJ4HzyW0SL7HN6vWeckpzHcHGx11zxjdL/SyKTk7+f4avgxFbIsQHbJ

wxQ/3TTvYPOMl/srxDyoTSuNoNaLoOa8wbM8qCVZMB
DW9NoHneYAwgYdMeNatVSOmJRww5QwhPL6Y45HBccSnydqarLWmDvxxEAs8b2elzLdTWIkDA/LlVxVUl

tVbltIA/HlU1Au0jy69hkSXkNjmvc/kSumTwMDEtm7nfm9haPZ1ecrdlMe0IpAJKi8Ij3RsjA9FyJCnQ

FHBiLSFjVNrHCdm9L+LpQoDM1WGSWK4Ka5vIW7Rk/M
pEj3ItuavtBDihgTgKlYBE0VGtfcb1GLmOzJGMZyNQhs5GA4T8KsLp+GK7JUhlJzghIfV/pUiTZoI9MP

HMp0BcHklSiDaQBnVZwI2NRI5pLYTsU5lG1o0rjAhJY2fla8O/t9ZY6tmgjqnKwmx+ql0MEtMMLzCoSC

oqNbI2mGbY0SYXUmMjRiQA3Pi3JbTZXocc2wA9zYw2
M3AFWST8PXLq7dSrVda7aCrT9SXmxmQ3EHA29GDwFtiuSBrpokaYKei5ypmySMdEPMa7PJNwLc86SUch

2o7FHaBHmojKiIz7W92OYEmLvHCMM73FrhdnqpQPGiYHQ6MFZF2RnF90FdKFEj1Z/K/dEU8YLGuQZ82E

CGzL3tIw5cU5tQ4jomBDQzBNnMaduIU9eKB9zWsYz+
tYtA25FxHi1ePJ97CCRxxloTVGINiTGOmhdCx4pYcvBJbLE3eFVmwNKWXS53ywLrMRLDiWJM3PyhgzRQ

9mHeivCsvAt87zxBuub1jUqAA3lHayh2BYsYRqVjSwc3jc7WrH68Pii0CnjcgtnnUdWTYefZBrtxjRVc

SZLW3XSbgGOXnpQhuiMfpkhkhoJi4sKG1JULmyDqKV
JZiJJbVaZQ8BOCAUEqiO8+cB1GX0bj7k9vIi32W3LmdorTUUecTYCszOBBT4JxFl/cVF/gNzSAKFbF8u

Dxmg/vvohdIxyQgD9xUWY+GVMkZJd3ZH8IXC9yDiWV3wL3YdWnD5o2iqEcQsYbKIitzrghvXRFQQX0PT

mOwBM5xtuGg9Z3XXLNrys406I1cpypbJwFKdoD7ILL
MtGtNOXPZOvJmVzCr1TO8ev/U49yO8ESWjvSs/vypM+MAf5rRxxBrPcnpH9eb6IJoEVHSRYrnZzgSxQJ

TTnEZi7kb/8CR8hBcyUBxwVnXz/OAb4wxoGHYQxLtF7iY/cFyYFpKt+iKSKy/PX1bkBjeXL90BBW8sMR

JUJXJqzKIJzrJhpx/FJVOvHkUQSvYVYWSy4qfLzMMu
4ZpwSigub/c1mHzL0/+FzP3/+P/4X4BH/w+uYsMCNEimsoRbfCQqUA9XQtGkXO8hnq1JFQNjFUSsAffT

HwBtAUthRjftbAHhJV1AuDN0TCVcQ64eBIXrBWMoS7IqRRZcIV0+YEesFWJ3veInxS0CKJV1OkfftKBV

vFfqP/eMmexL6GNYUx4SvJQaVe4ThIzinJEvPa85yM
P7KywoipVp1C7/AM4Qx+wnEmoRjWMLITWdDBjGmH6mAVptAO0aZGGuz2OeCti2aRDb/QvOzdN7TVrnmn

2TFoQAx9n+XL8j0YAi+ZFnd71GCO6CVGushnZ32X7Ze1CBSMDlJIk6bk9Tl5t8nhqH/HP0VCzJmZosw/

JR9LZRLvuS3SzkSTFZ4tMkOYwk65+XLHz0on6RvWq3
mIRuZKRllj+n+zgHoZYd3bhsyqOJh2w+s+c4a6mZe7/0zic6O+cXM2+FtG7CDW3rs1ZsNGZwLXlpmaXc

PyrQAkT8XLVpd1BaJAz4YB8QBFTgOEvxHD7Xw598NDDsM1FgzIIbff0HGALqJl7cyF4qlFHsYCKWEQNM

U0DreRFgXQgeDW3Rd1LysjFaKKH0W7OkgWmcfOigrX
R4QWGhRVOuU5BhuGJlXgYuJTsjYJ3IpULpafCcMm9QDGTfZc1jgNLUy4ivUaLgJVLaKfEsCEYiVRqzRT

5UJaAdB1h5BDfzP5TgL3lwWMXpJ1MziLJpCM9JEZKmIb7yuZGtwTBoMWMbJFOuId5IXq6CRhEfQT3fsz

6RXXUsFVEeXpbHAlw3UCaiHR0VfYDcR7QattVaF2Pm
bGoqEX2TKAUKr740HKayFVYlNhSgSTHxzmSiIXDBYRFOD1YonMZeN8KFZJJwS5xUSBNuV1voTG03eUN6

YAucMM5VQZWIhEO0MB1RhzTcFQw1M6QzH0sxqKU2DGpCXP5zPOxxJ6MuOYVggmujYRlvVS78oJC6BDIf

F8KjfVuzeULinVHlEu6fSUtbQN9Re001SEYgP7VukT
FlczIdQZDsYFRUSrWfC8MUBFLzBTEsF7suYUf2VEPrPGH1WTVlMDVbJE9rIX8MKz8QCgAdZO4xiy8AWB

LoBKZrXjsYMef6MYzyCB0CdPEfF2XmrgWnB5WssFsiGR9EsMGmZS6FJHFoHw6agS2NLQBJSAGpMXVhCK

naQU5vm6NoapjzBJOgtjKhqQwrTJ1XTINqRRLhD6Ci
CVEdzGRsliGgETtbUTUpMRTfGEcoYC9Mg8DqsPPjLTXbMRXoCQVDFFi+Jw2KSV3ps3RoLQjpMcRvJG4g

ke1UTbG6BFAtQGGzWIZ1AYKuUQHfHsivDVR0IGU5UbdcIYP5AHdpUWTeVkLjTsVuZYObDrP9MXpvJxb3

DKWyGgB7FQAwObZ6MQN4UQX6EfuyTDD0EOFoOLA9Aq
ocWBL7HBSjYZF4PjhqPYE4VUXfORT7SpgqHaZ0MUVbWfS4MLBkXFh8DLDpDou8ECZ4APJ1ZVEeGpW8ZB

D3CcU2LQmkOkJiPYIiZxF7VKsdKxz1FHueXAGuSqMePpN4UUF6PDV5ZuKjZDz8EWf9SNP5AODvUTQwXF

t4VGK4YHniZKKeNHEgHHU1EIfaPrQaKPmqNBG5EFTe
IHEbDSL9YVGPYyTiRnShRiguVoToGJU0FKI4ZYYrRoT4LCOoMKW4JCgkYIBkJSM1JYG2ZxScSsApGUK5

WPH9GwRyXsLfSSK5SsP1VJtcKwDlADt1JQH2YkCuZDe2CTZ2VJE9ECjwOpJ9VLLuKLJlXmxnUDP6VTH0

SKG2OkFfEWP0EE0YMVz1UPQ7XqQbSPMcGaE4TCVeMe
O4OPElGkZdFPE7KsY2XOPjBnK3OGR6AqX0UMXgHeL1NPV6SxQ6JRJfJdB2HAR1IuF9HTSyHsE8GMQ7Si

M4SRIkLzS5DGM2CyM5OTYvMtL5SHG5QpY6CKJzIbW2EGX0PuS9UMZkAWp6YNOnCcH6TUN9QyN2AZSxUx

L5BVZ8FtB4POLjTfH4DAO6YiTcGaWjJgg5YGLnTFH4
TahgPXN6LSQzNGSlKickFXM6YYMdFYY7MFAtZknrHWTqAjD2HAYrPXXvAXj0YZU2YYQjScs8HEJ2JJPn

CrOnDfS8SGQ3XtPBQrB5YsN8GQchBqM0GKWvEAKdKNIrKVYqAJRxVuK7EONmLVNgHXl3EqA2ZCirAhT9

QUJ6GAT7YBOaIuA6NBV8KEZ5GOLwNUQoYFYmEVM5NB
PnZKD9RMLnIhD1VAHeLmZ0WTR2TsZrGdKfKjC6PVlcFRH1GZdiUdB6PW9RJaK8TGg2PWU5LRLpCuF4WO

E6BIO4WBPfAov1BCC1KfG7DKhcSyr5QQC9MmE8OmCbIIK6KWFpQIN0DAjkRFK2GUEvXTN7KBswYAY7LQ

kkJrH4UuLbXQLdYVVpQoR8JfMaVUJkPRB5TbUbASZw
VcSdJQO1MmY4LZvgZIb7XzEzSVv3RTG8TmU9JGJqFQb7RDV6KaP8GVByLEf3HAQ2KgB2XlPpETV0BMC8

UpG9EULhUDt0NWO8BQQxZW4YQB6rz5TyPDekIXJaJX4pbz3SGYvUJnTkJ0D8vPWjPg4afUYhw7CtbYL0

k9UHByCiE1JqqfUWHY4eZ0YxV31qALaZTmXxA0DbA3
JkyKMbVIz1U2ZvgKsrzIyaqMGiHSx5U1Rqr4TxgxGxQPL4RS2XOZLuVuzzZ8RiEuHVGoFlA4PnacWCQq

65CSjkYT2rXERmMXT2HBEkIeruUDckKM2NyEEadNNJpxmiGPOaJ2Z4HH1XPOMYWk5+DQplbmRvYmoNCj

O7BBLhp4QhNRx9XP1GOODbLOgsYT2Dw009M9R5LtB0
wRTeTOW8QLV7jUMmUhPpQRKhsjViO0Bmb8YJPT1RuxGtGZrlSa0OlM6LmjTeTF8mu5KrwntTClDrL8Uz

wqA6J9bnsdKgGB8VKGT8F6ohbtYrTLVRMwWcT7uvLQZimrHmDyOxNANROdVpZ2ItaqDGYLQyteymeZ5j

BRX9PKXqJg2IJv7SBmEhCF7inf3YJvAsWKVvBvpDPy
kjLLjdnaEvZkfXVaEyPELdWyzQSmh7IOasXH6Sfl1vJ9L6LXpwHYAJK7EfpKHqLK4yL4CBC5rkKBprCz

1HGlBgP0VzriBhKUcxE0HuHRaeIYWPYNetBNDgZ2KpFBGqYVZjEf9OQZIvUD3NIbAbGsQqOFLAUyUhYS

WuIyMlOEjkPVMYCi5VIjYqX0wFOchqM3YwJNkmOj0W
JrKcZ0Z8aCrUtCV2JEV9QC5DDeEXWkVePRp1E5G8zBNgJ1I1gXzBkQJ5QF7RPU9KFXWwGU5+JdReT8RB

KRjNZCS0NY5PjUKoPT7QfLEBQ3YraAZrQe3bRMQxkPrvlIx+BxZxA4EVAXZCVRU3MV5GlVUvFO4SmTDX

X6AuuVDaWr8yDVfeMlRgGV0bEU3+VX2CKHLWWvNGNc
OsJNmbGVxzXDJaLDs9A1G6UJYbS4GHT1U0B1n8v0gxtn6+CG2UPWRjT2OTJSGFLkMqUQbrPTztKBCkFJ

w8O5Q3JFPeS2ZYZ5jcK0y8EN8+PiANCiAgID4+DQo+Zh3SEO6ci6FwRKsoKPSgJO2oob5WELhdSXGlN5

QjEQCqMYpmM3LygFfbXR3URFltHOlfSB4NJOAoUFN3
YT4+TNwgrPCpZM2BTij/gSExL4cyqHEdJKfltn3f32e/VwXpKR8nMwBNYH8lK4YmdNg5etODvr9QX7ks

YzlkJz8+PNrjBDe3UbuiiW1ifYGqqTn2wNJ0ev9wQh1dKTusECrjuK2ijnY0kA5nIWVkCdK5rnK3fEM8

KW5kNv2JSMJuPAgeNCP6OcYOTGaasF9bTvMzPf5afR
F0cXgzB3y2hu08Rw5sskiiNZd0VO7tOn8bTg3iFDAmr9utpRL9VE2kQwk+HDfiEFTwYT7rDQJ7BiFJFm

3NMHZ2I0c7rI0qqQJ8VG9RMpSxYGOwMERhBUMmCNOjSPBwAGHlDOCnJBSuEDNfXRQuCCZdTKUlNPKkAC

AgICAgICAgICAgICAgICAgICAgICAgICAgICAgICAg
ICAgICAgICAgICAgICAgICAgICAgICAgICANCiAgICAgICAgICAgICAgICAgICAgICAgICAgICAgICAg

ICAgICAgICAgICAgICAgICAgICAgICAgICAgICAgICAgICAgICAgICAgICAgICAgICAgICAgICAgICAg

ICAgICAgICANCiAgICAgICAgICAgICAgICAgICAgIC
AgICAgICAgICAgICAgICAgICAgICAgICAgICAgICAgICAgICAgICAgICAgICAgICAgICAgICAgICAgIC

AgICAgICAgICAgICAgICAgICANCiAgICAgICAgICAgICAgICAgICAgICAgICAgICAgICAgICAgICAgIC

AgICAgICAgICAgICAgICAgICAgICAgICAgICAgICAg
ICAgICAgICAgICAgICAgICAgICAgICAgICAgICANCiAgICAgICAgICAgICAgICAgICAgICAgICAgICAg

ICAgICAgICAgICAgICAgICAgICAgICAgICAgICAgICAgICAgICAgICAgICAgICAgICAgICAgICAgICAg

ICAgICAgICAgICANCiAgICAgICAgICAgICAgICAgIC
AgICAgICAgICAgICAgICAgICAgICAgICAgICAgICAgICAgICAgICAgICAgICAgICAgICAgICAgICAgIC

AgICAgICAgICAgICAgICAgICAgICANCiAgICAgICAgICAgICAgICAgICAgICAgICAgICAgICAgICAgIC

AgICAgICAgICAgICAgICAgICAgICAgICAgICAgICAg
ICAgICAgICAgICAgICAgICAgICAgICAgICAgICAgICANCiAgICAgICAgICAgICAgICAgICAgICAgICAg

ICAgICAgICAgICAgICAgICAgICAgICAgICAgICAgICAgICAgICAgICAgICAgICAgICAgICAgICAgICAg

ICAgICAgICAgICAgICANCiAgICAgICAgICAgICAgIC
AgICAgICAgICAgICAgICAgICAgICAgICAgICAgICAgICAgICAgICAgICAgICAgICAgICAgICAgICAgIC

AgICAgICAgICAgICAgICAgICAgICAgICANCiAgICAgICAgICAgICAgICAgICAgICAgICAgICAgICAgIC

AgICAgICAgICAgICAgICAgICAgICAgICAgICAgICAg
ICAgICAgICAgICAgICAgICAgICAgICAgICAgICAgICAgICANCjw/yOJpJ1bqxIFmtpV9P2bmNp2VRz0F

OQ9uo9CoZNCvRFjerfAqWzbLOmAeMEVaFtiCBme7IGkcSR8QgJNqF1AtX3WaNRykOP2TGONqSGLkoXPi

OPNfINHaYwL1GJFgLJqaSX9BrNXuOGtsRIJaROLxKP
1IREPvQ336leJnNW6LNz1RRyNvRT4all3KViUnDCKnAtbBOcg7LJgvIO4GgJQexYNsTbExHFOVFaBmF8

ydk8LeVqCzJYEQPMtsOF4Fr9KwsCJgJLi+Hj6DJS7yt7ScHLszPpRhUT4hld7SHTnKLlArM9JpnJugGK

IogSZuyL7aYHSOqaGabD48XBYzBocvAD3jHD8sW0lb
aNwvsXNaTLJrIt43LgTsLtAsJTE1GHOtGT5eQJodYS6ZVAF4SFpiTCNcUKDkY5rQCtWeAPgdTWDinTyu

QX7WEcBiV5BtyiNimDFrCvGlOBJJFn1+EImhvaFoBdrXQdF9JQHbm8IcJTm4SB3CLBKkWSymWS6UYYTt

aJ7vHLhsLZ3IGiNrDEJeZMHJPzUzM66rgUQpJKj7U5
VtYmVkZGVkRmlsZXMgPDwvTmFtZXMgWyBdDQogID4+ID4+OXkcUN7GTZbhelPhDNLsZe2ZECUvKBCiCK

1vSUGwLTRjX4Y9fDcjMFZUTcInL6dbxyywCN2oBGGyJ773aXopxwYdTRJcZRQnRk8IQPOoEPX8VEVtjY

DvSeEvWHLINSosTE1EnLQiTWO4cU3uYElzUDKkXNIf
M9oKJtBsgMzpGL65uTvqlqIqyYAlHCd+Yw4DHM3pq5KwDCt6wkOdWRxcCGN2IOptWNAwHNOqOXJfFSA9

SYC6JSDLXzJpYEGcLLNnBWcxIGIiNTAkit6AZTKvUVKfPHE3FeRpIBImILMxGKzjSVGjBLCqMRs8OQVj

WQUiIN1ZYhSaREYxMPBnSMabXHNjQOEwjj5DLUOjLF
OpSAl3KZQiJOVeOMOsHKwgKLXoWUVpMXe2NARzEWQaAU5SEiBdDBTeBFXmQzqiVZZpQBYdrq5VBVNlXM

ElUjYaHEUzYFOdKVNjTUknMMDzUMUwRAPiVBLzYUPqTH9OBzSnSZBgQGDaLFLyKSAfDRRswf0CPOExHG

XlPaC6AOUmFZMxSLImFBzcNTNiACHbLlRqKBFnNDXq
BK5YQmXpFBCiYMErJBKnGPQqCKGucr1OOJCiWEIuFjBkWnNpRDYzLHSeTHkxHFQsLVA3QeA0XRYvCXHi

IX3DAtPkCHAgMZU9VqOlJJUwXCBfuv7EYENiOCGzQeDqDCHmSXFxWHSwEFnaXWNoWGT4NGOfIYPpYJZr

VW7ITuUkVFGhYZxhNYdxTCPhMXEpcl7IWAFyLIGxHS
GcTVGqQLBvHXUhMUgvJUWqNFW4ElV7ZIGtNSNbJZ3STeUoNNHxQkNaGuSwDIOaHVQxgm6SIWRgSQAsXA

F6UxCqPXStRPMdSOjyHYUjUTWtTzo8FFMzYHDxDJ2CLhGdEQhdXVODUhh4OWvkU1b2BRJzPP8TB6Dqg6

LjRfLdQBHACWjaUR6fkwQgINYoEd7CV4qQPhg2VWFe
WmMgVhqbAEZrIhMlU1BdOlR0ZZUhCXW1LBovLO5cXVv2NOQkRZB7E3AdGwWcIYUcQpL0HAJhG2B5FYE6

I1AzRfUpRR4MQu1GPaX9HUF6oRBaYe2WDaA1UtdGQsGnVR6GTWm=









                    ID                  Date                Data Source

 

                    [id number]           2020 02:52:00 PM [facility]

 

                                          Patient Name: ARJUN GANDARA

OB: 1990  Sex: F  Pt Unit #: 

[id number]  Location:[facility]  Provider:   Visit Date/Time:  20  Primary 
Insurance: BC/BS [facility]  Secondary Insurance: Self Pay  Intake  
Vital Signs                                                    20         
                                         14:52     Current Height               
                 5 ft 6 in     Current Weight                                 
178 lb     Weight Measurement Method                  Standing Scale     BMI    
                                         28.7     BP                            
                 102/74     Blood Pressure Location                      Lt 
brachial     Position                                       Sitting     
Respiration                                      18     Pulse                   
                         99     Pulse Strength                                 
Normal     Pulse Source                               Pulse Oximeter     Temp   
                                        97.7 F     Temp Source                  
                   Oral     Pulse Oximetry (%)                               97 
    Oxygen Delivery Method                        room air      Intake  Visit 
Reasons: GYN annual exam  Nurse Note: Arjun presents to the clinic today for her
 annual GYN exam. She denies any problems or concerns at this time. She has the 
Kyleena IUD, this was placed in 2018. She notes that [facility] does still 
get her menses but it is not regular. Sometimes it is really heavy other times 
it is light and short. She does note cramps prior to getting it. She notes that 
prior to her last period she had a pretty bad headache for 3 days leading up to 
it. She is not currently sexually active and declines any STD testing at this 
time.   Accompanied by: Self / Same as Patient  Is patient in pain?: No  
Allergies    seafood Allergy (Uncoded 14 15:55)     HIVES      Is last 
menstrual period known: Yes Last menstrual period: 20  Post menopausal: No
  Patient pregnant: No  Vision  Wearing glasses?: Yes  Fall Risk  History of 
falls: No  Ambulatory Aid:: None  Gait/Transferring:: Normal  Medications:: 
Analgesics  PHQ-2/9  Over the last 2 weeks, how often have you been bothered by 
any of the following problems?   1. Little interest or pleasure in doing things:
 not at all  2. Feeling down, depressed, or hopeless: not at all  Total score: 0
  HIV Testing Offer - ages 13-64  HIV testing Offer: Yes  Requirement for HIV 
testing offer been met?: Declines today. Pretest education received and 
acknowledged  SBIRT Annual Questionnaire  Are you currently in recovery for 
alcohol or substance use?: No  How many times in the past year have you had 4 or
 more drinks in a day?: None  How many times in the past year have you used a 
recreational drug or used a prescription medication for nonmedical reasons?: 
None    Coronavirus Screening  Screening  Have you traveled outside of [facility] or [facility] in the last 14 days.: Yes  Has patient 
experienced coronavirus symptoms: No    **GYN History  Menstrual History  Hx Age
 of Menarche: 14  Date of Last Menstrual Period: 20  Menstrual  pattern  
Duration of menses: 3-5 days  Menstrual flow: Varies  Sanitary products used: 
pads and tampons  Per hour, how often product changed during heaviest flow?: <1 
 Activities missed dure to cycle: No  Associated symptoms: breast tenderness, 
mood swing and headache  Intermenstrual bleeding?: Yes (sometimes)  Menstrual 
pain: mild (prior to menses)  Menstrual regularity: irregular  Gynecologic pain 
symptoms: Reports none; Denies dysmenorrhea or dyspareunia  
Perimenopause/Menopause  Urogynecologic symptoms: Denies urinary urgency or 
urinary frequency  Contraception  Birth control method: progestin IUCD  Cervical
 and Vaginal Cytology  Ever treated for STI: No  STD Screening:             No 
Data to Display      Hx Sexually Transmitted Disorders: No  HIV risk evaluation:
 low risk  Hepatitis B risk evaluation: low risk  Sexual History  Sexually 
active: No  Do you think of yourself as: straight/heterosexual  Number of 
lifetime partners: 4  Number of partners in last 3 months [.AM.PEHCOMP]: 0  
Condom use: always  Sexual concerns: None  Sexual abuse: No  Ever a victim of 
rape/sexual assault: no  Pregnancy History    Pregnancy History          
                             0            Number of Living Children        0    
 Hx # Term Pregnancies                                                         
Hx #  Pregnancies                                                      Hx
 Total # of Abortions (Spontaneous   Elective)                                  
            Ectopic pregnancies                                                 
             PFSH  Medical History (Reviewed 20 @ 15:10 by Enforcer eCoaching)  
  Hx of melanoma of skin  Plantar Verruca  Skin Cancer removed from [place]      
Surgical History (Reviewed 20 @ 15:10 by Enforcer eCoaching)    History of - 
surgery  History of - surgery  Status post tonsillectomy      Family History 
(Reviewed 20 @ 15:10 by Enforcer eCoaching)  Mother      No problems noted.    
Father         No problems noted.                             Social 
History (Reviewed 20 @ 15:10 by Enforcer eCoaching)  Does the Patient have a 
Healthcare Proxy:  No   Does Patient have a DNR?:  No   Does Patient have a 
Living Will?:  No   adopted:  No   household members:  family   housing:  house 
  marital status:  Single   number of children:  0   highest education level 
completed:  Bachelor's degree    service:  No   current occupational 
status:  employed   current occupation:  [employer] for [employer]   current occupational exposures/hazards:  No   pets and animals:  Yes 
pets and animals: dog(s)   sexually active:  Yes how many partners: 4   do you 
think of yourself as:  straight/heterosexual   current gender identity:  female 
  Smoking Status:  Never smoker   alcohol intake:  never   substance use type:  
does not use   agree to transfusion:  Yes   seatbelt use:  always   drive intox 
or ride w/ intox :  No   water heater temp set < 120 deg:  Yes   working 
smoke detector in home:  Yes   fire extinguisher in home:  No   carbon monox 
detector in home:  Yes   firearms in home:  No   do you feel safe at home:  Yes 
  victim of physical abuse:  No   victim of emotional abuse:  No   victim of 
sexual abuse:  No       Female Reproductive History  Menstrual   Age of 
Menarche: 14  Duration of menses: 3-5 days  Date of last menstrual period: 
20  Birth control method: progestin IUCD  Pregnancy   Total pregnancies: 0
    HPI  Additional HPI  HPI  Details: Arjun presents to the clinic today for 
her annual GYN exam. She denies any problems or concerns at this time. She has 
the Kyleena IUD, this was placed in 2018. She notes that she does 
still get her menses but it is not regular. Sometimes it is really heavy other 
times it is light and short. She does note cramps prior to getting it. She notes
 that prior to her last period she had a pretty bad headache for 3 days leading 
up to it. She is not currently sexually active and declines any STD testing at 
this time.     Annual GYN Exam  Gynecologic pain symptoms: Reports none; Denies 
dysmenorrhea or dyspareunia  Urogynecologic symptoms: Denies urinary urgency or 
urinary frequency    Review of Systems  Const  Reports system reviewed and no 
additional complaints, except as documented and Denies headache(s)  Eyes  Denies
 loss of vision  ENT  Denies headache(s)  Card  Denies chest pain, Denies 
syncope, Denies leg edema, Denies lightheadedness, Denies palpitations and 
Denies dyspnea  Resp  Denies chest congestion, Denies cough and Denies dyspnea  
GI  Denies abdominal pain, Denies bloating, Denies change in bowel habits and 
Denies heartburn    Genitourinary: Denies abnormal menses, genital lesions, 
nipple discharge, dyspareunia, dysmenorrhea, pelvic pain, flank pain, urinary 
urgency, vaginal discharge or vaginal odor  Musc  Reports system reviewed and no
 additional complaints, except as documented  Skin/Breast  Denies breast 
swelling, Denies breast skin changes, Denies breast pain, Denies breast mass, 
Denies change in pigmentation, Denies nipple discharge and Denies sores  Neuro  
Denies syncope, Denies headache(s) and Denies loss of vision  Psych  Denies 
anxiety and Denies depression  Endo  Reports system reviewed and no additional 
complaints, except as documented and Denies palpitations  Hema/Lymph  Denies 
easy bruising and Denies lymphadenopathy  Aller/Immun  Reports system reviewed 
and no additional complaints, except as documented    Exam  Const  General: 
cooperative, healthy appearing, comfortable, no acute distress, well developed 
and well groomed  Orientation: alert, awake and oriented x3  Neck  Neck: normal 
visual inspection  Chest  Chest: normal inspection of the chest  Breast/Axilla 
Inspection: normal inspection of the breasts and normal inspection of the 
axillae  Breast/Axilla Palpation: normal palpation of the breasts and normal 
palpation of the axillae  Resp  Effort   Inspection: normal respiratory effort 
and able to speak in complete sentences  Cardio  Rate: regular rate  GI  
Inspection: Yes normal to inspection and No abdominal distension  Palpation: 
soft and no hepatosplenomegaly    External Female Exam: normal external 
appearance, normal appearance of the urethra, no tenderness externally, no 
external swelling and no lesions  Urethra: normal appearance of the urethra  
Speculum Exam - Vagina: normal appearance of the vagina, normal vaginal 
discharge, no lesions, no masses and nontender  Speculum Exam - Cervix: normal 
appearance of the cervix, nontender and other (IUD string palpable on bimanual 
but not visualized)  Bimanual Exam- Vagina   Uterus: normal bimanual exam, 
normal palpation, uterine mobility normal, uterine shape normal, No tender and 
non-tender  Bimanual Exam- Adnexa, other: no masses, normal and non-tender  
Pelvic Support: normal  Skin  Rashes: no rashes  Wounds: no wounds  Hair: normal
  Neuro  Cognition: normal cognition  Speech: speech normal  Extrem  General: 
normal to inspection  Psych  Mental Status: mental status grossly normal  Speech
 and Movement: speech and movement normal  Affect: normal affect  Attitude: 
cooperative    Assessment   Plan ***  Assessment   Plan  (1) Encounter for 
Routine Gynecological Examination:         Code(s):  Z01.419 - Encounter for 
gynecological examination (general) (routine) without abnormal findings        
Qualifiers:          Gynecological examination findings: abnormal findings 
ABSENT  Qualified Code(s): Z01.419 - Encounter for gynecological examination 
(general) (routine) without abnormal findings        Plan - Alecia Silva, 
DO:   Not due for pap until .   IUD in place since  without issue. Has 
periods but does not bother her and short.   Return in 1 year for annual exam.  
 (2) IUD (intrauterine device) in place:         Status: Acute        Code(s):  
Z97.5 - Presence of (intrauterine) contraceptive device        SNOMED Code(s): 
608510745        Category: Social Hx    Coding  Level of Care Code  93363 Well 
18-39 (Est)  Exam  Expanded Problem Focused    Diagnoses  Encounter for Routine 
Gynecological Examination  Z01.419        Gynecological examination findings: 
abnormal findings ABSENT  IUD (intrauterine device) in place  Z97.5      
<Electronically signed by Alecia Silva DO> 20 1553          









          Name      Value     Range     Interpretation Code Description Data Vanna

rce(s) Supporting 

Document(s)

 

                                                                       









                    ID                  Date                Data Source

 

                    [id number]         2020 02:38:00 PM EST [facility] ([facility] [place]









          Name      Value     Range     Interpretation Code Description Data Vanna

rce(s) Supporting 

Document(s)

 

           Protein [Presence] in Urine by Test strip Laboratory test result     

                             [facility] 

([facility] [place])  

 

           Glucose [Presence] in Urine Laboratory test result                   

               [facility] ([facility] [place])             

 

           Ua Leuko   Laboratory test result                                  ME

[facility] ([facility] [place])                                   

 

           Ua Nitrite Laboratory test result                                  ME

[facility] ([facility] [place])                                  

 

           Blood [Presence] in Urine by Visual Laboratory test result           

                       [facility] 

([facility] [place])  

 

           Color of Urine Laboratory test result                                

  [facility] [place])                     

 

           Ketones [Presence] in Urine by Test strip 15 mg/dL                   

                 [facility] ([facility])             

 

           Ua Specific Turrell Laboratory test result 1.003-1.030               

        [facility])             

 

           Clarity of Urine Laboratory test result                              

    [facility] [place]                     

 

           pH of Urine by Test strip 5.5        5.0-7.5                         

 [facility])                     

 

           Bilirubin.total [Presence] in Urine by Test strip Laboratory test res

ult                                  

[facility] ([facility] [place])  

 

           Urobilinogen [Mass/volume] in Urine by Test strip 0.2 E.U./dL 0.0-1.0

                          [facility]

 ([facility])  









                    ID                  Date                Data Source

 

                    [id number]            2020 02:06:05 PM EST [facility]









          Name      Value     Range     Interpretation Code Description Data Vanna

rce(s) Supporting 

Document(s)

 

          Progress Notes                                         [facility] 

MQCBLz5eSiLAElMo77/BNMycGOVuh4KuEGuzZCq2WRctKYUhU9RkSJF5iX3rGCK7QDyBTsNcZjDoISC7

lbm
SoYuuMYdVlTNYyYcpFEyYiEBgfLnvroHXkAA9BqOU7EICvI27hOFGpLRQrR4EyPIQuYCV+Bs5MGYEfoB

CyZT1XDbhJ7V8hs6f9Mf8+dD2Izcwm2YRB2T7gQct20smVzyiwcL+u93PdYEcQEYhtNCvZ++DSj8Llan

iqPaCi4BqIMoBTaRpWh0pAqUPlIOLkD8NBuFHpfbnk
v62WYRT7Ha75Y0Kf2fvNxxcpODtqozUZJ1vdTW155W6bONazZDtlTmGUla+RU3D4vU8WuSPrOntXaFfg

3iQ35LJwiXaq+cM3THYFX0baBEjRPWJ4gzXICg15xzmmHdFUJwd0PDs+M7dfFqqfU4ldWfz83a5SXWvp

oRUfL3M5nK7cgnQriFfc08PhLO8nv1AuWKu6LOa1sE
8zAQMxKF9zbT2r2eqqQpkQqXKwXDzW5p73yA7UIIOVrdOgdkeCZcRtNuiO+zsr7DcOXLfDgmYgR4H0Oo

z91OZjTb3teX8FKWPOg63UxEhbQV+bAxxnof6D8hy3nbl5PBWadallIXPP3IkYNz54HyASpdkk3MdI95

dH0yUEyKCoTIGjLlYQggLz9Vuw2eZy5rxjZ2ADK9Nz
dxeHHZ/rh+GW/Ojr4gLJneJD5qQ6rTzP2A1nlkt6gXzkomYRHNgn2Foo66YbcIppv/5uWZfyH/8Wk58P

l14T8d95AkL3k5EDTLKjF3xF6b4lg3D7vKPxmAUEGvLIDXRL6RewGzPPPEO2TRO+fdt+7j+XVhllCHlq

ttYJCOU8Bl934F/Z4NxFFdwVR4MjhmT25JbYtvyi0L
r7bJl+JIYexhrpDAf5zt4TUk/W5ZywHMfS9ZEIwqH1BHsjl8KmizYXTGNJWDN88JFh7i3LR7SuaXotW+

VaEDYHCog+KAL64tlsZV3ZYJtnSymfa4DPsjxWpabypvv5UcXabkz+0w+wvDMALuKKTDqomfJ83JHfXz

clirs3b9kDzkTIk6jXF9sw1qUqZx5VLoUXlXCb3Wdo
PTrxZd0RfJfohoxjSZ7xWp1kfKyT42OA9ZRznxBIPvPoFPjaCa8Km/xwxSZI6ev8XeGRnnGvyhh+kovY

4BicKOiNQUbi/eY9Wu8861nE9y8m+q6TP4nZXj3opujMrb9F0XU8nt8nPsGjR0kN6AcbVMN2Ls3DsGEd

mxc0jrlBr702h8SZ+Tw2sRQvC27OkgM1hGZ67oEJ2T
1soR/2TCu4UMQBCTye2DLQHveLq+Pe4QonlUaWt9jifPNoohY/gHbuoJU7rZL1nVK4pAwhWBNgIhVifh

R8spGFTQbedxGrGUksZEe4Y7Xerv3a2zk5pz/ZtXAtnrbUbSPhToyQ1wEmjkb/n7gWkvy01N4QJqdbMf

2vDbPzrHKRlOx+ameMhnWdCSFrYyZNHzILYvbFzIS+
+gxVEHAmjvqkNDr1D3435SD96ItvmjYsCdQg1bkuogtPtGx2Cbjb+vqlzD1qVn71zt9pq/gxM16Fz000

gjS8bH2e/UFyNVEjJICnDu3Md0vznYdEEpaXbqv8w8q/3rzvf1tGGPmVLvTPa/ZhOt8gBR75BGPqOOFl

fw5pcLD2pMfo10wPpDeAZPgpVwxj8kCGXm3pg3lOVT
p5ylLt7j3lgDoIeVXT8iFU7ZJeIzB5dx5JQbX3HFrCrg6O0mAgKA70G3ci9Kzcu92KhiUKMXNWhPZyT/

LNSBZWrX3NF2BClJRKFV1wZdJPJezfjr36/cJZfytcOU90QkdXU1pVUqGQ+FpdbQtM8c2D3IPndPuu+W

5HZJ7PnJ7LXMkKzXcZArgqyBUHyz6uc9RhTGEazvxH
7jCf8uY9EEWoN61C3swzeKy8AikLJ6wLyxDh9+PWh3UktlCbTXNC8BDICWDlfs18cg2zwlAJhfZCPorX

gqA+Deo4HwugkpDlMIdCYlD8rci+Di6w3h1zx5AH+7zFp3d0o2bx509k99zG73OyHjgQ/zK3IoXUNFNc

kGLFWwkzTcwFvpY4ozv3g1VXMSyWPynpBYXeZykSfn
3AHm/UGB+2qgkg52xDurJY579H4A6O/pO2jTfKkHxlM7WghY+kcKpVrYzDSsJE2yKB7xQbKPqmrnh5z2

1P9wVWX6AnlntqwEt7xGsQz+2MGMs7KuxnnzZ0GEnqF1XfnojcJ57T0dmvPdgLfxzAClLybU5dLo65w/

QYxof8o8rLLcAXy6EgIdqpExtOtW7jfsV1k+5IlemB
EaMBHK9wCQfp5EalPAymDhQAoBDPF3BqKL9QKmrV3U9EzoPoT+d4O8iIe8pUjCto0pxtYB/hKUUnz5Bn

bvdIDnNBvhPnD+CXSmltTJUE8xedMrFm85kX6F5G99EkpPYy2SVbDdgVf8SXvRZKIY4wEImBbFVscxIF

Qt9PgqgFM4RO4YaLPR0vQEhksCRzIx1bxXJULTqXDY
RwXxTzKWvZckGUZFYaBExhvVnlnuyFW3CYgAhe2DPLtDI7WynyGONmrqm07DOafskBMvUDM/tvYOECYM

IpxNelvGt6EOgvgjnS0zmE+xVXE8AT1EqB8L8ZS+CAC7C9NPlMF2zeKx1t9OS/zIMImt3qqm5cbNiFt9

Q/S1rk9Vc+Hwy7USRQvqLkBm/VflYuOjXzIm5TZpjG
RIhkDMC/V9mFWlAbjvoJtSSF7XblzEpv2ERcNFBHWSkWVxeaZDGvpfTOC5NZ/X8F2E5lJ3tAswB/rJM6

LG8B/Ui6ANyEUEf18UZyJ4C4DFxoR2OLsH/H2x4wpeWl7LfCm9SZzORQzmKESLRwHMgrM4LugC4zrwqV

QteyixrpMAPXMUMLPCbiqA3PHcgYHxqs9W8RFx4gCa
u69SZrrSvT1/7pISomcLYEDuI6HYEfQHrUYnYHTUf8AR6f655An/7LaXiQvRPqx5MNNyZTqxnQSxIcL2

aGGToLVgMqj+fqUfDK4LgogTvsS6EmUjohdCdF7Bas9Fa0E+vl/Me6dVVVmXoeto0tKHnxavj24ov4mQ

wtxnojJdFvO8qfgWz4GHsqDJ9yZdlsMHD1J8ZQTLDg
HGX/yd1/hAviPIeGMVBPmEkskXSLXvc4GgNEIGl8MEBO9Eaynh2UQlVAhyvDts+Ny6FXF+zi5AY3hqjo

eCYul/YcmrDEM0jcp1cZCdOFLAuGzAVtVCiFyUw4KVm2olrbkK1ZUVoWui2vUTj/ssmnm+S8y7IQMuMK

8dgojZdi2QShsCSRMc8wl+L2WPRBLu1EgDWNgBI4kU
3f6Fa8Kjb3QCfAoiYorKhs8Uw2RMKK1XRxjqjyI9HyOqBqCk4dmLYlzW8wBKnLr/A5GrnNCEup8c2gFV

LrbCW9jU2HtB0H7JEDfet7Zno63CY/cyYMx0NxBDLeMwo8K98K/kD+beh5CyVJ7FlbfMfSaT1PkhqCaQ

cw4E7kq6hAbAQsiUbviIKsw0zd8cq70wJn64FOYy8W
Ce14PKgl4Nko6s2PC7wBb653j0hvU9oe7qeLyDgVglpbdG65zq4qGTF0/130l0xiPXmhd4AGrrhuOxmK

eyxHTcrzmbjxxNhzAkiDID9wiglWdb9Bj22345oe19lBjugbHhWnwDvE33VjwD7mnpCmSE/A5EFUysXG

pko0Cl4xYXHkeNXeR6rPHUGJV7rjSNJU+ibLlXbPBA
/gAxyrLj/EYvTq75HljJ8hP/rz6DFjlgDZ8yriWfhGw9MeGVCyUtQAIW4SZtOH7/pI7blTRt1cVewiqu

qaXtBvda5XLqKj5Xe/QQHeGHXjT+p3Ua1MNbABXtW4o3mgpmQGOR4vkE+K9bZwbf0grAfFOm6uK9FmrS

jN8Oz3YVlPPn37OFKtYTZQlEwwXYUjUkkgwSPb4/mK
kldtUGfSjy0IyCZIrUMB6zi6sTunQresWvbBrrhRQan6Zo6a3I9RJYB4Iu4YD0HH/lkZKZr5c8XUinfV

ax0QmQu4PxKGpo0Y7SLFxcOfoR5/L94U8VbLkUUICkodsoMwdGcVOf2ZzNDrCE6g/UEeJZQRSrhXy2xi

4B2o3ri2Mol36aHaRR9e6nBu1P+6nikiJF2Z48pmKH
kRrVX//zEWFmkmthvx244OzZR6siP+2TgIujBTCH5NpvJiieE4mwmxnxczt90K2tJP7OURz/vqQKpOiA

i3y8BtEWzHG8/oN9lATs3XAxTkQGUhAXYF7zYrmD4rzPHeYEWbyQaGe8cOzEMKOtjss1C8FYzN2w4IUA

rPYXbM1Xg6xj+xLgTrp4QerUUYZdeN/OZ/sJoleZGp
ZBbpQ4IkQYonMq+/y3hZr12C2O7Qs3J0SIw8XEpU//Mx+A3WpfXBU+SMor8JPv2XA2cirQS/MZOUWfUf

yHmXME6E6K7V6cmx567JrUAGJb7NZN1xz6RdGFGiBHyqedYvFfbDTaRmXIFzChnASaKnNKeMXzGuJKDl

RIahRL3AKRlkPRqmIESaR6WkjzDfcMWhHQVbQw0XPY
CrXZ2UQPCefKRnRMNbIkEdLBBJMxCrYCTvFSMnyPUDr4flZeNcAFX4CZKeTkfiPA7CMAOtXV4Ka975SF

75rcG5DOJaVb6NPAPtGP1Hss34vXO2LUTqDsVaRAIlyyUiQBJwkoI5AT2SDgVyRHG0iQYtUdpHGJ0WND

StdBIxRU0AFSTxxQYxTP4+DQogID4+DQplbmRvYmoN
GfRtLHPnAybHQtKpGQxsOzeepCXrNR8JfCH2MLGyO36eUIFsKPHmJ2WdELOoVGb+Ou6QTYNceDElVS5M

QdzK4OqFe6QYKN0pei9BY9FXzv6IDaDwApKUcRU4xbqNBlABs4aabX6qp10+HR+FEYE2OMhzQeXO7nFj

u+voqq+J9oaU1I+/vqG2EJ0Zq4/ZW74fIYGL+PUXsZ
1wJmhO2v/LmT6u2ZwDyI+IcUIbO89dBZWfC1Pc+GM6S9d/VkrU83t9NfsICNUUyUA742zOv/96HQVhlD

15Ip4+t0oFL7cuaTDa4wStf6e13RaLE9ua0SgFDzarcy1joN/AxlStTbb6nRTKezvj77Kim5WDm4wfIQ

pC3DDiELs/JGi9nPbEjR6LS4A2okxCvebLqlYIEghB
rtFrCaUEum4WWA95sa19OR89Zyd7CP9CuNE6ycmpXXefoSJY1aZvrQ31lsBodn8BfqRWh4XXbubgX/RV

wh5xYzmraPedHlmtZx344NBDGfHHJjWl36wOcDevaxJnFXSw9bChQ8/iZASoxg4zS6S9GBzyJRpY0CQJ

CenTT/er8YuZ+KLYavBOrAMgTOFn9H1zkYcFte/edc
M6JwtPJGSFj1QOeAuYmgK8MHrc7JenN+qAuZvpOfbCGWQJpMsU0KbvgIrfJgy5pU9xu62aPUJZRVVHsn

NNW81U4dXLoE3JV8ArzD/svkZZY0DXKYVfd5Bv95z+bmXN7zkmH2laknAWpkrAT5PWWvOBCBbhcjxRdB

Ol/GMqXJTIX3pYeQeEvbfTAw3tLCCdYNsJctKC/GpI
NUpfZev+23kxngAS5yL6HNBT3eMyIGUQXJs+rVjdBL1iGnU/R0wMMpfxpd8zJEjUwttjot4g/3Dki9p5

T4++2M3n8XZE03cqx1+nrnp30W4hK7fFRWvI2A4YDoHHcmpCH8WuxHazdfyu7DFj4BoRv3XKKVEJewuc

UVXANRCJGz2DPjjGZXJPjTHb0HeLkzxp0DBHrfpCgr
emvvPVwRrzr0u29XgAiSbFjBF0aOv34D7RebQLIL4GR7eZAMQY+cdYoIIf/kjsbGmOVKQwOTGQqoQ/xg

PllwVz+eTvh3OT18TholxduPk9FwKlb7m5sP2cFjHtJuxPjkHPfsswDiAkd1f7uEmnRimFNlwpcOPULt

GcSXl3Mzfpb7GDooNCWpzBbHsiq9R7ynl1+4DySryD
3ZKNhkzxYKDTKUpa1KDUgVCbhaQxFoldOYZDgN8PvmWtpdzGorZNf2V3EnSl+CvlhVo8K7dZCcmxXf4W

jmCGRXXPHjJr+J2tEZz7hRlr28HzCAfEubaC0YfabGsEGXSVzrYxn2X2XHtd7Xi+dESb5K29wSJ9UhrA

CUfPjzSSIHfO4irSf2R7OzDkMf6RgT5WHn9Or0dDEj
VuQ+pfO4DCB8hGpuAhAyjJWfIeSQ32lsTviyoNe8FURCw6LXdSt9m9GanbA9EFxguxrzR+sZKvGye5lb

MZPbdZvpoyrTjHD4yF8JPDB4VtEejbmad6HMMHtReq/QVW5pihGusVu8R3HpunGcvC7QCPVUbod4PQ4f

pTaeyoHvr3UKUZJIxgk248LK/rh7Z/DINs+G/Ng7wU
JCSQVxUOs4ygnyMpuwColLRAJ7+aEipmdFJbcxslJRECQN7t/UCdC3CHcZd5MvY7qVcLVe5ydOQdWGum

GFi1kq6FjvMP8yYaP5mizEC3Y6QWVYQzVYc6n7hTOWDWKLp/5nO18W6I/q6lu1Vytp7MGnJ5zCCHX3vN

eVKEDV8H4sflQrs1oAGTjtX6mSxbZNVi+wWn4Hb2HJ
JwLyhJqof3WAVvZ8SqSlgAfW1a3FER4wvvlxBhYmuLF4+StICmBJkFqkNSSweLw2MZdRS78NkuuRMGUG

YrbWTS9TOOZqu4xtInRnPmYcnpWQt7ucLTtoXvKYd9P+TC8FZLVgpAROsz51wYXS/xadNQ/QUmtkrGEy

T4rjTLyh2Lada9RvZeLUISWtWOlMRyfL16BIxFnSkg
KUqb827BH+Keg9H4NEtG8zLqYVW9J9J65PpuO5LZbtXYC9ctlsyHn+XwgD3vqbD7TOvO2tiKpcya1uZN

Dh7HfFcM8vKtxS5oCjqt16qA4gzzzFz7Q1UUnYvKxKrs4La5SgcyEZ0kD9jdz1KlDEAibaZ4gO+fYFNv

GXjRNX14oNjXxoqCAU3+Pps3Jb1//svLJVwQvd8Ntw
ASaBjiEA/HY/BNHKm+vGcYVf4YOOvQEjaAyOZcgN4iHBUb2cl/EgMzZJzuqPQwb+Fnq8wyghyouv+nmy

kHr5zCkIGK/V92Pq6RXS6xcZeAK9Ef742Oek4UzKQxLt/irz4fMs53+WvT1hvIO0YIZS+WQTKcxIZTCn

aPbk3h9sLOmu21VnUaqrw1C3qwONpkylFPTzxpYF6K
PjfhVn1E7VnLhOIpAtTRNKYBjjqledIpiE0Aw8ip+aa3Jz+I4IhAqCxycwmFcKDqAh9mnNm1gKteCO3N

xU1ZVNEEmgR4HqGiHZK4Q1bHZBAHlQxgotzqRSsbRbkZqPWnCKRxA12+T8vylCg4nxtQFHRIz6aDIt8G

l1MFl7kbzxWSyhr/8Qpkksl9MzwlYFYzGQ8bHp2MMA
6tCfUL5kQOAlHP9B8Xn4QeZO2JQ3lPlHzGhkOf6FV2y25IG+viKCe7sYwnWotfXXvbug66rlDUVR+sqU

qftd/IL7hRRSVArwP9+bef6VnAVGQmB6CMw2z8+LmMF3gc475qgdKE92DeYVdmL8bn8yFSIQm/IeW6pa

ky00xHhEbYbqU/kI//VKEIPSXQBpGhNKF6rjQojI6T
SB1ov3EsLOt1NNQuy1QdDRqkTIr6SFubSRHqO2S6uIKaXIQlYC7SPCHwAL5WHSTqiqVbOjMsRIXJHdWb

UBMnUjHnq6RcP5UmXBXcROLTQJgkFDVtO59iSRnkSk81KTvaHDIzOaRsFYj9Ny3PLgLnRPZxJ90qnRVp

dKDeTgSbRZECZjMdYGNxD2JfyWBpULqrN8RyA4OqRG
1rtYQfST1ceSAdE3UsL5KxzcvvAQXTPgLgXJTaPClwEZCmBfCkIMhkDQG+Bx8HAYN+It6TSC6we9XrTZ

i7PDAqp6LfZTscPOcwWyFiIWl4BNNqFncwHjVoSAB5VLW5SSFhASJ4WFt2IBX2JiPlVoF5DPGuOfDxXj

ZpIbq6KWV0MDDwHibgKwPzPJP8SCYcXtteOMN0OTO9
AtP1JJJvXDU9FBM4NtH9PZKgTUF0RCY3FoY8PKItQFL6UOYyGkSgSsGxHPq9RY7HODI7MAXrRRm2LPIe

PGT7RfCeRnYrXWebMcF0VhRsCnXuAVD2YwL0MMAkXyj8HCviNcNtKkntZED5UMjpTgM1VOFqXWPwOSgm

BwS0AidzJrO8MDl1XRP9HaIdOjQ4LRVyYNO3BoOyYi
W3OIi3WOZ1JwdlQfF5PSDaQVYZKbElKyYlUXT5NSBhNwTfAXl8ECR2SnNnUrKbKRC7BYPdUOI0NSTkIy

GnAZL1RnNjOtPtLjJiKDPoZKEnQyfvMhx8YFM5RtFjPtdpESj2YOSwJEN4RYAvDbDdOOUhTBBuXPzmEL

F2NSSqMrN5ILRnMJS9YNm5CHE0AHKlYUmeIVU2UsS5
DUTrXqh6DWA8SGCeNRecPFc8PYv0AEL6LSIeJuJwAEk9TVC6TOSzRsMoCBa4CPF9WXYlXyArFEo4AKH0

WJUcSmNcFVv5MMY4RNEaRiBkSEr8WUU4AOBhPpQxHIf0YRE9FWUyQlZvDVs3CQT0WSIfLlPfXX6XKWM6

FRUqYkLuEEd8UNB4AXLoRtOxUEb3CPS2CAGcVrFdMS
u9MXUcCoqdWrZhGNA7ThQ1MMKqUQV7UYB8OtWtWGYlTPR0JAWqAsK1McbuMplvLUN3DiH0SHOrSrExJP

ldAmS0VZQkYMMbXNG8RHFrYgBpFgLeETKdJcQEStZcHUj4SRV8GqRgAjDpCrRqXAXsTdBbEwNsOOC0QA

dcTJD0PiYkPDP1FSUvQIK6OpLlZfCbOTvmRqU3XjQd
NwTyCPybVcViYZWsGOdpKoX1KnkePfO3XPL4LbN2CvbnVof8UTF7UNRcLcitGxp0GAseFfZ6EeUaRyg3

ES0ZJDX2FrizCjv8EBw4CFL5UiyzNYc4ROz9XTS3ZkSpIxBsLVjfWuI9RxKvItF7XFY6BzB1OANcCAJ5

OQP4LpT6UTKyWLP4VYM8AaZ0IMFvGPr9CFQ4CfF7IJ
CdZNR2XHK6YyK0OCHyYld0ODG0SDSbXbfxCwc9DZHdMHJOLrYnOmKiXBDfTFC6JCVcMbBrNGNkPJB2LJ

FcGEG8TQUcEOL6FKGyArSmPMGfGNV8PBZbKVR3PQHvMFF0EHQyTQJLYyVuHP7pfc6AZACqZC4vgv8JMS

E0DQ1DTCWmXR2SrHWcK4RrooPXTPEknaoqxP6lDIyj
HFIzB8IxyvQFXQ2pI0LcyRZyQQGyoBBCFpGoJXZsXVCwXA31ODpuIF6MUDHOZLharVKeQOA4Y8Jgm1Ix

gqFhGXQjPh8JZJTbBV8GiHFkkzNhQp3LQMGmQV3Sr437FoMeiBBwZKZbYrLgQCAkBNXkJVU2MZ4LEZBi

MY2MwQXpgYVNawevWAYrD2Q2KI2YYYPOXzMjHo2CFy
MaVN6ogs9IZDTwDSSnDupXRvRsCOfFJvYfEUInIPthBE4Ue599D7N5XlD7iNRvVPC6KLC0cQOsCmEqVG

SmmoFxPLSdJJgrBm7gSN6ZomPfPNahBv2MvD7RdkUhAY5cc8BsfjxQWqDzJEVaXyund4EArLPeDNBdT7

exu1TOxBElGEE1JL4KCOZlSS3TtWT6pGYtDCMdLXHU
EhKnRNBrCk6daIXed5QazTY9d9AoJUYtNIHVNbWbGb7YRxPiSF3jkz5KXVDwHHXtSbmNMhKmQuS6NCRv

VfMxRJT1CMPnJhwwIbP5NMS6LpO0LOPrYLd5QRA9CbQoRGGzXfGlYRAuDjUaBLnnTKg7FHR8PTZcNrLy

Yse0NLC2RZS9PIKeJKO7DUV5BjT7WWEoKVL5MUE9Tk
L0NSJpWKL0QQH4EsL6HPBtTsh1GYN9YHK9ALMvHRgbEAV0HDE6GOCdVLQ9ZVZyECEzHjS8EOX5XeY2Wj

QqCzZkANQ5TxJ7WTHtMhx8CYobBuBrSyvqVEAqLSZ9PlA6KAPzYUWdZUbfZqS5MogkWlL8DXk5XMR6Ur

JcXcP8WOEjAZN6TpLpFyW0GAo3LWU6ZcwlGhV4QILk
RSSXXfWaHaw8YIH4BDJiBrwhNNF4XOZ3VcRoRrKvYAH9NIQ4VfK6XKIaZOX3ISN4DsBrBodtAJS8TFT8

FkMoJcEcSfYyUHHnWTEmQyVwEXZeFWY7VxA2SFHvWHB5VCC6DeHrEhHkSWLvLCH3FFR1LZCwXTMnHYbz

BdP5EFNdVTwuVRNoEYY9HDBhDnC6SNH0GSSaDkOuQE
q1WWf9MGY4PZNcCjKdHWn8ZZE5ZUAtUjMhCAq5DBK4PRXgAqTbKCg9KII2XUSnMhEuORu9FYI8KNQwAc

HuFZi9XCP2DZUoBxQiEJf3NQB8HZBkNaDrHOq2NZE0SLFeBsSlYS3PVFJ9EKOiIeNiSKl6DNP6HSMoIp

WsJGr4VLJ2NOHuIjKmRLh1AVGzHjwoMnCfDHL7MyM0
NLIdGAF3NHO6AcVtIzUbRJR0FFTqKxX0JpzcPhgoTAD8MkF4KFRrNoYtGJcaPlQ0JKWqMADoBSI7SNAy

OoXmPqTnGXEnEnZIAkYePHw4TINdKfTuOmDaHuIoQPJjMgYmWrAlUDF2SWwkIJU3DyIfZOJ7UKYpPBB8

IrqfHuW8YYP8HiU3HyiiByH4DKB8MqXuLENnZFjhLx
R5YisaLpH0GJP4IgB8FltsPfp3HEO1YUYuNqxdSif6EZywUoX3OtZdFvq4TU8JGZO9YqavLcc7GXg9NS

S1LhgkHNk1PNi3BGM9BrTxCzFdUCxdCbH9DmYrIeS0FVB0ZnJ6ECMkKEF4IBY6XoR4FADuUFH6VLP3Uj

S5QRRzZGi9ODHnEUS1ONQdWTB9DEO3XrJ7BAMbKfp3
LKM9BYPeMxflTil1SHN6ByIZCkXzKRO1PQY3HoY6YZXsSYI4FKU9IuZ6WVHqWUR6OMQzPLR6RKRoFMS2

OJV3ZsI6FQAeDWUlFFI6WsG0QOHvJPSVGxTpFI2jng2UBNIzQGMyNusPGdWtIWqGJeJlHPGpXXqsRH3D

a797LKIjX9KjzCYuiz4YACBkSZ2Iu180GtNiAY3Ppt
lraT2QUGPaXX3Zq8VxgnMhOPF9X7ZdrFxnqEgcdAV2JICzAIAiY9FvkSBnRtUjIRrmUPErO2SoWUqxFO

WuENgmGEIzB4ZofcFXDl28JJfcSW9iYFJaATRfJYI9MOQmROwvZTMfP8w2GGrsT6HaL9bzFUDxW0AzaT

KbTF0JLYP+Iy3RHX8ym8KeMSkzHtVsXB7jfm4WVLN4
BR2VEBBvWY9GeUXmN5YzucBqQ7ElgWuqXS3OtsKzTUlrOS8VUWIaAx5gtU5JvqxhjF8HhrNdMMdxNx6Z

tY9JwjIzWR7zw9BjakxKLrBvZGCoNiwmg9XJfLPkOBItN0ksu7PBuGIuNAX2GF6NGUYlXC3OfYR8iWNz

DUAvGMFICPwkCFXcF8UauuXBMHTutmaltQ2tTNBbVI
BkLa8MEMB+Xi5QDX8ri9HaCAkoFFDhVK4oag8YVZK7YX5ZaAu4CSFjF2UyKOIqDTImy3NgZY3EEB7zjH

wqKMCnGXZlY5bztax6bLZrZpJjWXT+Cj5OKNIyvGJuRT2EWcgO7SnMqQOO9anbnin8zxUSSIWD6K1Zvh

FbKWXNLVYZbJF7HGIZSIpWiiNQoL5TFEKyx54MBOSV
IwGKEII2CsmrmII48u0V+k0tyfyC+7+w2s9badYwuRD///N8z9+s931pXwRy9w0hscweDnHVNVtFzKf6

w1hn5U82iK9R5eUk8vbXKKnSsZw436RMbXSNZbjdRmRJdUQ5/aWXLPRv++qpL+SzK6EodMCg7nIhbyfx

TbBPMaRB6OAcAnJ3uFmXDALgy8T/9PzeZh3gxggNk8
IjqmGmgIUwAl1vuhkaprWB1BMRT5oiYMsKzqsNb6J3/+sKAezR0o+RZq6FVGweW2+81Gyfd5AvzXNczA

Kbn00/vp4VwUEzYr6aLvVx2Z2tm+cL3X84u7x0487ZT2LHjrf5YlMDvxAYi9Z73TCwlf1i0gypreSX57

1fX0aW8hGvQzb9lWANSbmTY3vCIsP9HSG1fkGdsR6q
GUSOCrjdZK1QkFK2ltn8MczalD1bh4aEBSxaXPNXVpV39DE4tlKeoEfdcZflUTSUCVAc6MENwIPLFEnQ

upNyLCoK6urjAan6X6mLX+KySHqXeuqwWhqs8Jnvtq6VoJPPH1sBn5cratrJTuQ1DR5eZ/MT1XlR2hU6

NRYxqpHT8r3Q/oAQ3zAilG2YM9aNkwj+Gi2FcAd2j7
otfS91oq1Aj5Z0gogIwwuf1o6H9CemNS1CI7woAIZqPIdClEtUo0f0Au84s0KdZsTxcZ4ZKhoYDfyQjE

DPnMRN8pVw1MkZvyzAXQrR5Wd4PVgThPorxzNk+NbSX+ov9jv2vj2aIaDU1OGMuk085HAMs9Qos4ArDu

0jzTX4Vr/EzKf/oriwKXXAD9k4RiFqazems5riMhmz
QJZK7aDc/Ez8JK/R2oksEC1RjiWkzEvaumIkLyuVtWWaUxqZDQyQ+HD2hcX7PTJKsYsyEdc6GKN/X6SJ

nbKxPKjdp2/Mf6kb9z5vPfIraxCC6kmjETXD9F0miVCdEfMvIFXtinPiY+0O/RC2WY9Tkv4+zaOVtJV+

NlRijfgokiTstCCdrnzAzWYuaDdRX4AyuJHzwKCgJI
0Y8S08Il98sxP+9Som8Cl+NCjC69qbF+t+sjKspTQO4+FaSH8nrUfNdtFBehvfD+gjYYhGoim+fpEsCs

QV+D1gKizVDhQ2AWlZPt+Lj6JU5axklospQUBoXITovfrFg3GxXT+Ou2e43ET6V7YheA+1Cmt1FF1brB

5YgbV4zMiSjgz+s3hq0x98h7qHvr2gUli0QWuXctqT
syLiegb8L8go/u2+397Qj09PtyfzPjLN6H4I6vyMC8LUL/ngb4NPOr7x3a+wPSK35z6MT0HvO2PCVUEf

0TJTxWxxoViElrxerBUPKNkfFU+jjq1PZvUdM0zgNE3jc8ggBZsi70D9qTPeUr+TO+Cn2mbGxcYVZaM6

hNy3IAgXT1iUdkxuf7UO8qE1g+8l7KzzD3hqEKz22x
19MY8XJ1aP7V/W1+xt507aC0rWaE/NaHOeudSsNf/vGjhjuFMGA40zzbdpc78L344syB976Ve8Rk0JvJ

TnRb3Wp7wdke279fIXMPDyCZOUiNECK5JlGlbcQBCU7ANbYvdLAZR0EhNW8nzPfTaww8PXWdotiXoaXM

fkhLft2U2sQ/V99K3+HId0XErxJzhFk+URszHVCJL9
QTstBe30VBiX6aM+ZQ09RfcPtjwdjXpgHU3lEwLx7EVCNVLbc7Fp1iXvQisF6lPEX/NeuCqjBeMFL0zQ

IlNbdWYJxeUm0oF1YRbaluyDzdA7Mo4/Rcm0GHFP9AQV7xp6LiAns6YK4oJWBz/ZJNqKgV96xSFbNf19

vQ0RA2gK3aL5xp35YO1FN8DJWEkd8x2T0Jexa1uw/a
czBlaEGLmMnsMV05v9xSpKPvYwPTCCHSG/DQ51mVibW9BbN49lNIkeV0e4vodUjijkcXOu8FsVuZjFvp

yB78bfFU6dfIfj+ERxgEQ4z7xpx7xI2LLV8tDxZ9IVe3pZGnugshNFeQyiG/TJTkhX4MdIUkKtqzWDEd

sVJaW9pUfds1ZRzffi4HbfNUX6H+cOFLd4W2n6GFvn
V/g+Tm0d4dav5e+xwTl9FgiTKOe0HgLbqTu7ao7PVa+JUoNfr0sY7JdWaaIyO1LdR8or2cBrH7E813iQ

K8KHEaRzlAVZwc6AmlaO0tnrDQ32MvawekLQIwhqFYzczRr83pc5Oi4j/ulydTbeFW7wHkYbK3LEa11o

hfOecZAdV81wl5cPOadRPggvLm7OoliGM0q28y8c1M
u1Hi6eXCmp1hmdcg2wl1XXy9rsbVuu4PLq8+IzYgZ30/mW5dUB92/fgTWdgZbQfgxMFkNrvF6HFVWceU

0Gv8KZeOqO+nR9kK2FKF0J2nxRNZ0y2HUAEtlvnN3rP3B1btPOEVsUzfEYeHUoiZjDlhB8uHx2TYM+cA

0jTwdzUcHbs9uhgeQ8NfK3RxJ74pjM2L6gbFHSP50r
k7gfGagoUmXH69b2cyuuU4mG2hQr0eQgOxVOVdfEVdwW7Ue8ouEQdNnRlGhsYdWaGXLXakOLUD1InXNE

PuFmtO1zgJcjvk4FB6SCIAZ81Hj2KDGDjZiP6GgyfCPMQm69G/mtHSO/1z88OdZ9m2wMb5qlxGhJ7Oj3

fig/hEveQD5gCDCVl6R0xFRzNEBQv/h+gO/71c2IhO
+ndCATmArcA+wBjgAe+Xc8Y+G4yOssG5rgNFmbuuHEmlpfgEtceXhVvrwfIzViqrA4+oCdEs36fYwcG0

TbbMRfX7oOkWm5Tp9vvSTxXpQZAFRXxR6uOFNF1uev6FAyQ9nKKTfCZo/e7k/vqHquT00ZRLYOzujU9u

slK5IKlgXZsNNbByqkuOdcXwHTOSDrbxFVQxfeW/Ay
6Xy9TXIQZZJyM87JGoAgTV8tkn9jT2tl12TC+cUa0zVKT5lXRsdKwVnFJ3Wkmf0Nk4Q852KrCborvZeX

uLOHV9syp4yhYx6yGN0a68CadCodT9dFAXBumLRvux8pgF2DBf4HWIP2MQF2OE31lvay1pef5UliOLFN

JfZvX5o6s6GDq0x430BBVYsvGpLyL/Wm2+DiR+r1K+
DiR+mv71YMr7Q5w8TRo9Fn3TLhB3OtZBc1aPizXaz05Dg4j2tv1MluWq2eL40La4CAWkRyLFmPnnJ5KM

T62j13g8usqqeSoMhiFEihKp1S5sWwCIA1GuBzKJGrwuJhDAgzcNknHj9RuFcdExfEypOGWJQ4BOt2AV

NnuSYku38raofdLxvyLKLKcjKUGVhPrg5p6w/6BGtl
xr8yEjkzWMB5AY754IWQQqlRmCY4sACMjGD7gj69BoRB5jSL4J9uE4gjo4M11x8Yvy57/4y5aB85tKHa

ruPcwZSd1WU5PTu0bEo+ETMOHrOsZhjTMRNdsQA0uhi+b8mU9CfDAVYBqBvIR0HfvLxOQIpsfOsMPsBT

uiiJxhK7qX6tojoMDGVymGaUfAaqSN0eCGPNDhuhE5
BHltbzL0dlzg21wIyAh38/Dnp7qoUEKXzT1oMtJxAYG5wvO0h7AqQhEdioSlJcOD/+w1pgMXxiL6AJIW

C+RLS4zQmCz/QsSpJbQTNqkiYiWUxNalffbtGUU+30/ZL0ie/WgEpC8dsUP/sf1SboifW9VSYjr/peT7

pV00U0mZthY6nETrHkhdP5W5el58U3izXtOBoo6B5Q
ZKfvqqShvjlJKqLcjji/Md7gnE433tAsLYGBqVTUU2dQehm6sMat+qbbWW65nG5vR4rPEwb1RSwQNltH

NHlB8CW3TtdxBqlpGja4RfgKFVNzqRS1ccmRDY6ME1+rt7D5ydvdguep4A38xc1eR/wFdWbqcb7UbRYh

iJEd6F0U1UbtD8nw1Gk4f1MEvAXI5YXiKB/j2NqZxP
FbZerx7h/9mB+jEhTeLvoAUHMLleIK1aTiCK+4gUnEIR3MJz1j4Lj2luY9AJuAMTNhv0aUvhZIRohJSg

SRlioMgCyUwDDiUMSZEnkWDzDCVztLLzMq0uY124yijTjZsqCRQ/UWKXcvDzr8Fr+/pIm6hw7om2sNEa

nzHZEjeO1Q884h3GJm/suA8r5zGcHXyms0RkV2fISx
wt3QYerTivYUIN/5xT/Pw1UlZ0FyxWbNdET7juF17zh1LIlMtt4PuJiZHFzTUiDfToNgxbjmGjdlSWf6

3SC8HmwaYzNSrADN0+zRLM9YmqWpEDq2Gl/+sjBVJzirLNWZiHOzPwDV5V0AG2Kkr8saynORg2jxqPc4

FFTjoRV6KOBSg3WxZ4JR4Jl4g8/plYI7zT4WCmbVaP
JBFfPyzymtRg26jxuETYF2g6+6tIgj/XOkrRot2EsiLiJyzkZ0zmx0vF4uIvFkLVTRXJfUICabttVMXD

iuNjuUhaxNMCQAMMwi3h806Br1Abjegpvvrvb6nvcZhvYbeQyF+7annv0KZ+Wy+nBZfbisocGhqixSQ3

5sSyBCuluBUQJossnlGkZ3XCH9AUoa2OLKgkvMLB44
BnG8YVuH3KqrNFH6rFMfXoZWj1VfMsSXrxLZHEIrgTEQqBSN5FWjYeXOHOabZsXqYjt4kdOFcNKvR3x+

p5AHRSec2da1a7nMR/ogLicULMmuWEiUF+sDcpuKJEz2jCiuGKluBptF5a5o7Cos6klkQ5TTKG2Y6FI4

Gd6vqDZFPA4pqb8/lXZZ1JluaQ7iR9D9JsZPHGwemm
qPOCMBfWpf87SFlnjyRDSsMEAQHmHsD2Cq1wGTmKOFcy+8vp1OVjm0lOBM6TX9HESToU4H/+HGSqtvsY

FiY2LhhIKODlrQeanNwMOx7p9h9BZ2gXwXYwmxgrmRkmh1+ZI7iMzFxrB3GeDr7+BobRGrZN2yRFCWkM

GDA5P5bFBTowQH0iUqb4aY9dQ/HqVuvlkuDN1CpVib
tVeKdCYCSk6btOje7XhvE571QzMs0L69WV+hNWAcYFxMaIZWf87iw4u/Afs3qAp8DnB4VLftzG6Y6bsr

woQJzVJJEcv0kvJ2X664J26SS3FQd+1EC9UsNMqnhJo1USoQEdcar2J54na0WluLsto2PnLxCGpcv7E2

FTntDX1cDUERoNldue00KLAtC6J2VdkIlHonVof1n2
SUofQ7JNyQOFAwGVOxlFRek/4p8uQwiesPpviRHcmy6vM6H1S9BVRdNl4z7cW8eQfmF0Slfb/Ng/9+pN

dGjqIVcWHqrkxOSiO7SsOPJuf6oJS844N8Evd1MH3gg9wsuTGmTe0B+rpr5Yrm7pIhjZkES6AcTehcCM

WQHAlGjKDcuLEtKEzQMk9s9cG1xuDpBZwbyq/G4UC2
B0fxrsTKe4J4AvCkyohK2OOTQgSOUvmTs9sfqAQVhkyo/mKmuoJC43wcm93sNO0z40yiSleTK3x7FpKK

/glZPhAHTBhU0pxJ81eVXmmsGYUMF4+dxloDAosxTkfavVLsgDH7kS+he6iXrPwM1Ec1voPr0pFaACef

4/un1GX6H99Lg0b1KrWhS8d3SD2KBcjSyNXU9HfLa7
ezou2nGll+yuRSAQ6A+d1Be+necdD4SYcOqv240kXzT/+37PtwYNl8YeMI+ortOMqaLkuU6sZ6eT5v2q

sexm3krH0KbqSHbnpthS00VsrMr6rfNq328ZzQ8gzwhUG5PyvVnEtDbSDrAi6V3ogcjH3SQHehD/ApsB

GYA/SU2aVCEUuNavwBR5TMu047CpV8aHqOxeNBkJg7
WfxIl7wz9y2bKk5b4ZcR0ktB7ll7duWXeFxMur56x4wXmCRJ9hqk3G+O20yfsk6h8zio86XYGk2CaD8N

2mfYzVkyOutFQc9io3ONxbzX02QlHn4jun7vFwk8LOhfVuVnZoVZLo4SQ+mAxp0c0Ob5E8bFt2BcCozU

OW2mGyXhlKb0QgkRUcZ9gqvDBN/efaTBEA08z1chP3
fTwtwKzvM0TbbKib0Cpjhs5dZknNt0NJ2j3+nUxVSkqs02XDeahNDIC/xwYCBnbn4VyHHv49yC/x3IN4

ux5Oshge+MQ5aF+vtCh9LthffD+O8B7LrcXjysKMOfxYUNyiaEIsX9/j7PreraajKzOmwyRjT82BlHHL

QXGB/SHm0l+rrL3eqduTIXSOcaGCQNXhzxXHxGWQBz
/gaBROqTDyrkxLXJlm13ewYj321j6DY0Rx4RUEyXyU/FCcfQiKoGtFrvLP1i1giwsMEwT86w61/PF+kB

6Bzr0NfjNznVvH/UhqSrlot3Rzmkffio1vi9/locWrziwkT2i4mW7O+zIqxb6RqYJ6gAm2DaP985ntGG

Ct9uaN8rEwN6SGexx16PpQ8URy0S6dtrjY7ZTKkNnk
0W1a+xjIzjl430notIT4ugcbzj3XgoLvtxmnBnnzB12Tm7UhqIMT8ee3kj5YlfYVPhCtoFin3d1en4AR

2+kiwhSy75HXwPRWwzfXhxveTX8BMU28UpTmLwt2GthhQ1LchFJrjAqqf0McDbCvONILnICCDrPrqgmg

pXoDXLLVvehBBeM48Dt4I8810TvxsvqGcPXA/9IfDE
EjY4Mud9TXRYpjji2wqq6xNRkrYpTEbNZk/dk32EP7C5sz2wZ90h3rME8KGQjUA7rJyzw+yJr+keo4Am

FB3v63kyn69XzYd40ip0OnU0WNiM9cPL0/WflY8f52EfitIYEzNO/5TmsKEF4EvUwH3vmXljpYTuNQVZ

56i6Z+b6tr9P30iiQdi0Dye3qHjXtzwIonHHf+YcWo
htkqW0O1QvXrF6DbUdZehfRQsld/T1zhbuVJ5tM0aDGSqM85qtRG1MIcTTiKOH7WENo73W62skzOjG5O

DvnWiHJTCI/4QMhra069nBpT1FZden0N6yC21IKxgQbAboPo3jpbXMbTmqC+ZuM+qm/xCr8Htpj5VYAO

0/ktJotRT69wbFPHuo/epaN2Z0FDgC1oA4+SwHIA3s
0i+nC/SpVKFVY+w1CjK7bIA19kf5RNf6MNxmiT9T8QDT4iMkwc7M+xesg+NKPudvaxR0BcEFyToLqjPP

oInjE5JssD1QyZz+SV7IWi+nK+Mm6CP96TzSeti2mOBV4dBdrMGRnlVhWC4YYsl1sGy8zj2Cq43uwIUx

CAwL9+h3qJCXc537JB7vD0rD0E0Qmuu+2K182H1ePf
UnL5sz8aNOjGXkK8NTmoG48USwMa5f08Zu8EIVor6c+R/SXgXU554qkJr+fRJGVX9y/RxrNwNjcQTDvI

HvkX7fMdxDlRzNvga/hQw39Ij40Ft9uCKSsi/55Ecip0F8Fr4+AG35B/BgCMKcrnkUt4e7ttqh31SDU2

W+/1H6cosLs/QD9RldFYWNMsJbilzU3X8Va4T1Je0+
QvjtKjyi/7NHRfnfZpca6S/h97p0kojGGBv3OohZd841mNYdvv+W7CcadPZH89jFVGyrQ9+wAUymLtpa

zijgfYExjmZYnRHASgS9IboIgIRn/IqlSnEvQqdd0OCAl69F6ZK8QnSz69qxNPrtgw3ixbZL4yPp/7j9

Etk/kK+slpWGp4NP6V+iAorNmnUvEhKfOxl6cxI6GJ
Fzo+zZ2chuEZfp/cDzwHP/q9lTwAXMuGFrg6UIZySLKuC0ja7jEmuLUaNwGbDXilqH8DjUd+ap9nftYu

jtnR6QoMyr2J9qJ9XeShGzhIbKCoaowC9qgN1xa9Ubmf7+2MuBi09XsOQTF3qMnKt0ygWH37D/B30KdD

E0e5G458QjtqQUQ2E/EuBJ+L+Msi2HNBnznNYpWTCl
VRaRi3UoHJKVWxO/Tk+///imWQZHNoY24AkO2YXZy4l/TN3+fOYN5dWr/m+Wka5JBHQ5SoGs+wc1Stv1

7/I9wplXkz/gygRFQMwyUuYqF7grIqnqxz6jOTZ/U0FjhcKt5BF6zlo+MxzT8VwJd4YGdnGyAiN8rqOO

HGsTdwx7ylGKgXXhY+dD9vgP1CbJ0RHL3y5V8zc7I+
MNEVQVRciSJUyowWabzv02DfTG+NuC/oQrzs0iADKAFbTx/vp7q3tJ/FyJ6FjRPaIjseEYxO1FX9WqiM

8WDa3d//IqxuT9Faos/p/11QCmZjGawtO4ryPaPzTqyxio0VV/ITv3z/oj7Y4/7Y+wS1x/HF4Flrz5jo

5VqaaZB4Ou/Vnw3kJ/7MHx44aEMA/k18viZcvgdGO0
hSWwtz2Q/qBy2QlWFfWjZi/V3t8ow/sIZcC/MCG0mBVABWnXyqARxtli1uY2g7K/Nd3F0EJ2XKqlVLrK

P8nUfU4UKqV72PcHl1ZOFIIcBD5Oi/ua95Ao+1HRreh1EZ8i2ME+VFzgZMardmhV7SI0D+EPyV6nLMqC

9v7Lo2qaZlaTIg97Ai4BNA330Ctg5ItST5hFaJb2/A
K7JS25fa8IN4c3TXOcnU9sSKGw5bGW1yHS53Nos6/EB4wv82kw2AnM7+DfkXjLaHov1CpunmWwux7ma6

R3sKaT7Nabw9WFesj9mmskbbdHnxVW3ILgc5FkG7GbECS647DK7UsE4vy7S4MWg2855yZq083bQF6Z4u

c9T+f5MJSavBe2t9ta3MLEWDxG4ec8g5rT97nF3UTY
82MBfC3vGYow+gNNx25COmKRcmiR2YMX5NOoaG1yyBRagHIe+ydiVcuEt6sATTh/r7B+PP15p/Wyc+45

78lrC2us7kPS0jeJLR1h4WdiJ4nDnE/Q+dbEdRxiJWxwrcFcb44rD6h9YlgC5Avf/l2Cj7Qoo10Ni8Di

/KXqr+NbM6EUjEMV5b2ky47/x2uZV3IxNwt7HWPpLy
e3v1m8CMxDx2O/JakjBNaqLUE21xgl5tCIfJxhCgui5n/BfHewX9PVE6eg/M6GRE07GwfBqSZfMLX0vn

TUtjZ4cT6vT0BiBEHpb7Az2TT7BN9egvamoAC8WbmEdzNEROcfnBb+MoamQcIsYZQ0LEyj2bykk57Zmo

n5yKTQVkmC30hlty6MvBdBmYfhqu9ucWxyt7YmarJ2
F2NfAiCN+7yf8M1cQLjPRPkZkf5JH2HLGpqGofV+Uilm4CZ72ichlPs7zf7+IigH7sJAFCx4rXkpbmju

cw46zXGRm83yHng/5coi9jAUMhCOsCUZjRE7P8iRubJSraT9PYtBi2Jh/vHBudE34KJYC0FmdCb2OdBE

YfmQwntC1SbIFiWNt0eJvfdcAPJvFkuO17a8Q0LtFM
TlzOO05f3LhnXqY9hGngI/Q69aXFIk8owuFBG0n0fOwog6evdlH1Pb2JFnSY4dR3b0HMjlL2G+5az3X4

UJQEHco8jBm1NN2W6/XGffkvBsn125UOdUSsAK1WcB70YMPE9BiB+6cj9F2UroSqZLxXbPiIoQIMwsu4

Nt964hb6Kgmpemahb//gjoCxH+jdTiyusagWh7d2AN
dGnfqdrmumT43HS1g+RtR4FakJYWQmZKsYDC3jORC0K9h4lT+9OfNjri1GmCOxh3q8cE0/q460UDY8Zb

+sylpTDsqJJrUUiqoj9WL0MSODnOQoMO0bVDhwHJwkR009CXWVkLLvovtvprLX2tvLl+V82syY6P+wS2

yaKZTeZqD3inzM7xzx5LDVx4SGyNlz9ZLfa1PBJfht
31y2FEiOIPw2l6RuvpxGvvXzZ3rO7GX60G+vqNec8ljS3qkQIUYlnHT3o1y2U6us1zXp0dn84jzS05lX

+Y7jNoXd4lSqKbcsXm7p8SqRsZQmAVQM53vcZIY3zWnnC//hdcYT3XHrchvTZo4DCGxh3791ZLJZ7dy9

+v4Vx3qmkFXQXTXcxeBabVLpa7C1W8aCEeWi7i9j3o
UnYmFszuYGli3uZWZqLkF62DUoYSKvKM6KEQoPtz2fcXe9y+B79/8mqqkmUfHbJ73aBhrWwPLO0Txn/n

pzyGwstOp3kkfCi6Gzz0G6lwjy50dp4NBhVxeTa0ttOb7f1X76gb/gdMsP6kv7/74EuasI3zbfcnwF5k

T2n8Hk+ucUDXHra5Wt7kwsGdr0o59BtUYwyWiahAUz
5YhOYoMmuxgQJdKKyWiW09IEbpc8gdyWNHrKZ3CiSeOCAfh0Uu6+/CfNUeelIS21zGFFdM199oQhsXx0

vQKB8rpRT6hsO3a0N8/J6egvGCzGRw35Zj6965+Y8GA2h4zFizn/hS6ZZfNsIGK+iJd/a3faKxoO/wDI

3nMN+pOs07/kh6O+mI2v568uZL2izA9l+Eht0bTOW8
ff/XYbQ3XcZHHEsfvcV58SEa7I0VI06c+T770k0lGz284ybTs7xvlq06aGXl+M6HU7EoVH/YF3g847n2

kPGlml0c2OvkF0x5sg2uvBh+Ac9hJ0g+H6He8/PdnN9B/K3i5oCELsQSOv1dAN4SsSRDs2pG9hu7R7T7

N3RBKy6oeI0eLVF8Rld9Jl65lZQkSfhoPx6HBYx/Uj
j9/rgh666CP0u52aA3tQHS/24GqUK6eVW98hN4mR30IyXAFXcLYGdIfPYd5JP989AvE9IiE9V4Pv9Gto

lOvv/Pnmfk6bk/qt6/Y9s1sIm7YrH1f+Zp5Ub/MHeDanvTb8CuTaAAjy4TE5R9Hz2rMV643lyQJqhYV5

r0j2fu9fLlmVD1J6dgl+8snIXiR6+CPQ/47q0ZMpqm
+0hM7tJl7Bh8m6c7G0jjd7EhDOf2owMh7xtuckip31rC8wTkcesF15zC/hURV7zht/Lt/iL9WpcjDw2s

moHTqQ1N0rlFLnecvC2ffjMiUXCn6c507CALjtdC67w7mYp8E0vRIwcU3mgoXOcyz0VKC973vZqmNElv

CqmMN1yz1w8fJ0BpFCNG40dTgo56Xr+YBr5pqs76bI
jYC/y+FGrsjwdooOgOvlJ6H6lz+7J89vA+iQ5oxNdi8ydz+c33a/heEyCw6DG/VNx9tDlgD+nd/X0ndb

54M6v3MdFjwq+OH0n6aAqqcGI2SRSPlmjNyOTVjUVfk5kpBHa9+Mf9CRWKiPkkhjpgRqq++FXYO/EIvx

UXV5wNsFpMJNtOXTB1QpsqkYGcfN7zAghxhebJ0noO
+8XBChscr/ygP7yLObFC9aT6Yqo8XgkaR7+pmcf1WOVASythmkqMLev26KixcpGgEmz/zucKzK/iXJ6o

Xlyivfv9dlamqkyU70NOAHI6wf1jtD7KqmYKaxS19ltLlF0Y59hZFagcvh49+9TrzUM0WnbUdRgqypEQ

zHRNlu07C+VzBX33blB1XqA7YavzVIkFLjs/ucT3id
Fy6sRONwtb46FWNDTMe0AG1SYi/T0Tqd3u/zNqVZ8O4k/ExvxvvJwu9j41WdzTtiaOjzWTl1jSGVjv96

T7/BomVCb20j8o1ai/lVgaL0ocginMXwdZa9voirqxZ6L0v12MtmMCwv32sBmB63LQqV07N68bvj7mp+

45czHd0ZtJuZI5R8h8c4LX/4lnu8VRm3seyS2h3V5A
P5l5XgL/lEvqjTl5NqiN3zQ/4p1LQ+/AJPrNBt3TetSS8XScht7r2Syo3/sZOlmWOh+03rLpiInH3rgo

Qp2SDTgfHA8roQq52M8cmh33w0r8VXGfkjt7tR15htoybsiE1hD4edh4VXX1zIiQ2ul6/xHO+d514RK4

Ve9YXAZ5cnExTgK1fcm2grJ2Oxrp7GhZU/1o5rUfIC
WJU6wbeJqYlXUrtoyjxi3OlorGY3G28QOTN52nsVDPJ9vhoFwzNU0y/lL5BVgvpw3/6Cp0EiTju9nL93

BX/P2S49OaO038u2cC2vAodZuESySxWCiW59Ua/VMMz0H+PJmM2mZuZ74QoM+7gLZ35Tav8AhkQt/z7g

JyHZR2Oj2Anhbvt/2+s6kXbqy0nsveMTS88nXp6Ud1
vF31E/rIfrcvIvcF/D6H+7uk84rkMyZlb5BjXoT1gp21UqwgfBl7B9QE6QIbqF+F9b0jJ+1KSgCj7vp/

98W7ie3ld0qNlx1h04tSWdcHt6CLfk6m5Y76pnq5G/ZpBlTyNkFr0ZWgOmGBBhOQFZz5TjmW+Aeiml4R

dfxt/e1Xb7q1qwoehq0Ko8s2H+01XQA1L7Fph0dep5
mFbBIvhgG9giSN1mvtE7p5Ka53KiJEmTaE+/nzh0Zy8kSq1peU+04xZfwue1KUD4o3Ikd20P8Fkbc6av

c0fnyrcss3SZxP5wucdKT8LSpott4PqxB1cnsbt4EWpIbxnwI5ItD+xh0OM4wU8J0jpQCHMSm7J/pFlM

x9NXyMf4A94gtJJHpzF4QJXukGRfMT+DlOzdsLsabd
EKv1kKw9HNX+7VZ6io3/QUhqsdMZVvR9PYyyo+mq9To1K1HgcD/c6ExQIq/5DMC1c6nw0ZmxDCawj+aT

Y51LOTPwkH3C6JtJ0BWXpVtmvb2YvfwZswRsbH1v37mBAaioG+j3lQPO2Y8wJEfRQSe5skOlZSRexd2i

wJNTB8EeuWPrQb+MpsK4FrFryDel2fAodKIn4LNBY8
ovmo3Mslc9hLfba6em72cp3sDbdxRuZr4F05ez+oSshNYtN4GPckjRZyXF8LD6O83IA2GkP1gKCVvj7n

/U3Div58gC9xD0Ya6ZyoXtVjd8PR1WadFk9lQ72nUiJpSV+RCn+hDc75qpBc5KSU+rsTWCy+V4bk/ze4

mvn7RgGSWTDhvsNLEef33BqhgC8kdYWfvFpSJ9Q6la
YcUKyQbEIfuqirZAlp4MnqIIn0hiU4pH2BngE49SkPff+K+VGcrowIH1fgWAhlub4ySOeXD+aB/+ey2g

rGPPxh0xTgRQtdk+gd5KBO1a3Ho+R/Gc7vxwnVvkcT2ZMKMwiI9Oogfow/oVYT5rpY3g4Q1R9aFcwuvc

MHBnw3RRY4KsfH8woOUpWb/UshWJXtU1e2z0prbos7
e7T0yRxNrW5DJmZ1ywO3UUX5HdCuDO5rRbAS6cVXlqjFUOo+q8zDNqvelby2glKpOl0V5dR0Pqp9glS/

nLy97JDkEbkevtqWO0wMC5lNnsuuHjym80485NH/nw58TE8vxoQV9ZwLTVz764sKIZIYa0PbGW+ifWF5

8voMmikZwCC3Vm2S+R+c0NcO1POv/MPDwbPOlv7i+9
9/3U4213ABXK2As83+T5cIjt1WSrc8ZWPCnEnx9/PdbiDH0AkHkPWhUdQm/6Qt2nQFAM9rf9x1qFsvuZ

W67dSdwxldG2pAZIA/x68f8PX/SVBQjx1Atiil/b2z7lNTFmS/GWEnrUMNpE7iG24n/pitP27PJ45Z0U

UC1jlWlabi/Rs93q/CiNk9L3megZJbpNrF86wsSvNm
ZpBWnm5DA2gP+VRVhJUt3lM0psmQMVv86dklBrxmdn1qGx+OWXrwFl2jiUfcvP9m1wRcsD5sg3OEwtXd

RqqbV7xvQTKF2s90XdDZ8P8jomjeQ4o/nBhCx4Jt3Cb+VVbNVp0KDEFNU4X+JJPuPpfO4uFiGL27NoAL

8Bfcx+V8hTmKQR/q0CrEsX0rMOneIdINVl03KVDWJD
528jqO68u3/1w2TXsh+L+/jpBFp94ciIFB1Af6L0sH/oRX2Ee4fEt2p09rfCesaEiwP+RhT8Id3Yfo6b

Lid39KTZ/RjfC+l+FZdDD+p38HWYAL9MLi5PK4KqoAsORaQCbNRmwD6FiLLO2X7XPxpzTj3xSgi6ibaL

q7M8RmVuFrXQDzyLAccTz0iPgfM6mFKDvekARF4Aer
8rWgEuLMuVKYFcabgJNzyt7VGguBxziXaGvymYUGi3ufn+H+gof2LgRACfcSQYLNBZLWwFwljMk0aSzJ

8J/ft4TRnHGRDoBNiZn8898yeOr5n6z90C/gjx/F+bFSxQfpifVpr7wVvPQQ97W5TZ6pYF9a+wftYXoD

7/Jk93cGp/U18h1u6CpmkWd3GqIABLUunj39cdMJuU
Ix0Bs9EzQJzGeLp0l4wkOve69CQnjAwhg103k+bU1roy46kmy4r81IkYkpV7LEaook7ckK1KPbvc98yC

myNg4+VPs2J5mwfxD/qaHLcv0pQToVSmPlLgrh80gzJ/nmhT7a/YP7e6TmuH6OW/0VCV9dfNf0WL/xdY

fXxANkXeKOfx82e/7Z9YjRdFLT6FlGWw6/xUjBEiP8
WT5tS+o31+JgiRuboaQfQLCj2Fp5Q6wNDtz6i3H8a0fPICB54dcEYd7MJx6aPlAq4h431yg88e0dfg1N

B2g0ocbO68e+hRsxQt3C1C92bHRvfwBfeRCo8rFxLUguVr18FF02pXVJ2MfcdeLJB7G3El1S+cQth17N

XaTr7DKojDZLaNZRTGe1Ygx2zM/mLF5j+NOatE5VZq
EPooVG2hOc2OrYW9to0tqR/4JbmZZihxwglDd8D8lGtj+ak0Zua5XjykQW9cWQRo2tACxAvZVCU/J5R7

aizbnFOdHWdrQWhLCFq4iPmoEZ9oCB8XvBDbPMNFo1mNmUDZYie0axY7l2PvAWgUsd6nfVUbSCkhYXup

AkVw9wwljrLTq9v3yA8IAnBUePFu/0uTplhoiComZA
4JeVQx/aozK7qRT046c4PM1etvKuyIqGzPKyMqZEXOOTTBfeGPxepw5jQqNVgkSwM9YDUFipWIwmwmrT

l+6bPEBqWZCqbaGA8W4irCCiWULtiIZ8fAx4HSfJCs+H4YgqMTxiZHVPMi/VwTrpVdP/sf+6QUz/aHog

DSyMmLa5dHEK4kVtS+lzSaLq2Ba6fN5HvTTcPoTGSu
deePHVDdX9V6dYewn30oMgW0DyiqD3kt3WF0a3T9vKBydFqBF9qa44XBfyUhr2d7abiWREPtDoQpA/VD

maPW1mCWP+D0znm6U7qmBDSKSVjOz7kSWZ5acxysm+GsFyBooKo2NR2YTfMm672NDsieGwuS5S+InTxO

9rvs2x87VT1n4ZSMXrMQEHGgBSph/R22fi2rSxSDF6
L9IGg+Nvp6JLpbc41wG45tDh1pGdaI8WyDlhe5/N2apsdGlfH9dvTa6mv3pea3WfjDpaO9wStVWLt86O

/F2fXv7Cp/lUqRkNOr8h+Rsf9PHF4J3+WG2sJLVqMrHVF3dJl34KEpGb+6Kj3wcAzKSrLAhpYZzEvFbb

3aabsFC7QuxO1IG2XK4jBazvwlQeHIw83pezyM/SyK
Tk7+h9juxzQrYnGSfRisH/3TTvYPOMl/umiQlgGMkEiPyHgVr7mdU6bAIZOCGO6FnQqgKTvvSxMwOeyJ

ZCxYJkx8IetHS6X19EBtfPffvufgPKhLzqsHZp8z4nzwFdWEHpCS/LlVxVUltVbltIA/UuH5Ic2yx52k

kFPsDpxaa/jKnoXgKLCcj8efa1qwQZ8usmnqPc6KjJ
YCi1Mb9HncQ4UbBOwFZYTcPVReKRuUGek5D+SwTsOP8ZRTHW8Bh3lKZ6Zk/HmWf0YqmkwrFNfnoZoRuF

UQ8MPuirq7WAkTuUTERxOImu9HZ3U4VrIc+IX8RCniDohzDaM/sHxGQtA2IHTPl4WpXbcToDtRJeVWoG

3HVT8sGCXyU5wV1r9tiCyGF1gvt4B/o2AX9hambarR
weg+wr9XNvLVIaBzAKyhPzF1rXgG3ZHQZlVwOqBJ4Dv1TaJYWqsz4rH8yIu2S9JCWOI1RLUd6wDaEuz2

sPsL2FPljfW1IKN52JYgHjslSEsxxinOPnt4gtqiBNsJTMq0CLEvKx96KOlz5c0RIzSIpmtSvOe7U08N

XCmDdYSWD28GxfmnufGHZbJZF5JPQQ3UoJ18VyERJi
5F/K/fUS4GATvKE73BYQkA6wGx5kK8zN2vukGTXiXLpOwvaBI4wNT6bDaEk+fQbA66SyVw3dQH81RWYv

caoMFQCRtHOZqrgWh4kTlbJYiPV9eWOkaLQBYX21oiReOZABfFKA6XgfzoOD6aWsdoIjuOq06tsFzsw4

fUdXJ8dGeck5SKmDTjVsXqt1vg4BqW69Qfl8Rwwxef
ixDrBGDaqIYazrmAOcHJKN2ZCdnHTSrbQoakKqtrctwoOj4tGZ6UYKpnCiUWCLoENdHwUY4YTPVOMtgP

0+nH3JP7zc3k2vTv17Y9VdpmnABReiCTJqyAKWC3IkNb/cVF/jLvOXYHwC6qEkvm/ztensIkpUiM2aDG

C+BCUfKWs2TZ2OCK3qEyAF6cG4JjZlJ6w9abClToJd
TGcannpaiCVMFSD7GPpVjZX1vpyAk0S4XGJBgzv242W1sabjuRnELwbY0BRCWsQePTAUEKeQvYkQz2JP

1iu/R46zR5MXEsfQs/vypM+SSn8pDwhQiTjooF5cj4MTgTAVVFHbuLxjIyLHQBoIIz9ma/3AQ9fRubXN

knQtTf/KIr3zfrYINZsPxC4iB/cFyYFpKt+iKSKy/G
N7wvXvxYL89KKM5xNSJPGDFuwPPJaaFnjn/YIUHtQwHTNkVXKJDh3rsGzNEa0IwqFrylf/j4uGzX4/+F

zP3/+P/4X4BH/w+ySdMHYDqbsfCagZMvWZ8DEpJiMX6bea2WQDEuRRCbJioJCcOgZXpdVvjzmXPdQH3J

lSC8SMSoJ38rUUMhDYHlY3RbJLTwEU9+LSleLHW0yz
JjdW0DWUY7QmcooEKIuZzfH/hGquaA1WOVJq8CvAYkYx8FeTwluDCfAk53mFZ0JfmckiWo2K2/AM4Qx+

dzNibDzLDZBYXxVYhSiP1nXJpwSR0yYMApj2PeSiy6eTBx/KoGeuQ0YJdczg8IHuENs6k+JF1g2AIi+H

Khw29ACY3NJVakzoL19C4Op7YPLHPoHKt4ly6Zm9p3
gtiW/EY5HTgGxMrsn/RF4HGOGliF4DftZRRV1vYfZAky51+EUOj1zh4JgTa6hUJiTCDuie+n+nuBuWVy

3mevydSPc6g+s+h7g3vSh0/0zic6O+cXM2+OhF9TGH0sl2EqNHOdUSiucuVcYlsMPeG5IDEpn3XqHKr4

QF7JZVXlPNheBG2Ka063QLDnU2OxmDTtac7OCUSlRv
5gxC0ldGOfJMQBMMSIC5VcpLFgNAgoWC1Fn3YuxpEwJMX1P0KmiFssxIzlaON5OHSpAJCbL1ZxnKYmCm

IjZBitMF4MxMSvjuSeGb0SMBGmYa3fyEMUm6fsTtMzCDJtAzTlUUOdVBbeKV5EGoOaM2f2SYllM9VnS2

kkYOGcR0BapLBePE9FAHAsGe2wiWUwlLJyNTO8QZEs
Au2JRc8YZdQfXB9fqa0LRCbdDIBpHthFXxo1WFelHN9QtCQdV9AzvhCeW6BvuRbiBE7FGVCDu398HAox

ASUnCmZiYVBbiuHhFDBNSHEMD7PhvSNrQ0EFHQAaV5lKBFDwO7szXX83zPU8UIifZJ7FYWAErNN1NH7I

ntFrAMh3K5JmE6mvjSC9LYuEZO5zNVoxS4AiDJVnod
dwSRdoOZ50tBZ7RNNuD1AauFjqiGAskPPvAf5vKBdrCP3Ah544RSOkB8DhiSQzynAnTsKtZOWALdAzU2

MXZSIdSWGdR0wyDIf6CPOgQHP2BCAoZCQqQA8lXS2LKh4PPlWtSL2kgj3NRBxlTLNmTtdWIyv3FAfaSV

3MdKRrZ6XltwXeG4YauInuDP6MqYGtSW1RHYDqAe9e
yH2UZZCTEZFeMHAgKDavRK0fd3InxoznQPYrlmSypTlqLP3TVOOuUWVdO1InCCYlwHNbszHtQHouOrNf

JMZgDNguVE3Wf5OmjTErJTKbNLLkWCKDBSh+Rg9ZPR7xh0FhMCwjWXSsUT8zhd4BJsP7MBPyVIbhUDW4

CxKbYdcnZDjrVEL2DrU2CnXlQOP7AWk8LYBuADYwHt
F2DUO1EYA1YQJaUfX9AJV5QEO9DRGfVjwfMKF7GdR6SeOjWXk6SHC5EBC2BvBhPBn3MFX4WLK9JjKfNM

b8IKO7QJU3XcSlIjrnTXW3KVB1CQfqGJx1UOmoQjL7MUZmIAB3FMLeFtWkTTZ5TQU2RmSsMgW9SKLzZx

U6ZaXgIaBmGOx3ErUvQTzfIKL6XEK9GNK9JPNjYSR0
BKu5MSP9TBpxVvH1KWk9UOR1BKQlYELfJRQ0OmD0DeLrSnB9UNCrDDRnCeC2DLMbBkV1JNugDHyfQnoy

QKL5TAG2PLC9IRarXHW0RCDxHEL3RzcmYeVqIAP7LGW7LYnwUCTgKBG9VpL3UWakJgWxDMF6JSYxYGCj

JDF8ILD5FKW4OPXxJnC8DAFuUIM5CoYnQnC9PMQ6KA
Y7VMYgKos3LFJeVJR1NXTwXbo8LSE2GkAWZiTrYDS7HdyaFbH6QEGwQhXnFhjzGxV2DYVlIcM4WVXgDf

U8ICK4UwH1BVBlJrZ0EDP9WyX3TEJgHiX7GTW0BxB7OXJvNxH3RHK6TfO4PKZtPfH8IUQ8LfU4XRUrKb

O5LJR0UsL5RNDmFeS9QKB5RtX9PHBbHpE8IY2SPyP4
LED4DoE4UNJtTxD3LOO2TaA1XHQwZyU4NOA4MjH1ABOzZnv7IZGiZlW6PkFkWEd2TMR1QbF6VeCoNlN6

EIR5JGG5FfZgLFe3MZQvJZB0GYNeMtU1EFSbCEV6EzEeEUU4IMW3OYN2BFrnSLV8JKHqKCUjVFZcUJx2

FEAkPYE5EPB8GUSmZCEdJlz4BAI9LrW7ZHZyDMJfIY
z3CWF8HvapSXP4ZBgnKxE9QVZyNzOgNJchOxK4PPDwNsXiSKofWLT9RnBjDJXlHMOaZyD2OwJsISWtWF

JtQeQgVYnvSvT8TFSuBzG0PsneYphlORa9ROPKRnb8PKV1VOjzUkN1DWf3SXQ7HDmgCtY9WUnjAjF6Vz

DoLpFuISqlGyC7PSwzOxU9FWKqWBS8SapkHEM8SMKn
GAZ0MxciATK8YDBzRHT6DlzaPKtrLRX5LLR3ZCLuMPImSIC1DNMnGRZxOhu7JNH9FFGmFFVeIVshTJ9R

RgV3VBJiSQC3OKCiKcPeECZiOQI3UVEoKmN7SEMzHXK6TLXfGbX0EJAmHZB1BGRrLNJ7YUQlBTC5Aowe

DTSNBqJeVV1tug0DYjXtFBFjZpvNZlr1OGrfHY9QvW
LqF8LysrDYIFOglugzsA3cTAzoQG9In384ZxCkKV9YLVsbBTIUGWAwvDKDOgSuO7HzC0PqiQFdHHu0Q9

MtzFvmjDpdgAFgRSw4X7Zcn2PpuyZgMPZ3ZS0AKWPsFssjK4YrTkJTAyRdI6UekgGOAb53CJmaFR4mYG

HuCFBoMKRwEEm7DA3XYQWjJPQgzSqcHY0vuFRtKZ2O
dGVtViAwDQo+Oi5UQL1ld3GyKXzcMLRoDT6swf7IEXqZJxUpO1H1cCDkOt9gyI0KhPG9rDVuP8CosPAX

sFBjU4Uaa3QZu177G0YmS05iAYpjDk3qTF7JuqLbFHfzZe5KaA8MlvZsFB6rr2KlxkmEJlOaN7CcnkY3

U6nbhlPgXU9DCNV1I4crrcOvFSIOBjNyD7adYGClji
QjVWIuSBEYYhOlM9QoxmSLYOAymndccY1eZNHuYXJdOe3CBc6NJaKjMN4cmv8LGeYlHASmGdoVHsohLZ

NpSMQhAQKnPwCtMvw5JWS5YiE7SMDmDWI3SEngUYX5SDRyPuAcXAHxWhDlTSIaZHR8QCA8OHUyNMjxIM

LuPGQxZvLkALZfJBJ5NHOxYMS3MydiMFY0PCJsHAU8
CrmpPTK3UZRuDDR6GtnyPNO5DKSePcDiPRhhVqk0SE6COsr1XSP3FHZ1UDrwSBR4JCX9GEA6TeIuZbS6

KZtiJAB7JJRaZKj9KZH2WyS9QWGjKkZ8AZS2RvY7GXZxEGfeFBk7TTA3ACynUkK5DHL2UTT3FTJzQQk9

YULdOAQ0OlWyDup9MGPuJFE2VvRmAXesMYY0VtW3Mj
XfIRimTJZsBsm7HTBkXvF2ENAeBEH5ZOI3CJR8PHtyLJu2OCL1TvZ7TOwoOWXkJHLjEmA0JYtkUPA0BI

L4UeVcMJAhSWa4WSG0XuJ5RcSbRLW8KVQ1HpM2EPmmXAt5WXQ0TIL8SqNzXjT0LVP2AlO4FncoHnTiQE

Q7RFTFSrY9FCG9ZQFzXeVlZCHvYNVhTFLdCjn2FTLp
GVA1CGTgNtJ0ZWD4YiF0CYUtRgR1ZKA1MuB7GIIuBlE0AQQ5YmO1HVQgToQ2YBQ5JmS7ZFMwMfV4OCJ6

RrP8BXAvUhM9TZX5PuA1CACfWrL7USH9JuE7XBKaYxB3XI6JUgT6XLN3EaI1VJGhYnY8BHJ5YaI2ZOWy

GrY0WNL4BtB9BUCtBab0XCK5VLQ5KizeNQG9JGS1Wr
V2KdohBpJ5HOU6KSU5OCNaPRprOYY8WeF2WAGrRln0UFQtYRO5FRVvIHF0WVU7MsY8ZOUdDhP3PAL6Fo

UvDBDfBMn5IoanAHO2LPHwWuKzSNGiTrUtDDUsNVB5PDJpMIL9ATmhXIR8TOPhRMI1XZI0GVH6MXAvXf

G8JAX3IZY0PIIsWhP8EHx7MMR7VSolQHD8RUTqCqD3
AXIpTWW6WCH7ExLcEzXjGpU9SLO8PdK5ZCYfRbZ9EXl5CEXTXsm2YZO3MFCcDmX6BPd9BZI3RZGyEoJ2

QLP0XxS2DBcfVew1CPN3PuJ0DJCkMSDmNHZ5XOU3YSenYPC1WMFqOWV9GPkmWBJ3LBZdQPO5PfUbTOTr

NODnJfH8QpUvWIWxDALpHlZjZLNtVkSjJCS1JiQhLI
YoUON8LD1VGCE8WUA4CnA1FEPdUVq9LYK1MyB8ZIEsMfk8PGB7DuJ2LrQiBTA1SAA3EpG8OsQhEWi1JP

T2GRN5PBHjDTRXWbQdTI0bon3QCxMkGMNePouOLdt2CWpxBG8UtSTrY1HycfKNZQOdpptdbA0vQZewSR

5Ao789WkUpKG4SLUffQLRJQZpcPL9Fi5AfafHcKJL0
KY9HNSSBHBofeNHdYBO4RR1VUVFeMO78QZ8kYCJVNrTlT8MnXFgvFBHvXCqtKJ1Qh211RaFqwJEwQGMi

BeEdEGb0TBWnBWi4AY7KBXLiWJHeiXoaZG9zyLUrIV0VoIZnZpHkQGd+Ot5EMR2pl4EoBNmjGPIhUB0s

ph9HJWnFEgEuL5W4hCOdKz5bvG2TjJG0zAJsG0HfrW
QXoWIjN3Ywm6XCx740F6CkG10dAOaoCD2et3IusyrfF6krLD3ybHMuH48emN8gWKasKU7NiFFnwVYfHO

FaBfSaLSQrbGWdTKAnQsS0IXslZX5OpRL4vWRjKtBoBJJZFSuxRZ0Yf663QXUeK3CzmXVnpcZnVrXsWR

INCj4+AYcyhbIfKebIOkF1PEZjj5ZaZCjaQXa2N5Bs
hQIcqcBbJzshuZVIOISyTSJcM3wetds5tBRfEbGwHpMzCFJmV0UmCSXeRPF2LH9+TLsnTJN1dyEhjL8U

HBAqhYa6TQC315ocZeQOXHRHSqWYoFuLJeGWzFPKvHWHHSUFNKUjfWWcSc8IVAQlL3EpuFiZGDCH5WLP

l5Po3QFVJBOFBumk4Nj5nNAmJj0WRGP+97u2v8F83H
z33ufz/rDxe0/lbmsq7YmTcoaBqceaeE5gxp5hrdf+MK///SaQTmt5LYpMIXlV2yT/BLS/ZfzskiONCz

UiKQF+v47tF2rODbGx+IDZKMmC7GNTbgzeTEUVMOxT5vxr9vgGaiVri7wxd8Q15vO9uUvkhUY2FGLfr9

M8OlgPL+i7OO6iajyY/0S8v+RY10kYvbNd7fV6La4C
vD36gFxfCN65xQj/7NRbbxw/nam27l4rbM4a41gifgUe+a+/oWnmz+TQMYGnqr1wknHw8iiflVv3flGh

lzSVhEMMOdDH1TuZeSxlmDhJi9e+o3gbmF4x/DX4EoRI9qxSY4+Ax2VCfJA2U1I7tDiPJuqcSKqNfh1x

OM/Mm5qQrQkXMRXnOE9J05V9SxMn+htgqJUN6ezbaS
osDzK/LWouQoX0K8eA+T4kLnX5qlbYXgfSc+idhSiOQ6axBjXPr1UwnvZ/0AKaQOf/vY9/v/9r4PV4r+

1+AqjZyVyQFm06A1Yvm9ZEmv8H39nmS/8+x09WzYtoBwgT5v3ofnNF9WY9ciMtElV9h7FZNVBUv1FRyO

oR968q2IpTksp9O94YNaigB7EsjMr9FaYFMXAO+nkD
MBx8N8AsWoEKywFMF4j1PJZob7XentEQPdGxx+qniPkmQYXsvbrcMfBH7Ct//upd2VhvKYrqI11IrUpd

Jt0p/sU3JXjwelu/1cukRV9onhYjtkUqaGnKXEivNrAg+lgFVCd0XliQA5frBgeWe96Jb+ajAY3G5Nas

UlRr0b11m8In3mIvrHsupZBrO/pbXTEi60R3yr73sI
/8/li31dff73rlc0//h7/RNFpN36fvmRgDWxZUr6TCkLSH0yj6E1bievc0Qby0J3La0Ivp21wScQTilc

x9LzrrkM1/NimqDfUlAaXYO4wrvmjRrXssRlq8PDn4ak4AS7Wu6GKp4LrCkSYCOXbzZUUqv2Rzxoc8hZ

4Szx/G0mmhQQ4y80gnz+J5RDw/Ec+NoMYu1yqIlM7Q
zzfE82/itL3674yrm+Z9Hmq8aYnyxB6XR0urRxZsPw3o5kvUDGuGd6Y0pfvhDdA5BMsQw+e60coSXGbG

3yAqvJzJTiCBaQ6ee2MtNLErvl1Y11y+6qtb5Tbk2WejZpv6+x4ec++Md8+9s9Xb/2C64sj0hfkDO+qt

sTs4Qh8916T227yXcI3oELmszyWivJWyF0ww9JEGt/
7Y37z1jo2zmCXJVNZNPZR0KHBtRmC7mSp3shL1rMJV4d5vrCpHrgJfoIjr1xCyx3s0iwhvi6KSPJuWh7

jOrFO/3+5ul9H/4zkgL6v/T/8Eg50290eM5+WfzNOjky3IQbN2yC/UbV4EqG/b/0UpCyFNpatpAyCbn+

34oYsXRUu+7DgYaCe9ftd3a++ETeSYaHyf8Oex9M83
6mvF6/b3K91X92ttMI5j9krodimuvk+enJzh+3JK21nvOmK20IxS7qUpLz69XdOeAB5Qg/jDgGo1wHz5

BGJefkX2+j2r2EzdC2kMhepRKWNCIz1+uCwxw/d+y5YZ3+5OcipUXoQzCv1DYOzDuznilGnsD1TFjJbW

pW4SRjd59UyagfGRS1rwnRkD/VPz+WpgN7yh+9FmDm
d7O6AhdHr0TcTZlScUykx6klc/rzsPYMLvJag9ulG0vi3U1w5n4NMhn4SmpgE05/FTuA1U78He6WS0Fk

D3GpjOZtApW3Q2Bkk3h38LHS9fPFkGoFreDssrIBsundQ6sY+6Z/ejW/17G2z77acPP4sbdq36G6LG1m

AepjerjPeZCcYO95hMlN2ZeJSdvupyVdoKPpskhTkE
nFmyMIMnM4Hf9c3vSzYgPW6Gkad50+6BqHzlfLu07CY5jyGLtjaoDALrMQFkQb5ySRxFXynfGuwBSdr9

XOvOYi8y4O3Mx0jAlbbkvyj0zFyjqkjttoS9yT8zyh2zVSMpuLWrJ1BgCzGVYIzAM+bSSJx0uBNDbI9L

DkTNPgOvxsEWQ9eDeVfdv2E+CCFFFXyki7i9xJC7jP
pJipIaIX+NYSm7ocDP/zftRJJgBR5qjFapYeIg1FzMH9tNMkxarWAr3t92CWdmzjeuPvPf2euVXb+mXb

0Eiskgybhg0c32eKQiZj08GicWABXYpnJPqcZLgbp0PwllYS/ySiLuWEMa373h/Xl5hgAZQeAZTO4taE

iAwFNt7XNB2M89Fp97XoKjD1B/boTAXkNCAAvr2hIn
XijabJfykRB8fE3s9jcF1tkap3sTfZKHhIKsuralVhqHdytsB6qlfsm9FEHmxmXTBlybnxQ2qCvU7KNC

AAwGDeesBtB8tOzbm6yHIu8vnlHjcUGvXP+C1wslj2JiqffexlR1tyVbyXeuTVxqbYdsNTSGTzEOJHDV

mBx2zdb94KDAns4y0hG2dCoBnpeytuvJf4vOObbRjZ
eC1Cl3gglq9Hro+U028yHP3Ne8dPZpF+POAOXJR66w/RavhknVuNzUdkChxivBkxT2v0jAyTx+uJKz7S

EsbG1xsi2nOkAvuAe2+wh/g1LnqQLneVil0T5FwHR40v4N4Ju0sR2sWrfnS7wFIBWN/ayrr3HzdgXlzs

/LguGbCuF6MWatRSe1dyMKrmbcrVzGH5vdixhr8mAg
TOHVLaR+VNQoxkdVMvWI0YAdBmFTIqTFww/fxPoaN0sH/Ov7DTR23J4vr6QXgNpXpdW/LWjTWWB8K/C3

sW0AbxHgqy9EUJU4sR8TxtPWUTevREDqGUeGHXvLWIlGC3ngIO4qEBYqPBHgVcwRausnD/d6PsWxOnYj

D36Ye8r843KOscInNZEO2oQAhCWAhAevJWRtbkP3H5
qzpGYdkpolVSj/LqWV3wg1lEge26FYiw9w1cLwkcbIDKOTkPPzvjj4iQEtE6GoPTnR4Wl2fJGU+rvUnl

O0ub8i67QNT86AtG1wFP7hBv3+4KIrpNKAZi4UNh1j78Agn5SqOhYFrdnX4UX0SHVm+dYDB3M9/SaaJX

9AX3fDoMaKY4iYZG9LUWFocZVIFqsCP4QU5jGUntJW
8mNACCnPc2SyiCgEvSNvs+d43aggloxbqtPRPjgw5ze8mMv5tNmiwI3Z7lYTO73miEc2umXQDn0jUPqJ

ceomhO+nOgfV5g2RbZ0C26G3Pl1DaA5hr5WHZ74+xXLICvtxZfOOf6GuAMnzvsZ5C81ZSQ9N0nh9hESM

iZ9obYERSP99vDClHm+8Y4dgo2gSfnj7XvjQbmW0i8
2+yq/UYwLpyczEVj8GCv+nk/ch/2XijvrET4GkrNtgpagIGgEd4Hr8LJ1M2wK+saZK9xpchnza7clArq

Ff9XibKWK/9uqnEEijbJ3gMWe+odMpVYeHzPNCkGLUgoog1O5gyirZrKFymxfln8USMkgw8K1Wp4owcJ

5xS2cEmrQuSt9U7eobBcdK327FVXYwg3TMTC9wAlNl
iGR68v4XhTDA4lGhTj6JirBlEtk7ecDkQKU/oeMDoAa57uFmA2OZOnJjPGu0C+eByoqDneTh7YIsj/qK

83UHS6tqNLUuvLNAI5Te1iq1QKuRJvnbuJk7O8+9NKF+H8qdJC+baZz9VBRMdReMUM2NzaobHPcT+WI8

oKzpmvU9zKrJrOTNH4E5Qeqr6Un0RVnKE1KAwmaG02
I/aAz9gGcCFHuyHSfiLB8zGo5Qa1/LGTdcTwvjgjIIulZuaVYL1wH46Wtfe/aKf5WOzHhDzX0ifbK9PP

8+Fwn4VpjNS/eyY4BvShSHlBH1D+EeqT1Gsj85FtEhBRFn1NKuVJueE9GJ+adNJL/xztjJHjCTygsq46

0G3jahJ1yXye/FGczuQ3N9UPlLyAX4PxJUu/xgjop5
AqosJa+Qq1cwX41+gPiyalNnRJ78CWExfoFqGuQqT9QxyucASwiixXe0WLucpKTmwG/edyyEef32U2C0

RrU15zJAv6G8GO10In0ro7jBTFGFjvc9vPEnnfZWV42+weEx9g0hCF3by0NDzUtRy34bMWaWpn5/PejH

2VevK35vnqq8Lfn31JmCm532dt4hW/RvWL+T56s2Yx
3IusmRL+hA3pVaX+A50uptDhfQvPQ+lXnfG36g/7MOUQ/DRZor1vdFnW1tQzianPHB9ODB38+5B/1d7m

F+WIjgHd2fg8ydJL7jCw3FlBpwLR3B34ksm+8C7yJgmY0DQNAHQi0fRTR68XlGMzm8cWsaCZxFfsNjcf

5nqP68b01zeaWR6suzxiZ/WjfU1uP8XrvjEmvLJdIV
8xg5GLTH9AE3kbtSnABJjXlrHkbgWoL1TJi44QK0Ot0d8BhVgjj5YR5OieXo5Zni/B3GD8GdzoRcsTkC

Qs4hw+hq6GdThleidIxasx5eas29MQtZPtyXyff232M35NdTp1IKIq6G/948ohZAvuoRVm/9psNAsa4S

aa8bTh/qkdQ3eCN+HPt1QVgZmIMratTN3+bYtB3VGq
HpjVI3TwschqEy31BWgPQplqwPcEiM+hNCViM3I5oezw/sayUNe6ZiTS8QKmVK3jcs3RawdN0qsrmIOE

Nqbz5yDQOjRl4DkhkZLQ9D6/DAqL7XRsHHu9SLgo5FBcKU8fNKgXDFfNifkwEZKEa+J8yRGkoffVNr0q

Ko9U8qtjRfr89Zb6DfXd9LYm78O8aOeGf3pnDSACp2
9se27aptxAcvBIojfa2zQf+dP2AVGwBT8Y1KywFS+FvnpGWtgp3mef4yEx0Vj11GzyCQya95GX1+CqFN

QELUQ076wJx6jFg7ej8pfK9IUrolL31ai8pJVicA3wTS1gPuQ70mdpTFlsyDEJ+IpsuEbtck6rok7HDQ

IPA2C8Q6Vip2ZL06oMXmvhsJtNgw2wW/rZUTmC/9LH
hcOXR98R8YhNcW7KZbL64pHOkXKNdcXRRhOR4aWqQFC+Taina/KfxBd2ftO5mJZ3CEpwdDdfamQs7rhQ+5

ah7MHsC+J9D2lK07jcwdO8+pneCaL6hYPoyFne0PFNEO+20WrvCrB0qKZ8US8b2x48EzHOG40aKOpNcc

IjBoceDSoP8Qsqszqml0G9YeSlhHhqxNJ6Cmfh4ThY
EjXWOmVDPR9Q7290GD5DNrN2aigraZkUrmc5I1RxuUcG6vFiiC3adc3P5Vdpey1jfQD9EZ+WvjIE7Bgk

i9PdfoHqALMS8DQbvU82KR3PH3Kcrd5PfUseLBiEeVDziumK4D6bWR7d8ajorRk4jaGePwewmiTaGhdP

qqcrel2SfCxEAHhT6PjEenoamrvcX43NgmjtleMWaG
00WI3G5AehmTOzMkTkV/rysX3L0eApD5da0uVYoLz984xQ/W9yfSu0ejGb4dC2ifRXnabxKZmtesdgtq

3cspjxfRAfh4g/XsxKwmLCsT6wDtKRjL6jcVGdSvOd+V4O8TG5uIQZUVibTiZsATuCpGaqP1C05H3TLf

BkIhTd0c8Z7hfzUaXJWugx1vOJiRPzLRbnYEKmHx2B
Tfvv7rs8YucwrKd8q5TZ4oBVY4T3y0N1GoLULJZPpe8m6BQHPIktPdT3sCP2G9HCyR2ZiZR8luREDplW

f8buKOaFJNvgl9OLOU9yMK2vKtobkjdl/voqm7/Wwx4w7BgtmFaYCLL8BfVq7vl3bPjSD5Z23o85nxgp

CGml66TBUTZZ35AiC/XU6XS1njMuEXbhUV48PbRd4Y
qSehjj/3Kw20kSelx2YqpAA8UvVz/tXyH/W9XV2O/lfUbgENhG9hBKfZOYPQmalVoUYfrQJ9/sBR8j85

LQdHBizMbP0V9hYCs3B+Mhaecq7oVXJMiwjOKQiNy2cvBhMqTdPmaFwiO3MJ0mLUjtGBsyS7zJBhU1V4

jVVKPeCzs+GTRYR01ZaRKrJ5HPMNw+ns1sTIzBIJV3
GhE3Jlyseu4ec6ebXZFZRtCUEORu35IqBSu01buceqpyuin326iD3VuW88l5l5psIue2e5etabcOI04i

BAZ4KXyN2Ei7dM+213XW+dAoLXVeX7dR2P9H5dsJ4+I9zGJ7/7ixMB3OnA4m/ela0ymzTV4RJUk9CWC5

7nVKoXMzhvtqprAnQHcYEmAiiDgst4K66SiljXZTvN
NqowG1slbGLV3EUKSfLdvXrRZeP/8q0S/BxQNC9NJ0l4BRt43KY7QK0mr4vF/rMQcbd1FrJ6r9DHvhA1

IeM7ZqATZMPA7bT+ENhxTVyyIL2PP9z2rKmu9ksX7B09YRZe9m114a8NJ1EUMvhxJB3YEqxyUW8SFWOQ

Ri/9BTbh7W2NfRmR0sW/MBOcZcL+qOFWF0zG7DGjph
/Yqksw82xVZKN8WamlCtFYBtShkKKM8t6ZBt8xAoO6bYjA31eBPzPA4WBO4t8XVYU2UZ6bebiHogxOFs

4fsRvLlbSzV7NS8nZsk7h1ljO3ZnhZwniQXR4CpQVBntLY4TucDNf5LlqoEVvl06XTHPNPP/JlyX01XP

b6jYQ57RaHD/9uXLj3Pws5JaRKQap7ZHt+7eWA1OGA
gDJsv0DumzaAZFOxIBR7vIm0QrSIuL4oSsib4KrLExhRI6qeKPdkduR2dHMOB8Axt7gJI/rdVA/gooXq

coyK71hJzF8hL+LyLmCGXnT8Z8dsssRTZxxa7OicRfXdoV862W3CKqZwOcceTjgDL3Dqc+mF+UiAV3jj

uWfweFougfo4kRBzlElKn8QQx041S1ONxv238IXR7u
p5soh7ET/NW5s7Dtejv/7RFDnMgxebwT7LxFwApySvBr76Fw0fByNKNfm+ZfHur4JT3u68R/We80ArBd

vjgTWKMjvxZDXRzK+zU+0+BuOdMlWoeAnb6i8DW4z6Y9z5Y1Fip40Hby3D5YVG0545QHbWD0GnxHlphU

50gavM5m+CoIxA9kFtjUfCcD64apDumdWmtf/g4FwJ
MkS3HzSaI/t5TeC6m01vJDpCnEqC4dif+h9xsL1v/5QmRw2dH22f7U5tTkRg1mqhFhnRqhqV1dNLcy67

jiWofC0hEp6c7ZLDzFgrHfiFxQqKYtygPq0okmVnfewhU9t7uKw+w7+7Vol2OSa0YCCJcB7jp/MSTMIy

ZS0LqjzhINBCmae7C7lIqV71iPe0+h6t0rJnSDdS3T
7JHTt5nXa1LEUMd2ZowChj0pPVpxU6hqeNxFypCMDDP+EqK5rJqh/adeR+PcXSHHrPO9CUeJA5afPYV8

93MraIhVSDRwgR4+ixlrnQhgG4nnJBHw0x+Qh/CBcQqNzEjwnIfcsRJ658qeth3+ly0UyNc/WV408PX8

Jpaa9qExl4ZS56+THzbDihWx57LHxHcCg19CRKIqdM
i34tRghV83GcD1WBv5NSUGlF1mILHW6k5so8Gt+i8tbf6QQhise6CBnuHsyJ4qqHRX7KM7Okptz6MK6X

6C1J6xpQ7OhH5b1tI8tcQi1vHASmAqpkPXWyvkJu0FC90l3jUdOGdTWoMU3AdajVgn7oPCwBCJdHu8Dq

AkrKVs+0aSxvTemaaZqXwYHBs4JM4uFm/ifLVarK9+
mA79SFd9C8e/yK/JFGvjoIdH8HvGq+AsybXovhJZfGrs14N9Ec5YF1qb3rCMqCjZggi/O7N0wPDMJ0rH

y8iLeOJuL4QuGtFce6PDy3krpY7EOVuOhThB8vHwlamcFtW/G3uquwIW0qYT1ecMKpDMOVHCLPtjhxkF

4+6D/60snF9xz9JqJHstktaUF7m/pJysB5Q7WxxaXH
6WLyW1QEHwlawtGSais4xV48jdeCDeC77hFh2Jl5DEnBjbeC7ro6f6ldiw1LGGbCzmi75VBslc+1TXHb

SuAK6IeeFRE55lVKmqjlhe7/SCa1Fh1bquh4T35n0lBW+VULGxI5pp3sljldDpVk/DfEHYUgvNk/ohs0

8sFyOB6itElq0qq1WS9r6Yf5NghOlIb9eOhCxohIeA
udw8QWB10U4pk+0gL+8rE0IXi6w0G9M8EoqW7EkgXjAPHWe1hzx+IOxXfu/NrvNU7pl83w/DD6I0x78H

AZ/Ret5G1vW9J857Wgt1G/L2C9aQ0zDJjWNUs0xwlTbCG5MrsZvPrYxKmARTchLe4B0FzpKstydIEEmM

OcYyrpy63Ae/L0a0D3uPM/ya0cDLDaHr7H8PB15Lhr
mtBcYAseZvHB+yt+U82mj1GoaG1ufw3xAtgbQMUQWirNdjCoHPycMPqgRC6QQ6/5pkn7KQxxLW/0FfyK

CngC+OE2D89KrM5igHidS0wP5084QZgnUGamPVi3CaV0bIWEIdOOJPq4N354IgF1d+SG+8s2fWadNSMY

tX0mec37uKgtm59TCYx71l/kCAXUK0JbTbMxULrGe5
8F2TvrZR0zERm+uuiAvgP8cS13d04/jxaIiA6JKg0nDvtiFKm3BU1QosIvN0Y+anMIpxrCnH5hHTJPB9

Rqh2NyKibpZmUTKs08OVqK3YMaB8cZ5VMDOgc0rr2akzfARJsrjiNY/TcdwMwUOlRXWFhmuFdBPKelz4

Y4cxu8ma/cjhqxXgFYcKR2G/yPoHwEncToTf0oN/Fv
A72mk7/TYQrUL8Ny3b4aYywNr78JHvZpsJiA7ZxhZjGCKHmXLA8AsTNWCD8xbqB7bPlokoE3cvYRdAOI

NB3KVn7eYooRdSePgD5T4Yfu+C9tTwjH+Bda/E7BawMsCIxp8lc+x3KPON+dS6Ns5Q+6x4pqJ+7k9RBj

0u+zydfRAhwI4aikRSBK0xJ0vedYkaG5e5A/gK9ul0
wUlFBFb1fnAiY1XHEEk0UCBdCJxctiJk2BUxG6UY3Z5hA6a+BvexfedrMAPj+7CNPaEAfzz+hq6V33iY

+5T59AJ0kTe4s57kDEwOHn4eQXUyg1F+lj2ek+VRTCMpbxIKKkyjK5k/Q/l0MnvKwcIIojGTbv9FCLB2

abisnY+vgFUuZmsDC1XCt+eWboWBHi6DFtyaF8s2SX
7HNk10YTGZcXCDhGXgt2Znqy5Oy4Xe+G+3gAvK73xJ/QOfzqFvkG1ihNOe4mFEWaOQwOzQdL9P9nGOUX

DvsUCX2+hro+VaZY8K6SwubT5frRzXCkIZGBtdoXHlNerZy2zkTPCBZ3jhPxU+4Bu+Ksg2STbgoxgHPK

z/vxc7LutYI8XrO+gEz1KotGxU5oLBwWkDG+v0qKXL
lgI4875J67f6Ma6jiVHP8RFfGpS/9MTv9AQGl/PNz/D5Fjp/jpgoVi2ltH4Her/kFmNceYZeiqwzwue8

au9Mmzz2BBrZ6MysBTyuiKdjdMYhGVOLpjnTG9UTp84TF3emm+232MN2xDnrcMs2HdN+Tjs3J2LpC1Bv

3Hn0GTJ7Bb7rhJ6Jm1F3gutXMREq6N+eFlTUe0VGxh
+lc6FKgolbWa/pVGAvV7Mr6IOhlXompAMzl/7B4HuRWe1oQuEUnR5YuwZ63Tn6KT/eFva/G9Pt8UY/0z

kiCfO67R+1GQVJdKSxsGJJuMa4JiM9Pqgq5jnJdD/n+F9ALI4Y7iI0tqkc36TOHBQzVfzB/cVZG5/9O+

+5fyYD+6rwZ28H8gWVCHGm/WeoF38aijLrOw4Cefc+
qDabvtL+DDxAX+pXpAX6Lo56HtYo6IF5GN0wR5v3snsRA9YX7CN95aKZQn1f0ldsrMd+qgnE07jRoFJf

INqiOKMQaC2yEu+K1niEA1LS+RK/tzft7+OXsf42SMqfL2ca/nU8xSNbG8E3g5u3f/wXgKj4sJjH+/FF

qWu8WX0vHjFQKMs6SF3e8tZiqM9yUyiCU3ZzGEy9gm
EOkwabzQWv8lFhM5POBPVm08JVjfmyyxLtR8AymmBs8LnVFhkNqz4whwg6BL7uX+OWXKr79V4cttbR8v

WM5FrMSbeMn40smEyjFBfR2eHoXdMwxtwzQNnEdeddtWA+EyuL44lkXEcN3QSfxY6+0d6/vuutj0Oy1Z

YDdlwuqHQre8KfwfIu8F4WQAhzKrHGSsULmUbSjNcY
BdCqnphxj8WI7J6/GqYvnqW6nu5Yn8OnkTBjhnft1qR208KeVGx/9DLe+5YqiT6ymQOM7WjYa/baQY5H

UkCrEO+CAsL9C/QDW8N20RTCVdZx3Lo+lQRhKSqd4nWQC3etfJQg2ghRXVaRwgmF392pd5OTdUOT93l6

9XiE1dw7Ghmve81WCceD43Y1j1j4xCsxhC7k6D+TZ+
2YcGm0oIS5T7K1owE+4GAIBLPmkhNgMysVH0difR4cCpdoseOegW/EUuT5twtoRX+32kFg1tz0Yv6gFq

h2vi9wqvYfKV4cyniiovA2g3I+Jj9p4esSZjW2bGpW6DfD7ktqTGBq1sfBYnV+s7+rYU0YkvBtIt0ZoV

DUJQqxz5twaEYCVR27po33nnI8sXXHj/8JKXcMkCLi
nMUr2nnoMQAcU9kOAyq3NsWllDlwUsjpurcCju57O/rr1NViny9hY9VmWPzO6Ur/Oc6LIfWTpnVm1p4h

1N9J44llWpr6yFJtziZhN/nYI4L1f9bOxuwOHt5hwbZyBzm6P2/zn+G+fYOceL8CMr5vRdfK11U5/Q2S

CamVegMPWFSGhMp/h/6fdA2frAlJF7lhHUjcj0il/S
nWGfVvGBd+LzkT+4sq+/9V323rDmqsCK3StRTUuC3ABnXxq8V2MzYkGhZYZScqUQTxoWta4idlPsDwM7

gFCTp0U8THNH+v/sNF7rrCDAu4IapqLYAeJyI81L6fS90YP2fatSPsTWII1gA+Ce6MgXhyfVbuCDR2Lf

y5RY7HPQ8qbs9SA31Uk4AUJhhOn6+5nL88/u0ieYbo
8dDmThpLyEBXG10mUS/oL06mGid353q4f3pwovDM253vOV9EzzGMrEE/VduccEFykTKqwyoSw9ZoOzsn

Z2X6iyeyr6v9ORWjd7AjMvD7g9utRe6rPxQC94Rj7bLKFX6AH+6p2tT4BEWOHTcboxqx9Jw1yHiYNbcb

E+ZT6EVRLNSBxifbxA6Snih9UFTqGBLeU0gbqfZPw6
pJ6PySId93pCf0KIBSlO+WGrS0S5A+aIyiw6d9FbaVhrDYj+MP8W4E9zEUrO4VTo0yE6pIeOU43uJ846

DbzePA/e+Ug1TGPSxmedC2iOVqvy6ibyKBUMx8sUbDZG4O03/hjonAAm7pzNC9dcp4seARpPEJs9OoKn

9Z8SfX8KBIFd6Bg7VbjgdzTQ2rbsi5WK8IsNLXtFqz
uS/1VrAEM184u6WRdk6K+uvR4vglr6z4F5p3sLBVGMg7Aeaceol6WuHtzfHzBI0QaEwjnugmfWZpcBQg

l3v2tXUcTNNywFwn83W5qKPJ9pA/r2tP3N1Slkou5Dap2Jw6fMNXkM7UgJnZOmt5JbDt/izfmU+lTuKe

svKvl67E061EE+wburou4J1pUSc2S29TWh84Wkw4M7
MegNEXs2kVPempe8JZr7fbyNcyq37w5i+TYqU99ZJpjsP9y+6VA8HIdDmmRiyxIRi+G7v78exb57si+7

PYTkXcWGCc+A5FkWzNUzl2vcXolSob0713sV222W/NQ+S1qVy8Xb15OS5+k4yIs0RbPVfw1frufdeE3e

7UpB4Yhu/uBL0Te/1CrIc0JFLXIV1MRIdV4MGJCftf
nOHC+7BP3PzUPny7ym6gW3a840XMRMQrhrTUxN0scY+bp3j5O82lt8zEO3K4nbVIjF5HR4C2n6az/8+z

9/5jPAPgkaR90EMb+u1apz+0tbCSdQ7Wc+d15zvn/X0qHsaDDf9rqfJ2zmKCzymlTHYgQfq9wla/9nlb

5AfvDN700uXooXE8xcN3gM5ZE870vp+Vy4XPBqeqb9
Xy+l+L3xeRxUu+epeObf3rRP6Ddkl69BI3DlQZggi8GTH/109MArFcIiKK98DjydhiR70vt+ckymppI5

4RfrZ+Mb7C/SxDwfltzblNgBJCAXOCTV/F/QDxSe88unRew67P2LsMjsUBXcA8BK32Bk3fGD7KJE9I4f

U2IoTVowRGROfNrn7e9Nq5PUeT4UkiJ/Xt7w4J29XR
RDraSPxYquiHhP3d98fJ89GEvXygI0FyqA1Clrt/aE5cGS7v9Q0uEGgnQdwrmpsljTIrV+tqfTq0RLuv

3cgkGw6WInB4uG0+F6AW+0vGxqJi6tcoKMeLAGtljgzm7ZRcfMnM5mssJsSCmAeD5UrJWmdCgHKGi/XG

fF9k3QQ/wa851jrScPW3G+f1PfHPWancGoOP0CbeMh
J1CWQJE46GuOI3HB78+TnyY8AOfQy7d1cAszW8a+xgjFjhhnaO9c8klDwPpE7koWpJvf3GznbSXJN70w

W42GnUZ1ucXM243hHoKNt4Os18sxvPdO2K4z0g8E2J732hQzmw3DLMXpWj0317N4M27djvA+uwzv+dZm

fX2GcPsEa15EAB2rk8FsZxgTt5zOLRCpebwrF8avkq
jGZ2ztpD2SanlKi8JlyuPG27HQYhcXwOkU0tH3LQWhh0Bi3HesM1A7s5BE/RPU2GBT/5YQauI60IK5V+

P00FeXC6P8ZOhuebJtijXP2RmZ+vWBbMvNzVUMg10c6hnPN78gXY6F/X18o7UwLZ6Do9V9j4GIvW4c7R

n5XF6DuEz2F53TlPbSLDWJiVvX1Sz+jftmjrbtqgZd
hAaU5zc5HeEahep3VCbUR3jxgzi8Ev7oF11bY/vx6pcTi5o8IJl5VsgYZ0roIdWmKj+FT1MuoY/TE2C2

xQsx89DDY0Ppf93QuaxWLn1I/sk8KOtfGHo39Pwt9auu3Cj5F+vBptfw/0izu4QGorrUrNj/a+D1oEzA

NMyl6C7mlQ/+H18xTsiO9OQqt0rdoBKqac8bNC2ict
Qk3QbI45lceTLHmHGM38NgqwVnQHMSje601EdC8EDYX/9Kcd1odB019ivoayfgqJYvPYYlw9ntp83PXH

qXD7FR/QGqPaOuyr0l2mNbr/HFl4UKijkcrEXFtLobXUNa6jqajS0dnB1WOV1DS4L12W+M0MvR/yV5JH

3c6voNFlf+yivJ1QVhP3oJpDqiF/1GnV9P11S3KVSx
DJPM2fKvkHkQ47uOUvoUgyLZ7dAV38LjLhQ7ipuPkT5OgqhKWQLZcHJKFlYf1Ct/VxyFy6eC+N8qj8VT

6qcg7H6OGtQr8TfY309ANnUJyB2vKGway0O34iWeFPnpHVqrHUvV5U1KH84E2ld5Aa6ijbv77FtgecYG

qK9Vdz+v7Dg2qouMmRJI+XOUBVv8WNxJ40IpN0ISUA
10aSO3ZPTvEyxIyo1xwCw+zHHobPzsZjn/kNCLNzWgilY1uVceuyS1i7PZquDPs0HIghf8Lb8P0tBXFt

C+tL816Mf/T6tpaK1hEL6GhSFSKS3vbZ5z55Oap5xhDkanZU+kuXzzk1zVAfTwAv62DCiIgAm6ph/Danny

nag/Rz5JfSo2gzXV2db4PivL1oZ5wGA/k7mxY0sdGP
Rdx5CDC5GPtU5HIDYnrK+4FtxaIaM1Sj5VML7V78U7ioXrkAjR609E4c2Iyz/HrM1wn4h5qFJSns/D6n

ob/R5v0pP7sOi75w9kenu5jVqQ2loLVfLlyMAAQ7LnoHdM0AbE/euZWYaHhC2tGsc/Fe+FLwLwMhxteE

F4UIw7X6UQvUTQhTsuRqATNExgbGqKaTSW37Y9aGnJ
FV3Eo0K+pEA6Eo7n0dF1fu5WIbN2mHwRp5DPCC9Uk0ZTlX+PHaYP9X2B4+0deJ4VHU0jWwL2oaiBkPHM

U5uJbMGfIG1RZgGmE5mNwdCIHcMFEIqSJAotdEAoFRLXK5HTvcnKBM5Rvj71YmtAFRT/BRYW1YvZfOPq

I43ZMqR6tSJiErJ91J2cC5uix466x9AG1qTUYN9Xf3
t9PMQ0NL3OxhEOF7YtgNuKB3B0QQaJsB6Bi2MszRkQgsG9R4g8V87uG2yT06DC7hxsGkwENrofeNAFJ2

HBrVBhJgV16S0grw3RCVpGwacxKuK895HS0YgEf6TEoPTU3CHeoZ1AQ6GYNJwKLMYNZi4PLYPvU02LUN

PQ+3VhES33QAT6ZK7S+cIafIvEAdErYKthAUB9pY+9
gX8VtqGIiSmSOpsemQvmD+yHWwJFHbRSV+UqW+aeBT4A/aL2pcVOI53UqgB6/o/oAK9EYSX5y9f/lUG3

EBepBQhO4JclC6b/VW4arYc72gThKasArHDX4hY33mI3SEuBpfYYW1Ya8dORXEloY/uceBrtOgT/y6B9

JI18NCVn4uaRodlbu+sI+W7b8o+NOk0pay+OxbxPhA
3f82lDH0W87Av+uDhmt59T54zvlE3F0SSWFeEbfpsL/xLUec/2Ng1Ys0RyZSFzAoHa5tKPqcZV4x5qhS

z5MfoErAZ9R6/Qyk8fIu82L7n+lyQ3rE2SVYnjoCj4MJnXjQVY7yNesgIT6hGxzIxC0vJHb39Zk41sns

+qZfMejHqcbFqFL3tnByRhr143lu3iSMM5Fs3EEVXe
FuAA73CU7d29nr+h+9Ut04FdUx3g3wblbcXkM6JoUXQ0gtK6b+z+6qidAr/F0Bp6XKPeg8St4BgvoY6G

5lYlgPB+IUtU6Wa+M+BaZhcZ1yDiivxzoOP6xK1LgbdBA0kItdnbr+keYKHLRSmuluJvqMxVkyhfnDHa

psmXhJswP05vpj93E9/Ph+/yUhLzkZi7h0E6gXHhqL
ExUVp9XSqlQE0jO/99kIZnZ+Q1NmZKJBSPM72w91SOVlF0LEonJfsfMqbHv3GhyKsuWpLJVkLpONUi6A

D9tRk8a+p9Ks5dfcL9Cr1fXzDl6MULazuJpDjo9Pt88qqPAMhiTuy04EHTJA2t9LsnA1jxx/v8vQeePJ

253NMTWB10QsIEsQsKz6kMkkzyAOL0sYkkX+v1G7QQ
4zoL+xJzECve5bm3zWNM2WUXM/C9/hIaLFWSLv+m4cld9RWSct0Y241a6jwqZufw+JcvMZOLPKh2o4C4

fz8gkp+lDrD/xkjNzI/5P1SPOkYuqatIO3g16ljApFdQbKaNhvLmMtm4IxP68Zej7WkAW08ddhVcc1o3

6+JdnrYH+7nGKA6oItmAiy9rMJPHIrk1Z5n9vO/4By
At/y2bH+RtYL15kY948bF25HFWhH/sV98FjgZabmtO99tT516BCjBni6JhwLpGn71Mq3n+A1hwjomsrx

o4qD5qsTA3gRwLpNijUcgxJcUEwEIh1w//xfuZH2Ap14Jhf/+sk18Irhy5IJRyh+4ifwTOmkD3WQNdgJ

vZVtlav4/vBIm//8UIOWn2PrBt6VGQ0D9dD84SFvX5
snE0QV6QAndWUyXlBy6Je8zUUy3upLeoWO9SPqNk1rY/A/bfQcnz35T/QX38xUpOeTmxaMrAJzx9yWLD

TgU0E93/ADcyqO7YhrSCVP8QF4ylJ1ZmiKJ1j988k5iDu1gEJbuSqab+b4G9txjCgiWer944l2CPY0yB

lN3Hm9GKK3taZqIOSJVeJC38qKDR3dhhdB1hIijXyo
nO67vkUqOulASQC0P/uy0K+8sLLIufuK82RpH/TQeXTxy3LxVaUlQwh95tt3z84Xi1wq5EY5elOHHMlx

0lfKakgk56X1iwCg/fB0krJ7dDkxPmyJqzSFM5bPYOpRlJPY2eNfpDktLRdYuw38/kFZlO4c5As4KD26

tXtcL7UVgHrTc1y8sBfMd2uY/iTaqNNwFbGs6UuCGn
tkbKpy5yNZSgq9jSuyAvK2d3uwH1JDvvmArEVZtanKr3pB7KT5rgh6qK0Wvq68WZIIsCFm6zEAja4y3n

JsLvCh5pefg5dwhbvJiSbqy0LR8acRe7lQCN98s3SXiiPv9hSwmvXYiINUUkz5rvFrMVLS/RekBhUpLb

uTujt8GTph8XhlpAFwx+wpPcVshgg/ILGTXmF+Chetan
MxMmhSMx9Tz2nBhJ6qq3J34aOdzTnQelIKnpySiLSqCKC/SHXRJbN0uTmDVFq4aKbGEXY8PkKwsAHJ2B

yQWlwDls+awbL1ml4MUoxGHTqSBBiSsbj8K/x2wMAwTT0GV7lTQeL1VJKBinx+YYVVjaIGJA+YGIUAEg

UGSfCWLL42IMZWSBmA/SThkKNz+ttJCNAX4PlQ0WXg
phi+iB70jkPEl0beeryykqzpG94oMLNcpF3LvJPuPrXsQqZmmUVrvTBdDBEj0kfXkbHlkf9hOLgKgT0e

xHbpvcZVsI72xUCZYri5wqMUtvF/uJvFF9t6iLG7GsBtFfa0qI/O1Ddp3YJWI2DM/Pyvqn39W7OZXrfX

VuS7rNtVUZCJ02pAAZ1mZWRTUdDRNdxME9h9sSFbYD
nktBb4gRKazYCEfWWOjzNbooDwfDGGt52L5+XIAFhmevDdA4IXYHqncG5d9E+pNMaI/wKLc4UbFV7D6y

6WmP4McsycFDsbP7xorHMG0vPn0PNPasscKueSU3B8jNHnbk6SWH9KXSWjSs0I9zB5q4VC2jmGSXiKk8

oO7QKcq7ZCdLD743hsqiLfZ0NrgTZUUktnZO1+8dlF
kjSxVGEp5mk/wbXEjsFhFEd7AmsTfsE4EgmATfiojygCsQyfjz914UTZ6sX7RhvJaEPfUm8rkF+DOzc/

SIuoQH2xsqapp41xyeaoXzNrvJw6+WiVY7oDtqyV8zvLeMzG8QqwMeb68O7cGqUKY8OGBxx+xlwwIdkT

b8mmUT7fFtAm69lUXsC4+HTnM2REgDOkD7n3VzSK3n
JJ1IfCb78p3WvkTV8xGUp+nBzr8nSnHK4d/PNF2/MMEg+132vYDzPJdTLYD5i4R9h65MLxdO5ToP8o6K

RkIebBjUJmxQPzYzgKd/BLZAl58CfrrgnUoPRBQRW4q+mHIPQLpVFhWX6FSsWaPPfGuWd5OIWnOq7ER4

pAfoW0SzmXuGwwaEyQIdbsI2kA+G3NZ3FyE+W5tFly
rCcjRfS/JYpXg66P4phu8IbOn2x4+UBGhA8rWuqrsLwJF/CTnmW9zzgtH0QSnGQCuckHeEafdS6tqWZh

FbubsrQ2WHgijAbnCyq0ojD3HzrXbfqoiUlPokfyjm0YGu3jwJ0OaIR9mpe2cyZLAwabANPZ955l5mGv

b8laI92s1XJv1SzDxhdgwsHKcsqnBHhFvSLnLkmxh8
lR8QC/0sNHVauggBVjxdWB1TLpQAbmx8COL2Fd6AJs0gJ/Jzv4vIbQ9LFV87o61pxzrowBjdUYooQcpQ

aWswe5puWc0zmLuLPXfXXgmjguFsM0TghrzI9z5KXtQkUokA8qrz+zVQ3VdpXuBdv4VclCiU8uSZz/J6

214DibEMewEc9wf6Em7HEeIDh/2v2mqvY+PisIemSe
+p0gZ0QqjeeAJa5kamxyIx51RGvr8yUfdAcWxhrphsK3jR+ilJw98yat87YIXwEvl4DiaakliWzLZIxN

ESK9+agx0UQGu1vB2n+xRiIRFuGESXRjjWyFxVgVdRQV+tIRdVRAkFY3JxzVQZWK3ZGYCk0oP5yZ+qI0

E9Bh0wdlT8leDMQLNBUrcVyylHLpg9EVfhvIeCsUvV
12kZcU11GTNYEIv1Ok4kBytw84usZ1CGLddEKnVX1thecuu/Mxl/ivlxsxPIgwrwxP6/3r8jaW89kSf3

Axt4yQA0XQsrzWg/EJ2lFMySvdYUQ4zOYxydwB5Dt+wHmw/C9Sunav+N6ZKS4yk1QbRYGsTElcplOxPt

yYNaV3DIJov2RgLJvcNNx4B6ItnMUnheNjMmrpbLRR
LTYrEJGmW7pryni9sDLbJhl+Xg7FIXVhqGChSX4UWxjBdWLPfuKhQY6I+z83scmb8DruTPMus5FCc1Xi

H+PzEnXNUWX2r/z1qyO4ida5v1GQ9QCzLPxe2oy7jxlYlPvpFtzEHjNNzsyLOtzyryA9qZLWRzKrRgga

WuFn0mT21vXTI2VniBXqq+obGdA1XelE+qJQ1zoDxa
F3+Yjurt9hxxRWkrb3/2TXSDIJQQQE31jmG+U/ANev3zR3DM+UE9QlIcQb0Ia6UqcjXI6g/DqLKCDfRh

QzjWT+65BnwWOtfxT/PJklxG35erCCvMi1vbWwdBXOP+A6M82Slh5h+PqnTbJmLp9nwq7pVRJAJ5xwIW

dpgbGuzXBmMK4THvWeMJ1ozl5SVwcuKLCvNeiBJjz9
AGhaXT0ThJKnJ7QwksUUTAJvqfxdqP3iURjbOK0Ji164WbFdLM9QDJXYYBKpY2Ifl2SDIH8NAOTtZPVv

EK40EQMgLxteY1IwGCXsI4h7GTLeKmxpDRDgS1TadPJcBtQuCKweFI3WeGTfnaSdAz2UVGQbVs9iwAIX

z0vmPpBgVBI7NLGtYUTkOJWpWrmgKYeoCT2AwJZniA
EXzyviXPXzV0Y2IO5RWYTODzFxJ5CidxCNhOnmZsWiILGsOOJOFy3+XOvhhnVgLtaHEcZ5NTPdn0FtIO

n2ZZ0AHOZdCHyrJE8Fm532M0J3WoZ0yIRiR3hXTl6qyQQ1jQAqO4Csr2XSq596R0VRGEMNTvuFQLluCK

FLF2UOTZEkU6pBXUTiZ5ufHM88qGU1RZymPZ7BGKBR
zMK3QY3FkbLwLVu6P9MoX9etpHG6WMlGLG4jPXeoC6IbJDMdlodyOPnlAV20tVZ5LBZvP7QfyQshwHUm

fBQxLt2iVWfwHF9Lh320HPUkU3YjhHLgxdNpLdBcRDPBNwDoI3FAKRWkHHIiG0cxIAq4GLGvYEWqRRLa

SLG4ZKYmAMe+Go7EFL3bp3JxLAguRSOwJU6pzm3OZW
yTGhHqX5L6lSRcTg6ogZ2HpUW7tAPxP8L7oLEcI1Lsa0VBy301T0ZEJXJCBzmYRBtxBUKTA6RsV70qkB

3zQ3emIS49oOS4PKxNPyPrO1Bap9HwzgKdpmKMu133tuNrJjjrMHZZQX5OFRXcCC5Mjejqn2OsZWE7BX

RjZf4CVw8LVsSwNI8syn9OFiXfUOOxEhjQHunqLIgj
kfJjJanGUpCyMGPgLnjLPjc9ULbkNE1Dsu1bH3A3AOmvTZAZS4TkiRDlKE4tI2XIT2ntMDnxBk5NAlQc

U0TmymYzQGowG2YjIIYyIFHgBq2KCAIeON9HVTTyGwCvUGRONjVxIXTkUkQiGWnqYAISCHzcMCRoV6Sd

QINcFMRhHg1VTYXwWR7MENVpOLZmKEWQVmYwNNHyYh
KjGmfoAAQOLp5LVtKnK4jARmdoI0HcXEnbEi1WVhFkE8I9aRpTwDA3MAQ7LG4ASeUALeVfEWt8M5N0dU

YgA2Q0dAmJcMK3NJ4NXI6PCWHwAM7+KsQpT3WYOUtDLZJ7YB0LlHRgAB7DpZYPF7GfhJPaVs8kODQptF

lwbHk+AcHmX6LKMWQWCZM8JM8NvIXfCP0VjBMUB4Ol
bINaNb1iJDchDtHxUI9vBL4+XD9XUELWBsONTeVuOAzcGAojHSIxYFt1H7W1RRCcB9NHV5N8P1k0r8fj

bj4+KP3CDQDgD2LJWZYEOsTzXDchVAezUEBiGYr5T8K0CVIeB9JBT6ecT8v4OI5+PiANCiAgID4+DQo+

Jx6DVD1at6WeRVnwMVGoOZ5vtm3XLAnaWQWgL5YsQM
PkTOycL0MfiSceAX2VTYelRFnmCK3IWMUzHCZ8KZ0+SNzbyCRzSE5ESlr/lMUcY8qjaHYeXJptpx8i72

u/ExSwIQ7jCpIUHR8gR9NihMu2neGKtq7TF5qhEijoPb1+YQyvCYx5HxnlbN9vxLGcwNl4gBM6pf3bTb

3iGKnqIPavyH8nlzJ0bT0sSEXgNlQ6ywB1nGK6LD7r
Ce8OQOTkOAktIVU9CbXMCHrvkI1tTzPwGg0lgBX8vGywM5p6uy43Bp0casgsYXj6YP7pCh8iJn8mSJWo

o6spoAQ8GS0gGfo+RNveXLDcOX3eHID3GbKVRx1JGSR8J0q9jF1tnSN9RQ4TQtNxIALlCWVcAFSqUORc

ICAgICAgICAgICAgICAgICAgICAgICAgICAgICAgIC
AgICAgICAgICAgICAgICAgICAgICAgICAgICAgICAgICAgICAgICAgICAgICAgICAgICAgICANCiAgIC

AgICAgICAgICAgICAgICAgICAgICAgICAgICAgICAgICAgICAgICAgICAgICAgICAgICAgICAgICAgIC

AgICAgICAgICAgICAgICAgICAgICAgICAgICAgICAg
ICAgICANCiAgICAgICAgICAgICAgICAgICAgICAgICAgICAgICAgICAgICAgICAgICAgICAgICAgICAg

ICAgICAgICAgICAgICAgICAgICAgICAgICAgICAgICAgICAgICAgICAgICAgICANCiAgICAgICAgICAg

ICAgICAgICAgICAgICAgICAgICAgICAgICAgICAgIC
AgICAgICAgICAgICAgICAgICAgICAgICAgICAgICAgICAgICAgICAgICAgICAgICAgICAgICAgICANCi

AgICAgICAgICAgICAgICAgICAgICAgICAgICAgICAgICAgICAgICAgICAgICAgICAgICAgICAgICAgIC

AgICAgICAgICAgICAgICAgICAgICAgICAgICAgICAg
ICAgICAgICANCiAgICAgICAgICAgICAgICAgICAgICAgICAgICAgICAgICAgICAgICAgICAgICAgICAg

ICAgICAgICAgICAgICAgICAgICAgICAgICAgICAgICAgICAgICAgICAgICAgICAgICANCiAgICAgICAg

ICAgICAgICAgICAgICAgICAgICAgICAgICAgICAgIC
AgICAgICAgICAgICAgICAgICAgICAgICAgICAgICAgICAgICAgICAgICAgICAgICAgICAgICAgICAgIC

ANCiAgICAgICAgICAgICAgICAgICAgICAgICAgICAgICAgICAgICAgICAgICAgICAgICAgICAgICAgIC

AgICAgICAgICAgICAgICAgICAgICAgICAgICAgICAg
ICAgICAgICAgICANCiAgICAgICAgICAgICAgICAgICAgICAgICAgICAgICAgICAgICAgICAgICAgICAg

ICAgICAgICAgICAgICAgICAgICAgICAgICAgICAgICAgICAgICAgICAgICAgICAgICAgICANCiAgICAg

ICAgICAgICAgICAgICAgICAgICAgICAgICAgICAgIC
AgICAgICAgICAgICAgICAgICAgICAgICAgICAgICAgICAgICAgICAgICAgICAgICAgICAgICAgICAgIC

AgICANCjw/pQNbB2ezjKRujyD6U0ylFy1EEt0CIR0rx3NbELRhMYlurnAqArnDXqFnANCwIlaDWqr3ZA

ttQU6ZeFTmE6MlA3JeNAlgKK0EXTJjOJNnuWXaZMWr
IRJqFtY0HEIjWVqfRM7RqHBzANupEBYpRYNrQuDgGMVzYS0OUTQzO321fpOiJy9JIn9JQeDgPZ0met6F

NeYzMXLoGreVFhc9ZElzML6UsMDvpWQvRiNzFRQGWfSxK7cwl7ZkOjLcVTDEJTxySO7Tt7XgpLEiPQf+

Pp9UHM9el8EyRTayKuGaSW1clf5VPCfZNgUfE7RiiD
qlETPdt6dbHMLqVD5dpYQwBIP1BCxglz6fCVFDWB6mKZW2HGAUMpCcLMCcQWHpRm3kJQPbRDF4JfRtKX

ORTX7UTIIrWIZbmZOuETWhSGXWAS0KGCnzBVX5KsogzaNgsIWiLZefVI0GJBFkfyZqFuIeYRBVDSq+Pg

8LDG0xk2EdCWcsAGEiGV8fij4MPNtIOaJuV3T5nNOj
F8Q6YDrdUu7SJIPcXYOeBpBgYZXOKOxgOE4LMJ4mknI0WU7OuWInIBCiMCMclIPlLEj8K12jyIVgIKdd

GV2GLOB+Martine+Ff2ZSLRoAVJlFVVgJjOtEJKMHeBxJ8BeL0OFk3EiQ4DzEI61nEeeuqMzZFztUC6ZEN1l

IWIcYUVXCN5CiCVsbZ8hbhYdIpFeRLNPFzTiR17goB
LhNSRaTYWnFDLfVh1VPFTsS8LhmuEjnIccliWpQROqNVBVBN9YQOjhdsLulTBplIgwFQ69pJhmDM6SCa

6FSeQfMC7vde1HfTAhNn8JJELcFS0XWOZsGURfYONsNFY9MFPoCwMpPJosJJXzMZPdVAI8RUAsBQRdDE

4PWpUoBGMoXdw7JGCfRBKfFAPwjx0JXLAqRHB0KWS0
ULUwXFUhPHVsXSqpQYCrIWDoGYE4MLOaDCYiSU4TWgHiGSHkSQN5XpJlJFFnIAIdyn7IKCRvTMCyGkVh

HnVpCCLnKYUnAQysRLVjCGJ3FbO8KKUxJRPzWW1ZSbDkCKAxAJF7ThwuLYIyAXNvkv2OHMGqQKIyRvdk

EhUhQNBdUQAtLImnDKYxPGC7YCA0KXRwHBCwOX2GEd
IzDTIeYRsbLUNyRHPtERZnub0XRHLsXBVaZVV4JENcNYYjYBIxIFglUPNtBBB9IEidERKwHDPbCD0QHr

RhKLOoZPn3PJXyJNVhIFPrab5AHOEiUFGlGJVrSoPzJNSaRUCqIKmrKQCsNNVrMsN1OSXpVCFeSI4KWx

DwGNDkXoO8OuzpSXJkFEIxmh0VGOUyNGDsNNq4HOAg
DXGmEUQpVLuyHHCxXLKeRKZ7KFXtKXLfWD6LDxSnGLArScZ5NFhlHGIpBAJzmu9NSWHwWBKaQoV7SQLd

WFUvLAYdFQryGTErFOKfJfI9UCOlMTArEO0WDcVtCUUdInSyCxnwZDKqEMXthg6ECIVwZCPbFUB2UoCp

PXSrYCQuWDbsLRSbFZO2VQA7ZFUhLKKhWD3RAcGmFL
NsSpq5ZEQqYXFsDZXavy3DMFBlMFNvKMU8CfUmSIQjHFMzUVatSKWpYCB7TjdlGTAdYHCtWI3DBdRlPI

IeVxs4CiWpJYRuDTJfbl7KUOQmXUGiBVw0AFZbWFPyPGXeXBfoIPUpCVC6MoK8GMZuPEMbYX7YIeOuDF

FyYHZ2ZnUaNODyVJPars2LUBCeRKV3HEdpWjRdZDUc
KKZcDUwrWRXkCDIpQXnfOUKaGMCwVE9PCzHdLCwnMEENKks4DZrqK3z0ZWNlPY3ZQ9Igf2DdNuMbVZAR

XPunIS5eekIiZCGlNg2SB1aXPyp9IZFaFnSfRGWnAKgnMBp1ZnHrALGmCfHsMBk1V3SsWD8pCXHaHTE8

QDW9MiEtT1QeEVq2WUE1YFJtPJLtTcgzUWQ9LoJk
UW9SIj7OEvN5TMR0vINlRl0GTNTcWqcQFfPkBD1GGIk=









                    ID                  Date                Data Source

 

                    [id number]            2020 09:58:44 AM EST [facility]









          Name      Value     Range     Interpretation Code Description Data Vanna

rce(s) Supporting 

Document(s)

 

          Patient Instructions                                         [facility] 

ATWZUw4cWfOWXnGl69/WHFlwWHXqt4UhXBitXOq4RFnqACBtZ2KoLDV9bA4bUFM4LYdQKfNsOtUmKDA4

lbm
VdFxwFGsWoHUPlTceWGoUjCOwyXgpuhLBdTI6XpKW7PGUrI77tAUDdTRThP7DfSCbdXx5+LYopPTE8km

ObxL4ZOGAjEYnv8xMYwt/Yf+FgDkmARElDAbkVpg6lKgw1jX7rnQjSiJnwT9uyTM4+tw9ToUFzetwAGb

G7tY8H6XNimpIRSB/9daUWI7Mq/L9/bF9A8rC5aqpq
ovFj0ktJPIjhKEVutj0AXAr79hLHaeWhmf0qK/AcG/GvMqAcUsH3iAnXhKv04EB+Y8haw1vblIGWScdh

PtZwjBEYkcnxAuO9IYRDtkgCh07nMAa/QhhHfilIDY6tzeMdwDPRTZyBw5aegSqIZ2u3OdUy+QUhbWFu

8iCrkOvVqMArpw/1WaXkgR4n7vwXj70Q1okrpoVmLS
BbGhfCVFNSd4L8lJIXpbUO5SUwvTipQ9ZAmEB2ioQD370hDbZ5ofZbaZlBtle7ZAoSYgqrkUHfwzNqGG

6KUMEnVA9cK4MN/FbB0/CVAmVsCjMYCenY41n4kG/DB+Ur45gCXePRPunaRretwsHUPC+zI+iJtIyG+t

QqEJMxJuEgHRTRxgucRhlPnn91YqtzhKv+Fcw1S2y6
mPD8dCeacyh69x1JYnUIWz0C+RtXfp6Xvw58tOdmwqySCvqySdv/zoaioqd2xiLJkD1UvXsSm2lKj8VC

POrB8msUtxkUxDAaK+qV53jNq0Xwyy6nor99H+phu4wAGpiLd0I9WYZ0FG5ZIpu394RkJydFGAdLlv1V

fJfQS4O56hs92xiueDMMAYLjg/U5OdaYDHoPODpLy0
VYYQNDOqGTyd1A6rAbkqJtVn4Hbsdrbgi7yRZPI+w+PvTHlmqcAIkMNZ77IGzeEVqu0dAwU738tZCodE

nzJZqG5ynLgnQzH5ZR93YgIQw/1p5FPyIEQyxOEiQfI9UnMJd+u8kz4044scuhQ8oKCaSpkRK6yUNEgV

B9XnMCEhHRj4DSrUCD8MNFqiVdFUE/eq0PrgFwkSDQ
nUS2MPOwSkV0BuVFNk+s3dm3t02SBoKPVi3rtwDf2mbCAcqZw34D9trt5mg0rGolDITSPAQgMJrVTNIF

nVkOYLHphSNRSbU1SrSLsxVOianHQPK4QnVwnmrRbysZ7bsJzfqNy5ccWjmhmCtxXTtuGfH3bRq3xB1Z

Po3DLIW6ctj+K6TQ79xzWz9kn1yRhzuYAcgEBG8kQe
kw+75O39vRjnS1Lw1HejfDnTjraIe/iX8T0kuK2BJuHPXM8h75+UhKLHDQmMN2DfVz0AbWKKnF4L0OpQ

Nuig4hM+jxD14C/b3BCrPvMCA4iwOerZ0OFR7qr1DvWPp3DFGam2TnVAmwCFn8UJnrQZBaA8A7dVLgXG

XoYR4TDVKzQT5CGAMphyOdQqKdESYXKaSiZNVdEsLd
h9DbH0NwJEKwQJXLVOlkRHThK25nWKanIc81TMeaBNCkFmTdETe6Fq9VBjIbTDJhD49frKPtmJDpHUHn

CFXLIbHhRUOpM2YzlHZbWYbkA4CyT8CkQV6smNVvVP3qlLJbO2AtL1VbevjuDPHVUiWpVMSdUYreJLIi

SyBmYWxzZSA+Cc6WLFO+Gr4EHC2ib4PaIWs5KKGer7
KtZUoeVTlzHjKfNPu1GDRsBstiIxEsCUB8JUM8RGPsYOT7CIx5FUM0PdBgVkO5ZQFiDqCvDlCgAgx9RM

C6OCQaBfgzTaGcVGZ3EBThHgamGWG3HWK5BcM2DFJnAFC2WSK5JsO2IGTzDEV2UWQ4ScB8XXNzCUN0TX

FqZcLeYaYgECa8PM8WPMA7RLDgWTl3OFCxCSX6PnBr
JtFsVJbjQgR3XzOkQcWmSPW9QvT3HIIaRhi0PAkrZkEyHtpkEFF2TBcpYoJ1CCPyXADiCQhvAfR7Bvqi

UvY5RQq5WQK9ImShFyU2ZHNgFTI8YkAvKnB1ZDr5ABT2JixyEcC8PMWeIABGGqDtYxFpCRS7SUGiWoXo

KSi8QQO0QvDpXzVjNCI3CKUmRBL9GYAfGmTiLQZ7Sj
KdBsLjUnViSTFmNYAlAjulQmx5YTG7JfQnAyelCHq7KLIsQQE1LEQcXhTvFQAvGZUgMIfeNLQ0LXLaWz

H8PMTaPWD5SUz2SMZ9RDDkPWkeYTR0PhZ4NRLdIzq1UAK8PDZgJYafMKa1DKm4CEB9ZUDvHdUfZVn3QI

Z1DUFoAbKeMTj5YOC5TFRnBcCfLGt6LYO1MSIpXqTt
FZy5GXZ8VQVdIdXgQTm0YHL2QRPtVxHmHBr5MYK2VQAdRkQfODg8DNO4GSXhLiNgYR1MYZP5KNRbOyHq

WQc1WVC0KBSaAtZwLId9VQG0XWQlHwNeSRt5IIJxJmbwAsBvMYV4WhS0PZNzFTD2SKN0FjZfECVfNCD6

IKGtThV0DsqyZypwAPM9WpZ0TCHrAlUmDOciBqS0YV
GhEQGcBDE9BFAqVvWyZlLyKZJsLcIHGvIhHXs6GHJ6HnQmMsQmGhCjWVDbVeNyNdRoZQK9IOswLZO9Tv

BfSNR4YRJhPCK5OzJzReQyJLckZgM8YyFnEtEsEZuoSoLxBSGsRFzaRaM5SfrqVqJ6VDS8UiV6UogqMj

v0TWY1HVEfJmvaEro1MFuxWdN4VeCoUkb8UX0TKDM3
XyxhNbu5DYu1EMN3GlonBQr7JUy6WKD4BjEzZbMjNNjgCiB8ToMjGqP2KOP6ThD0PKXaFLI3BAW6JfN5

UOMqNHH9XLD2KfI1CSBnEAq7ZPA0MiZ6ZFZfYOI6ARF2OxX0YLHcHul6IRP6QJBgJlocRmc5LDRbGXQF

AiStQnVvCNOoBJC9DMSnSiIaDEQzGUY0FPHfJWO7LQ
NuRVE4VBYlZrPdTRIsCJO0RCTzJKM0UVImKTL6VKRvHHYHUoMjZL4bhk9AGbGnNI7zfs6QDMV6ER3XNS

DcGR2ZuTMyO1DxqyOZFEKjfbcztB8oMDucBCHyH4JkwrMIYD8xR7SyyEIrJMRltTAABkCeEMVvWDLiJQ

45VToxJA0FLDMRUJatlACaRGS2P2Chv9AxdwZdTNVt
Ri0HECZxNO0DzJRktdZeQb2QSFSvNP7Cs093GiYqlDUuHVYsZjLaBCGjNIYuXKD9EG3JXCIuHJ7MsWRw

hKAUccqxNZScT4K5FB5AKJCEYzHsJg4JBqYyGD8vbq4TRCVoJN0wzs1DKUA6CV2KJIYuHF4QbIMbN4Fq

cwScB0XsrIdmAS3HuyJhONlxLI6PDUDxSx0jbP7Cxl
chuVfBv6wpA9MaX38tnI5lK9pstfYxn6lOyjTgKXfcKe8KJKXvJE6PmPZtoULoHPCpDwAbDHYwgNBdRW

FkQaM5HAltZTPlW7orXKKfacS6ZPYgIo0CFKLeSS2Pb395UHLsS1MagVIzozH1EAQpSe9DTMR+Pg0KZW

8ne9ZsQYm9NVIkm1QtHKiwOVppFsKbVBa4DPXnBjuh
Gck2JCB1AHZ8BRGzGBI5ZGo8FUS6EnkcGVvtQOQhKeDhUgChKjc4GBO2TVHsVplnNmGgUYJ0LDIuAmwa

GWE8NTC7DvW7UIZzLKN5CKF1UzY4BWKlWJA1VAG0RaS9EKCrRBS9ZKK0LGPyBbreOOw5XU0YLJG8HAZx

FDv0BHX1KfIcEFJ9CDS9GgH2MweyVzJfYIogMcP5Ck
flMkUoJMf8JFY9LxKcFvg1XHNgXDH2HunrZGO2TQxsJfD4JrRsZvu4BZX4KkA7UhyjFiHfSIB4AaX4UT

TaVeGqPCJ5FqE2VSRjBvM1KIY2NzD9EZZxLTawYMA4FGKnClruFtp6EKL1DJL5SSTwFwKwUIU0ZrH0NN

ItJIGxIIJ6CdT9RFLlJdx9GUR6EpK8LIGnZyHqXKTh
LsW6HVZrGhIkBXaeCfJ7DOAfTRD2FZU4KbH4TSClJpWrDDIfFUQrPyefQDC4FFSoQNE4CtAiWCWoGQ2Q

PNU8BXNuADNlUEXbIKOsReGqLkG7EJR7QAO2VVMxZpHfOBe4COU1YOPiCaOcAEe1VOQ7GFHrAtCzKTv7

YMC8HIYyQbNlPTp8KEO5YSWqGbUdHNq2BUE1COWnRu
LhAVa5CGX7DJQuZmWlQKx6XQX8ZPRyLmFvHCg7METDUaSlIhRnCNi8GYB9JJGjZcJoXQz2JBX7OZMvDa

HvPZe1XSQ5NYAqYoj2XBPwPjW3UWTfRMZ1WEK5ZrQ4FCXyMbVpNVO2JnQrQvVqGrL0JMC3CSE0OBPaFU

v4XZLrFqD1RcikJHJbWXYwNCR5BHbyPqLxFKXxElPm
PmBxDCbsIQZ2JeK4PljbLbViNNNrWzMpGoBqEwC4CJF4SoN4FlXmRZK4AQxeZTT7OEInPcH9HCH3FaZ9

IbxqFdA5PFA4RwO4NgqaTGZkIKM8DaCyOyL8FJL6FtT1NsveUyN1WPG5YSRsBpkxXcb9KDR9ZKE5PzWx

WdVxRPy9GRFXIvQfUsw1WCn8PJI2LjkzBac3LFK7WS
R0UttrUxHmXXbhLyY2GsSuNoKyNSL8HaL1OunzHmLgBXV9HjC2RTGtTIF6BOG1BmV7UNFaPHH9JUm7CV

B9WDJgXDU0BPL1ZgP6EBRkETF9UTJ8UCAlCbxaKmc2IIK0UAK3JJEuISheXMZ8BjP2GJMbVPI8BMF9Fa

C6PFAqWFO0CSL4SPS8NIGtJZT7BEN4PbB1HUEcGBK5
ZTMnGQV0VMJrWBGyMF5cTLfbccGdPdiYImPhUPWgj4FfAYerDMm9RTqmFULuX0H5lQYhJv4mfEPfo3Wo

jLR3l7YLDtNzBITtOp7lwT0mbVEmXLGiRWnNOhOzOFJsJWRaUI72KGtoFM5BMPTXJWtbjOBuMRB0O8Ef

s5XpibDiCQUzZj7JRREoUJ2HqPLsasXiVq9VMRLvBT
3Wp010JeMjpBLkYQHeJjPyJYUcMSSlCIA8XA2VOXQyLS5PqZHvqWUQdleoFNVnC1J3IM0QHAHWNzGuSk

2SVlVxQA2anf7IIHEpNBEsNxeCNvVqFZvMLwUhKRKhIBdwJR1Qk598F5M6VbN4sGXvCJR6SRL8zLYoJw

ZxAUPygsTpKZVkRXjqXK6jg8UvgrjfF3hbYB8acXNd
K75kdK9iXKlgGUYtG5BumvR4S1xtuxXiHW7BFMC4Q7rrymEgCKMYQeZqAFVqR7vzkOwxHPeeBUMHQLcr

XGGtU9OtteIAYDUdbkrrzI4dSWMePTOmUe7AOXN+Vy6WSP3un5BxHXgbRoEuHA7mwe2OBWJ8HD5JjRu8

OTRcM6FwERZzKCUqm8HnYA4ICK7ydOyaXLAgUXFtZ2
ytqqe2xUPjSsGpZJO+Rv8CTEQrrHMqSC1EFsrK4LfLzEUY7vitbec9upGRJYNJ0V6RkhQkCKLVWHYHpX

D9AOLUZMvQesMMvS9FVMSrq66JGFVOHrELGZV7JveavBX96a5N+e4hvoeS+7+a4g0jijJnwFB///N8z9

+r476wKhAq3l4ybqxeUkKJAEiHeTj4d6jj0G48cE6L
2gHy2qrVYOsLuFw755DQpPLELozoMmFPxQT6/aWXLPRv++qpL+ReC2PwrFDd8sXtlvzjRtGUErFD0IOf

NkT3zDcQCFSyw3R/3ExgGh3dofyVw2YemxIvoSVbTp3ahvucagTW2MJGV4gaJNpAwciDg9B1/+kLZszT

2m+TDr5JCNddN6+39Zwpg6OniDGabWYbh93/vm7DrV
LyHl9gEfMq4V0dq+pT2W19t8c8051KL4WAufx0PiULmpWVy1Z10OHmbp1z3njdjzEE275hJ5qM1jUvWa

w4bWFDWpyWV9zJBuJ3GFI0ptEqiZ7rOZVWAggnWZ1OaBM9vza8GwtdkA3bp7cUFDuuZXYDRaR51QQ3sv

PktHznzOgnEBABNLCh0ODZfKRCXQwKsfPqTCkR4pbz
Qpe3K8jGN+RbMRxEbdvkZgbl1Altkb6QzQKJS1mRl0fenjqNDhT1NL9kR/HZ9GyP7eM2HYKowwGC8e0I

/rJO8wDvyY8HZ7aNlxk+Lq2ZkVc3g4kbwV39qm7Wv2W8gxcYvust4l5N3BxzPU6GP3muEMYiRKkLqKkT

b0i6Pe93a3EaRhIijP0MDrvTOysYmQBCbYUE4bSm4R
eRtsnCVGdN9Yc4WGqGaVnrczEr+NbSX+gw2ls3hi5xOkBZ9XBIma139QEFd9Umv4FrQf6taXF3Zf/EzK

f/hjmsPLAIF7e0EpXvcizfh1gmEeeoXJUJ3lUf/Ez8JK/U8qbrLP8IvlZiqXcymfMvLtjFwUCkAbcVEN

vL+VU5zqA3SYGBdSowEtb2XFI/L0KDvfIxCQjub3/V
n4ti0y7dBqEdaaEK0itlWSUV9V8reSNsAzLaCJTevhRaE+0O/XK0OU8Iyf5+zaOVtJV+EnGirxgnzhUz

lPWkayuPrFXylOfNV1RmwEBsdWTeRO7A1F78Dn35wiL+7Hlb7Dg+LQgS84vsL+t+sjKspTQO4+FaSH8n

rUfNdtFBehvfD+gjYYhGoim+fpEsCsQV+T9cNteWBz
K2WImOZf+Gj5PQ4ynwrrcoKFKzGEMutraPm5KxBK+Cj4i07ST5Z3CkvT+6Lns4NM7quH6IijT9nCoApe

u+b3sk0m78l8uJal8kHrl7OGeQcvkOdxNuecl7C4gr/u2+077Js24QrrzrBqHX9G1W7eyRA8WSX/gwa8

URUk1v0e+eLGE05j2ZT9VtS3EHPCAx9CGSrPmmdQjC
lrxerBUPKNkfFU+phz0QQkMdC6pmFB9mq0deSAcu32K7yIKfBq+TO+Ii1bmYrsPQSvA7gJg6AXuEB0rJ

efmdv8EB5sZ4z+7i5GmeQ5gcQUt21r16XK0XJ6nE6O/W1+hv272wH0rXzZ/NaHOeudSsNf/pOdszyDPW

I24fguujx07D950ykS337Ot8Qk8FcOExNn7Tf2edsy
549eURULXrOPRQyKGEA3IgXkpbYZLK8RDzOdlTYBD5UqTC8spLfApry2TIXexkqFzuQHuzmSyz8I3iM/

V99K3+BSc8PYtiPlvBm+PPzaZMMWI4NOunLg10RMeL9uS+EW51JwxJsmceaAwmSU1cGvTk0AXUODNjj5

Wg9uIsPzhI3cCCY/OflCylSiJRU3yADxNfxXAQcgMc
3cQ8GYfbbtzYhzE4Ud7/Ryk2ZUNK7VWR9hg9QuMpy9FZ2fDOTg/NMQfJpE94qXNoOt20jZ2DI1yB3rK7

lz24CI5DI1WMQKgp1u4O4Rtfp0os/xelAcbBINzJumBM64s0xXrROvFuTMWXUKO/MD90pSnuH4DvS65q

GSfcI8y9hpjTepuqwBXt9SoTsBgPxmoH50joCV3tiB
bm+LMceVI0o1meq6yO2UQX0yCbI9HYc4fYPwwvodWEzJvrB/HGDbaK2HmPHsUjwpIDTnmMPbO7cUyso7

NEphrp9DdhOYZ8O+jAKCo8V2d0GVueR/g+Ym6c9kyh0y+hvCz6NcbKIUy5FhMrbBu3ff5KVz+BEoZpe6

aE8XoIayGqT8VjV5wu7xYyR7X216jMA1WWCqVvqDZX
ku3EwsbG2ychPA81HtucibFTCufsNExibTl02ef5Hy6u/bbniDvpSR9pBvJfQ8FSz69jujFduYMaI88v

v6pEXphYLgjoPk6FdcgPX8g22d3d9Lo1Zl7uNKwl4ahmxe2ol0EZg5bbgYit8WJb6+IzYgZ30/xY2tLV

04/riSEdpWkPgzcMVjLghB3MPNDlrF2Uj8PFhQjE+o
G3sG7ANO4W8klSYZ1j5XWNNtqidE9nW4F9rwSGCXnVizROhVXopHzKbwA0uUe7KUP+oS7mQrmqEsYyz6

ivmjR9LhI1HpC34rzY1C7baTAAK31wz8wcAeseNePK59c1qgtrD4iE0wEl6vAoJyFKHdkJYtpI0Fz7zn

XKbCzMtIdhLrSwSUFAwvPBTN4EcCUHMmSqvR9xsZlg
ty3CN9QEMRU86Ps8WUXUjAkO6MlxdDUPRl82A/mtHSO/0g62HhI6g5tJe8njrMzN9Wh1ucg/xDyfIL4f

EQETq9G9kYLoZMCUm/h+gO/19h3RmE+ndCATmArcA+wBjgAe+Xc8Y+D2iOcfE6rlIQuqnjEWyyykoTod

jHwHrnfkOxDtbzV9+tAqKm57eVinH7LfqCJrV1dIsK
l0Qv8duTRzUtOAGBQRjK8nBPNG4dvq3JHfE8rWICrIVm/e7k/ctCcfZ14LSTLHobkM1fcdC5YNawLFxW

TrDbjeoErgFmADEAIwyvCMBfxyP/Pt7Xn7MYGIOMBhF62NZnJhYQ4wva3iJ1it20GJ+fFj4hAQZ4oQYq

wFbJgAR5Kzwi5No6R511ElZjqpgOkEuJBHC0nvj8hm
Fw1uHV1t61JicRmhD5lRBWEyrHHqpu1ysD2JYe9DQWX8KIQ4TF49azpd3pvq3MxaQUTYNqNnP0p0m3SZ

b9h673VXGKesByUzR/Wm2+DiR+r1K+DiR+mg86WHq7X7j0FPf1Sc9KPvB6RfQGh5xIztSvg80Gd7b0wd

2RohNs6bL91Mm4KCSxBaVHbHhfZ0ITF41z89q1glmp
kFsLzdZPvlTj2J1kMpVFI4UqHrVWDniiGgTLhqgVoeUb0ZfPlfDkiCrzJTYLL7GXv8EZYzpWRdv59ifm

xmLlgaHJAAwzAJPSwImd8r9m/5YSwkwa5cJbnaTCP9LC717OBBXeaTyKF2tFIWiFI1vh26EoHV6hBC6V

5cA2dci3Z70i0Aup93/0q4xP66iCZvjzDtoDCo5KZ2
BXq6hTx+FAWEMhDvEvxWVNTquZP9qse+s9yN8UlIOOFFcZkSY9MebSgTXJabwYkUQxKJyfbFkeL7rN0k

hsyLMAMnxKlQiItaOC4tCXRZRzanL0MMxujoS1ybpg84cBuQl41/Ufy5kaVPQNkE0hGvInQYI1xuZ1a5

AtUtVtekPuEbHC/+z8eaZVdpV7SRGEN+MJF8tMkYa/
QsSpJbQTNqkiYiWUxNalffbtGUU+30/ZL0ie/BkIfL8vaDC/gu7StxpqS1XZDqx/txY2kE80F6sKjmZ3

uXDpWpydR0X1dz77B7ijSjHEjv9A1QPXrhetWkmvgKSySkayk/Zr2zeV721qTgVTUDgQKUO7mAklt2hP

nn+icgPL77tJ6tM5zNHtx7ELmVNdiNIXnU1RK1Fzpa
AsvdLqt1StnFIGCuqNM6sttDFO3WK8+ko3F0rhquklnx4Y35dk7rX/eXmMquin0SxYXfcHLk3W9L8Aqe

M3vk9Aw9m3WCzELG7YSvYY/f2EvYtLGvFykb6z/9mB+rYvYbMfdGCOVHbcII8yPfTT+7xNjWPJ3QUx8z

9Hl9xkV4EClFGEAuq4kAxxTMCmjHYxVWyjdZlZwHfM
UfBZBLRcvCWtMPYvdJMdOz5yT714ysvEmKmePCD/UQQAkwXrh8Zi+/yRg8dl3tz2qARzbkJLQdzN5O25

8p3AUz/ziD1z8fQiGEfma5KsQ2qJKuxx4IGpiVbmDLSU/5xT/Gd3EwN2EvjZuJiJY2cgD74ct2CGeYkw

5NoEkSDIaRVrKmDgKxhuwhGgtnBEs97ZX1ElpdNeLJ
tPKH8+tXDM6UumAeRDe2Vi/+jcQBExgqXFNIxDLxHhAF7M8QQ2Ctv9xamaNEa0gssWm3RZKtzIM1PSFZ

b9RjI2KF1Ip7u9/ooTD0oR0FRgkPuBLFKmFiveayHm05fpqZRZJ3s9+6tIgj/TEzkFqg1PioIvQctiP6

dgu9qI4xEdYqQJKMYClBIGbsolYNKFyqJxeChqcITY
BTIExs1k359Xy8Hduaudagkzc8zkyLasTotLlL+0jrrv9PV+Wy+mOSxkiejqIgbvxPM08oRzYPejiLER

QlljacNuF8LAO5CZvb3GOBksyNBT37VqC1YIcD0PjxDAG1xIJvNrYBj6CdHlFZvwWQZRNfhDKQgRJK4J

NlCzFNYLjwByPgLqt7hzLFkUUhC3u+v8EBZFwa2fi2
n2kVZ/ogLicULMmuWEiUF+hPuiiEWUw8sMnqRAhaXsgL7b8k8Wbi4shkF7GBHI1C5SE0Gh7roYSEUB3e

qf1/xHZY1AswzP9wP0Y4SzGZGKjagjqRUTJUxLpv99SJjulyUCUnJCCKKbJwZ0Qy1yLYaHQNzh+4zi4W

Zrf9tXJY7KW5PEUJfY2U/+HETjbgbZKiP9SpaFBQIh
rFqvjEuCGg7l6u5JW5jIbFVocsaxmGtlu1+LZ0aNvIctR5ThDt6+WluQEyZI8dGHYFgJKIG2Q7kSUCpw

AN1eGfp2fP5aU/MpKawkseUB6NzWpetHtIlZCHYy2ooMrq8QjzT239RwAg3T62VT+ePZKbJTdLwQNKl3

7za3t/Kvp7bLa6PcP0CIlaiM3T6aqejyJHgMBGGro1
pfB3O743G75BM4FVr+4OQ4JcIThzoEj3FEuBNvkgr8B95sa9XccJdjk6BqGlCAlxa4Y5RArjMN5oIQLQ

mGzkmv63OHZmS8S6YaiLzTreWfk9c2JWwaW1DLbLOIAuHAEhiFYwl/6v9uJvzzeBfnySRtsb3kF4X0M4

BFQwDd4o5dY1qGkcV0Dvnj/Ng/9+pNuDudCHeZBmlb
gQRzK9VoHVMhz3lKY432I1Lbu9CR2gq0dtpHUjYs9X+stq2Haj9aMryZaFQ9SuVufcZNBFXNbZwBIdkL

KgSCxIYc2v4vR8gwSeHNswmu/V3XD0Q8dbzcSNw3P4HcApuuyG5SBZIgCSZwoOa1mhjIXVavnu/pPseg

BF31gkz98iOS7g18scGkaGE3m2ZvGA/axPQaPCZIvE
3uaZ77vVPodyJVNQH1+hqkxYAihfMkkmzEFsuJY0oK+sx5iMeZbG1Dc5hhKf9gCzMAij2/de2YC9Q09V

v2h6WjLmV7n8MA9ZFceUxLLU6ZvRx1ojqt6mRza+taJPFH3C+d1Be+yojjS3PBcQct855uRrW/+16Nfx

QFb3ZdHW+duzOTskDcwK1zA4mO0z2pvwpe9ciI2Xyj
CQqlirbF44KvqHx9wkSd531ByA3stvmFF9GvhXnTtFlVZgPn6J1esejU4MFHrsO/ApsBGYA/XN5gHTUO

kWlnaUH6MQv577YwC5jBgWqpCFsHs6JsmKz7cd1p3xMd9g1BbQ7dbB0fe1btEQsFpWnw69b7eYjOKZ5a

lq3P+P65sdvy7p5mhu14JRWt6YfP1Y0aaKaJyiYjiJ
Oi8ja3SHvkfN49YuRs6ofw3fGhj5LPkjRbPcPiFONk9NR+rXil1l5Mx0T0lPs9FfYfmSTR7eKjJqpIs1

ZagOJmN9inrOWO/vsqTSDR44j3swU6yCzwyJtcA5UhyRjs6Pfxka0sMosYx4EH9m8+pLxBRkth16TTpa

bJVXK/uzJQMztg7CcNXv75cV/l6GF3wn7Dekzi+MQ5
aF+geOe9PowstV+K8Q4HriIlglRAZrnMHYalnTAnE4/t3EbiunqbBcZuokLzA68AdKHGBGMG/SHm0l+v

bO7bgogCGYKNqiIPADFcohIZjFTFXc/ltJAFgVDjyhfTJIhm18pxJm582v1MH6Rs3WUOjAuE/ADxhHeR

cAzZhtTK5u3zhckDLwP54n28/PF+tK7Ubd7ThrYkwT
jM/SrmFmzcc3Lhqsfaky9ar2/elcReebrxJ5h4oB9Z+cNqqw1NzIZ7xUn7LkY945koOOXo9bmE5nZeA9

CAiiy01FmG9UJb8I6xpamM0YKWaSed9F4p+nxWhdk046htcUW1wzxjpi2GjzXjtycoBblmH41Fq5OvdJ

OB6dm0pv2EjdWPQeQufTcx2o5in7AM5+lgtpMl43ZA
rIWJhiqIyafpPZ9PXF66WkRsShy7RrcgJ4MtoJGhnFpsg0UmMeUtBJOGqYAJZdAsnkscjYmKAUCQqcfJ

IqM75Yw7Q6996CzvcdxWbCDF/9LbOYZhB5Nuz7NTFRejhz0zrf7lJQqiZkEOsTQc/vq31HO5Z5qc2sU1

0l0tAX9GJBpFI0gHjyv+yJr+yqg7FiXB3w12wsl88D
jIv89uo6VvA5NMtX2vFG7/VirA1f54OeqoDICuIF/3DydRDF0ZcDfV2qiOqecDLiHPKT81t8F+u2jl3G

96aoXcf7Vnn9iJtCxirBgnOUc+SnAekaggA4Y2VmKdN3JnIsPpkeIGsnb/H8uljgQX5sS9pPSSpF28gg

UI5UCsQIxAZR8MNFv92S74buiEeS7TTppLoQJUZZ/9
UEgeo889zGcL8TZkjx5E7iZ53KMziAbEecAi8phbDOcFicQ+ZuM+qm/dQb7Amiw8EZDN0/fkOhpCX01l

bUSQuh/nrrM2Y2MQdA7tO6+PhEHB6n2w+nC/SpVKFVY+x6OvK4xEG43vb0UHo9QXozfD5Q6XDT9gEywj

2F+xesg+OHLbrsinV0UyDCtFpZpqPKlAvlW3GtcL1F
vOw+SV7IWi+nK+Ob2SJ59IcEbpe4lXTH2uKigKAAbbUxLC8QPnl9eTg5kv6Tg34amHOuWMtN0+u9xZVA

l995TT4bU0wP6D7Fqlv+9S320J8vYjUcM0da2zLCmXVpO2NGpgN74NGyIi2q68Zs9ECJgt6c+R/DWgOI

763djEh+zPBHGR0l/AsvCyAnsWJDpVNlhJ2gXpnNeA
dDugi/mJo94Ys98Vm8bVZCyq/26Jmwr3B9Fp0+AG35B/HzWSXtaitAb0o8jqey78DUB3S+/5Y9dubDc/

UW3OkeZNZOFiIsidzM8V6Uj7Q4Dr7+QvjtKjyi/4LFOcahUtng1E/r68l5gfyBKXn5AhpTv556wZZadg

+Z4SbldIJN29yLVAmeJ1+wAUymLtpalgljsOOpfnMM
lHUGYxR8NruFiFRn/SuyUzTeDthe9IWPu65T7CL4LgDb47fdSZcccj2valKA0hNc/7j9Etk/kK+nfoAG

g3YE7Q+iFibKasFsBtOaTfi8hzV0GYRhv+dY8efdPTjb/cDzwHP/g5bHxQMZxNFee7NQRbJTWsV5pf9i

KvpVJaGqXvQSncuV4QdZh+gf9hilRumelX3JlGpz3Y
5gR9KeObKeeNvTYtyvtO2cuD0oi1Oylw1+3HeGq67PeKRQI9nFkUm2qlBZ22M/H05LxCI2o7I033Tfpd

OPT4X/EuBJ+L+Nft1TKKuyiZDqJLCdWPxTm1YlYJADZbJ/Tk+///brWDIEBqN99ZvD0HDHb7u/TN3+bI

BG7jXc/m+Evk0FVGU5AmQk+hh7Fvp23/V1fpoWol/r
jbSSFHggKzZeX8yzYqjiis4aWSH/W2XwckJm6IG1lkb+SkfL4SmOn3NCnsZqJhQ0tzVLVKrDzmm7faEY

sUCiQ+nT7rhP8IxV4WOB9q4I6jm4U+VHDEZDTjtKZTajcSamot45MgPN+NuC/uEfbp6jAXFCDkNx/af9

f3aN/QmL9DlTBqLrbfOHjF8TO3IcuE7NXm1d//Zafo
Y1Onyd/p/82UYuSwEtojI8zjJrPaHyfznx5XX/ITv3z/oj7Y4/7Y+wS1x/VA5Qfji7em3LfzuSJ5Iw/V

nw3kJ/5YPu70uCGR/s51nnGxguwOW1mOYqhk3P/dOt9JsKLdNiDu/V3t8ow/sIZcC/XIG0dQSMPWbXoa

XOuokf5bF7c7X/Sm1G8ME5GFemOQbVM2dJrK8WEkS2
8RhPp1HZGZRwYQ0Kg/ua95Ao+9CEbwp9HM0p8FK+RRvgZCiekqxS3DS4D+TLoT2yCUmQ6m2Ir7gyClcO

Yl39Kt4TYT206Npz8XrEX0lUwLu5/VT1XJ77iu8PA8n8PIZfnU6kKNJt4gDF9fUD45Sjb7/JH0zb99cd

2PwO6+MlkVlJeCnb3YelwjRhuf6fu1U9zMdK6Vhgs2
TEoag2wutlbmhFxgQF6QNnd5AcK6XmWSE721QB7XkG4pl6S0BRl5540iWj387bWU5D3qd9E+k8AHMqjN

g1x1jm4EQZNNwQ9ao7l1gJ53hJ7IVO30TStD0vNPpv+rHSp84QOiOEoopW5CYR9MFiuL7dxEYylHHd+x

uxQxqMf8vCNJr/r7B+PP15p/Wyc+8922znD3nz2kLS
6zaGAI3k1MuzZ8yXfD/Q+wnNzRbjKYobjcEwi28jF1w6QqrZ0Jxu/i6Cu6Chl98De9Mm/KXqr+HcZ0YT

kKOM9f8lv32/w8rAE8XtVio5APPzMxy0w9x2RXoSw7M/DwfaWJxhDDX13wgh1hJRpBmhYnjh5o/NzHwg

J6LJZ2bf/J7KOU85VzkZnLKwSJU8bjAVeoV4aV4wW5
GvTVDxl7Tp5DV3QT4utkdvtLC4AuhQghFPCFpygZn+FkguCwXzRXJ8TWwz0fcnr30Icsc8bKAAZvhY97

ibcn4ZpTiBkWjupw2qwBebl1PkweC4H9BtYwQR+9ci0F4vGDvAEAjEug8RO3AQFxiBigZ+Jkjk2QH50n

lykTc8ph3+LobX2wJPVBk1nZskympslm58fAZPt07w
Rlg/0iwg9yYUXqWTuHCIjDW7W8sWueNLdvO3RBaPr7Tb/mNTpmH47QWMA2ElkFx6XoCKNdvZqdmM1TqQ

JvMUp8wNlmtgAFIdGeuY17c2Z2TlZZRmnOE44g3RtsOaX9kLrvP/C74cSVMf2uuqNKG2c3tWmwv8vgph

I4Pj3VRaPL4zE2x9AXcuI3T+3mi1Q8IICSAff9rIb8
GB6Y7/NAyqlqLlx467ECbGMhTM1ImB75LFWU3VnI+2vs2P8GsiCcOSkIeZgPeBUBjwq6Ml643sy4Vtoj

zbbqc//gjoCxH+naXyxzulfKy8v2SThAzdxmqtkeW87JO3h+ZuC7UrrBTOZdWLzSVJ7gRBY9T5x6dF+0

BoKvvt8MvVAli5z2cM3/e826GMZ0Ym+lrojGIirNUt
CCdzhh1YC3COLFuRYoDY6vPDjaQZraZ054VZJQtBKhwlgylbTZ2iwYd+D38kyB9D+zN1lzICNpFyG8uz

hJ5vtk0ZGPi5LRbMrl7NFne5KRLoml36y9WYdKERl4d6AfgbbPwzFoO2vE6ZF35D+jrGjo4yrG7blZNL

PanZJ4f5c1N1tx0rGs0zc63ymI75wZ+N7mTzDj1tDb
XzlrXq9a7UxZvWHwXNHK45fsTGZ6bTkvZ//gjpOW8SEdwtlUZl4QDSja0072HEMR2hu3+n8Bb1qybSFN

JCDwwzEycWVhn3L5U3wAArGw3j4n7oOuYyIyxbOXsn9gJVOhIeU23EIbZOCeXK9SEFaVnx0pbTb0p+B7

9/5prfrqZtAoA39eQncZrGLU4Ltj/xzjyTadlVs2vi
iFw3Rcn0F4vxma62gm2MSsVheCx0osHi9z7W89rp/imSrX7yv3/23NdwsJ0bnjtztJ4nY2y9Rm+diURS

Glv7Zu4hbcBiz0p73EdRTasAdxvZUv0CuSDaWmcgzIUkYZxMcK45KTyrd7rpkGKAsBU4FvZhJCDfq8Re

2+/QxHBbksUP81iLAUsC906kUyaJx1hOYO1keCQ2au
G9z1A8/U5pysGGpPLg36Uk9876+O8NV2f5kXedn/yJ4TBqOdNTG+iJd/g3ahDcvO/fJV9ePG+pOs07/k

h6O+gD2b151aGP9ztG6l+Ydz1bRPZ5wu/ICbT1BqGYCZrmbvU60JFd2S2GY97k+U790q4sLn556pkPb8

bbnc74fYBz+K5CR8LhYZ/DL2z704k5jETwir2x5Jag
K7m2pc1tqFo+Yt2sD7l+H6He8/PdnN9B/P2x0uDJQrUINt8iKX0SzCOPo7zN1ma2T2Z2S9NJFr7iiJ0y

IHQ5Nzn1Mc90dJCeFpxkIl0EVSb/Ujj9/ged107LV1p52gM5qWEV/12EuUM5zTW52qT6oV45ElFSVNsM

QNbUvTQu7FS034UgV7PnW0X3Ap0AngdZyk/Dbaht4p
t/qt6/Z3a5wJm5YaU1d+Zp5Ub/XKzVawhPz5QmGoKTzr2IW8Z6Kl0qPJ491kjKSdpHG1b3n2qz4lSfmL

O1I0azk+5egCZoV4+CPQ/45s1HWhyh+1yM6kCt9Wi5z4n0S3kyw9GgZIz3zpTk4eyiquvl47bD0hTpkg

rL87yH/bVDP8wzc/Lt/tE8ZljyDx1nsmIHoO9K4chD
QmlmzQ9ayvXxBAJj4g504WXDzklG22z3nPb2G9yOKmsX3kyrPHhhb2BDA716aPlrIGjoJckGQ4dv3n9w

L7GsAHJA69sNon85Zj+EXt7zqe71mTeVL/y+YMvdxgbmtVkOxnB5Z9mk+7J89vA+gD2erDzv0njp+c33

a/ujKpIq7ZG/WDh2gKorL+nd/D6yiy04P1j9WrYiuy
+TL6u2zIkacSJ7DTJAcdmIbNVUkETcs3eoASe7+Qv7MBGQlWscjeutEon++FXYO/LWcbSDQ1aCgRcZFX

eRYYR3SjbvbXRcmP9aTylotldF4ieM+8XBChscr/joR9iUAkMW7xM0Kgn2XlmiK4+emrv1IEKVQnizlm

uUOos78UmkaiLvQdp/zucKzK/xAC4jYdebdxz9ehoq
jfwZ77ZIIXS5hu9mbU2AknRDywO36ccBqY3A41hGKolyqf31+1QmwBY7IifLzTbdorQNwHNCvo62V+Pg

ZX25beP3IhR7MknbMPaNFjd/qkR6akMb7tUUEszy80LSGERFs3TG6UVa/X3Gaq0a/iIyPX0A7u/Isdui

vFlu9h61ElxDuknHvlWWi2kONEps37G5/WmmUEl44j
6n2kl/xVqaF2jfvvzLHzxNo7nxnsljL8Q0w27OjuBPwu84mNlG07XQeZ93Y78qjp9nl+10gwYd6TtRrB

X3V4a1o5VE/2psg5NOt0wmaI6h4Z8MB1c3ElF/jCmqwYv3QlbS3yB/4p1LQ+/ZJUyDLb3RftRB1OHcyu

5d1Occ3/sZOlmWOh+60iNbzHkO9rzdZg2XCBngPM7d
dEg72N0ojl39m0x8JVXdcos0fX34agnwlrbG4dS0azb2HIY1bZlB2xt1/xHO+f137MW5Lp6FRGZ6mkZy

ZqT4vtb2flV0Tbyx0DmLI/5g2lBdPDTJH6pzqRnKjVKtvrmzyn5FrmbNV9E24NQLC43faFRDD7tbpBdo

WZ9h/xK0LKyyqj0/2Iz0KiIrz5iS26LA/K2X05HsK2
52s7yD6jQjgZnSPvXrKStR80Bh/VMMz0H+YQmX0jJiV45CcC+4aMZ75Qrz3QcgUk/a8hVaAVY8Ui6Zpg

lub/2+u9iRmwu0fggvVDF64oCd0Ox0tN69U/rIfrcvIvcF/D6H+6rd79aiDfHec7UoXfV3ox90PaimwT

t5M6UT6BPhcK+F9b0jJ+0UEpVf9ei/31X8pn1ej0tI
gg5l74aPMkkQh6VOjj0w0K00gnq9D/IyBwHzLbPz2HCpQrTPQzOLZZt6OzwL+Fmhyb8Hhgzc/u0Td2q6

yoqouf3Tk1q6X+69DET1N9Kgm0moa8uAwZNvuiI6hjOA3cxoW9v6Ii27ZkBBsOaX+/xkl0Ft7eRe8rfS

+75uPiasf5XXZ8e3Rnb85M6Aqjq9gzb5nurgunw8KZ
dY2cisbKO0QStgir5ZoqW8zhzbt9HEdImpzlV6KfX+oc4NX5lX3I4slBVOXEl8K/lMzSd3RYuHh9U70n

jGNVtiP3CLCdpKCiZA+NmFublHdicbZNk7jQa1OQK+4QB2ve0/DSozzkQLHbW5LXruh+mf6Ro6O6KbrZ

/s4UfMRs/0JHQ1w6ea6GcxZSzyz+wKB87VVLAvyI6B
5PkA5HYFxBinyn4XqibMcdRdhZ9t92cDScfpE+r0bVGP2P9zIRmNHLx7jfJlBUCwwf2ifMSMA1KimXMt

Rf+WvnD4XmLpsOsz2iDjqQAo2TMTA8uuya5Qxej0dFhbc4lj51or1uMeuoFoEb8C29pc+sHmlDDuB2OQ

grrURqHP3KX3Z79ED1VfO6pJXNqz5l/Y4Ezw33wO6h
I5Mw6XujQlLoj5AK3PbuUc7aA94mWzIiBC+RCn+nJy16ciNr5MMS+rsTWCy+V4bk/xc2ijv0XdSDOJMo

plHGHxr68BalyN1ojIKvjWbMZ0Q7qhAdAXtGvGAeoctiCPfn3SogJSq6peW9vA2PinS98KiCby+K+EXc

iscTI7joBXmjls6cGMzLB+aB/+hq7isNYXzo7gLwVO
ejl+pw1CYU6a8Gh+R/Ty1gmspLficZ3VQQJgeC3Kdhkod/rULJ4zqK2a8E3L3yZgakquBOMgx8GMD3Vc

hQ9yfKVhZz/VqdIZHuW6e0t6uojih3u4N4vZfJuY7ZIjZ7jjK6ZLD0VqBiVC7zWcQA9lNIvmuGWPi+r4

iQEmnaifs3kgQsSs8V9tV7Npc7uzQ/oTl83ZCrAjjk
bpyGY7nNX4dOdwawRhsq92578WT/bl29FR5whvIG1JiINAx823fAWLEJx5KzIE+seYX08vgUyvmWwHZ7

Dm1K+R+w7JhH7QXy/JPJjpNFsk9h+99/4O8098WNTR4Ch33+V1gYnr9MJdc6LMWVmCbf3/BtxkIS9WsT

lPCeZoWm/1Su4oJUQH0ma5k6yAbipFR63kNwdomaI1
gPOOA/i04j9SM/JQTEsh5Hsbyk/s8k2xXUEqY/TRCgiFVCpR6kY81j/unvA28QS35K2ICQ7gwLyrmc/R

s93q/SgRr8I6ickENcpWrT21rcNbVjEiGTix7YO2dF+TANmZIu5yX4xpfTLEm27kesJnquyf9oLc+AWK

ajKu9soIoplU1t6zQmbT4va5ECziCpKhyaQ2qxPILA
4d69ZwKC5F5ziyqxZ2w/yTfHj7Mv1De+DSwCCs9RXEFRO4T+DNBdQyyU4fXlJC97EdUC2Yprm+X2oNjX

CT/h3YaQnB3xJPuoLpFSIh51GDAFIU994lbG32p0/7n1EFcg+L+/tbFUl96pfIBV0Bz3Y5cS/zGP8At8

fCw2v40bcMokhWdeI+HnO0Ph6Ohu8pVkj61XZH/Rjf
C+l+FZdDD+l67CLWEU0UGx1FH0RoiRlSLhBSqCRgoK6KiFAF0O2BEgelNe4eNqk0oynUs9T4QxMuUmLN

MfpWInsHb3nGryH3hWYUwxlTCE5Pay8lKvZvEEoBDVPnromHOjpw7HYfcHlnjHrMzodXHCd6mgn+H+zo

c6XnVFKbqPURVWFFWOvCyjbAo1rSnT1D/iy7TXoVYY
SvTKwGb8846jkEy2a8t41V/gjx/F+eZTgQtvudXoh1uKhLAI30G4ZG8pGC4o+wftYXoD7/Vn82hUs/S6

1i1w9ImuwTs0ElTERFBiby84drLWqHKr8Jd4XlYGbEtAd7z1zqEet55VLbjNcer534h+sV0zlm19vak2

x13XxUibF3JRjcne2kyL6ZPpas56zFonWn0+RSc9O0
oedhT/cyVImf3fHUvELjOvBpsn37oyC/nmhT7a/QO8m4BfcN2BJ/6HJD2hiIa3QH/xdYfCaMGuKnIPmt

35y/7U1XaYjMQO9TfSUm7/jPfAHiX8HP2gZ+o31+QdzTecppAdBRIw8Dy3X0oORwp3b3J7e8fOAPB19v

wMCm7NPx3xLqMe2h394mu66e7sbl4AP1h9tofA05f+
lOonMj1E2R70hLXnovMbbUGz2eUxZOqfBm11YX74nZQD9VkiikKMR6T3Bu7N+cOfx21QXvRm9EKdeADM

fHKYDXs2Erc2iF/mLF5j+QJurM2MCgLFbbMO9tXh7ErEI7hb9taP/6EmpSOojviveTf0A9eCmj+la5Ry

c3DtznMY1pRSPz8bCQwDqEXYS/E5L5zaxuaRWyKDbk
BAbTLOk1sJmxTF9zXT9LbKEjGMCQb1xTrBQCLnu2qyT1d5DvSQcBef6lnHNuQMmiMApbBnQw8lagywHN

w0u9dR3AGsTSkVOv/9oPdszfkYpwBK5VyYLa/konL0lUO147m2YT5rubDiyTqBhWNfOfRMQUKEVYynWP

zfsa0lRkQLurIhK3RLHBlmMIgkkmbYr+9lZVLfOHNo
mpHF8R0jkQTzLZYvwUT0dNh3WRfCDb+F7EiqBNtfPNHCQw/VwTrpVdP/sf+6QUz/aVbzNHqSfGj3oQWM

2qQjT+cuJlLw2Hv8fI8GcZWkRlHKVvgzwHBFEgX4K5sBpug78yIkB6YzauP8of5YS0c5P0dSJccMgAF8

fd47NOlrUvc6d7walCXJAiVdTjL/YEckYW1aZXP+V9
gof3D6xsGYPREDgUw2vGPY3xvbxwz+YgFpUmaGr6BE7YSxFj224MIaogSesJ6R+VqRtJ3geh0r00TZ0v

1KULDxHPEPQpZByk/C77xm9oRaVQP6M0VRc+Fgb2ZQozx32zI38vBy2nImnP7BiSnec4/E3lqhaQsqZ6

evMi6xm2eyd4DydRujU9nQrJFSr01J/Z5dFt4On/mN
mDkROi5y+Hws1NWA5W2+IK6oNZYiEzWLA9zOw62YJmTf+7Fz8gxZvCRkTBwtVGrNwXzl9onspMA9MqnT

7WA3FV9hQgfwocQdMVp24xexjV/SyKTk7+v5gtnhQeOnKDnWoaC/3TTvYPOMl/kniNqzQIrKfKvAmTq6

egK2gHCWDHDX8GfTnrESprRkHiMejRRVyGOvq0FdwH
P4J65PKsjCbfnykpANnRuihWDx3x8voaRjLZVdKZ/LlVxVUltVbltIA/RdB0Wh0un60swPPpZucds/eN

jiYhMATcq9qea7jkOA3nvgeuGk4KcYAUr9Kh9LhbO7CwDXuDDSDkFPSnORbBDca9D+UhKqHD0TDCKO4X

c1uNQ5Jp/IkZi8NxwlsiNJicbZmPvJRM7BJavlk4FK
zVpICURhLQxn0LQ9N8UoTj+DT4LVrnGesmSvO/wLoYXqW8QDMBr5JgYqeXqQhWRdETtV2ZJL2uOHZzK6

mL7r8yiGhTZ5pvc6H/t8ZE4nrpsoxLbsk+aj5KWiYLWdSyCQbeIjW0cJtM5CINLkPvLbRP6Ag0KhJAVi

ms3eL8aHh4B0SGJTJ9MJHt4sAzBnv6gVjV3CMlnmP6
BWW28DVvLzvqLSrgebnEUzv8apvbSUbQJRr8GBKuSd99LFmk7a4BWjOMhbjRsBi9G86OIBkDvQDJV10H

biojwbLMAhPEP1BBYU3ViN31JxKQHp4Y/K/cOT0UBWjLC68MRZkC6wMf3jU3oC4uurYGQeICcPrzmYR6

uEH7yGmSr+wYeV01MaMi1qVG70BHRkxmgTZJQZdPBH
blsRg8uYrbDXxUX8uXHrxSLLDN77dyMnOIXDjYHU1GcwnsYN9bQsuoNaeGq72yhEfbj6tPzNB3mFrtt5

KTlISaEzOpn1uv7NrG62Zyr1ClqhjedoLmTYNxvZUvglqBYaXWEX6GAjuGOWtsIfeiKzqipfdyGn5vAK

2ZPMtmDaFPMIgCUdUsGV2OVVAGIyjQ9+kD0XS9ou6e
2sUb00G1EuiwbFVAuvYLHvhQURL2HpUb/cVF/yTtNFAJbL6cFkmf/xzcpjYjaGyB2cNSL+CKRaFYn8QE

7YJR0sYuBO8iK8EeYwE5d1ikOjKtWgYFvzalxllGSJNAO1RJxQdFO2mhsZr7E8XNRDygr959B5aiwjxV

zFWdzS4RADXqJtTGERJHiCaXoQx6SC2tx/O47yG0VR
OquCp/vypM+PQu5yClhSgEzfhL8iu0LUoCQRXRWmcRctFhEODZyKMw0qf/6NU5uTnkWQobIpTa/DSb2a

okSJCZrIrZ0jD/cFyYFpKt+iKSKy/LM2tbPqqZU25FGR9jEHDKNXBkjEBLnrRwto/OSRPdDrOTIaTIXQ

Za6maNqJRk3XjpBatzl/u9bWnJ6/+FzP3/+P/4X4BH
/w+xMcSIWJwmguNwaLRmOS7MLsMiYY1zlk7ORVVvYPWgCxfVPaLvOGhcMiwdtTNeGT7OlXO1SGFrD54g

POCsFBEkY4TySJHiKP9+QWmuNNN6woYkbH5QMLL0YjkxeUSTzSewP/mUuzeO6CGTPj5KsUXsZg0EiXah

ePEaOe81eLU0FhqhgcVg5K3/AM4Qx+qlIhtAfLLTNQ
RmBAjVqZ9fKKtnTS7fIEUrj8RkXfa3gAZf/KfPccY8WGpamq0HDtSWm7s+XL9p7XWr+KBfq77SDZ1ZPY

mzymE23H5Cp8CZWQPvCNn3uu4Zj1n2wmiE/SR5UWjHwVybg/LL4TALFtwU0FdyNAZS3eSpLKrs60+FPA

a0bd6UpMp5nXUeUQDvon+n+ntTzOGj8xmoqfKUn7w+
s+y8l7aGl7/0zic6O+cXM2+GsF7LCE0ih3OdJOEkLKuaxbUsGpvTHiV8EZZoe0NnBIh1SI5KIWOrCImc

ZA9Ns750IHNmV1DizAFuyr1ABSEuYf7tvC3xkMHbNQVHPJDIE4SkbBWzIIceXF1Zc2BxyqThMTT0W5Zt

vBalqLwvyKK0KRFhEPOnC2DjjFLuHvNuMSszLX3WqV
MlifAiWp0GVKVhPl4iuEDKh0wqNhDxIWVdQgAdVANaWKhqDS0KBhBjE3g4UGoxJ5RkS4ewWMLpD5MdoO

HzRD6OXPAjUu3fyDWakKCeLWJyTWMtZr4KGj6STaHvVW4puj6HLHBzRTWuIvjSBsu3XYzrVB9ZnJGzN4

XyotAuL7GpjMtwIW3ESJFJs643AVpsVCAtEaQyJAMg
jtZkSQYLNMNPT7OvdUIwG0ODUMFuG1hBJMEmT0oaZZ07lKX8BMysWS9LPMBUfWZ1CV5DpvCwNDz9C5Pi

D7kniXQ9TPlDJH4lWHjtZ2SlNHEjieznCPerGL47lKF7XOJxD2IwcRrfiYKvrRUeQl7oXJfyDX7Za655

LDWiR7DuwPHcwqTzSYJyLRZZXzOfS3TSOQQsCZDzH7
jnKEl6AREeYTG4MCStXZZsFQ9qIL7KAy8ISsBbLN9mzn8HTVZkMXZhVhnTVsy2SEivFP8KaORlB8Ylsg

LvS0MmkOgfHV3YcREvFM4WCUOlOo0kpC8NOBFZBQDkFWPkIEbdUA7rs8YbiutqWYTyimNovJsaBX4HVV

YeFMVyY1QhNNOloGGdecIxLTbzNVZfTWSmIOnpMH9Q
j1FaoXVaNUYgZCXfGJLTAEr+Td7TGF6el8ZiXZvwFbUwWT0ync9UQyW3EFEzSZVsBSR3VRPlTMKmEjnf

RKV2NPC8ZqwgSIM9URnnTBZrMqEhMmNiYZGaSvN9JSihYbd0BRUzEzJ2BIFtJpM8AQU5UWR8PhkiXFS6

UCHvMNQ4CquaHEU4UBFuQUQ4TlicGWU7UOOzOHD4Pd
trDiL9SVGeQjC8HMWrHUq6VRKbTop2DTV5XKJ6DQFkNkS7DQG9DaP7MLcnHtSyIBHrCwC6FBnrFls6FK

qwJFDhBpCgPzD8SNA0RCG9IjGdDCy1UWo3OWH8YXDjVJEtWYy7THW4DAszHYMeALGbUQV9CUowEmSlLZ

xaNTT2VMGxJLPnBKJ1QDMTCrYwLiLgRfkmVsKqHIU8
FHU6KVDmJiX1VKJoMUI6MCvoOFUrKYY9GQE8GiEsVpDnMTB8PMK3ZoRvKkCtJHC2CbL8CQcgIuDzJJn8

ZKI7HxHrIRp4IMB8AEN5JMdbWfG8WVJlOFEdQalyKBA6ATK2ACM0LhMfUJQ4KG6TZSm8WUY9QxHbUFKy

AxT8BKXcZtA7MBFuUjXyUVV5EhC8CCPvIhN5EOC0Jm
A0GOQtTnY4JMX4ArT2YNHjKuR8FRA1XiT1SLKrJpO7GLC6IsJ6RBYdOqP6MMN7KnK0VTTvGjD0PIB4Nz

P0EAFuBfU8DIS3HxF6WDOwMKg9RFKhMpB2OIG1JvL3FHMpFcF2BHV8LsM3PLUyDqB7NBS1XgWqQfLhKs

v4GBGjUGG0JpkoONK2OAEmJLHuHxsnSWS6PTVbVZG0
QQWbFbrcIANkWwJ6UXXeAZDrYIi2HET8YZXwLde1WME5VWPuWtGoFlJ2XKB0HrAGAoO9UgW1THqgNwV9

DZKnRTBbDNHjJEDfJZOkJyA4JUDrZBHzQEi7TjV5EWcyJtY0LRN7FGN8WYFbJkI5HVT0BCZ5SQWcIUVd

VCEaUBY9LNHmTHQ3AVXaVaS0DULqEoK9YCM0DzXnYd
TfSkO5KIlnZJK5SFhgNaV1CM6TJgT3YGs3ONU5OLKiBjW7KGF7ESG4OSMbAfe1BKN1BpQ0JUcuCwo2DL

U5SpK8AoLpPYM8JWSaKAK9WCfcYML5MIVfKUK0NUzzFIQ3WVinZaP0ZnIzHHLgLRDtGnL0IqChMKTbVK

T3PsMsSMVfDhWtUCG6TwI0UFmfLIb8SmMnVMc1YGJ9
HgW2JSGlOIa1PEV1KuZ5GCKmOMd2RDT0FfT8JbHjYGU3XYI3AwP4SUQeCQs1LMD3JAOqJM7JYT1ba9Fw

VUhaAKAhKZ9bmz4PUTcFGbGcR7C1rRByZk7jzGVqq5ZbgEG1r2WODhLxM4GahbEVNM7aJ8SaB92lRKqR

BgRbB3JfX9EamVXiSNq2P9SgyJirrVlwlVMmYQi0U0
Vpm9DbzeKtOMR9DN9BUWZdElfgN4WeTeMKJdZhB3MvorFYEe41SFxwCT1vSPEgFIR3ANKpEmwpEZdfDE

5EeCXfcTYQtjduWWQgF2T5KB8MUCINYg3+HAdtsjAfTrjQXqR6LIOcj0PoLTa2OP9WAVMdJDvgCN8Pm2

91K6N6MeK3rQWfCSD6VNS8aTDfJjArHJMasaXsK0Os
u4VIAQ6OmeLtQDyoGd7UnG3DbdSoYF2lt3HdowqIKqXpG0QymeW7L6xwcwKpTY5WTCM9R9mfkbDcHHDO

OqYvK6vrPRIrysOxHbJtZQNZMuHlT7VeaeFXMKEiovxbeP8pFGZ3PEDhKj9FEz5CZvYpGO2smr8XLnZw

SXNfXntLQcbkGGwczxPgGybQSqIhROMnRdjQDwq2FO
hbXE5Kxr6uX3F8UKyoOBUTB2CmpPUsKA2qT8AOU4toGIehEs8IGmGsR2TczhSmGYneP3VuTBvoOYOYUZ

qqIUPuE9UcLXNlIPVgDm6MYFOtQP1ISfNcCaThLGISXmRhCUWvNrFzKRdeGKHWBj8BQtOzU9cUKbumK3

PvVKqbDf0NJmQfO9J8lBmCuWS9GOY1FA2INbKUZtBy
KYh2K3B2bETmG2E6vGxBpFO6RU1KEM4PHCWgKQ5+PcLvO1TXLNkSJWT6UK1ObSXgLD7KtDQTO1DwfOUe

Sc1pVMFneKeuwRa+EaDeG8NYEYLJBZJ1ZC1UyJLnTD8BrBSRG9XopAAvJz5uOJnxWsUeWE4hFD7+IC9Q

HNOGMdDMQpCoIAgyWHzlTFKmBPy1V4K7SUJvJ4RYR2
H0W4u0q1wmpz2+MD3HRDKwO1CEDKVOOpNqASykPGtpPRKeJPd9F0L8WKZlW2CYC1mkZ2j6TI8+PiANCi

AgID4+DQo+Kn7SDF1mt1OhPUxlUNHiRC4ssz2RDDciUQGwP0JcIWGmIGdjJ4PpuFzuVQ7GCMsbXIlfHG

3MEXFsCRS6NO9+UWirbQGhQL0QWwa/bMGbX7nrbREv
VCpspt4t21w/DiHhOS6qBaXPUJ0bE4FoxOx5kvVPcf2IX5ymYcnxCa6+XZfaWMs2HmjrdS9zjSStdHu0

nKP5ds0dQr6wPFsoNMpooW8kxyF8dR0gSMZmUcW0qsS1eBS7ES1nWx8UQXIsFApmAZB1AtQWVQtmmY4a

BaOnDr9gdSZ1tDioQ1m0mw59Kg8zhoadOUj5PD8bNk
1dQt2qSVSat5rxjPB4ZI5iIct+CCtsAQWlIT4mMLC7ZnKORj1HVVF7H8s7kU6euZT8NU0NXnUiMHPuAX

AgICAgICAgICAgICAgICAgICAgICAgICAgICAgICAgICAgICAgICAgICAgICAgICAgICAgICAgICAgIC

AgICAgICAgICAgICAgICAgICAgICAgICAgICAgICAg
ICANCiAgICAgICAgICAgICAgICAgICAgICAgICAgICAgICAgICAgICAgICAgICAgICAgICAgICAgICAg

ICAgICAgICAgICAgICAgICAgICAgICAgICAgICAgICAgICAgICAgICAgICANCiAgICAgICAgICAgICAg

ICAgICAgICAgICAgICAgICAgICAgICAgICAgICAgIC
AgICAgICAgICAgICAgICAgICAgICAgICAgICAgICAgICAgICAgICAgICAgICAgICAgICAgICANCiAgIC

AgICAgICAgICAgICAgICAgICAgICAgICAgICAgICAgICAgICAgICAgICAgICAgICAgICAgICAgICAgIC

AgICAgICAgICAgICAgICAgICAgICAgICAgICAgICAg
ICAgICANCiAgICAgICAgICAgICAgICAgICAgICAgICAgICAgICAgICAgICAgICAgICAgICAgICAgICAg

ICAgICAgICAgICAgICAgICAgICAgICAgICAgICAgICAgICAgICAgICAgICAgICANCiAgICAgICAgICAg

ICAgICAgICAgICAgICAgICAgICAgICAgICAgICAgIC
AgICAgICAgICAgICAgICAgICAgICAgICAgICAgICAgICAgICAgICAgICAgICAgICAgICAgICAgICANCi

AgICAgICAgICAgICAgICAgICAgICAgICAgICAgICAgICAgICAgICAgICAgICAgICAgICAgICAgICAgIC

AgICAgICAgICAgICAgICAgICAgICAgICAgICAgICAg
ICAgICAgICANCiAgICAgICAgICAgICAgICAgICAgICAgICAgICAgICAgICAgICAgICAgICAgICAgICAg

ICAgICAgICAgICAgICAgICAgICAgICAgICAgICAgICAgICAgICAgICAgICAgICAgICANCiAgICAgICAg

ICAgICAgICAgICAgICAgICAgICAgICAgICAgICAgIC
AgICAgICAgICAgICAgICAgICAgICAgICAgICAgICAgICAgICAgICAgICAgICAgICAgICAgICAgICAgIC

ANCiAgICAgICAgICAgICAgICAgICAgICAgICAgICAgICAgICAgICAgICAgICAgICAgICAgICAgICAgIC

AgICAgICAgICAgICAgICAgICAgICAgICAgICAgICAg
ICAgICAgICAgICANCjw/xVLbY6yvpCXklmE7Z4feWg6EEk7RUL3ky4YbMOVwCYpdxcHiVixBRqMqSMTt

KgtVAbx0RLauGF9KoBYuY0DwN6DgVWlmYC0MOGCfVQSubJGaRRHhLJVbBfY2LDDiDChrFZ2CeENtKMar

VFVeJBKyNH5MMYCdO800mbSmOB3MDn7KZgOlNP8wbj
1YDoCcDHQzRbxWWxx2RCziTN6YtWMpeHVsGpFxCSGEBaLcB0sno8RuDdOzMQBLPPluFT8No6QpyRMkCV

o+Dq8AAX4dx7NvOZqrJvMhEZ8qgx8FQLhQZaBsG5XcwBjdJHUnsFsfjoVqCJ9jiLI0Y5Icn97xTEY2FR

oqzr2aRCSCAN0sBRX2TGGTIpLgNCHiWFDiQy3aYOWj
JBN6HlLhHXZUAD3GKJGmAPLnsDOdFWZaDWFIAJ0NIXzwTEB2KdpqylQlcOEoROmlQO1QLYOgddAaLyLi

MCBSDQo+Dk1ZSU9ld4LpRKpmMTWkGM6zpy7SGGyFPjVdI6Z4vKFmC2Y6MApbSk1CIZQsKTRxWhBiJBRX

JJwlCZ3EJT0oemF2MJ0XiNAnXIOeEYYhnMHgRWj9V9
3llMBmMYavBD0UWEN+Martine+Ls9SGQQkRMGfJEAfClNqWUJPZrLeV2ZnL3WEy5FaV1XxIK17xEcrozEcFO

byYC3KKF8lBULfXVRGDS8KeIUrtN9ouoMiTiOkQPGHBqDuT04veQPqPKLxYVYfETLfRk9GRPIlY4Yrjc

TfdOwoclEnAVUzOFVIFH0FULwdffWbpKGiwTijXD69
iBcoXS5EWn6KIuKwGG6pnn3YwQTxWv4TJHNpDP4OSRKqVJPkAGBtGGQ0BYQlCuLaGIxlFRWbXBPpPZU6

LNQjRQQhGH4LQmMhZKFbBBm3TkDxNTNyVAUlgz5KPHLmCTEbQHH0IiEyIERqHNSuAFgxLGTiJEDxMCR3

UNYdWLGgZS0WKzHuYEEfLCEoSJDfQDGkCNJcbj0BKN
AsQGGiKGH6WILsDOVeSRQbTMeqOKYcGHPkJrQ3IDBhGNXtJK6NToHmWODyALU6PBfkKEIpFQVecu8PVN

YbDIOkQrelFqAeDUVxZBYgIHymMUAzQXIeUuTzPTXlSYRqPC1UHfTvCOIxRYP3HUamUMXkNUJhzy2BXU

TzJCYnLVR6FLEyACEuWVOqSFjgDUZjNBI4Xdr5RSKs
NAFsKP0UOsJnGPKzUHD9NGLgMYOfKPEmll4GDRIsHYMeWxpyKJRhWYLzHUFmRZxpBJKnPYM5KBZ0OFDj

DBWpZO2CLhGkYFXiPKreKFXmNHYhLTJizj3FLEJuLGTbWTF9BxDrXXViUFMcPSwhLGYrEEW8BBQ3BCUz

DYNkDT4BJzUePZLwGPe5TKPmHZBsIISbpm6HNZKkHE
JgRJFbQcEyIAHsHAZbQFdoPQBfWZMdQCY2ZSEjWTOlRP6XBpKqVDIsNkX3SnZnLSAyJSLzem1YLVFkAQ

RvRWF1CMPbEUBxVPHuPFj2vvTiqSNmBQq7FY0SC4BmeaXiXdJYCb4Ir962OQB0VNNzGa9HQ1sdTu4nCL

XtSCETFp2GNQc3HzyvOxs8AzH2XtSdLMVuTqPhHOb9
ZGW3HwElWEa4C0G+PDexDTU9CBfsSCF7PWZwUVWdCQZkNVNfUwjpWvR7OYx7Qc3lGJPWUb1+DQpzdGFy

tXfdACSHHtDrOBT9XBbhXGHYRu7L







                                        Procedure

 

                                          



                                                                                
                                                                                
                                                                              



Social History

          



           Code       Duration   Value      Status     Description Data Source(s

)

 

           Smoking    10/16/2021 12:00:00 AM EDT Never Smoker completed  Never S

moker eCW1 

([facility])

 

                Alcohol intake  2021 12:00:00 AM EDT Current drinker of al

cohol (finding) 

completed                 Current drinker of alcohol (finding) [facility]

 

             Tobacco use and exposure 2021 12:00:00 AM EDT Never used   co

mpleted    Never 

used                                    [facility]

 

           Smoking    2021 12:00:00 AM EDT Never smoker completed  Never s

moker [facility]

 

                    Alcohol intake      2021 12:00:00 AM EDT Current non-d

scott of alcohol 

(finding)           completed           Current non-drinker of alcohol (finding)

 [facility]

 

             Smoking      2021 12:00:00 AM EDT Never Smoked Cigarettes com

pleted    Never 

Smoked Cigarettes                       [facility] ([facility] [place]

 

           Smoking    2021 12:00:00 AM EDT Never Smoker completed  Never S

moker eCW1 

([facility])

 

           Smoking    2021 12:00:00 AM EDT Never Smoker completed  Never S

moker eCW1 

([facility])

 

           Alcohol intake 2021 12:00:00 AM EDT No         completed       

      [facility]

 

           Smoking    2021 12:00:00 AM EDT Never smoker completed  Never s

moker [facility]

 

                Alcohol intake  2020 12:00:00 AM EST Current drinker of al

cohol (finding) 

completed                 Current drinker of alcohol (finding) [facility]

 

           Smoking    2020 12:00:00 AM EST Never smoker completed  Never s

moker [facility]

 

           Smoking    10/01/2020 12:00:00 AM EDT Never Smoker completed  Never S

moker eCW1 

([facility])



                                                                                
                                                                                
                                                          



Vital Signs

          



                    ID                  Date                Data Source

 

                    UNK                                      









           Name       Value      Range      Interpretation Code Description Data

 Source(s)

 

           Body weight 160.4 [lb_av]                       160.4 [facility]av[facility]

 

           Body weight 72.76 kg                         72.76 kg   eCW1 ([facility])

 

           Body height 66 [in_i]                        66 [in_i]  eCW1 ([facility])

 

           Body mass index (BMI) [Ratio] 25.89 kg/m2                       25.89

 kg/m2 eCW1 ([facility])

 

           Systolic blood pressure 122 mm[Hg]                       122 mm[Hg] e

CW1 ([facility])

 

           Diastolic blood pressure 74 mm[Hg]                        74 mm[Hg]  

eCW1 ([facility])

 

           Systolic blood pressure 112 mm[Hg]                       112 mm[Hg] M

[facility] ([facility])

 

           Diastolic blood pressure 82 mm[Hg]                        82 mm[Hg]  

[facility] ([facility])

 

           Body height 66 [in_i]                        66 [in_i]  [facility] ([facility])

 

                                        5'6" 

 

           Body weight 163.00 [lb_av]                       163.00 [lb_av] [facility]

T ([facility])

 

           Body mass index (BMI) [Ratio] 26.3 kg/m2                       26.3 k

g/m2 [facility] ([facility])

 

           Ideal body weight 130 [lb_av]                       130 [lb_av] [facility]

T ([facility])

 

           Body weight 73.937 kg                        73.937 kg  [facility] ([facility])

 

           Body surface area Derived from formula 1.83 m2                       

   1.83 m2    [facility] ([facility])

 

           Body weight 73.030 kg                        73.030 kg  [facility] ([facility])

 

           Body surface area Derived from formula 1.82 m2                       

   1.82 m2    [facility] ([facility])

 

           Body height 66 [in_i]                        66 [in_i]  [facility] ([facility])

 

                                        5'6" 

 

           Body weight 161.00 [lb_av]                       161.00 [lb_av] [facility]

T ([facility])

 

           Body mass index (BMI) [Ratio] 26.0 kg/m2                       26.0 k

g/m2 [facility] ([facility])

 

           Ideal body weight 130 [lb_av]                       130 [lb_av] [facility]

T ([facility])

 

           Systolic blood pressure 126 mm[Hg]                       126 mm[Hg] M

[facility] ([facility])

 

           Diastolic blood pressure 84 mm[Hg]                        84 mm[Hg]  

[facility] ([facility])

 

           Body height 66 [in_i]                        66 [in_i]  [facility] ([facility]

 

                                        5'6" 

 

           Body weight 161.00 [lb_av]                       161.00 [lb_av] [facility]

T ([facility])

 

           Body mass index (BMI) [Ratio] 26.0 kg/m2                       26.0 k

g/m2 [facility] ([facility]

 

           Body surface area Derived from formula 1.82 m2                       

   1.82 m2    [facility] ([facility]

 

           Body temperature 97.6 [degF]                       97.6 [degF] [facility]

 ([facility])

 

           Systolic blood pressure 122 mm[Hg]                       122 mm[Hg] M

[facility]

 

           Diastolic blood pressure 74 mm[Hg]                        74 mm[Hg]  

[facility]

 

           Heart rate 81 /min                          81 /min    [facility]

 

           Body temperature 36.56 Amanda                        36.56 Amanda  [facility]

 

           Respiratory rate 16 /min                          16 /min    [facility]

 

           Body height 167.6 cm                         167.6 cm   [facility]

 

           Body weight 72.576 kg                        72.576 kg  [facility]

 

           Body mass index (BMI) [Ratio] 25.82 kg/m2                       25.82

 kg/m2 [facility]

 

           Oxygen saturation in Arterial blood by Pulse oximetry 95 %           

                  95 %       [facility]

 

           Systolic blood pressure 110 mm[Hg]                       110 mm[Hg] S

[facility]

 

           Diastolic blood pressure 88 mm[Hg]                        88 mm[Hg]  

[facility]

 

           Heart rate 91 /min                          91 /min    [facility]

 

           Body weight 73.483 kg                        73.483 kg  [facility]

 

           Body mass index (BMI) [Ratio] 26.15 kg/m2                       26.15

 kg/m2 [facility]

 

           Oxygen saturation in Arterial blood by Pulse oximetry 99 %           

                  99 %       [facility]

 

           Body height 66 [in_i]                        66 [in_i]  [facility] ([facility]

 

                                        5'6" 

 

           Body weight 156.00 [lb_av]                       156.00 [lb_av] [facility]

T ([facility] [place]

 

           Body weight 70.762 kg                        70.762 kg  [facility] 

[place]

 

           Body mass index (BMI) [Ratio] 25.2 kg/m2                       25.2 k

g/m2 [facility] ([facility] [place]

 

           Systolic blood pressure 129 mm[Hg]                       129 mm[Hg] M

[facility] ([facility]

 

           Diastolic blood pressure 86 mm[Hg]                        86 mm[Hg]  

[facility] ([facility]

 

           Heart rate 104 /min                         104 /min   [facility] [place]

 

           Body temperature 96.0 [degF]                       96.0 [degF] [facility]

 ([facility] [place]

 

           Body weight 167 [lb_av]                       167 [lb_av] eCW1 ([facility])

 

           Body height 66 [in_i]                        66 [in_i]  eCW1 ([facility])

 

           Body mass index (BMI) [Ratio] 26.95 kg/m2                       26.95

 kg/m2 eCW1 ([facility])

 

           Systolic blood pressure 118 mm[Hg]                       118 mm[Hg] e

CW1 ([facility])

 

           Diastolic blood pressure 64 mm[Hg]                        64 mm[Hg]  

eCW1 ([facility])

 

           Systolic blood pressure 118 mm[Hg]                       118 mm[facility]Hg] [facility]

 

           Diastolic blood pressure 72 mm[Hg]                        72 mm[Hg]  

[facility]

 

           Heart rate 61 /min                          61 /min    [facility]

 

           Body weight 75.297 kg                        75.297 kg  [facility]

 

           Body mass index (BMI) [Ratio] 26.79 kg/m2                       26.79

 kg/m2 [facility]

 

           Oxygen saturation in Arterial blood by Pulse oximetry 93 %           

                  93 %       [facility]

 

           Body weight 74.39 kg                         74.39 kg   [facility]

 

           Body mass index (BMI) [Ratio] 26.47 kg/m2                       26.47

 kg/m2 [facility]

 

           Systolic blood pressure 110 mm[Hg]                       110 mm[Hg] M

[facility] ([facility])

 

           Diastolic blood pressure 74 mm[Hg]                        74 mm[Hg]  

[facility] ([facility])

 

           Body height 66 [in_i]                        66 [in_i]  [facility] ([facility]

 

                                        5'6" 

 

           Body weight 176.00 [lb_av]                       176.00 [lb_av] [facility]

T ([facility])

 

           Body mass index (BMI) [Ratio] 28.4 kg/m2                       28.4 k

g/m2 [facility] ([facility]

 

           Body surface area Derived from formula 1.89 m2                       

   1.89 m2    [facility] ([facility]

 

           Body temperature 98.2 [degF]                       98.2 [degF] [facility]

 ([facility])

 

           Systolic blood pressure 118 mm[Hg]                       118 mm[Hg] M

[facility] ([facility]

 

           Diastolic blood pressure 81 mm[Hg]                        81 mm[Hg]  

[facility] ([facility]

 

           Heart rate 93 /min                          93 /min    [facility] ([facility] [place]

 

           Body temperature 98.0 [degF]                       98.0 [degF] [facility]

 ([facility] [place]

 

           Body height 66 [in_i]                        66 [in_i]  [facility] 

[place]

 

                                        5'6" 

 

           Systolic blood pressure 112 mm[Hg]                       112 mm[Hg] M

[facility]

 

           Diastolic blood pressure 64 mm[Hg]                        64 mm[Hg]  

[facility]

 

           Heart rate 72 /min                          72 /min    [facility]

 

           Body temperature 36.33 Amanda                        36.33 Amanda  [facility]

 

           Respiratory rate 12 /min                          12 /min    [facility]

 

           Body height 167.6 cm                         167.6 cm   [facility]

 

           Body weight 83.915 kg                        83.915 kg  [facility]

 

           Body mass index (BMI) [Ratio] 29.86 kg/m2                       29.86

 kg/m2 [facility]

 

           Oxygen saturation in Arterial blood by Pulse oximetry 98 %           

                  98 %       [facility]

 

           Body weight 184.0 [lb_av]                       184.0 [lb_av] [facility]

 

           Body height 66 [in_i]                        66 [in_i]  W1 ([facility])

 

           Body mass index (BMI) [Ratio] 29.70 kg/m2                       29.70

 kg/m2 eCW1 ([facility])

 

           Systolic blood pressure 122 mm[Hg]                       122 mm[Hg] e

CW1 ([facility])

 

           Diastolic blood pressure 76 mm[Hg]                        76 mm[Hg]  

eCW1 ([facility])



                                                                                
                  



Patient Treatment Plan of Care

          



             Planned Activity Planned Date Details      Description  Data Source

(s)

 

             Prednisone 10 MG Oral Tablet 2021 12:00:00 AM EDT            

               [facility]

 

             Phentermine Hydrochloride 30 MG Oral Capsule 2021 12:00:00 AM

 EDT                           

[facility]

 

             Fluconazole 200 MG Oral Tablet 2021 12:00:00 AM EDT          

                 eCW1 ([facility])

 

             Fluconazole 200 MG Oral Tablet 2021 12:00:00 AM EDT          

                 eCW1 ([facility])

 

             Fluconazole 200 MG Oral Tablet 2021 12:00:00 AM EDT          

                 eCW1 ([facility])

 

             Omeprazole 20 MG Delayed Release Oral Capsule 2021 12:00:00 A

[facility]

 

             topiramate 25 MG Oral Tablet 2021 12:00:00 AM [facility]

 

             Phentermine Hydrochloride 30 MG Oral Capsule 2021 12:00:00 AM

 [facility]

 

             Phentermine Hydrochloride 30 MG Oral Capsule 2020 12:00:00 AM

 [facility]

 

             meloxicam 15 MG Oral Tablet 10/21/2020 12:00:00 AM EDT             

              [facility]

 

             topiramate 25 MG Oral Tablet 2020 12:00:00 AM [facility]

 

             Phentermine Hydrochloride 30 MG Oral Capsule 2020 12:00:00 AM

 [facility]

 

             Loratadine 10 MG Oral Tablet                                       

 [facility]

 

             topiramate 25 MG Oral Tablet                                       

 [facility]

## 2021-11-19 NOTE — ROOR
________________________________________________________________________________

Patient Name: Arjun Ansari           Procedure Date: 11/19/2021 2:01 PM

MRN: [id number]                          Account Number: [id number]

YOB: 1990              Age: 31

Room: [facility]                            Gender: Female

Note Status: Finalized                 

________________________________________________________________________________

 

Procedure:            Upper GI endoscopy

Indications:          Heartburn

Providers:            Lopez Knight MD

Referring MD:         Ngozi Garcia, Lopez Knight MD

Requesting Provider:  

Medicines:            Monitored Anesthesia Care

Complications:        No immediate complications.

________________________________________________________________________________

Procedure:            Pre-Anesthesia Assessment:

                      - Prior to the procedure, a History and Physical was 

                      performed, and patient medications and allergies were 

                      reviewed. The patient is competent. The risks and 

                      benefits of the procedure and the sedation options and 

                      risks were discussed with the patient. All questions 

                      were answered and informed consent was obtained. Patient 

                      identification and proposed procedure were verified by 

                      the physician, the nurse and the anesthesiologist in the 

                      procedure room. Mental Status Examination: alert and 

                      oriented. Airway Examination: normal oropharyngeal 

                      airway and neck mobility. Respiratory Examination: clear 

                      to auscultation. CV Examination: normal. Prophylactic 

                      Antibiotics: The patient does not require prophylactic 

                      antibiotics. Prior Anticoagulants: The patient has taken 

                      no previous anticoagulant or antiplatelet agents. ASA 

                      Grade Assessment: II - A patient with mild systemic 

                      disease. After reviewing the risks and benefits, the 

                      patient was deemed in satisfactory condition to undergo 

                      the procedure. The anesthesia plan was to use monitored 

                      anesthesia care (MAC). Immediately prior to 

                      administration of medications, the patient was 

                      re-assessed for adequacy to receive sedatives. The heart 

                      rate, respiratory rate, oxygen saturations, blood 

                      pressure, adequacy of pulmonary ventilation, and 

                      response to care were monitored throughout the 

                      procedure. The physical status of the patient was 

                      re-assessed after the procedure.

                      The Endoscope was introduced through the mouth, and 

                      advanced to the second part of duodenum. The upper GI 

                      endoscopy was accomplished without difficulty. The 

                      patient tolerated the procedure well.

                                                                                

Findings:

     The Z-line was regular and was found 40 cm from the incisors.

     The examined esophagus was normal.

     Patchy mild inflammation characterized by erythema and granularity was 

     found in the gastric antrum. Biopsies were taken with a cold forceps for 

     histology. Biopsies were taken with a cold forceps for Helicobacter 

     pylori testing. Verification of patient identification for the specimen 

     was done by the physician and nurse using the patient's name, birth date 

     and medical record number. Estimated blood loss was minimal.

     The duodenal bulb and second portion of the duodenum were normal.

     The BRAVO capsule with delivery system was introduced through the mouth 

     and advanced into the esophagus, such that the BRAVO pH capsule was 

     positioned 34 cm from the incisors, which was 6 cm proximal to the GE 

     junction. Suction was applied to the well of the BRAVO pH capsule to suck 

     in the adjacent mucosa of the esophagus using the external vacuum pump 

     set at a minimum vacuum pressure of 550 mmHg for 30 seconds. The BRAVO pH 

     capsule was then deployed by depressing the plunger on top of the handle 

     to advance the locking pin into the mucosa, thereby attaching the capsule 

     to the esophagus. The plunger was then rotated a quarter turn clockwise 

     to release the capsule from the delivery system. The delivery system was 

     then withdrawn. Endoscopy was utilized for probe placement and diagnostic 

     evaluation. The scope was reinserted to evaluate placement of the BRAVO 

     capsule. Visualization showed the BRAVO capsule to be in an appropriate 

     position.

                                                                                

Impression:           - Z-line regular, 40 cm from the incisors.

                      - Normal esophagus.

                      - Gastritis. Biopsied.

                      - Normal duodenal bulb and second portion of the 

                      duodenum.

                      - The BRAVO pH capsule was positioned 34 cm from the 

                      incisors, which was 6 cm proximal to the GE junction.

Recommendation:       - Patient has a contact number available for 

                      emergencies. The signs and symptoms of potential delayed 

                      complications were discussed with the patient. Return to 

                      normal activities tomorrow. Written discharge 

                      instructions were provided to the patient.

                      - High fiber diet.

                      - Continue present medications.

                      - Await pathology results.

                      - Follow an antireflux regimen.

                      - Telephone GI clinic for pathology results in 2 weeks.

                      - Return to GI clinic if persistent symptoms or new 

                      symptoms.

                      - Return to primary care physician.

                                                                                

Procedure Code(s):    --- Professional ---

                      28472, Esophagogastroduodenoscopy, flexible, transoral; 

                      with biopsy, single or multiple

Diagnosis Code(s):    --- Professional ---

                      K29.70, Gastritis, unspecified, without bleeding

                      R12, Heartburn

 

CPT copyright 2019 [employer]. All rights reserved.

 

The codes documented in this report are preliminary and upon  review may 

be revised to meet current compliance requirements.

 

Lopez Knight MD

_______________________

Lopez Knight MD

11/19/2021 2:45:39 PM

Electronically signed by Lopez Knight MD

Number of Addenda: 0

 

Note Initiated On: 11/19/2021 2:01 PM

Estimated Blood Loss: Estimated blood loss was minimal.

## 2022-11-02 ENCOUNTER — HOSPITAL ENCOUNTER (OUTPATIENT)
Dept: HOSPITAL 53 - M LAB REF | Age: 32
End: 2022-11-02
Attending: PHYSICIAN ASSISTANT
Payer: COMMERCIAL

## 2022-11-02 DIAGNOSIS — D22.71: Primary | ICD-10-CM

## 2022-12-28 ENCOUNTER — HOSPITAL ENCOUNTER (OUTPATIENT)
Dept: HOSPITAL 53 - M SFHCDERM | Age: 32
End: 2022-12-28
Attending: PHYSICIAN ASSISTANT
Payer: COMMERCIAL

## 2022-12-28 DIAGNOSIS — D23.9: Primary | ICD-10-CM

## 2023-04-18 ENCOUNTER — HOSPITAL ENCOUNTER (OUTPATIENT)
Dept: HOSPITAL 53 - M WHC | Age: 33
End: 2023-04-18
Attending: NURSE PRACTITIONER
Payer: COMMERCIAL

## 2023-04-18 DIAGNOSIS — Z12.31: Primary | ICD-10-CM

## 2023-04-24 ENCOUNTER — HOSPITAL ENCOUNTER (OUTPATIENT)
Dept: HOSPITAL 53 - M SFHCDERM | Age: 33
End: 2023-04-24
Attending: PHYSICIAN ASSISTANT
Payer: COMMERCIAL

## 2023-04-24 DIAGNOSIS — D22.61: Primary | ICD-10-CM

## 2023-06-02 ENCOUNTER — HOSPITAL ENCOUNTER (OUTPATIENT)
Dept: HOSPITAL 53 - M PLALAB | Age: 33
End: 2023-06-02
Attending: NURSE PRACTITIONER
Payer: COMMERCIAL

## 2023-06-02 DIAGNOSIS — N64.52: Primary | ICD-10-CM

## 2023-06-02 LAB
PROLACTIN SERPL-MCNC: 8.1 NG/ML
T4 FREE SERPL-MCNC: 0.98 NG/DL (ref 0.89–1.76)
TSH SERPL DL<=0.005 MIU/L-ACNC: 1.12 UIU/ML (ref 0.55–4.78)